# Patient Record
Sex: MALE | Race: WHITE | URBAN - METROPOLITAN AREA
[De-identification: names, ages, dates, MRNs, and addresses within clinical notes are randomized per-mention and may not be internally consistent; named-entity substitution may affect disease eponyms.]

---

## 2017-05-16 ENCOUNTER — EMERGENCY (EMERGENCY)
Facility: HOSPITAL | Age: 52
LOS: 0 days | Discharge: HOME | End: 2017-05-16

## 2017-06-28 DIAGNOSIS — Z79.84 LONG TERM (CURRENT) USE OF ORAL HYPOGLYCEMIC DRUGS: ICD-10-CM

## 2017-06-28 DIAGNOSIS — K85.90 ACUTE PANCREATITIS WITHOUT NECROSIS OR INFECTION, UNSPECIFIED: ICD-10-CM

## 2017-06-28 DIAGNOSIS — R10.13 EPIGASTRIC PAIN: ICD-10-CM

## 2017-06-28 DIAGNOSIS — Z79.899 OTHER LONG TERM (CURRENT) DRUG THERAPY: ICD-10-CM

## 2017-06-28 DIAGNOSIS — I10 ESSENTIAL (PRIMARY) HYPERTENSION: ICD-10-CM

## 2017-06-28 DIAGNOSIS — E11.9 TYPE 2 DIABETES MELLITUS WITHOUT COMPLICATIONS: ICD-10-CM

## 2019-04-03 ENCOUNTER — EMERGENCY (EMERGENCY)
Facility: HOSPITAL | Age: 54
LOS: 0 days | Discharge: HOME | End: 2019-04-03
Attending: EMERGENCY MEDICINE | Admitting: EMERGENCY MEDICINE
Payer: COMMERCIAL

## 2019-04-03 VITALS
DIASTOLIC BLOOD PRESSURE: 86 MMHG | HEART RATE: 85 BPM | OXYGEN SATURATION: 98 % | SYSTOLIC BLOOD PRESSURE: 176 MMHG | TEMPERATURE: 98 F | RESPIRATION RATE: 18 BRPM

## 2019-04-03 VITALS — HEIGHT: 70 IN | WEIGHT: 223.99 LBS

## 2019-04-03 DIAGNOSIS — Z23 ENCOUNTER FOR IMMUNIZATION: ICD-10-CM

## 2019-04-03 DIAGNOSIS — I10 ESSENTIAL (PRIMARY) HYPERTENSION: ICD-10-CM

## 2019-04-03 DIAGNOSIS — S61.311A LACERATION WITHOUT FOREIGN BODY OF LEFT INDEX FINGER WITH DAMAGE TO NAIL, INITIAL ENCOUNTER: ICD-10-CM
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
## 2019-04-03 DIAGNOSIS — E11.9 TYPE 2 DIABETES MELLITUS WITHOUT COMPLICATIONS: ICD-10-CM

## 2019-04-03 DIAGNOSIS — Z79.899 OTHER LONG TERM (CURRENT) DRUG THERAPY: ICD-10-CM
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
## 2019-04-03 DIAGNOSIS — Z87.19 PERSONAL HISTORY OF OTHER DISEASES OF THE DIGESTIVE SYSTEM: ICD-10-CM

## 2019-04-03 DIAGNOSIS — Z79.84 LONG TERM (CURRENT) USE OF ORAL HYPOGLYCEMIC DRUGS: ICD-10-CM

## 2019-04-03 PROCEDURE — 11760 REPAIR OF NAIL BED: CPT

## 2019-04-03 PROCEDURE — 99283 EMERGENCY DEPT VISIT LOW MDM: CPT | Mod: 25

## 2019-04-03 RX ORDER — TETANUS TOXOID, REDUCED DIPHTHERIA TOXOID AND ACELLULAR PERTUSSIS VACCINE, ADSORBED 5; 2.5; 8; 8; 2.5 [IU]/.5ML; [IU]/.5ML; UG/.5ML; UG/.5ML; UG/.5ML
0.5 SUSPENSION INTRAMUSCULAR ONCE
Qty: 0 | Refills: 0 | Status: COMPLETED | OUTPATIENT
Start: 2019-04-03 | End: 2019-04-03

## 2019-04-03 RX ORDER — CEPHALEXIN 500 MG
1 CAPSULE ORAL
Qty: 28 | Refills: 0
Start: 2019-04-03 | End: 2019-04-09

## 2019-04-03 RX ORDER — IBUPROFEN 200 MG
600 TABLET ORAL ONCE
Qty: 0 | Refills: 0 | Status: COMPLETED | OUTPATIENT
Start: 2019-04-03 | End: 2019-04-03

## 2019-04-03 RX ADMIN — Medication 600 MILLIGRAM(S): at 18:34

## 2019-04-03 RX ADMIN — TETANUS TOXOID, REDUCED DIPHTHERIA TOXOID AND ACELLULAR PERTUSSIS VACCINE, ADSORBED 0.5 MILLILITER(S): 5; 2.5; 8; 8; 2.5 SUSPENSION INTRAMUSCULAR at 18:34

## 2019-04-03 NOTE — ED PROVIDER NOTE - NS ED ROS FT
Review of Systems:  	•	CONSTITUTIONAL - no fever, no diaphoresis, no chills  	•	SKIN - left index laceration   	•	RESPIRATORY - no shortness of breath, no cough  	•	CARDIAC - no chest pain, no palpitations    	•	MUSCULOSKELETAL - no joint paint, no swelling, no redness  	•	NEUROLOGIC - no weakness, no headache, no paresthesias, no LOC  	•	PSYCH - no anxiety, non suicidal, non homicidal, no hallucination, no depression

## 2019-04-03 NOTE — ED PROVIDER NOTE - CLINICAL SUMMARY MEDICAL DECISION MAKING FREE TEXT BOX
53 male diabetic here for finger laceration had complex wound repair, update of Td and started on PO ABX for existing diabetes. Will discharge with outpatient management.

## 2019-04-03 NOTE — ED PROVIDER NOTE - PHYSICAL EXAMINATION
--EXAM--  VITAL SIGNS: I have reviewed vs documented at present.  CONSTITUTIONAL: Well-developed; well-nourished; in no acute distress.   SKIN: left index finger laceration  skin is torn and avusions. there is also a laceration under nail bed  HEAD: Normocephalic; atraumatic.  EYES: PERRL, EOM intact; conjunctiva and sclera clear. No nystagmus.  ENT: No nasal discharge; airway clear.  NECK: Supple; non tender.  CARD: S1, S2, Regular rate and rhythm.   RESP: No wheezes, rales or rhonchi.  ABD: Normal bowel sounds; soft; non-distended; non-tender.  EXT: Normal ROM.   NEURO: Alert, oriented, grossly unremarkable. Strength 5/5 in all extremities. Sensation intact throughout.  PSYCH: Cooperative, appropriate.

## 2019-04-03 NOTE — ED PROVIDER NOTE - OBJECTIVE STATEMENT
this is 52 yo male presents to ed for evaluation of laceration to index finger on left.  patient states he cut himself on saw. patient is diabetic but it is not on blood thinners.  patient states wound keeps bleeding

## 2019-04-03 NOTE — ED PROCEDURE NOTE - CPROC ED LACER REPAIR DETAIL1
The wound was explored to base in bloodless field./nail partially removed/Nail was excised./Undermining was performed.

## 2019-04-12 ENCOUNTER — EMERGENCY (EMERGENCY)
Facility: HOSPITAL | Age: 54
LOS: 0 days | Discharge: HOME | End: 2019-04-12
Admitting: EMERGENCY MEDICINE

## 2019-04-12 VITALS
SYSTOLIC BLOOD PRESSURE: 190 MMHG | RESPIRATION RATE: 18 BRPM | WEIGHT: 225.09 LBS | OXYGEN SATURATION: 98 % | HEART RATE: 79 BPM | DIASTOLIC BLOOD PRESSURE: 104 MMHG | HEIGHT: 70 IN | TEMPERATURE: 96 F

## 2019-04-12 DIAGNOSIS — Z79.899 OTHER LONG TERM (CURRENT) DRUG THERAPY: ICD-10-CM
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
## 2019-04-12 DIAGNOSIS — E11.9 TYPE 2 DIABETES MELLITUS WITHOUT COMPLICATIONS: ICD-10-CM

## 2019-04-12 DIAGNOSIS — S61.211D LACERATION WITHOUT FOREIGN BODY OF LEFT INDEX FINGER WITHOUT DAMAGE TO NAIL, SUBSEQUENT ENCOUNTER: ICD-10-CM

## 2019-04-12 DIAGNOSIS — Z79.84 LONG TERM (CURRENT) USE OF ORAL HYPOGLYCEMIC DRUGS: ICD-10-CM

## 2019-04-12 DIAGNOSIS — Z87.19 PERSONAL HISTORY OF OTHER DISEASES OF THE DIGESTIVE SYSTEM: ICD-10-CM

## 2019-04-12 DIAGNOSIS — Z79.2 LONG TERM (CURRENT) USE OF ANTIBIOTICS: ICD-10-CM

## 2019-04-12 DIAGNOSIS — I10 ESSENTIAL (PRIMARY) HYPERTENSION: ICD-10-CM

## 2019-04-12 NOTE — ED PROVIDER NOTE - NSFOLLOWUPINSTRUCTIONS_ED_ALL_ED_FT
clean with soap and water daily, Cover with bacitracin ointment, Return to ED if any problems arise.

## 2019-04-12 NOTE — ED PROVIDER NOTE - CLINICAL SUMMARY MEDICAL DECISION MAKING FREE TEXT BOX
Sutures removed, Wound healing well, Covered with bacitracin dressing, Patient informed to have BP rechecked by PMD

## 2020-05-14 ENCOUNTER — INPATIENT (INPATIENT)
Facility: HOSPITAL | Age: 55
LOS: 1 days | Discharge: HOME | End: 2020-05-16
Attending: INTERNAL MEDICINE | Admitting: INTERNAL MEDICINE
Payer: COMMERCIAL

## 2020-05-14 VITALS
DIASTOLIC BLOOD PRESSURE: 126 MMHG | SYSTOLIC BLOOD PRESSURE: 240 MMHG | RESPIRATION RATE: 20 BRPM | TEMPERATURE: 98 F | WEIGHT: 225.09 LBS | OXYGEN SATURATION: 96 % | HEIGHT: 70 IN | HEART RATE: 107 BPM

## 2020-05-14 LAB
ALBUMIN SERPL ELPH-MCNC: 4.2 G/DL — SIGNIFICANT CHANGE UP (ref 3.5–5.2)
ALP SERPL-CCNC: 83 U/L — SIGNIFICANT CHANGE UP (ref 30–115)
ALT FLD-CCNC: 16 U/L — SIGNIFICANT CHANGE UP (ref 0–41)
ANION GAP SERPL CALC-SCNC: 16 MMOL/L — HIGH (ref 7–14)
APTT BLD: 38 SEC — SIGNIFICANT CHANGE UP (ref 27–39.2)
AST SERPL-CCNC: 15 U/L — SIGNIFICANT CHANGE UP (ref 0–41)
B-OH-BUTYR SERPL-SCNC: 1.2 MMOL/L — HIGH
BASE EXCESS BLDV CALC-SCNC: 1.4 MMOL/L — SIGNIFICANT CHANGE UP (ref -2–2)
BASOPHILS # BLD AUTO: 0.09 K/UL — SIGNIFICANT CHANGE UP (ref 0–0.2)
BASOPHILS NFR BLD AUTO: 1 % — SIGNIFICANT CHANGE UP (ref 0–1)
BILIRUB SERPL-MCNC: 0.4 MG/DL — SIGNIFICANT CHANGE UP (ref 0.2–1.2)
BUN SERPL-MCNC: 19 MG/DL — SIGNIFICANT CHANGE UP (ref 10–20)
CA-I SERPL-SCNC: 1.18 MMOL/L — SIGNIFICANT CHANGE UP (ref 1.12–1.3)
CALCIUM SERPL-MCNC: 9 MG/DL — SIGNIFICANT CHANGE UP (ref 8.5–10.1)
CHLORIDE SERPL-SCNC: 100 MMOL/L — SIGNIFICANT CHANGE UP (ref 98–110)
CO2 SERPL-SCNC: 22 MMOL/L — SIGNIFICANT CHANGE UP (ref 17–32)
CREAT SERPL-MCNC: 0.9 MG/DL — SIGNIFICANT CHANGE UP (ref 0.7–1.5)
EOSINOPHIL # BLD AUTO: 0.24 K/UL — SIGNIFICANT CHANGE UP (ref 0–0.7)
EOSINOPHIL NFR BLD AUTO: 2.5 % — SIGNIFICANT CHANGE UP (ref 0–8)
GAS PNL BLDV: 140 MMOL/L — SIGNIFICANT CHANGE UP (ref 136–145)
GAS PNL BLDV: SIGNIFICANT CHANGE UP
GLUCOSE BLDC GLUCOMTR-MCNC: 269 MG/DL — HIGH (ref 70–99)
GLUCOSE SERPL-MCNC: 390 MG/DL — HIGH (ref 70–99)
HCO3 BLDV-SCNC: 27 MMOL/L — SIGNIFICANT CHANGE UP (ref 22–29)
HCT VFR BLD CALC: 51.6 % — SIGNIFICANT CHANGE UP (ref 42–52)
HCT VFR BLDA CALC: 51.5 % — HIGH (ref 34–44)
HGB BLD CALC-MCNC: 16.8 G/DL — SIGNIFICANT CHANGE UP (ref 14–18)
HGB BLD-MCNC: 17.6 G/DL — SIGNIFICANT CHANGE UP (ref 14–18)
HOROWITZ INDEX BLDV+IHG-RTO: 21 — SIGNIFICANT CHANGE UP
IMM GRANULOCYTES NFR BLD AUTO: 0.7 % — HIGH (ref 0.1–0.3)
INR BLD: 0.93 RATIO — SIGNIFICANT CHANGE UP (ref 0.65–1.3)
LACTATE BLDV-MCNC: 1.3 MMOL/L — SIGNIFICANT CHANGE UP (ref 0.5–1.6)
LYMPHOCYTES # BLD AUTO: 2.13 K/UL — SIGNIFICANT CHANGE UP (ref 1.2–3.4)
LYMPHOCYTES # BLD AUTO: 22.6 % — SIGNIFICANT CHANGE UP (ref 20.5–51.1)
MCHC RBC-ENTMCNC: 29.3 PG — SIGNIFICANT CHANGE UP (ref 27–31)
MCHC RBC-ENTMCNC: 34.1 G/DL — SIGNIFICANT CHANGE UP (ref 32–37)
MCV RBC AUTO: 86 FL — SIGNIFICANT CHANGE UP (ref 80–94)
MONOCYTES # BLD AUTO: 0.64 K/UL — HIGH (ref 0.1–0.6)
MONOCYTES NFR BLD AUTO: 6.8 % — SIGNIFICANT CHANGE UP (ref 1.7–9.3)
NEUTROPHILS # BLD AUTO: 6.26 K/UL — SIGNIFICANT CHANGE UP (ref 1.4–6.5)
NEUTROPHILS NFR BLD AUTO: 66.4 % — SIGNIFICANT CHANGE UP (ref 42.2–75.2)
NRBC # BLD: 0 /100 WBCS — SIGNIFICANT CHANGE UP (ref 0–0)
PCO2 BLDV: 44 MMHG — SIGNIFICANT CHANGE UP (ref 41–51)
PH BLDV: 7.4 — SIGNIFICANT CHANGE UP (ref 7.26–7.43)
PLATELET # BLD AUTO: 339 K/UL — SIGNIFICANT CHANGE UP (ref 130–400)
PO2 BLDV: 38 MMHG — SIGNIFICANT CHANGE UP (ref 20–40)
POTASSIUM BLDV-SCNC: 4 MMOL/L — SIGNIFICANT CHANGE UP (ref 3.3–5.6)
POTASSIUM SERPL-MCNC: 4.1 MMOL/L — SIGNIFICANT CHANGE UP (ref 3.5–5)
POTASSIUM SERPL-SCNC: 4.1 MMOL/L — SIGNIFICANT CHANGE UP (ref 3.5–5)
PROT SERPL-MCNC: 6.9 G/DL — SIGNIFICANT CHANGE UP (ref 6–8)
PROTHROM AB SERPL-ACNC: 10.7 SEC — SIGNIFICANT CHANGE UP (ref 9.95–12.87)
RBC # BLD: 6 M/UL — SIGNIFICANT CHANGE UP (ref 4.7–6.1)
RBC # FLD: 12.9 % — SIGNIFICANT CHANGE UP (ref 11.5–14.5)
SAO2 % BLDV: 75 % — SIGNIFICANT CHANGE UP
SARS-COV-2 RNA SPEC QL NAA+PROBE: SIGNIFICANT CHANGE UP
SODIUM SERPL-SCNC: 138 MMOL/L — SIGNIFICANT CHANGE UP (ref 135–146)
TROPONIN T SERPL-MCNC: <0.01 NG/ML — SIGNIFICANT CHANGE UP
WBC # BLD: 9.43 K/UL — SIGNIFICANT CHANGE UP (ref 4.8–10.8)
WBC # FLD AUTO: 9.43 K/UL — SIGNIFICANT CHANGE UP (ref 4.8–10.8)

## 2020-05-14 PROCEDURE — 70496 CT ANGIOGRAPHY HEAD: CPT | Mod: 26

## 2020-05-14 PROCEDURE — 70450 CT HEAD/BRAIN W/O DYE: CPT | Mod: 26,59

## 2020-05-14 PROCEDURE — ZZZZZ: CPT

## 2020-05-14 PROCEDURE — 99285 EMERGENCY DEPT VISIT HI MDM: CPT

## 2020-05-14 PROCEDURE — 70498 CT ANGIOGRAPHY NECK: CPT | Mod: 26

## 2020-05-14 PROCEDURE — 99222 1ST HOSP IP/OBS MODERATE 55: CPT

## 2020-05-14 RX ORDER — PANTOPRAZOLE SODIUM 20 MG/1
40 TABLET, DELAYED RELEASE ORAL
Refills: 0 | Status: DISCONTINUED | OUTPATIENT
Start: 2020-05-14 | End: 2020-05-16

## 2020-05-14 RX ORDER — DIPHENHYDRAMINE HCL 50 MG
50 CAPSULE ORAL ONCE
Refills: 0 | Status: COMPLETED | OUTPATIENT
Start: 2020-05-14 | End: 2020-05-14

## 2020-05-14 RX ORDER — HYDROCHLOROTHIAZIDE 25 MG
25 TABLET ORAL DAILY
Refills: 0 | Status: DISCONTINUED | OUTPATIENT
Start: 2020-05-14 | End: 2020-05-16

## 2020-05-14 RX ORDER — ASPIRIN/CALCIUM CARB/MAGNESIUM 324 MG
324 TABLET ORAL DAILY
Refills: 0 | Status: DISCONTINUED | OUTPATIENT
Start: 2020-05-14 | End: 2020-05-16

## 2020-05-14 RX ORDER — LABETALOL HCL 100 MG
10 TABLET ORAL ONCE
Refills: 0 | Status: COMPLETED | OUTPATIENT
Start: 2020-05-14 | End: 2020-05-14

## 2020-05-14 RX ORDER — LISINOPRIL 2.5 MG/1
20 TABLET ORAL DAILY
Refills: 0 | Status: DISCONTINUED | OUTPATIENT
Start: 2020-05-14 | End: 2020-05-15

## 2020-05-14 RX ORDER — METOPROLOL TARTRATE 50 MG
25 TABLET ORAL DAILY
Refills: 0 | Status: DISCONTINUED | OUTPATIENT
Start: 2020-05-14 | End: 2020-05-16

## 2020-05-14 RX ORDER — ATORVASTATIN CALCIUM 80 MG/1
80 TABLET, FILM COATED ORAL ONCE
Refills: 0 | Status: COMPLETED | OUTPATIENT
Start: 2020-05-14 | End: 2020-05-14

## 2020-05-14 RX ADMIN — Medication 10 MILLIGRAM(S): at 20:08

## 2020-05-14 RX ADMIN — Medication 324 MILLIGRAM(S): at 19:55

## 2020-05-14 NOTE — ED ADULT NURSE NOTE - NSIMPLEMENTINTERV_GEN_ALL_ED
Implemented All Universal Safety Interventions:  [place] to call system. Call bell, personal items and telephone within reach. Instruct patient to call for assistance. Room bathroom lighting operational. Non-slip footwear when patient is off stretcher. Physically safe environment: no spills, clutter or unnecessary equipment. Stretcher in lowest position, wheels locked, appropriate side rails in place.

## 2020-05-14 NOTE — ED ADULT TRIAGE NOTE - MODE OF ARRIVAL
Urology Consult History and Physical    Name: Jose Juan Araujo    MRN: [id number]   YOB: 1958       We were asked to see Jose Juan Araujo at the request of Dr. Valdez for evaluation and treatment of ureteral calculus.        Chief Complaint:   Ureteral calculus     History is obtained from the patient          History of Present Illness:   Jose Juan Araujo is a 60 year old male who is being seen for evaluation of left ureteral calculus. Patient has long history of stones with multiple episodes of renal colic over the past 30 years. More recently required ureteroscopy with laser lithotripsy and stent for a larger stone. Patient was here on business and noted abdominal pain. Has history of diverticulitis and was initially unsure of the cause of the pain. The pain worsened and he realized this was likely renal colic.   He presented to [facility] ED and was found to have a mid ureteral 5mm calculus on the left. He initially had some difficulty with pain but currently is comfortable with PO medication.     We discussed ureteroscopy vs MET. Discussed pain management while traveling. Discussed close follow up when patient returns home.            Past Medical History:   No past medical history on file.         Past Surgical History:   No past surgical history on file.         Social History:     Social History     Tobacco Use     Smoking status: Not on file   Substance Use Topics     Alcohol use: Not on file            Family History:   No family history on file.           Allergies:   No Known Allergies         Medications:     Current Facility-Administered Medications   Medication     acetaminophen (TYLENOL) Suppository 650 mg     acetaminophen (TYLENOL) tablet 650 mg     lidocaine (LMX4) cream     lidocaine 1 % 0.1-1 mL     magnesium hydroxide (MILK OF MAGNESIA) suspension 30 mL     melatonin tablet 1 mg     morphine (PF) injection 2-4 mg     naloxone (NARCAN) injection 0.1-0.4 mg     ondansetron  "(ZOFRAN-ODT) ODT tab 4 mg    Or     ondansetron (ZOFRAN) injection 4 mg     oxyCODONE-acetaminophen (PERCOCET) 5-325 MG per tablet 1-2 tablet     senna-docusate (SENOKOT-S/PERICOLACE) 8.6-50 MG per tablet 1 tablet    Or     senna-docusate (SENOKOT-S/PERICOLACE) 8.6-50 MG per tablet 2 tablet     sodium chloride (PF) 0.9% PF flush 3 mL     sodium chloride (PF) 0.9% PF flush 3 mL     sodium chloride 0.9% infusion     tamsulosin (FLOMAX) capsule 0.4 mg             Review of Systems:    ROS: 10 point ROS neg other than the symptoms noted above in the HPI and chart.          Physical Exam:     Patient Vitals for the past 24 hrs:   BP Temp Temp src Pulse Heart Rate Resp SpO2 Height Weight   06/13/19 0739 128/83 97.8  F (36.6  C) Oral 53 53 16 97 % -- --   06/13/19 0641 -- -- -- -- -- 16 -- -- --   06/13/19 0603 -- -- -- -- -- 16 -- -- --   06/13/19 0548 -- -- -- -- -- 16 -- -- --   06/13/19 0448 132/86 98.1  F (36.7  C) Oral -- 56 16 97 % -- --   06/13/19 0302 131/85 -- -- -- 58 16 96 % -- --   06/13/19 0200 -- -- -- -- -- -- 96 % -- --   06/13/19 0110 124/79 -- -- 57 -- -- 95 % -- --   06/13/19 0023 134/83 98.1  F (36.7  C) Temporal 74 74 20 98 % 1.778 m (5' 10\") 83.9 kg (185 lb)     General: age-appropriate appearing male in NAD.  body habitus.  HEENT: Head AT/NC, EOMI, CN Grossly intact  Resp: no respiratory distress,   CV: negative   Back:  no CVAT bilaterally.  Abdomen: soft, non-distended, non-tender  Neuro:  moving all 4 extremities equally.  Skin: clear of rashes or ecchymoses.          Data:   All laboratory data reviewed:    Recent Labs   Lab 06/13/19  0030   WBC 8.6   HGB 14.3        Recent Labs   Lab 06/13/19  0030      POTASSIUM 4.0   CHLORIDE 106   CO2 27   BUN 28   CR 1.47*   *   IDANIA 8.8     Recent Labs   Lab 06/13/19  0103   COLOR Yellow   APPEARANCE Clear   URINEGLC Negative   URINEBILI Negative   URINEKETONE Negative   SG 1.026   URINEPH 5.0   PROTEIN Negative   NITRITE Negative "   LEUKEST Negative   RBCU 53*   WBCU 1       All pertinent imaging reviewed:    CT scan of the abdomen:   Left ureteral calculus        CT scan of the pelvis:   See above             Impression and Plan:   Impression:   59 y/o M with long history of nephrolithiasis currently with left mid ureteral calculus       Plan:   Toradol 15 mg now   Continue percocet   Patient may be discharged and has scheduled follow up in [place]         Anuradha German MD  June 13, 2019        [employer]

## 2020-05-14 NOTE — H&P ADULT - HISTORY OF PRESENT ILLNESS
Pt's a 55 yo male w/PMH as noted below. Pt was told to go to hospital for  left facial droop , noticed by co workers and neighbor , @ around 5 pm.  pt was also told he had slurred speech w/ weakness of lower extremities.  pt denied- LOC, headache, loss of: vision or sensation.  presently in ER pt w/ still some left facial droop despite full beard, no other focal signs noted. neurologist was called by ER MD, who request neuro checks q 1 hr. pt . transfer to ICu

## 2020-05-14 NOTE — ED PROVIDER NOTE - CLINICAL SUMMARY MEDICAL DECISION MAKING FREE TEXT BOX
Stroke code called.  not a candidate for tpa.  Neuro rec aspirin, Lipitor, q 1 hr neuro checks.  will admit to crit care setting for neuro checks

## 2020-05-14 NOTE — ED PROVIDER NOTE - PROGRESS NOTE DETAILS
Stroke code called Authored by PAULA England: spoke with Neurology (Dr. Ross) pt is out of the window for TPA, wants CTA of head and neck and admission to stroke unit. Authored by PAULA England: spoke with Dr. Ross, will await official CTA reading, but states no acute intervention at this time. start ASA and lipitor 80mg and would like Q1hr neuro checks.  Pt also noted to have itchiness to neck and wrist after receiving contrast. Re-evaluated pt states it has improved no swelling, no difficulty breathing. offered benadryl and solu-medrol, pt refusing at this time. pt approved for ICU by Dr. Douglass.

## 2020-05-14 NOTE — ED PROVIDER NOTE - OBJECTIVE STATEMENT
The pt is a 54y M with PMH HTN and DM is presenting to ED with concern for stroke unknown well. Pt states his neighbor noticed a facial droop and slurred speech, pt is unsure if it started today. Pt states he has felt dizzy and off balance for a week now. no aggravating or relieving factors. Pt states he feels like it started soon after starting Lipitor. Pt denies any aphasia, noticeable weakness or slurred speech, trauma, fall, f/c/n/v/d, abd pain, cp, sob, palpitations, HA, visual changes.

## 2020-05-14 NOTE — ED ADULT TRIAGE NOTE - CHIEF COMPLAINT QUOTE
c/o light headed and weak in his legs. States his speech is slurred.  Pt has a slight left sided facial droop.   Stroke Code called.  .

## 2020-05-14 NOTE — ED PROVIDER NOTE - NS ED ROS FT
GEN: (-) fever, (-) chills, (-) malaise  HEENT: (-) vision changes, (-) HA, (-) sore throat, (-) ear pain, (-) tinnitus   CV: (-) chest pain, (-) palpitations, (-) edema  PULM: (-) cough, (-) wheezing, (-) dyspnea  GI: (-) abdominal pain,(-) Nausea, (-) Vomiting, (-) Diarrhea  NEURO: (+) weakness, (-) paresthesias, (-) syncope, (+) Dizziness  : (-) dysuria, (-) frequency, (-) urgency, (-) incontinence   MS: (-) back pain, (-) joint pain, (-)myalgias, (-) swelling  SKIN: (-) rashes, (-) new lesions  HEME: (-) bleeding, (-) ecchymosis

## 2020-05-14 NOTE — ED PROVIDER NOTE - CARE PLAN
Principal Discharge DX:	Dizziness  Secondary Diagnosis:	Facial droop  Secondary Diagnosis:	Weakness  Secondary Diagnosis:	Hyperglycemia  Secondary Diagnosis:	Hypertension

## 2020-05-14 NOTE — H&P ADULT - NSHPPHYSICALEXAM_GEN_ALL_CORE
GENERAL:  53y/o WN /WN  white Male NAD, resting comfortably.  HEAD:  Atraumatic, Normocephalic, some left facial droop  EYES: EOMI, PERRLA, conjunctiva and sclera clear  NECK: Supple, No JVD, no cervical lymphadenopathy, non-tender  CHEST/LUNG: Clear to auscultation bilaterally; No wheeze, rhonchi, or rales  HEART: Regular rate and rhythm; S1&S2  ABDOMEN: Soft, Nontender, Nondistended x 4 quadrants; Bowel sounds present  EXTREMITIES:   Peripheral Pulses Present, No clubbing, no cyanosis, or no edema, no calf tenderness  PSYCH: AAOx3, cooperative, appropriate  NEUROLOGY: cr n 1-12 grossly intact , no motor deficits, strength 5/5 x 4 extremities  SKIN: WNL

## 2020-05-14 NOTE — H&P ADULT - ATTENDING COMMENTS
Pt seen and examined at bedside with MICU team on 5/14/2020    I agree with PA physical exam and plan.    - pt has left face droop  - c/w asa, lipitor   - neuro fu   - carotid doppler, 2 d echo,   - neruocheck q 1 hr

## 2020-05-14 NOTE — H&P ADULT - NSHPLABSRESULTS_GEN_ALL_CORE
17.6   9.43  )-----------( 339      ( 14 May 2020 18:52 )             51.6       05-14    138  |  100  |  19  ----------------------------<  390<H>  4.1   |  22  |  0.9    Ca    9.0      14 May 2020 18:52    TPro  6.9  /  Alb  4.2  /  TBili  0.4  /  DBili  x   /  AST  15  /  ALT  16  /  AlkPhos  83  05-14                  PT/INR - ( 14 May 2020 18:52 )   PT: 10.70 sec;   INR: 0.93 ratio         PTT - ( 14 May 2020 18:52 )  PTT:38.0 sec    Lactate Trend      CARDIAC MARKERS ( 14 May 2020 18:52 )  x     / <0.01 ng/mL / x     / x     / x            CAPILLARY BLOOD GLUCOSE  404 (14 May 2020 20:27)      POCT Blood Glucose.: 269 mg/dL (14 May 2020 22:28)

## 2020-05-14 NOTE — CHART NOTE - NSCHARTNOTEFT_GEN_A_CORE
Paged about stroke code at 6.46 PM    62 year old man with Hx of DM and HTN who presents with slurred speech, left facial and arm weakness. Last known well is known. Per report patient had imbalance for the past week and this afternoon was visiting his neighbor when he noted that patient has left facial weakness. Per NP patient has slurred speech, left facial weakness and left arm weakness. CT head showed no acute pathology. Patient is not a candidate for IV Tpa since his last known well is unknown. CT angiogram of head and neck was done which showed no large vessel occlusion, official reading is pending.     - Admit to ICU with q1h neurocheck  - ASA 81 and Lipitor 80  - TSH, A1c and Lipid profile   - Cardiac monitoring   - TTE  - Repeat CT head in 24 hours.

## 2020-05-14 NOTE — ED PROVIDER NOTE - PHYSICAL EXAMINATION
GEN: Alert & Oriented x 3, No acute distress. Calm, appropriate.  Head and Neck: Normocephalic, atraumatic.   ENT: Oral mucosa pink, moist without lesions.   Eyes: PERRL. EOMI. no nystagmus. No conjunctival injection. No scleral icterus.  RESP: Lungs clear to auscult bilat. no wheezes, rhonchi or rales. No retractions. Equal air entry.  CARDIO: regular rate and rhythm, no murmurs, rubs or gallops. Normal S1, S2. Radial pulses 2+ bilaterally. No lower extremity edema.  ABD: Soft, Nondistended. No rebound tenderness/guarding. No pulsatile mass. No tenderness with palpation x 4 quadrants.  MS: grossly moving all extremities.   SKIN: no rashes/lesions, no petechiae, no ecchymosis.  Neuro: A&O x3, CN II- XII intact, strength 4/5 left upper ext. 5/5 strength right upper ext, 5/5 lower ext strength, sensation intact. Speech & mentation intact. Left sided facial droop. Without dysarthria or aphasia. Finger to nose intact. Romberg Neg. Neg. nuchal rigidity.

## 2020-05-14 NOTE — H&P ADULT - ASSESSMENT
1.  r/o right CVA  2. hx- HTN, DM 1.  r/o right CVA  2. hx- HTN, DM    Discussed w/ ICU MD , adm to ICU  pt not deemed as a candidate for tpa and presently has only  slight left facial droop.  pt able to speak clearly no dysarrthria ,pt able to swallow, as evaluated initially by RN  will start diet  neuro checks q 1 hr (as requested by neurologist)  aspirin 325 qd  plavix 75 qd  lipitor 40  ck lipid panel  carotid doppler  echocardiogram  PT/OT evaluation  ecg in am  f/u repeat CT brain in about 12 hrs  may need MRI head neck pending ICU team and neurologist discretion.

## 2020-05-15 LAB
CHOLEST SERPL-MCNC: 202 MG/DL — HIGH (ref 100–200)
D DIMER BLD IA.RAPID-MCNC: 222 NG/ML DDU — SIGNIFICANT CHANGE UP (ref 0–230)
GLUCOSE BLDC GLUCOMTR-MCNC: 233 MG/DL — HIGH (ref 70–99)
GLUCOSE BLDC GLUCOMTR-MCNC: 276 MG/DL — HIGH (ref 70–99)
GLUCOSE BLDC GLUCOMTR-MCNC: 297 MG/DL — HIGH (ref 70–99)
GLUCOSE BLDC GLUCOMTR-MCNC: 304 MG/DL — HIGH (ref 70–99)
GLUCOSE BLDC GLUCOMTR-MCNC: 332 MG/DL — HIGH (ref 70–99)
GLUCOSE BLDC GLUCOMTR-MCNC: 578 MG/DL — CRITICAL HIGH (ref 70–99)
HDLC SERPL-MCNC: 34 MG/DL — LOW
LIPID PNL WITH DIRECT LDL SERPL: 138 MG/DL — HIGH (ref 4–129)
TOTAL CHOLESTEROL/HDL RATIO MEASUREMENT: 5.9 RATIO — HIGH (ref 4–5.5)
TRIGL SERPL-MCNC: 196 MG/DL — HIGH (ref 10–149)

## 2020-05-15 PROCEDURE — 99233 SBSQ HOSP IP/OBS HIGH 50: CPT

## 2020-05-15 PROCEDURE — 70450 CT HEAD/BRAIN W/O DYE: CPT | Mod: 26

## 2020-05-15 PROCEDURE — 99222 1ST HOSP IP/OBS MODERATE 55: CPT

## 2020-05-15 PROCEDURE — 70551 MRI BRAIN STEM W/O DYE: CPT | Mod: 26

## 2020-05-15 RX ORDER — DEXTROSE 50 % IN WATER 50 %
12.5 SYRINGE (ML) INTRAVENOUS ONCE
Refills: 0 | Status: DISCONTINUED | OUTPATIENT
Start: 2020-05-15 | End: 2020-05-16

## 2020-05-15 RX ORDER — DEXTROSE 50 % IN WATER 50 %
25 SYRINGE (ML) INTRAVENOUS ONCE
Refills: 0 | Status: DISCONTINUED | OUTPATIENT
Start: 2020-05-15 | End: 2020-05-16

## 2020-05-15 RX ORDER — SODIUM CHLORIDE 9 MG/ML
1000 INJECTION, SOLUTION INTRAVENOUS
Refills: 0 | Status: DISCONTINUED | OUTPATIENT
Start: 2020-05-15 | End: 2020-05-16

## 2020-05-15 RX ORDER — LISINOPRIL 2.5 MG/1
40 TABLET ORAL DAILY
Refills: 0 | Status: DISCONTINUED | OUTPATIENT
Start: 2020-05-16 | End: 2020-05-16

## 2020-05-15 RX ORDER — ENOXAPARIN SODIUM 100 MG/ML
40 INJECTION SUBCUTANEOUS DAILY
Refills: 0 | Status: DISCONTINUED | OUTPATIENT
Start: 2020-05-15 | End: 2020-05-16

## 2020-05-15 RX ORDER — INSULIN LISPRO 100/ML
5 VIAL (ML) SUBCUTANEOUS
Refills: 0 | Status: DISCONTINUED | OUTPATIENT
Start: 2020-05-15 | End: 2020-05-15

## 2020-05-15 RX ORDER — LISINOPRIL 2.5 MG/1
20 TABLET ORAL ONCE
Refills: 0 | Status: COMPLETED | OUTPATIENT
Start: 2020-05-15 | End: 2020-05-15

## 2020-05-15 RX ORDER — DEXTROSE 50 % IN WATER 50 %
15 SYRINGE (ML) INTRAVENOUS ONCE
Refills: 0 | Status: DISCONTINUED | OUTPATIENT
Start: 2020-05-15 | End: 2020-05-15

## 2020-05-15 RX ORDER — INSULIN GLARGINE 100 [IU]/ML
21 INJECTION, SOLUTION SUBCUTANEOUS AT BEDTIME
Refills: 0 | Status: DISCONTINUED | OUTPATIENT
Start: 2020-05-15 | End: 2020-05-16

## 2020-05-15 RX ORDER — ATORVASTATIN CALCIUM 80 MG/1
40 TABLET, FILM COATED ORAL AT BEDTIME
Refills: 0 | Status: DISCONTINUED | OUTPATIENT
Start: 2020-05-15 | End: 2020-05-16

## 2020-05-15 RX ORDER — DEXTROSE 50 % IN WATER 50 %
12.5 SYRINGE (ML) INTRAVENOUS ONCE
Refills: 0 | Status: DISCONTINUED | OUTPATIENT
Start: 2020-05-15 | End: 2020-05-15

## 2020-05-15 RX ORDER — INSULIN GLARGINE 100 [IU]/ML
12 INJECTION, SOLUTION SUBCUTANEOUS ONCE
Refills: 0 | Status: COMPLETED | OUTPATIENT
Start: 2020-05-15 | End: 2020-05-15

## 2020-05-15 RX ORDER — GLUCAGON INJECTION, SOLUTION 0.5 MG/.1ML
1 INJECTION, SOLUTION SUBCUTANEOUS ONCE
Refills: 0 | Status: DISCONTINUED | OUTPATIENT
Start: 2020-05-15 | End: 2020-05-16

## 2020-05-15 RX ORDER — INSULIN GLARGINE 100 [IU]/ML
12 INJECTION, SOLUTION SUBCUTANEOUS AT BEDTIME
Refills: 0 | Status: DISCONTINUED | OUTPATIENT
Start: 2020-05-15 | End: 2020-05-15

## 2020-05-15 RX ORDER — INSULIN LISPRO 100/ML
VIAL (ML) SUBCUTANEOUS
Refills: 0 | Status: DISCONTINUED | OUTPATIENT
Start: 2020-05-15 | End: 2020-05-16

## 2020-05-15 RX ORDER — INSULIN LISPRO 100/ML
7 VIAL (ML) SUBCUTANEOUS
Refills: 0 | Status: DISCONTINUED | OUTPATIENT
Start: 2020-05-15 | End: 2020-05-16

## 2020-05-15 RX ORDER — DEXTROSE 50 % IN WATER 50 %
25 SYRINGE (ML) INTRAVENOUS ONCE
Refills: 0 | Status: DISCONTINUED | OUTPATIENT
Start: 2020-05-15 | End: 2020-05-15

## 2020-05-15 RX ORDER — DEXTROSE 50 % IN WATER 50 %
15 SYRINGE (ML) INTRAVENOUS ONCE
Refills: 0 | Status: DISCONTINUED | OUTPATIENT
Start: 2020-05-15 | End: 2020-05-16

## 2020-05-15 RX ORDER — CLOPIDOGREL BISULFATE 75 MG/1
75 TABLET, FILM COATED ORAL DAILY
Refills: 0 | Status: DISCONTINUED | OUTPATIENT
Start: 2020-05-15 | End: 2020-05-16

## 2020-05-15 RX ORDER — ZOLPIDEM TARTRATE 10 MG/1
5 TABLET ORAL AT BEDTIME
Refills: 0 | Status: DISCONTINUED | OUTPATIENT
Start: 2020-05-15 | End: 2020-05-15

## 2020-05-15 RX ORDER — INSULIN LISPRO 100/ML
VIAL (ML) SUBCUTANEOUS
Refills: 0 | Status: DISCONTINUED | OUTPATIENT
Start: 2020-05-15 | End: 2020-05-15

## 2020-05-15 RX ADMIN — Medication 5 UNIT(S): at 07:52

## 2020-05-15 RX ADMIN — Medication 7 UNIT(S): at 11:42

## 2020-05-15 RX ADMIN — INSULIN GLARGINE 21 UNIT(S): 100 INJECTION, SOLUTION SUBCUTANEOUS at 22:35

## 2020-05-15 RX ADMIN — ZOLPIDEM TARTRATE 5 MILLIGRAM(S): 10 TABLET ORAL at 23:17

## 2020-05-15 RX ADMIN — CLOPIDOGREL BISULFATE 75 MILLIGRAM(S): 75 TABLET, FILM COATED ORAL at 11:06

## 2020-05-15 RX ADMIN — ENOXAPARIN SODIUM 40 MILLIGRAM(S): 100 INJECTION SUBCUTANEOUS at 11:04

## 2020-05-15 RX ADMIN — Medication 7 UNIT(S): at 19:59

## 2020-05-15 RX ADMIN — Medication 3: at 11:42

## 2020-05-15 RX ADMIN — Medication 25 MILLIGRAM(S): at 05:08

## 2020-05-15 RX ADMIN — LISINOPRIL 20 MILLIGRAM(S): 2.5 TABLET ORAL at 15:36

## 2020-05-15 RX ADMIN — Medication 3: at 19:58

## 2020-05-15 RX ADMIN — Medication 4: at 07:52

## 2020-05-15 RX ADMIN — LISINOPRIL 20 MILLIGRAM(S): 2.5 TABLET ORAL at 05:08

## 2020-05-15 RX ADMIN — INSULIN GLARGINE 12 UNIT(S): 100 INJECTION, SOLUTION SUBCUTANEOUS at 01:09

## 2020-05-15 RX ADMIN — ATORVASTATIN CALCIUM 40 MILLIGRAM(S): 80 TABLET, FILM COATED ORAL at 22:36

## 2020-05-15 RX ADMIN — Medication 324 MILLIGRAM(S): at 11:06

## 2020-05-15 RX ADMIN — PANTOPRAZOLE SODIUM 40 MILLIGRAM(S): 20 TABLET, DELAYED RELEASE ORAL at 06:24

## 2020-05-15 NOTE — PROGRESS NOTE ADULT - ASSESSMENT
IMPRESSION:    Acute CVA  HO HTN, DM    PLAN:    CNS: MRI today, neuro check q4h    HEENT: Oral care    PULMONARY:  HOB @ 45 degrees    CARDIOVASCULAR: BP control    GI: GI prophylaxis.  Feeding     RENAL:  Follow up lytes.  Correct as needed    INFECTIOUS DISEASE: Follow up cultures. No Abx    HEMATOLOGICAL:  DVT prophylaxis.    ENDOCRINE:  Follow up FS.  Insulin protocol if needed.    MUSCULOSKELETAL: OOb to chair, PT rehab    Downgrade to tele

## 2020-05-15 NOTE — PHYSICAL THERAPY INITIAL EVALUATION ADULT - GENERAL OBSERVATIONS, REHAB EVAL
10:27 - 10:50. Chart reviewed. Patient available to be seen for physical therapy, confirmed with nurse. Patient encountered semi-reclined in bed, +tele. Denies pain at rest, but says his "head feels foggy."  Agreeable for PT evaluation.

## 2020-05-15 NOTE — PROGRESS NOTE ADULT - ASSESSMENT
54 M  w/PMH HTN, DM who p/w  left facial droop , also slurred speech w/ weakness of lower extremities       # Acute CVA  repeat CT head shows small R corpus callosum infarct  Neurology has seen, indicates q4 neurochecks, chk MRI brain, chk echo & hgbA1c  cont ASA, plavix, lipitor  PT consult      #HTN, DM    cont lisinopril , HCTZ, metoprolol   on Insulin basal bolus regimen, monitor FS 54 M  w/PMH HTN, DM who p/w  left facial droop , also slurred speech w/ weakness of lower extremities       # Acute CVA  repeat CT head shows small R corpus callosum infarct  Neurology has seen, indicates q4 neurochecks, chk MRI brain, chk echo & hgbA1c, D-dimer  cont ASA, plavix, lipitor  PT consult, OT, speech      #HTN, DM    cont lisinopril , HCTZ, metoprolol   on Insulin basal bolus regimen, monitor FS

## 2020-05-15 NOTE — OCCUPATIONAL THERAPY INITIAL EVALUATION ADULT - GENERAL OBSERVATIONS, REHAB EVAL
13:00-13:23 chart reviewed, ok to treat by Occupational Therapist as confirmed by RN, Patient received supine in bed in No Apparent Distress. +Tele

## 2020-05-15 NOTE — CONSULT NOTE ADULT - SUBJECTIVE AND OBJECTIVE BOX
Neurology Consultation note    Name  ALEA MORRIS    HPI:  Pt's a 53 yo male w/PMH as noted below. Pt was told to go to hospital for  left facial droop , noticed by co workers and neighbor , @ around 5 pm.  pt was also told he had slurred speech w/ weakness of lower extremities.  pt denied- LOC, headache, loss of: vision or sensation.  presently in ER pt w/ still some left facial droop despite full beard, no other focal signs noted. neurologist was called by ER MD, who request neuro checks q 1 hr. pt . transfer to ICu (14 May 2020 23:49)      NEURO  53 yo man w/ hx of dm and htn adm w/ dyarthria and l facial droop  no hx of cva  out of tpa window  nihss3  mrs 0  patient feels unchnged this am  cth and cta h/ n on adm normal      Vital Signs Last 24 Hrs  T(C): 36.1 (15 May 2020 07:01), Max: 36.7 (14 May 2020 18:43)  T(F): 97 (15 May 2020 07:01), Max: 98.1 (14 May 2020 18:43)  HR: 74 (15 May 2020 07:03) (74 - 107)  BP: 168/81 (15 May 2020 07:03) (134/62 - 240/126)  BP(mean): 116 (15 May 2020 07:03) (89 - 122)  RR: 15 (15 May 2020 07:03) (15 - 50)  SpO2: 94% (15 May 2020 07:03) (92% - 96%)    Neurological Exam:   Mental status: Awake, alert and oriented x3.  Recent and remote memory intact.  Naming, repetition and comprehension intact.  Attention/concentration intact.  , no aphasia.  Fund of knowledge appropriate.    Cranial nerves: Pupils equally round and reactive to light, visual fields full, no nystagmus, extraocular muscles intact, V1 through V3 intact bilaterally and symmetric, l central facial, hearing intact to finger rub, palate elevation symmetric, tongue was midline. mild dysarthria  Motor:  MRC grading 5/5 b/l UE/LE except 4+/5 prox lue   strength 5/5.  Normal tone and bulk.  No abnormal movements.    Sensation: Intact to light touch, proprioception, and pinprick.   Coordination: No dysmetria on finger-to-nose and heel-to-shin.  No dysdiadokinesia.  Reflexes: 2+ in bilateral UE/LE, downgoing toes bilaterally. (-) Cortes.      Medications  aspirin  chewable 324 milliGRAM(s) Oral daily  atorvastatin 40 milliGRAM(s) Oral at bedtime  clopidogrel Tablet 75 milliGRAM(s) Oral daily  dextrose 40% Gel 15 Gram(s) Oral once PRN  dextrose 5%. 1000 milliLiter(s) IV Continuous <Continuous>  dextrose 50% Injectable 12.5 Gram(s) IV Push once  dextrose 50% Injectable 25 Gram(s) IV Push once  dextrose 50% Injectable 25 Gram(s) IV Push once  enoxaparin Injectable 40 milliGRAM(s) SubCutaneous daily  glucagon  Injectable 1 milliGRAM(s) IntraMuscular once PRN  hydrochlorothiazide 25 milliGRAM(s) Oral daily  insulin glargine Injectable (LANTUS) 21 Unit(s) SubCutaneous at bedtime  insulin lispro (HumaLOG) corrective regimen sliding scale   SubCutaneous three times a day before meals  insulin lispro Injectable (HumaLOG) 7 Unit(s) SubCutaneous before breakfast  insulin lispro Injectable (HumaLOG) 7 Unit(s) SubCutaneous before lunch  insulin lispro Injectable (HumaLOG) 7 Unit(s) SubCutaneous before dinner  lisinopril 20 milliGRAM(s) Oral daily  metoprolol succinate ER 25 milliGRAM(s) Oral daily  pantoprazole    Tablet 40 milliGRAM(s) Oral before breakfast      Lab  05-14    138  |  100  |  19  ----------------------------<  390<H>  4.1   |  22  |  0.9    Ca    9.0      14 May 2020 18:52    TPro  6.9  /  Alb  4.2  /  TBili  0.4  /  DBili  x   /  AST  15  /  ALT  16  /  AlkPhos  83  05-14                          17.6   9.43  )-----------( 339      ( 14 May 2020 18:52 )             51.6     LIVER FUNCTIONS - ( 14 May 2020 18:52 )  Alb: 4.2 g/dL / Pro: 6.9 g/dL / ALK PHOS: 83 U/L / ALT: 16 U/L / AST: 15 U/L / GGT: x                   Radiology      Assessment:  53 yo man adm with sudden onset dysarthria and l facial. on exam this am, left arm is weak as well  adm to ccu for cva w/u  Plan:  q 4 neuro checks  mri brain nc  2d echo  cont asa and plavix . patient was on asa at home  lipitor 80 mg qd  check lipids and hba1c  PT for lue weakness

## 2020-05-15 NOTE — OCCUPATIONAL THERAPY INITIAL EVALUATION ADULT - PLANNED THERAPY INTERVENTIONS, OT EVAL
motor coordination training/cognitive, visual perceptual/transfer training/balance training/ADL retraining

## 2020-05-15 NOTE — PROGRESS NOTE ADULT - SUBJECTIVE AND OBJECTIVE BOX
SUBJECTIVE:    Stable, no complaints. Still w/ facial droop + slurred speech.    PAST MEDICAL & SURGICAL HISTORY  Pancreatitis  Hypertension  Diabetes  No significant past surgical history    SOCIAL HISTORY:  Negative for smoking/alcohol/drug use.     ALLERGIES:  No Known Allergies    MEDICATIONS:  STANDING MEDICATIONS  aspirin  chewable 324 milliGRAM(s) Oral daily  atorvastatin 40 milliGRAM(s) Oral at bedtime  clopidogrel Tablet 75 milliGRAM(s) Oral daily  dextrose 5%. 1000 milliLiter(s) IV Continuous <Continuous>  dextrose 50% Injectable 12.5 Gram(s) IV Push once  dextrose 50% Injectable 25 Gram(s) IV Push once  dextrose 50% Injectable 25 Gram(s) IV Push once  enoxaparin Injectable 40 milliGRAM(s) SubCutaneous daily  hydrochlorothiazide 25 milliGRAM(s) Oral daily  insulin glargine Injectable (LANTUS) 21 Unit(s) SubCutaneous at bedtime  insulin lispro (HumaLOG) corrective regimen sliding scale   SubCutaneous three times a day before meals  insulin lispro Injectable (HumaLOG) 7 Unit(s) SubCutaneous before breakfast  insulin lispro Injectable (HumaLOG) 7 Unit(s) SubCutaneous before lunch  insulin lispro Injectable (HumaLOG) 7 Unit(s) SubCutaneous before dinner  lisinopril 20 milliGRAM(s) Oral daily  metoprolol succinate ER 25 milliGRAM(s) Oral daily  pantoprazole    Tablet 40 milliGRAM(s) Oral before breakfast    PRN MEDICATIONS  dextrose 40% Gel 15 Gram(s) Oral once PRN  glucagon  Injectable 1 milliGRAM(s) IntraMuscular once PRN    VITALS:   T(F): 97  HR: 74  BP: 168/81  RR: 15  SpO2: 94%    LABS:                        17.6   9.43  )-----------( 339      ( 14 May 2020 18:52 )             51.6     05-14    138  |  100  |  19  ----------------------------<  390<H>  4.1   |  22  |  0.9    Ca    9.0      14 May 2020 18:52    TPro  6.9  /  Alb  4.2  /  TBili  0.4  /  DBili  x   /  AST  15  /  ALT  16  /  AlkPhos  83  05-14    PT/INR - ( 14 May 2020 18:52 )   PT: 10.70 sec;   INR: 0.93 ratio      PTT - ( 14 May 2020 18:52 )  PTT:38.0 sec    Troponin T, Serum: <0.01 ng/mL (05-14-20 @ 18:52)    CARDIAC MARKERS ( 14 May 2020 18:52 )  x     / <0.01 ng/mL / x     / x     / x        05-14-20 @ 07:01  -  05-15-20 @ 07:00  --------------------------------------------------------  IN: 0 mL / OUT: 550 mL / NET: -550 mL    05-15-20 @ 07:01  -  05-15-20 @ 08:58  --------------------------------------------------------  IN: 0 mL / OUT: 800 mL / NET: -800 mL    PHYSICAL EXAM:  GEN: NAD, comfortable  LUNGS: CTAB, no w/r/r  HEART: RRR, no m/r/g  ABD: soft, NT/ND, +BS  EXT: no edema, PP b/l  NEURO: AAOx3, L-sided facial droop noted, also w/ slurred speech

## 2020-05-15 NOTE — SWALLOW BEDSIDE ASSESSMENT ADULT - ORAL PREPARATORY PHASE
Bolus falls into right lateral sulci/Bolus falls into left lateral sulci/Decreased mastication ability

## 2020-05-15 NOTE — PHYSICAL THERAPY INITIAL EVALUATION ADULT - DISCHARGE DISPOSITION, PT EVAL
Patient demonstrated independent bed mobility, transfers, gait / overall functional mobiltiy. Does not require acute PT interventions in this setting. Please re-consult PT if needed / if change in status./no skilled PT needs

## 2020-05-15 NOTE — PROGRESS NOTE ADULT - SUBJECTIVE AND OBJECTIVE BOX
Patient is a 54y old  Male who presents with a chief complaint of stroke code called (15 May 2020 10:46)        Over Night Events: no events overnight        ROS:  See HPI    PHYSICAL EXAM    ICU Vital Signs Last 24 Hrs  T(C): 36.2 (15 May 2020 11:11), Max: 36.7 (14 May 2020 18:43)  T(F): 97.2 (15 May 2020 11:11), Max: 98.1 (14 May 2020 18:43)  HR: 78 (15 May 2020 10:45) (74 - 107)  BP: 190/91 (15 May 2020 10:45) (134/62 - 240/126)  BP(mean): 130 (15 May 2020 10:45) (89 - 130)  RR: 24 (15 May 2020 11:11) (15 - 50)  SpO2: 95% (15 May 2020 10:45) (92% - 96%)      General: NAD  HEENT: VAL, left facial droop             Lymph Nodes: No cervical LN   Lungs: Bilateral BS  Cardiovascular: Regular   Abdomen: Soft, Positive BS  Extremities: No clubbing   Skin: Warm  Neurological: Left sided weakness upper and lower extremity      05-14-20 @ 07:01  -  05-15-20 @ 07:00  --------------------------------------------------------  IN:  Total IN: 0 mL    OUT:    Voided: 550 mL  Total OUT: 550 mL    Total NET: -550 mL      05-15-20 @ 07:01  -  05-15-20 @ 11:55  --------------------------------------------------------  IN:    Oral Fluid: 120 mL  Total IN: 120 mL    OUT:    Voided: 1525 mL  Total OUT: 1525 mL    Total NET: -1405 mL          LABS:                            17.6   9.43  )-----------( 339      ( 14 May 2020 18:52 )             51.6                                               05-14    138  |  100  |  19  ----------------------------<  390<H>  4.1   |  22  |  0.9    Ca    9.0      14 May 2020 18:52    TPro  6.9  /  Alb  4.2  /  TBili  0.4  /  DBili  x   /  AST  15  /  ALT  16  /  AlkPhos  83  05-14      PT/INR - ( 14 May 2020 18:52 )   PT: 10.70 sec;   INR: 0.93 ratio         PTT - ( 14 May 2020 18:52 )  PTT:38.0 sec                                           CARDIAC MARKERS ( 14 May 2020 18:52 )  x     / <0.01 ng/mL / x     / x     / x        LIVER FUNCTIONS - ( 14 May 2020 18:52 )  Alb: 4.2 g/dL / Pro: 6.9 g/dL / ALK PHOS: 83 U/L / ALT: 16 U/L / AST: 15 U/L / GGT: x                                                                                                                                       MEDICATIONS  (STANDING):  aspirin  chewable 324 milliGRAM(s) Oral daily  atorvastatin 40 milliGRAM(s) Oral at bedtime  clopidogrel Tablet 75 milliGRAM(s) Oral daily  dextrose 5%. 1000 milliLiter(s) (50 mL/Hr) IV Continuous <Continuous>  dextrose 50% Injectable 12.5 Gram(s) IV Push once  dextrose 50% Injectable 25 Gram(s) IV Push once  dextrose 50% Injectable 25 Gram(s) IV Push once  enoxaparin Injectable 40 milliGRAM(s) SubCutaneous daily  hydrochlorothiazide 25 milliGRAM(s) Oral daily  insulin glargine Injectable (LANTUS) 21 Unit(s) SubCutaneous at bedtime  insulin lispro (HumaLOG) corrective regimen sliding scale   SubCutaneous three times a day before meals  insulin lispro Injectable (HumaLOG) 7 Unit(s) SubCutaneous before breakfast  insulin lispro Injectable (HumaLOG) 7 Unit(s) SubCutaneous before lunch  insulin lispro Injectable (HumaLOG) 7 Unit(s) SubCutaneous before dinner  lisinopril 20 milliGRAM(s) Oral daily  metoprolol succinate ER 25 milliGRAM(s) Oral daily  pantoprazole    Tablet 40 milliGRAM(s) Oral before breakfast    MEDICATIONS  (PRN):  dextrose 40% Gel 15 Gram(s) Oral once PRN Blood Glucose LESS THAN 70 milliGRAM(s)/deciliter  glucagon  Injectable 1 milliGRAM(s) IntraMuscular once PRN Glucose LESS THAN 70 milligrams/deciliter      Xrays:                                                                                     ECHO

## 2020-05-15 NOTE — PROGRESS NOTE ADULT - ASSESSMENT
#) Facial droop + slurred speech 2/2 suspected CVA  - CTH + CTA head/neck = negative  - MRI - ordered, pending  - Echo - ordered, pending  - Neuro following  - c/w ASA (vs DAPT?) - will wait for final Neuro Rx's  - patient previously unable to tolerate Lipitor because of "brain fog". Will start 40 qd for now, if tolerated increase to 80  - PT/OT/Speech ordered    #) HTN - c/w Lisinopril + HCTZ + Metoprolol    #) DM2 - holding Metformin, monitor fs, c/w insulin basal+bolus, increasing dose to 21 + 7/7/7, adjust PRN    DVT ppx: Lovenox subQ  Diet: DASH/CC  Activity: as tolerated  Code status: FULL  Dispo: pending

## 2020-05-15 NOTE — SWALLOW BEDSIDE ASSESSMENT ADULT - ASR SWALLOW ASPIRATION MONITOR
change of breathing pattern/cough/gurgly voice/pneumonia/oral hygiene/position upright (90Y)/fever/throat clearing/upper respiratory infection

## 2020-05-15 NOTE — PROGRESS NOTE ADULT - SUBJECTIVE AND OBJECTIVE BOX
SUBJECTIVE:   No new complaints this AM.  Did not have any weakness or paresthesias of the upper or lower extremities.  Denied any difficulty swallowing or speaking.      *********************** VITALS ******************************************  Vital Signs Last 24 Hrs  T(C): 36.1 (15 May 2020 07:01), Max: 36.7 (14 May 2020 18:43)  T(F): 97 (15 May 2020 07:01), Max: 98.1 (14 May 2020 18:43)  HR: 83 (15 May 2020 09:42) (74 - 107)  BP: 182/86 (15 May 2020 09:42) (134/62 - 240/126)  BP(mean): 123 (15 May 2020 09:42) (89 - 123)  RR: 30 (15 May 2020 09:42) (15 - 50)  SpO2: 95% (15 May 2020 09:42) (92% - 96%)    ******************************** PHYSICAL EXAM:**************************************************  GENERAL:  NAD     PSYCH: no agitation     HEENT:   EOMI     NERVOUS SYSTEM:  Alert & Oriented X3 , slight L sided facial droop; L arm weakness    PULMONARY:  patient is breathing comfortably    CARDIOVASCULAR:  RRR, S1 S2 audible      ABDOMEN: Soft, NT, ND     EXTREMITIES:  warm          LABS:                        17.6   9.43  )-----------( 339      ( 14 May 2020 18:52 )             51.6       05-14    138  |  100  |  19  ----------------------------<  390<H>  4.1   |  22  |  0.9    Ca    9.0      14 May 2020 18:52    TPro  6.9  /  Alb  4.2  /  TBili  0.4  /  DBili  x   /  AST  15  /  ALT  16  /  AlkPhos  83  05-14      LIVER FUNCTIONS - ( 14 May 2020 18:52 )  Alb: 4.2 g/dL / Pro: 6.9 g/dL / ALK PHOS: 83 U/L / ALT: 16 U/L / AST: 15 U/L / GGT: x               < from: CT Brain Stroke Protocol (05.14.20 @ 18:55) >    IMPRESSION:     1.  No evidence of acute intracranial hemorrhage or acute territorial infarct; if clinical suspicion for acute infarct persists, MRI may be obtained for further evaluation.    < end of copied text >  < from: CT Angio Head w/ IV Cont (05.14.20 @ 19:28) >  sphenoid sinus.    IMPRESSION:     Negative CTA of the head and neck  No evidence of major vascular stenosis, occlusion, or aneurysm.      < end of copied text >      REPEAT CT HEAD    < from: CT Head No Cont (05.15.20 @ 09:09) >  IMPRESSION:    In comparison with the prior noncontrast CT scan of the brain dated May 14, 2020:    Hypodensity in the right side of the splenium of the corpus callosum has mildly increased in size consistent with an acute infarct.    < end of copied text >

## 2020-05-16 ENCOUNTER — TRANSCRIPTION ENCOUNTER (OUTPATIENT)
Age: 55
End: 2020-05-16

## 2020-05-16 VITALS — SYSTOLIC BLOOD PRESSURE: 158 MMHG | DIASTOLIC BLOOD PRESSURE: 76 MMHG | HEART RATE: 86 BPM | OXYGEN SATURATION: 97 %

## 2020-05-16 LAB
A1C WITH ESTIMATED AVERAGE GLUCOSE RESULT: 14.4 % — HIGH (ref 4–5.6)
A1C WITH ESTIMATED AVERAGE GLUCOSE RESULT: 14.6 % — HIGH (ref 4–5.6)
ANION GAP SERPL CALC-SCNC: 11 MMOL/L — SIGNIFICANT CHANGE UP (ref 7–14)
BASOPHILS # BLD AUTO: 0.08 K/UL — SIGNIFICANT CHANGE UP (ref 0–0.2)
BASOPHILS NFR BLD AUTO: 0.9 % — SIGNIFICANT CHANGE UP (ref 0–1)
BUN SERPL-MCNC: 14 MG/DL — SIGNIFICANT CHANGE UP (ref 10–20)
CALCIUM SERPL-MCNC: 9.2 MG/DL — SIGNIFICANT CHANGE UP (ref 8.5–10.1)
CHLORIDE SERPL-SCNC: 101 MMOL/L — SIGNIFICANT CHANGE UP (ref 98–110)
CO2 SERPL-SCNC: 29 MMOL/L — SIGNIFICANT CHANGE UP (ref 17–32)
CREAT SERPL-MCNC: 0.8 MG/DL — SIGNIFICANT CHANGE UP (ref 0.7–1.5)
EOSINOPHIL # BLD AUTO: 0.48 K/UL — SIGNIFICANT CHANGE UP (ref 0–0.7)
EOSINOPHIL NFR BLD AUTO: 5.6 % — SIGNIFICANT CHANGE UP (ref 0–8)
ESTIMATED AVERAGE GLUCOSE: 367 MG/DL — HIGH (ref 68–114)
ESTIMATED AVERAGE GLUCOSE: 372 MG/DL — HIGH (ref 68–114)
GLUCOSE BLDC GLUCOMTR-MCNC: 159 MG/DL — HIGH (ref 70–99)
GLUCOSE BLDC GLUCOMTR-MCNC: 235 MG/DL — HIGH (ref 70–99)
GLUCOSE SERPL-MCNC: 166 MG/DL — HIGH (ref 70–99)
HCT VFR BLD CALC: 47.9 % — SIGNIFICANT CHANGE UP (ref 42–52)
HGB BLD-MCNC: 16.2 G/DL — SIGNIFICANT CHANGE UP (ref 14–18)
IMM GRANULOCYTES NFR BLD AUTO: 0.5 % — HIGH (ref 0.1–0.3)
LYMPHOCYTES # BLD AUTO: 2.28 K/UL — SIGNIFICANT CHANGE UP (ref 1.2–3.4)
LYMPHOCYTES # BLD AUTO: 26.8 % — SIGNIFICANT CHANGE UP (ref 20.5–51.1)
MAGNESIUM SERPL-MCNC: 1.9 MG/DL — SIGNIFICANT CHANGE UP (ref 1.8–2.4)
MCHC RBC-ENTMCNC: 29.5 PG — SIGNIFICANT CHANGE UP (ref 27–31)
MCHC RBC-ENTMCNC: 33.8 G/DL — SIGNIFICANT CHANGE UP (ref 32–37)
MCV RBC AUTO: 87.2 FL — SIGNIFICANT CHANGE UP (ref 80–94)
MONOCYTES # BLD AUTO: 0.65 K/UL — HIGH (ref 0.1–0.6)
MONOCYTES NFR BLD AUTO: 7.6 % — SIGNIFICANT CHANGE UP (ref 1.7–9.3)
NEUTROPHILS # BLD AUTO: 4.98 K/UL — SIGNIFICANT CHANGE UP (ref 1.4–6.5)
NEUTROPHILS NFR BLD AUTO: 58.6 % — SIGNIFICANT CHANGE UP (ref 42.2–75.2)
NRBC # BLD: 0 /100 WBCS — SIGNIFICANT CHANGE UP (ref 0–0)
PHOSPHATE SERPL-MCNC: 3.4 MG/DL — SIGNIFICANT CHANGE UP (ref 2.1–4.9)
PLATELET # BLD AUTO: 306 K/UL — SIGNIFICANT CHANGE UP (ref 130–400)
POTASSIUM SERPL-MCNC: 4.3 MMOL/L — SIGNIFICANT CHANGE UP (ref 3.5–5)
POTASSIUM SERPL-SCNC: 4.3 MMOL/L — SIGNIFICANT CHANGE UP (ref 3.5–5)
RBC # BLD: 5.49 M/UL — SIGNIFICANT CHANGE UP (ref 4.7–6.1)
RBC # FLD: 12.7 % — SIGNIFICANT CHANGE UP (ref 11.5–14.5)
SODIUM SERPL-SCNC: 141 MMOL/L — SIGNIFICANT CHANGE UP (ref 135–146)
WBC # BLD: 8.51 K/UL — SIGNIFICANT CHANGE UP (ref 4.8–10.8)
WBC # FLD AUTO: 8.51 K/UL — SIGNIFICANT CHANGE UP (ref 4.8–10.8)

## 2020-05-16 PROCEDURE — 99233 SBSQ HOSP IP/OBS HIGH 50: CPT

## 2020-05-16 PROCEDURE — 99232 SBSQ HOSP IP/OBS MODERATE 35: CPT

## 2020-05-16 RX ORDER — BENAZEPRIL HYDROCHLORIDE 40 MG/1
1 TABLET ORAL
Qty: 0 | Refills: 0 | DISCHARGE

## 2020-05-16 RX ORDER — AMLODIPINE BESYLATE 2.5 MG/1
5 TABLET ORAL ONCE
Refills: 0 | Status: DISCONTINUED | OUTPATIENT
Start: 2020-05-16 | End: 2020-05-16

## 2020-05-16 RX ORDER — AMLODIPINE BESYLATE 2.5 MG/1
5 TABLET ORAL DAILY
Refills: 0 | Status: DISCONTINUED | OUTPATIENT
Start: 2020-05-16 | End: 2020-05-16

## 2020-05-16 RX ORDER — ATORVASTATIN CALCIUM 80 MG/1
1 TABLET, FILM COATED ORAL
Qty: 30 | Refills: 1
Start: 2020-05-16 | End: 2020-07-14

## 2020-05-16 RX ORDER — ATORVASTATIN CALCIUM 80 MG/1
40 TABLET, FILM COATED ORAL ONCE
Refills: 0 | Status: COMPLETED | OUTPATIENT
Start: 2020-05-16 | End: 2020-05-16

## 2020-05-16 RX ORDER — CLOPIDOGREL BISULFATE 75 MG/1
1 TABLET, FILM COATED ORAL
Qty: 30 | Refills: 1
Start: 2020-05-16 | End: 2020-07-14

## 2020-05-16 RX ORDER — REPAGLINIDE 1 MG/1
1 TABLET ORAL
Qty: 90 | Refills: 0
Start: 2020-05-16 | End: 2020-06-14

## 2020-05-16 RX ORDER — REPAGLINIDE 1 MG/1
0.5 TABLET ORAL THREE TIMES A DAY
Refills: 0 | Status: DISCONTINUED | OUTPATIENT
Start: 2020-05-16 | End: 2020-05-16

## 2020-05-16 RX ORDER — AMLODIPINE BESYLATE 2.5 MG/1
1 TABLET ORAL
Qty: 30 | Refills: 1
Start: 2020-05-16 | End: 2020-07-14

## 2020-05-16 RX ORDER — BENAZEPRIL HYDROCHLORIDE 40 MG/1
2 TABLET ORAL
Qty: 60 | Refills: 0
Start: 2020-05-16 | End: 2020-06-14

## 2020-05-16 RX ORDER — ASPIRIN/CALCIUM CARB/MAGNESIUM 324 MG
1 TABLET ORAL
Qty: 30 | Refills: 1
Start: 2020-05-16 | End: 2020-07-14

## 2020-05-16 RX ADMIN — ENOXAPARIN SODIUM 40 MILLIGRAM(S): 100 INJECTION SUBCUTANEOUS at 12:38

## 2020-05-16 RX ADMIN — Medication 2: at 11:57

## 2020-05-16 RX ADMIN — LISINOPRIL 40 MILLIGRAM(S): 2.5 TABLET ORAL at 05:27

## 2020-05-16 RX ADMIN — CLOPIDOGREL BISULFATE 75 MILLIGRAM(S): 75 TABLET, FILM COATED ORAL at 12:37

## 2020-05-16 RX ADMIN — ATORVASTATIN CALCIUM 40 MILLIGRAM(S): 80 TABLET, FILM COATED ORAL at 09:22

## 2020-05-16 RX ADMIN — PANTOPRAZOLE SODIUM 40 MILLIGRAM(S): 20 TABLET, DELAYED RELEASE ORAL at 06:08

## 2020-05-16 RX ADMIN — Medication 7 UNIT(S): at 11:58

## 2020-05-16 RX ADMIN — Medication 324 MILLIGRAM(S): at 12:38

## 2020-05-16 RX ADMIN — REPAGLINIDE 0.5 MILLIGRAM(S): 1 TABLET ORAL at 09:22

## 2020-05-16 RX ADMIN — Medication 25 MILLIGRAM(S): at 05:27

## 2020-05-16 RX ADMIN — REPAGLINIDE 0.5 MILLIGRAM(S): 1 TABLET ORAL at 13:25

## 2020-05-16 RX ADMIN — Medication 7 UNIT(S): at 08:52

## 2020-05-16 RX ADMIN — AMLODIPINE BESYLATE 5 MILLIGRAM(S): 2.5 TABLET ORAL at 09:07

## 2020-05-16 NOTE — DISCHARGE NOTE PROVIDER - PROVIDER TOKENS
PROVIDER:[TOKEN:[[id number]],FOLLOWUP:[1 week]],PROVIDER:[TOKEN:[[id number]],FOLLOWUP:[1 week]]

## 2020-05-16 NOTE — PROGRESS NOTE ADULT - SUBJECTIVE AND OBJECTIVE BOX
Neurology Follow up note    Name  ALEA MORRIS    HPI:  Pt's a 53 yo male w/PMH as noted below. Pt was told to go to hospital for  left facial droop , noticed by co workers and neighbor , @ around 5 pm.  pt was also told he had slurred speech w/ weakness of lower extremities.  pt denied- LOC, headache, loss of: vision or sensation.  presently in ER pt w/ still some left facial droop despite full beard, no other focal signs noted. neurologist was called by ER MD, who request neuro checks q 1 hr. pt . transfer to ICu (14 May 2020 23:49)      Interval History:        Vital Signs Last 24 Hrs  T(C): 36.3 (16 May 2020 05:24), Max: 36.4 (15 May 2020 15:10)  T(F): 97.3 (16 May 2020 05:24), Max: 97.5 (15 May 2020 15:10)  HR: 67 (16 May 2020 05:24) (67 - 89)  BP: 173/88 (16 May 2020 05:24) (144/74 - 203/97)  BP(mean): 123 (16 May 2020 05:24) (100 - 139)  RR: 20 (16 May 2020 05:24) (15 - 30)  SpO2: 96% (16 May 2020 05:24) (94% - 98%)    Neurological Exam:   Mental status: Awake, alert and oriented x3.  Recent and remote memory intact.  Naming, repetition and comprehension intact.  Attention/concentration intact.  No dysarthria, no aphasia.  Fund of knowledge appropriate.    Cranial nerves: Pupils equally round and reactive to light, visual fields full, no nystagmus, extraocular muscles intact, V1 through V3 intact bilaterally and symmetric, face symmetric, hearing intact to finger rub, palate elevation symmetric, tongue was midline.  Motor:  MRC grading 5/5 b/l UE/LE.   strength 5/5.  Normal tone and bulk.  No abnormal movements.    Sensation: Intact to light touch, proprioception, and pinprick.   Coordination: No dysmetria on finger-to-nose and heel-to-shin.  No dysdiadokinesia.  Reflexes: 2+ in bilateral UE/LE, downgoing toes bilaterally. (-) Cortes.  Gait: Narrow and steady. No ataxia.  Romberg negative    Medications  aspirin  chewable 324 milliGRAM(s) Oral daily  atorvastatin 40 milliGRAM(s) Oral at bedtime  clopidogrel Tablet 75 milliGRAM(s) Oral daily  dextrose 40% Gel 15 Gram(s) Oral once PRN  dextrose 5%. 1000 milliLiter(s) IV Continuous <Continuous>  dextrose 50% Injectable 12.5 Gram(s) IV Push once  dextrose 50% Injectable 25 Gram(s) IV Push once  dextrose 50% Injectable 25 Gram(s) IV Push once  enoxaparin Injectable 40 milliGRAM(s) SubCutaneous daily  glucagon  Injectable 1 milliGRAM(s) IntraMuscular once PRN  hydrochlorothiazide 25 milliGRAM(s) Oral daily  insulin glargine Injectable (LANTUS) 21 Unit(s) SubCutaneous at bedtime  insulin lispro (HumaLOG) corrective regimen sliding scale   SubCutaneous three times a day before meals  insulin lispro Injectable (HumaLOG) 7 Unit(s) SubCutaneous before breakfast  insulin lispro Injectable (HumaLOG) 7 Unit(s) SubCutaneous before lunch  insulin lispro Injectable (HumaLOG) 7 Unit(s) SubCutaneous before dinner  lisinopril 40 milliGRAM(s) Oral daily  metoprolol succinate ER 25 milliGRAM(s) Oral daily  pantoprazole    Tablet 40 milliGRAM(s) Oral before breakfast      Lab  05-14    138  |  100  |  19  ----------------------------<  390<H>  4.1   |  22  |  0.9    Ca    9.0      14 May 2020 18:52    TPro  6.9  /  Alb  4.2  /  TBili  0.4  /  DBili  x   /  AST  15  /  ALT  16  /  AlkPhos  83  05-14                          17.6   9.43  )-----------( 339      ( 14 May 2020 18:52 )             51.6     LIVER FUNCTIONS - ( 14 May 2020 18:52 )  Alb: 4.2 g/dL / Pro: 6.9 g/dL / ALK PHOS: 83 U/L / ALT: 16 U/L / AST: 15 U/L / GGT: x                   Radiology  CT Head No Cont:   EXAM:  CT BRAIN            PROCEDURE DATE:  05/15/2020            INTERPRETATION:  Clinical History / Reason for exam: Left facial droop and slurred speech.    CT SCAN OF THE BRAIN WITHOUT CONTRAST    TECHNIQUE:    Multiple transaxial noncontrast CT images of the brain were obtained from base to vertex. Sagittal and coronal reformatted images were obtained.    COMPARISON:    Noncontrast CT scan of the brain dated May 14, 2020.    FINDINGS:    In the right side of the splenium of the corpus callosum there is a 1.7 cm hypodensity consistent with an acute infarct.    The third and lateral ventricles are mildly enlarged as are the cortical sulci consistent with a mild degree of cortical atrophy.  The fourth ventricle is normal in size and position.    There is no shift of the midline structures, evidence of acute intracranial hemorrhage, or depressed skull fracture.    In the anterior frontal subcutaneous soft tissues there is a 1.2 cm ossific/calcific lesion likely representing an osteoma.    Vascular calcifications are noted.    Mucosal thickening in the right sphenoid, right frontal, and bilateral ethmoid sinuses. There is a 0.6 cm ossific/calcific lesion arising in the left frontal sinus consistent with an osteoma.    The frontal sinuses are hypoplastic.    IMPRESSION:    In comparison with the prior noncontrast CT scan of the brain dated May 14, 2020:    Hypodensity in the right side of the splenium of the corpus callosum has mildly increased in size consistent with an acute infarct.    Spoke with MARTIN MCMAHAN MD on 5/15/2020 9:55 AM with readback.                  CANDACE GOODMAN M.D., ATTENDING RADIOLOGIST  This document has been electronically signed. May 15 2020 10:10AM             (05-15-20 @ 09:09)      Assessment:    Plan: Neurology Follow up note    Name  ALEA MORRIS    HPI:  Pt's a 55 yo male w/PMH as noted below. Pt was told to go to hospital for  left facial droop , noticed by co workers and neighbor , @ around 5 pm.  pt was also told he had slurred speech w/ weakness of lower extremities.  pt denied- LOC, headache, loss of: vision or sensation.  presently in ER pt w/ still some left facial droop despite full beard, no other focal signs noted. neurologist was called by ER MD, who request neuro checks q 1 hr. pt . transfer to ICu (14 May 2020 23:49)      Interval History:      pt unchanged  L facial, lue weakness and dysarthria  mri brain with multiple small acute r sided cva, prelim    Vital Signs Last 24 Hrs  T(C): 36.3 (16 May 2020 05:24), Max: 36.4 (15 May 2020 15:10)  T(F): 97.3 (16 May 2020 05:24), Max: 97.5 (15 May 2020 15:10)  HR: 67 (16 May 2020 05:24) (67 - 89)  BP: 173/88 (16 May 2020 05:24) (144/74 - 203/97)  BP(mean): 123 (16 May 2020 05:24) (100 - 139)  RR: 20 (16 May 2020 05:24) (15 - 30)  SpO2: 96% (16 May 2020 05:24) (94% - 98%)    Neurological Exam:   Mental status: Awake, alert and oriented x3.  Recent and remote memory intact.  Naming, repetition and comprehension intact.  Attention/concentration intact.  No dysarthria, no aphasia.  Fund of knowledge appropriate.    Cranial nerves: Pupils equally round and reactive to light, visual fields full, no nystagmus, extraocular muscles intact, V1 through V3 intact bilaterally and symmetric, l central facial, hearing intact to finger rub, palate elevation symmetric, tongue was midline. mild dysartthria  Motor:  MRC grading 5/5 b/l UE/LE except 4+ prox lue   strength 5/5.  Normal tone and bulk.  No abnormal movements.    Sensation: Intact to light touch, proprioception, and pinprick.   Coordination: No dysmetria on finger-to-nose and heel-to-shin.  No dysdiadokinesia.  Reflexes: 2+ in bilateral UE/LE, downgoing toes bilaterally. (-) Cortes.  Gait: Narrow and steady. No ataxia.  Romberg negative    Medications  aspirin  chewable 324 milliGRAM(s) Oral daily  atorvastatin 40 milliGRAM(s) Oral at bedtime  clopidogrel Tablet 75 milliGRAM(s) Oral daily  dextrose 40% Gel 15 Gram(s) Oral once PRN  dextrose 5%. 1000 milliLiter(s) IV Continuous <Continuous>  dextrose 50% Injectable 12.5 Gram(s) IV Push once  dextrose 50% Injectable 25 Gram(s) IV Push once  dextrose 50% Injectable 25 Gram(s) IV Push once  enoxaparin Injectable 40 milliGRAM(s) SubCutaneous daily  glucagon  Injectable 1 milliGRAM(s) IntraMuscular once PRN  hydrochlorothiazide 25 milliGRAM(s) Oral daily  insulin glargine Injectable (LANTUS) 21 Unit(s) SubCutaneous at bedtime  insulin lispro (HumaLOG) corrective regimen sliding scale   SubCutaneous three times a day before meals  insulin lispro Injectable (HumaLOG) 7 Unit(s) SubCutaneous before breakfast  insulin lispro Injectable (HumaLOG) 7 Unit(s) SubCutaneous before lunch  insulin lispro Injectable (HumaLOG) 7 Unit(s) SubCutaneous before dinner  lisinopril 40 milliGRAM(s) Oral daily  metoprolol succinate ER 25 milliGRAM(s) Oral daily  pantoprazole    Tablet 40 milliGRAM(s) Oral before breakfast      Lab  05-14    138  |  100  |  19  ----------------------------<  390<H>  4.1   |  22  |  0.9    Ca    9.0      14 May 2020 18:52    TPro  6.9  /  Alb  4.2  /  TBili  0.4  /  DBili  x   /  AST  15  /  ALT  16  /  AlkPhos  83  05-14                          17.6   9.43  )-----------( 339      ( 14 May 2020 18:52 )             51.6     LIVER FUNCTIONS - ( 14 May 2020 18:52 )  Alb: 4.2 g/dL / Pro: 6.9 g/dL / ALK PHOS: 83 U/L / ALT: 16 U/L / AST: 15 U/L / GGT: x                   Radiology  CT Head No Cont:   EXAM:  CT BRAIN            PROCEDURE DATE:  05/15/2020            INTERPRETATION:  Clinical History / Reason for exam: Left facial droop and slurred speech.    CT SCAN OF THE BRAIN WITHOUT CONTRAST    TECHNIQUE:    Multiple transaxial noncontrast CT images of the brain were obtained from base to vertex. Sagittal and coronal reformatted images were obtained.    COMPARISON:    Noncontrast CT scan of the brain dated May 14, 2020.    FINDINGS:    In the right side of the splenium of the corpus callosum there is a 1.7 cm hypodensity consistent with an acute infarct.    The third and lateral ventricles are mildly enlarged as are the cortical sulci consistent with a mild degree of cortical atrophy.  The fourth ventricle is normal in size and position.    There is no shift of the midline structures, evidence of acute intracranial hemorrhage, or depressed skull fracture.    In the anterior frontal subcutaneous soft tissues there is a 1.2 cm ossific/calcific lesion likely representing an osteoma.    Vascular calcifications are noted.    Mucosal thickening in the right sphenoid, right frontal, and bilateral ethmoid sinuses. There is a 0.6 cm ossific/calcific lesion arising in the left frontal sinus consistent with an osteoma.    The frontal sinuses are hypoplastic.    IMPRESSION:    In comparison with the prior noncontrast CT scan of the brain dated May 14, 2020:    Hypodensity in the right side of the splenium of the corpus callosum has mildly increased in size consistent with an acute infarct.    Spoke with MARTIN MCMAHAN MD on 5/15/2020 9:55 AM with readback.                  CANDACE GOODMAN M.D., ATTENDING RADIOLOGIST  This document has been electronically signed. May 15 2020 10:10AM             (05-15-20 @ 09:09)      Assessment:  55 yo man with acute l hp and dysarthria with shower of small r sided cva  exam stable  echo normal  Plan:  can /c home once cleared by pt  cont asa and plavix  cont lipitor 80  f/u official mri read  hypercoag w/u as oupt  f/u in 1-2 weeks w/neuro

## 2020-05-16 NOTE — DISCHARGE NOTE PROVIDER - NSDCCPCAREPLAN_GEN_ALL_CORE_FT
PRINCIPAL DISCHARGE DIAGNOSIS  Diagnosis: Stroke  Assessment and Plan of Treatment: Acute stroke.  Patient now needs to take aspirin , plavix, and high dose atorvastatin daily.  He also will be given amlodpine for blood pressure management and repaglinide for blood sugar management.  He needs to follow up closely with neurologist and endocrinologist in coming weeks. Symptoms have resolved.      SECONDARY DISCHARGE DIAGNOSES  Diagnosis: Hypertension  Assessment and Plan of Treatment:     Diagnosis: Hyperglycemia  Assessment and Plan of Treatment:     Diagnosis: Weakness  Assessment and Plan of Treatment:     Diagnosis: Facial droop  Assessment and Plan of Treatment:

## 2020-05-16 NOTE — CHART NOTE - NSCHARTNOTEFT_GEN_A_CORE
Patient works as a . I spoke to Dr. Perez over the phone about his return to the work. She recommends that at this point the driving is prohibited till he sees neurologist. Patient understands very well. He will make the appointment next week. All questions were answered.

## 2020-05-16 NOTE — PROGRESS NOTE ADULT - SUBJECTIVE AND OBJECTIVE BOX
SUBJECTIVE:   No new complaints this AM.  Patient feeling better and ready to go home.  He has no residual weakness in extremities.  Has been able to speak and swallow well    *********************** VITALS ******************************************  Vital Signs Last 24 Hrs  T(C): 37 (16 May 2020 07:01), Max: 37 (16 May 2020 07:01)  T(F): 98.6 (16 May 2020 07:01), Max: 98.6 (16 May 2020 07:01)  HR: 80 (16 May 2020 10:45) (67 - 89)  BP: 150/78 (16 May 2020 10:45) (144/74 - 203/97)  BP(mean): 107 (16 May 2020 10:45) (100 - 140)  RR: 12 (16 May 2020 07:01) (12 - 23)  SpO2: 97% (16 May 2020 10:45) (94% - 99%)      ******************************** PHYSICAL EXAM:**************************************************  GENERAL:  NAD     PSYCH: no agitation     HEENT:   EOMI     NERVOUS SYSTEM:  Alert & Oriented X3      PULMONARY:  patient is breathing comfortably    CARDIOVASCULAR:  RRR, S1 S2 audible      ABDOMEN: Soft, NT, ND     EXTREMITIES:  warm          LABS:                        17.6   9.43  )-----------( 339      ( 14 May 2020 18:52 )             51.6       05-14    138  |  100  |  19  ----------------------------<  390<H>  4.1   |  22  |  0.9    Ca    9.0      14 May 2020 18:52    TPro  6.9  /  Alb  4.2  /  TBili  0.4  /  DBili  x   /  AST  15  /  ALT  16  /  AlkPhos  83  05-14      LIVER FUNCTIONS - ( 14 May 2020 18:52 )  Alb: 4.2 g/dL / Pro: 6.9 g/dL / ALK PHOS: 83 U/L / ALT: 16 U/L / AST: 15 U/L / GGT: x               < from: CT Brain Stroke Protocol (05.14.20 @ 18:55) >    IMPRESSION:     1.  No evidence of acute intracranial hemorrhage or acute territorial infarct; if clinical suspicion for acute infarct persists, MRI may be obtained for further evaluation.    < end of copied text >  < from: CT Angio Head w/ IV Cont (05.14.20 @ 19:28) >  sphenoid sinus.    IMPRESSION:     Negative CTA of the head and neck  No evidence of major vascular stenosis, occlusion, or aneurysm.      < end of copied text >      REPEAT CT HEAD    < from: CT Head No Cont (05.15.20 @ 09:09) >  IMPRESSION:    In comparison with the prior noncontrast CT scan of the brain dated May 14, 2020:    Hypodensity in the right side of the splenium of the corpus callosum has mildly increased in size consistent with an acute infarct.    < end of copied text >        MRI HEAD: results pending

## 2020-05-16 NOTE — DISCHARGE NOTE PROVIDER - CARE PROVIDERS DIRECT ADDRESSES
[email or web address]

## 2020-05-16 NOTE — DISCHARGE NOTE PROVIDER - NSDCMRMEDTOKEN_GEN_ALL_CORE_FT
amLODIPine 5 mg oral tablet: 1 tab(s) orally once a day   Aspirin Enteric Coated 325 mg oral delayed release tablet: 1 tab(s) orally once a day   atorvastatin 80 mg oral tablet: 1 tab(s) orally once a day (at bedtime)   benazepril 20 mg oral tablet: 2 tab(s) orally once a day   clopidogrel 75 mg oral tablet: 1 tab(s) orally once a day  hydroCHLOROthiazide 25 mg oral tablet: 1 tab(s) orally once a day  metFORMIN 1000 mg oral tablet: 1 tab(s) orally 2 times a day  metoprolol succinate 25 mg oral tablet, extended release: 1 tab(s) orally once a day  repaglinide 0.5 mg oral tablet: 1 tab(s) orally 3 times a day (with meals)

## 2020-05-16 NOTE — DISCHARGE NOTE NURSING/CASE MANAGEMENT/SOCIAL WORK - PATIENT PORTAL LINK FT
You can access the FollowMyHealth Patient Portal offered by [facility] by registering at the following website: [email or web address]. By joining [employer]’s FollowMyHealth portal, you will also be able to view your health information using other applications (apps) compatible with our system.

## 2020-05-16 NOTE — PROGRESS NOTE ADULT - ASSESSMENT
54 M  w/PMH HTN, DM who p/w  left facial droop , also slurred speech w/ weakness of lower extremities       # Acute CVA  repeat CT head shows small R corpus callosum infarct  Neurology has seen; Echo nl.  cont ASA, plavix, lipitor as per neurology and patient needs close outpatient neurology follow up  PT consult, OT, speech have seen and pt has no needs from this standpoint      #HTN, DM    cont  benazepril  , HCTZ, metoprolol, and will add amlodipine 5mg PO daily for better blood pressure control    cont metformin and will add repaglinide 0.5mg PO TID w meals for better blood sugar control and patient should seen an endocrinologist outpatient.      DC home today. no services needed

## 2020-05-16 NOTE — DISCHARGE NOTE PROVIDER - CARE PROVIDER_API CALL
Monica Leyva  NEUROLOGY  [address] [place] [id number]  Phone: [phone]  Fax: [phone]  Follow Up Time: 1 week    Babar Jenkins  Endocrinology, Diabetes and Metabolism  [address] [place] [address]  Phone: [phone]  Fax: [phone]  Follow Up Time: 1 week

## 2020-05-16 NOTE — DISCHARGE NOTE PROVIDER - NSDCFUADDINST_GEN_ALL_CORE_FT
Patient can return to work/school duties in 2 weeks time frame, and preferably after he follow up with his Neurologist

## 2020-05-16 NOTE — DISCHARGE NOTE NURSING/CASE MANAGEMENT/SOCIAL WORK - NSDCPEPTSTRK_GEN_ALL_CORE
Risk factors for stroke/Stroke warning signs and symptoms/Signs and symptoms of stroke/Call 911 for stroke/Need for follow up after discharge/Prescribed medications/Stroke education booklet/Stroke support groups for patients, families, and friends

## 2020-05-18 DIAGNOSIS — E11.65 TYPE 2 DIABETES MELLITUS WITH HYPERGLYCEMIA: ICD-10-CM

## 2020-05-18 DIAGNOSIS — R47.1 DYSARTHRIA AND ANARTHRIA: ICD-10-CM

## 2020-05-18 DIAGNOSIS — I10 ESSENTIAL (PRIMARY) HYPERTENSION: ICD-10-CM

## 2020-05-18 DIAGNOSIS — Z79.84 LONG TERM (CURRENT) USE OF ORAL HYPOGLYCEMIC DRUGS: ICD-10-CM

## 2020-05-18 DIAGNOSIS — R29.810 FACIAL WEAKNESS: ICD-10-CM

## 2020-05-18 DIAGNOSIS — R29.898 OTHER SYMPTOMS AND SIGNS INVOLVING THE MUSCULOSKELETAL SYSTEM: ICD-10-CM

## 2020-05-18 DIAGNOSIS — R29.703 NIHSS SCORE 3: ICD-10-CM

## 2020-05-18 DIAGNOSIS — R47.81 SLURRED SPEECH: ICD-10-CM

## 2020-05-18 DIAGNOSIS — I63.9 CEREBRAL INFARCTION, UNSPECIFIED: ICD-10-CM

## 2020-05-18 PROBLEM — Z00.00 ENCOUNTER FOR PREVENTIVE HEALTH EXAMINATION: Status: ACTIVE | Noted: 2020-05-18

## 2020-05-20 ENCOUNTER — APPOINTMENT (OUTPATIENT)
Dept: NEUROLOGY | Facility: CLINIC | Age: 55
End: 2020-05-20
Payer: COMMERCIAL

## 2020-05-20 VITALS — WEIGHT: 225 LBS | BODY MASS INDEX: 32.21 KG/M2 | HEIGHT: 70 IN

## 2020-05-20 PROCEDURE — 99213 OFFICE O/P EST LOW 20 MIN: CPT | Mod: 95

## 2020-05-20 NOTE — HISTORY OF PRESENT ILLNESS
[FreeTextEntry1] : Patient consents to telehealth visit.  Start of visit is 1:36 p.m.  End of visit 1:53 p.m. 17 min.  Had stroke symptoms on left sided symptoms.  Left leg feels weak, not reliable .  Had a fall when walking down basement.  Has handrail, now but not at time.  stroke symptoms started last thursday.  Neighbor is a doctor and told him to go to ER when he saw his face droop.  He has diabetes and A1C 14.4.  LDL was 138.  Taking meds for diabetes and HTN.  Is a  so doesn't take insulin, only on metformin.  Spoke to his endocrinologist today and prescribed other meds.  Can't take glyburide since it gives him pancreatitis.\par \par He is taking atorvastatin 80 mg/day since stroke.\par \par BP meds were changed.   Latest readings at home, 170/79, 156/70, 143/65..\par \par Discussed BP management and healthy diet. \par \par Is trying to exercise, walking around back yard several time.\par \par Does not smoke.  \par \par Taking aspirin 325 mg/day and plavix 75 mg/day.\par \par \par \par \par \par \par

## 2020-05-20 NOTE — PHYSICAL EXAM
[FreeTextEntry1] : Speech, mild slurring.\par Left lower facial droop, mild.\par \par good movement of upper and lower extremities.\par No limp when he walks.\par \par

## 2020-06-26 ENCOUNTER — APPOINTMENT (OUTPATIENT)
Dept: NEUROLOGY | Facility: CLINIC | Age: 55
End: 2020-06-26
Payer: SELF-PAY

## 2020-06-26 PROCEDURE — 99214 OFFICE O/P EST MOD 30 MIN: CPT | Mod: 95

## 2020-06-26 RX ORDER — CLOPIDOGREL BISULFATE 75 MG/1
75 TABLET, FILM COATED ORAL DAILY
Qty: 90 | Refills: 3 | Status: ACTIVE | COMMUNITY
Start: 2020-06-26 | End: 1900-01-01

## 2020-06-26 NOTE — ASSESSMENT
[FreeTextEntry1] : Patient is s/p ischemic stroke causing left visual field defect, imbalance and mild decrease in strength and dexterity on left.   He should continue risk factor modification for stroke prevention.  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  He needs f/u visual field testing.\par \par He also has high risk for obstructive sleep apnea and needs sleep medicine referral.\par \par Paperwork completed for work and patient to return in 3 months.\par \par 25 min face-to-face visit with > 50% spent in counselling and coordination  of care.

## 2020-06-26 NOTE — HISTORY OF PRESENT ILLNESS
[FreeTextEntry1] : Patient consents to an audiovisual telehealth visit.  At home in [place].  Start of visit 1: 49 p.m. End of visit  2:14 p.m.  Duration 25 min.\par \par Patient states original stroke May 16th.  Stroke involved several areas of brain including brainstem.\par States he has problem sleeping.  Has to take melatonin 1-3 tablets of 5 mg melatonin.  \par States his balance is good.  Every once in a while he will bump into a wall.  Needs to have someone with him when shopping because will bump into other customers or shelving racks. \par \par Still every once in a while things are in a fog.  About once a day for about 15 min to 2 hours.  No headache associated with that.  \par \par Medications include aspirin 81 mg/day plus plavix 75 mg/day.

## 2020-06-26 NOTE — PHYSICAL EXAM
[FreeTextEntry1] : mild left facial droop, speech is clear.\par decreased fine motor control.\par left hemianopsia.\par mild unsteadiness to tandem.\par \par eye doctor appointment :  told not doing well.  has pending visual field testing.

## 2020-08-25 ENCOUNTER — APPOINTMENT (OUTPATIENT)
Dept: NEUROLOGY | Facility: CLINIC | Age: 55
End: 2020-08-25
Payer: SELF-PAY

## 2020-08-25 VITALS
DIASTOLIC BLOOD PRESSURE: 93 MMHG | OXYGEN SATURATION: 97 % | TEMPERATURE: 97.2 F | WEIGHT: 225 LBS | BODY MASS INDEX: 32.21 KG/M2 | SYSTOLIC BLOOD PRESSURE: 166 MMHG | HEART RATE: 76 BPM | HEIGHT: 70 IN

## 2020-08-25 VITALS — DIASTOLIC BLOOD PRESSURE: 85 MMHG | SYSTOLIC BLOOD PRESSURE: 145 MMHG

## 2020-08-25 PROCEDURE — 99214 OFFICE O/P EST MOD 30 MIN: CPT

## 2020-08-25 NOTE — PHYSICAL EXAM
[FreeTextEntry1] : Bp = 166/93.\par left homonymous hemianopsia.\par face symmetric, speech fluent.\par strength 5/5 upper and lower\par FTN, HTS intact.\par Gait normal tandem, heel, toe.\par normal LT face, arms, legs.\par

## 2020-08-25 NOTE — HISTORY OF PRESENT ILLNESS
[FreeTextEntry1] : Pt is 54 yom s/p stroke in May '20 causing left homonymous hemianopsia. Had ophthalmologic visit in July and field testing documenting this.  Recalls ophthalmologist stating he was not likely to get the vision back.  Also they documented diabetic retinopathy and mild glaucoma.  Given eye drops for glaucoma (combigan).  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  Given script for transition lenses.  \par \par States strength and dexterity in left hand improving.  Left leg when walking not wanting to carry the weight so reliies on right leg and that gets tired.  States hasn't yet seen sleep medicine doctor for ALENA eval.  \par \par States continues to take clopidogrel, aspirin 81, BP meds, cholesterol meds, diabetes meds.  Sees endocrinologist.  Dose of ezitembe is 10 mg, put on it 2 weeks after the stroke.  Finished the atorvatatin 80 mg/day last week.\par \par

## 2020-08-25 NOTE — ASSESSMENT
[FreeTextEntry1] : Ischemic stroke ( recent corpus callosum, occipital, and midbrain (right cerebral peduncle) ) with primary residual deficit being left homonymous hemianopsia.  Likely to have permanent visual field loss on the left.  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 \par \par Plan:\par 1.  Continue treating risk factors of DM, HTN, hyperlipidemia.  BP was 140/80-85.  DIscussed worki quinn PCP to get SBP <130.  \par 2.  F/u hyperlipidemia to optimally treat.\par 3.  Get sleep medicine ALENA eval and treat if positive.\par 4.  Heart and brain healthy diet and exercise.\par 5.  Return in 6 months.

## 2021-03-10 ENCOUNTER — APPOINTMENT (OUTPATIENT)
Dept: NEUROLOGY | Facility: CLINIC | Age: 56
End: 2021-03-10
Payer: COMMERCIAL

## 2021-03-10 VITALS
HEART RATE: 89 BPM | OXYGEN SATURATION: 96 % | TEMPERATURE: 97.8 F | SYSTOLIC BLOOD PRESSURE: 134 MMHG | BODY MASS INDEX: 35.55 KG/M2 | WEIGHT: 240 LBS | HEIGHT: 69 IN | DIASTOLIC BLOOD PRESSURE: 85 MMHG

## 2021-03-10 DIAGNOSIS — R06.83 SNORING: ICD-10-CM

## 2021-03-10 DIAGNOSIS — G47.19 OTHER HYPERSOMNIA: ICD-10-CM

## 2021-03-10 PROCEDURE — 99072 ADDL SUPL MATRL&STAF TM PHE: CPT

## 2021-03-10 PROCEDURE — 99213 OFFICE O/P EST LOW 20 MIN: CPT

## 2021-03-10 NOTE — PHYSICAL EXAM
[FreeTextEntry1] : left homonymous hemianopsia.\par RUE's 5/5 .  LUE 5-/5 - deltoid, bicep, 4+ tricep,  5-/5 .  decreased fine motor movement LUE compared to right.\par RLE 5/5,  LLE 4+/5 hip flexion and knee flexion\par Normal LT, PP, vibration in upper and lower extremities.\par ambulates well, only very subtle difference in leg swing on left compared to right, he does bump into corner due to left field defect.

## 2021-03-10 NOTE — ASSESSMENT
[FreeTextEntry1] : History of stroke last year affecting midbrain, corpus callosum and occipital lobe.  No new stroke symptoms reported.  He has residual mild left hemimotor syndrome and left homonymous hemianopsia which make it difficult for him to function in a competitive work environment.\par \par \par Plan:\par 1.  Continue Plavix, aspirin and cholesterol medicine treatment.\par 2.  Get sleep medicine eval to r/o ALENA (I offered to give him a new referral but states he doesn't need one).\par 3.  Optimize diabetic control.\par 4.  Treat hypertension.\par 5.  Heart and brain healthy diet and exercise.\par 6.  Activate EMS for any new stroke symptoms.\par 7.  Return in 6 months.\par \par

## 2021-03-10 NOTE — HISTORY OF PRESENT ILLNESS
[FreeTextEntry1] : Pt presents today in f/u for stroke occurring last year (corpus callosum, occipital, and right cerebral peduncle of midbrain) causing left visual field deficit and mild left sided weakness.  He reports being told he can't improve the vision.  Just saw ophthalmologist for diabetic retinopathy as well.  No new stroke symptoms.  He ambulates with quad cane but can walk without it.  He bumps into walls and people due to his visual field defect.\par \par Takes aspirin 81 plus clopidogrel 75 mg and ezitimbe.   Diabetes - unsure of how A1C is currently.\fransico \fransico States his company let him go and so he was without insurance for a few months so off diabetic meds and A1C shot up to 10.x.  Back on treatment now but doesn't know what latest result is (just done yesterday).\par \par He denies diabetic neuropathy, denies numbness tingling in his feet.\par \par Stroke was May 2020, still feels weak on left side from stroke.\fransico \par Wife states he snores.  I had encouraged him to get a sleep study to r/o ALENA since that is modifiable stroke risk factor but he hasn't done it yet.  He plans to he states.

## 2021-03-18 NOTE — ED ADULT NURSE NOTE - NS ED NURSE TRANSPORT WITH
I personally performed the service described in the documentation recorded by the scribe in my presence, and it accurately and completely records my words and actions. Cardiac Monitor/Defib/ACLS/Rescue Kit/O2/BVM

## 2021-04-26 NOTE — ED PROVIDER NOTE - ATTENDING CONTRIBUTION TO CARE
1.48 55 yo M PMHx HTN, DM presents with c/o facial droop.  Pt states that his neighbor noticed droop and told talia to come to ED for a possible stroke, Pt admits that he has been feeling dizzy/off balance for about a week now.  States that his co-workers thought his speech was off yesterday, but he did not notice.  no headache, no change in vision.  Pt is complaint with meds.  On exam pt in NAD AAO x 3, speech is clear and fluent, + left sided facial droop, good tone, equal strength, good finger to nose. no edema

## 2021-06-27 ENCOUNTER — INPATIENT (INPATIENT)
Facility: HOSPITAL | Age: 56
LOS: 16 days | Discharge: NURSING, REHAB OR LONG-TERM CARE | End: 2021-07-14
Attending: INTERNAL MEDICINE | Admitting: INTERNAL MEDICINE
Payer: COMMERCIAL

## 2021-06-27 VITALS
RESPIRATION RATE: 18 BRPM | TEMPERATURE: 98 F | DIASTOLIC BLOOD PRESSURE: 72 MMHG | OXYGEN SATURATION: 95 % | SYSTOLIC BLOOD PRESSURE: 154 MMHG | HEART RATE: 81 BPM | HEIGHT: 70 IN

## 2021-06-27 DIAGNOSIS — I10 ESSENTIAL (PRIMARY) HYPERTENSION: ICD-10-CM

## 2021-06-27 DIAGNOSIS — I63.9 CEREBRAL INFARCTION, UNSPECIFIED: ICD-10-CM

## 2021-06-27 DIAGNOSIS — E11.9 TYPE 2 DIABETES MELLITUS WITHOUT COMPLICATIONS: ICD-10-CM

## 2021-06-27 LAB
ALBUMIN SERPL ELPH-MCNC: 3.8 G/DL — SIGNIFICANT CHANGE UP (ref 3.5–5.2)
ALP SERPL-CCNC: 88 U/L — SIGNIFICANT CHANGE UP (ref 30–115)
ALT FLD-CCNC: 14 U/L — SIGNIFICANT CHANGE UP (ref 0–41)
ANION GAP SERPL CALC-SCNC: 15 MMOL/L — HIGH (ref 7–14)
APAP SERPL-MCNC: <5 UG/ML — LOW (ref 10–30)
AST SERPL-CCNC: 16 U/L — SIGNIFICANT CHANGE UP (ref 0–41)
BASOPHILS # BLD AUTO: 0.07 K/UL — SIGNIFICANT CHANGE UP (ref 0–0.2)
BASOPHILS NFR BLD AUTO: 0.7 % — SIGNIFICANT CHANGE UP (ref 0–1)
BILIRUB SERPL-MCNC: 0.4 MG/DL — SIGNIFICANT CHANGE UP (ref 0.2–1.2)
BUN SERPL-MCNC: 27 MG/DL — HIGH (ref 10–20)
CALCIUM SERPL-MCNC: 9.4 MG/DL — SIGNIFICANT CHANGE UP (ref 8.5–10.1)
CHLORIDE SERPL-SCNC: 106 MMOL/L — SIGNIFICANT CHANGE UP (ref 98–110)
CO2 SERPL-SCNC: 24 MMOL/L — SIGNIFICANT CHANGE UP (ref 17–32)
CREAT SERPL-MCNC: 0.9 MG/DL — SIGNIFICANT CHANGE UP (ref 0.7–1.5)
EOSINOPHIL # BLD AUTO: 0.14 K/UL — SIGNIFICANT CHANGE UP (ref 0–0.7)
EOSINOPHIL NFR BLD AUTO: 1.4 % — SIGNIFICANT CHANGE UP (ref 0–8)
ETHANOL SERPL-MCNC: <10 MG/DL — SIGNIFICANT CHANGE UP
GLUCOSE BLDC GLUCOMTR-MCNC: 97 MG/DL — SIGNIFICANT CHANGE UP (ref 70–99)
GLUCOSE SERPL-MCNC: 120 MG/DL — HIGH (ref 70–99)
HCT VFR BLD CALC: 49.2 % — SIGNIFICANT CHANGE UP (ref 42–52)
HGB BLD-MCNC: 15.8 G/DL — SIGNIFICANT CHANGE UP (ref 14–18)
IMM GRANULOCYTES NFR BLD AUTO: 0.5 % — HIGH (ref 0.1–0.3)
LYMPHOCYTES # BLD AUTO: 1.9 K/UL — SIGNIFICANT CHANGE UP (ref 1.2–3.4)
LYMPHOCYTES # BLD AUTO: 18.4 % — LOW (ref 20.5–51.1)
MAGNESIUM SERPL-MCNC: 1.9 MG/DL — SIGNIFICANT CHANGE UP (ref 1.8–2.4)
MCHC RBC-ENTMCNC: 28.1 PG — SIGNIFICANT CHANGE UP (ref 27–31)
MCHC RBC-ENTMCNC: 32.1 G/DL — SIGNIFICANT CHANGE UP (ref 32–37)
MCV RBC AUTO: 87.4 FL — SIGNIFICANT CHANGE UP (ref 80–94)
MONOCYTES # BLD AUTO: 0.81 K/UL — HIGH (ref 0.1–0.6)
MONOCYTES NFR BLD AUTO: 7.8 % — SIGNIFICANT CHANGE UP (ref 1.7–9.3)
NEUTROPHILS # BLD AUTO: 7.38 K/UL — HIGH (ref 1.4–6.5)
NEUTROPHILS NFR BLD AUTO: 71.2 % — SIGNIFICANT CHANGE UP (ref 42.2–75.2)
NRBC # BLD: 0 /100 WBCS — SIGNIFICANT CHANGE UP (ref 0–0)
PLATELET # BLD AUTO: 356 K/UL — SIGNIFICANT CHANGE UP (ref 130–400)
POTASSIUM SERPL-MCNC: 4 MMOL/L — SIGNIFICANT CHANGE UP (ref 3.5–5)
POTASSIUM SERPL-SCNC: 4 MMOL/L — SIGNIFICANT CHANGE UP (ref 3.5–5)
PROT SERPL-MCNC: 7.2 G/DL — SIGNIFICANT CHANGE UP (ref 6–8)
RAPID RVP RESULT: SIGNIFICANT CHANGE UP
RBC # BLD: 5.63 M/UL — SIGNIFICANT CHANGE UP (ref 4.7–6.1)
RBC # FLD: 13.5 % — SIGNIFICANT CHANGE UP (ref 11.5–14.5)
SALICYLATES SERPL-MCNC: <0.3 MG/DL — LOW (ref 4–30)
SARS-COV-2 RNA SPEC QL NAA+PROBE: SIGNIFICANT CHANGE UP
SODIUM SERPL-SCNC: 145 MMOL/L — SIGNIFICANT CHANGE UP (ref 135–146)
TROPONIN T SERPL-MCNC: <0.01 NG/ML — SIGNIFICANT CHANGE UP
WBC # BLD: 10.35 K/UL — SIGNIFICANT CHANGE UP (ref 4.8–10.8)
WBC # FLD AUTO: 10.35 K/UL — SIGNIFICANT CHANGE UP (ref 4.8–10.8)

## 2021-06-27 PROCEDURE — ZZZZZ: CPT

## 2021-06-27 PROCEDURE — 99285 EMERGENCY DEPT VISIT HI MDM: CPT

## 2021-06-27 PROCEDURE — 70450 CT HEAD/BRAIN W/O DYE: CPT | Mod: 26,MA,59

## 2021-06-27 PROCEDURE — 70496 CT ANGIOGRAPHY HEAD: CPT | Mod: 26,MA

## 2021-06-27 PROCEDURE — 70498 CT ANGIOGRAPHY NECK: CPT | Mod: 26,MA

## 2021-06-27 PROCEDURE — 99222 1ST HOSP IP/OBS MODERATE 55: CPT

## 2021-06-27 PROCEDURE — 71045 X-RAY EXAM CHEST 1 VIEW: CPT | Mod: 26

## 2021-06-27 PROCEDURE — 93010 ELECTROCARDIOGRAM REPORT: CPT | Mod: NC

## 2021-06-27 RX ORDER — CHLORHEXIDINE GLUCONATE 213 G/1000ML
1 SOLUTION TOPICAL EVERY 12 HOURS
Refills: 0 | Status: DISCONTINUED | OUTPATIENT
Start: 2021-06-27 | End: 2021-07-14

## 2021-06-27 RX ORDER — METOPROLOL TARTRATE 50 MG
50 TABLET ORAL DAILY
Refills: 0 | Status: DISCONTINUED | OUTPATIENT
Start: 2021-06-27 | End: 2021-07-14

## 2021-06-27 RX ORDER — ATORVASTATIN CALCIUM 80 MG/1
80 TABLET, FILM COATED ORAL AT BEDTIME
Refills: 0 | Status: DISCONTINUED | OUTPATIENT
Start: 2021-06-27 | End: 2021-07-14

## 2021-06-27 RX ORDER — AMLODIPINE BESYLATE 2.5 MG/1
5 TABLET ORAL DAILY
Refills: 0 | Status: DISCONTINUED | OUTPATIENT
Start: 2021-06-27 | End: 2021-06-29

## 2021-06-27 RX ORDER — PANTOPRAZOLE SODIUM 20 MG/1
40 TABLET, DELAYED RELEASE ORAL DAILY
Refills: 0 | Status: DISCONTINUED | OUTPATIENT
Start: 2021-06-27 | End: 2021-06-29

## 2021-06-27 RX ORDER — METFORMIN HYDROCHLORIDE 850 MG/1
1 TABLET ORAL
Qty: 0 | Refills: 0 | DISCHARGE

## 2021-06-27 RX ORDER — ASPIRIN/CALCIUM CARB/MAGNESIUM 324 MG
325 TABLET ORAL DAILY
Refills: 0 | Status: DISCONTINUED | OUTPATIENT
Start: 2021-06-27 | End: 2021-07-12

## 2021-06-27 RX ORDER — CLOPIDOGREL BISULFATE 75 MG/1
75 TABLET, FILM COATED ORAL DAILY
Refills: 0 | Status: DISCONTINUED | OUTPATIENT
Start: 2021-06-27 | End: 2021-07-14

## 2021-06-27 RX ORDER — LABETALOL HCL 100 MG
10 TABLET ORAL ONCE
Refills: 0 | Status: COMPLETED | OUTPATIENT
Start: 2021-06-27 | End: 2021-06-27

## 2021-06-27 RX ORDER — SODIUM CHLORIDE 9 MG/ML
1000 INJECTION, SOLUTION INTRAVENOUS ONCE
Refills: 0 | Status: COMPLETED | OUTPATIENT
Start: 2021-06-27 | End: 2021-06-27

## 2021-06-27 RX ORDER — LISINOPRIL 2.5 MG/1
20 TABLET ORAL DAILY
Refills: 0 | Status: DISCONTINUED | OUTPATIENT
Start: 2021-06-27 | End: 2021-06-29

## 2021-06-27 RX ADMIN — Medication 10 MILLIGRAM(S): at 22:50

## 2021-06-27 RX ADMIN — SODIUM CHLORIDE 1000 MILLILITER(S): 9 INJECTION, SOLUTION INTRAVENOUS at 17:38

## 2021-06-27 NOTE — ED ADULT NURSE NOTE - CHIEF COMPLAINT QUOTE
pt was found by ems walking around home not speaking. EMS was called bywife who was away for the weekend and was unable to reach  by phone and found him non verbal

## 2021-06-27 NOTE — ED PROVIDER NOTE - PROGRESS NOTE DETAILS
ANDREA: ct shows new left thalamic infarct. Neuro Dr. Rc Parr aware, requesting CTA head/neck, admission for Q1h neuro checks. ANDREA: Rc Parr aware of CTA new right posterior cerebral artery occlusion. states patient can remain south for Q1h neuro checks. Dr. Ross and dr. Calderon aware.

## 2021-06-27 NOTE — ED PROVIDER NOTE - PHYSICAL EXAMINATION
VITALS:  I have reviewed the initial vital signs.  GENERAL: Well-developed, well-nourished, in no acute distress. Nontoxic.  HEENT: NC/AT. EOMI, PERRLA. No nystagmus. No gaze deviation. MMM.   NECK: supple w FROM. No nuchal rigidity or meningismus.   CARDIO: RRR, nl S1 and S2. No murmurs, rubs, or gallops.   PULM: Normal effort. CTA b/l without wheezes, rales, or rhonchi.  MSK: FROM to extremities x 4.  GI: Abdomen soft and non-distended. Nontender.  : No CVA tenderness b/l.  SKIN: Warm, dry. No pallor. No rash.   NEURO: Alert, able to answer yes/no questions but unable to speak otherwise. CN II-XII intact. 5/5 strength to upper and lower extremities b/l. Sensation intact and equal throughout. No pronator drift. Finger to nose intact.

## 2021-06-27 NOTE — H&P ADULT - HISTORY OF PRESENT ILLNESS
Patient is a 55y old  Male who presents with a chief complaint of AMS - 2 days     T(F): 98.7 (06-27-21 @ 19:49), Max: 98.7 (06-27-21 @ 19:49)  HR: 79 (06-27-21 @ 21:09)  BP: 197/89 (06-27-21 @ 21:09)  RR: 20 (06-27-21 @ 21:09)  SpO2: 98% (06-27-21 @ 21:09) (95% - 98%)    PHYSICAL EXAM:  GENERAL: NAD, well-groomed, well-developed  HEAD:  Atraumatic, Normocephalic  EYES: EOMI, PERRLA, conjunctiva and sclera clear  ENMT: No tonsillar erythema, exudates, or enlargement; Moist mucous membranes, Good dentition, No lesions  NECK: Supple, No JVD, Normal thyroid  NERVOUS SYSTEM:  Alert & Oriented X3, Good concentration; Motor Strength 5/5 B/L upper and lower extremities; DTRs 2+ intact and symmetric  CHEST/LUNG: Clear to percussion bilaterally; No rales, rhonchi, wheezing, or rubs  HEART: Regular rate and rhythm; No murmurs, rubs, or gallops  ABDOMEN: Soft, Nontender, Nondistended; Bowel sounds present  EXTREMITIES:  2+ Peripheral Pulses, No clubbing, cyanosis, or edema  LYMPH: No lymphadenopathy noted  SKIN: No rashes or lesions    labs  06-27    145  |  106  |  27<H>  ----------------------------<  120<H>  4.0   |  24  |  0.9    Ca    9.4      27 Jun 2021 17:06  Mg     1.9     06-27    TPro  7.2  /  Alb  3.8  /  TBili  0.4  /  DBili  x   /  AST  16  /  ALT  14  /  AlkPhos  88  06-27                          15.8   10.35 )-----------( 356      ( 27 Jun 2021 17:06 )             49.2               radiology

## 2021-06-27 NOTE — ED PROVIDER NOTE - OBJECTIVE STATEMENT
55 year old male w hx of DM, HTN, previous stroke with residual left sided facial droop and left arm weakness presents to the ED with altered mental status. Per EMS, 55 year old male w hx of DM, HTN, previous stroke with residual left sided facial droop and left arm weakness presents to the ED with altered mental status. Per EMS, last known well 4d ago when wife went away, found him at home confused and the house in disarray not answering questions

## 2021-06-27 NOTE — ED ADULT TRIAGE NOTE - CHIEF COMPLAINT QUOTE
pt was found by ems walking around home not speaking. EMS was called bywife who was away for the weekend and was unable to reach  by phone and found him non verbal pt was found by ems walking around home not speaking. EMS was called bywife who was away for the weekend and was unable to reach  by phone and found him non verbal  [id number] in ED

## 2021-06-27 NOTE — ED ADULT NURSE NOTE - NSIMPLEMENTINTERV_GEN_ALL_ED
Implemented All Fall with Harm Risk Interventions:  Nice to call system. Call bell, personal items and telephone within reach. Instruct patient to call for assistance. Room bathroom lighting operational. Non-slip footwear when patient is off stretcher. Physically safe environment: no spills, clutter or unnecessary equipment. Stretcher in lowest position, wheels locked, appropriate side rails in place. Provide visual cue, wrist band, yellow gown, etc. Monitor gait and stability. Monitor for mental status changes and reorient to person, place, and time. Review medications for side effects contributing to fall risk. Reinforce activity limits and safety measures with patient and family. Provide visual clues: red socks.

## 2021-06-27 NOTE — ED ADULT NURSE NOTE - PAIN: PRESENCE, MLM
denies pain/discomfort Acute gastric ulcer without hemorrhage or perforation  age 42  Benign prostatic hyperplasia, presence of lower urinary tract symptoms unspecified, unspecified morphology    Cardiac murmur    Cervical disc disease    Essential hypertension  in the past  Falls frequently    Hemochromatosis, unspecified hemochromatosis type  Pt states he has been getting Procrit injections for about 20 years, Last dose 4/27/17, Pt sees hematologist Dr Sawyer.  Hypothyroid    Kidney stones    Migraine    Neuropathy  b/l feet  OA (osteoarthritis)    Sciatica of left side

## 2021-06-28 LAB
A1C WITH ESTIMATED AVERAGE GLUCOSE RESULT: 9.8 % — HIGH (ref 4–5.6)
ALBUMIN SERPL ELPH-MCNC: 3.4 G/DL — LOW (ref 3.5–5.2)
ALP SERPL-CCNC: 88 U/L — SIGNIFICANT CHANGE UP (ref 30–115)
ALT FLD-CCNC: 13 U/L — SIGNIFICANT CHANGE UP (ref 0–41)
ANION GAP SERPL CALC-SCNC: 16 MMOL/L — HIGH (ref 7–14)
APPEARANCE UR: CLEAR — SIGNIFICANT CHANGE UP
AST SERPL-CCNC: 17 U/L — SIGNIFICANT CHANGE UP (ref 0–41)
BASOPHILS # BLD AUTO: 0.06 K/UL — SIGNIFICANT CHANGE UP (ref 0–0.2)
BASOPHILS NFR BLD AUTO: 0.5 % — SIGNIFICANT CHANGE UP (ref 0–1)
BILIRUB SERPL-MCNC: 0.4 MG/DL — SIGNIFICANT CHANGE UP (ref 0.2–1.2)
BILIRUB UR-MCNC: ABNORMAL
BUN SERPL-MCNC: 18 MG/DL — SIGNIFICANT CHANGE UP (ref 10–20)
CALCIUM SERPL-MCNC: 9.1 MG/DL — SIGNIFICANT CHANGE UP (ref 8.5–10.1)
CHLORIDE SERPL-SCNC: 105 MMOL/L — SIGNIFICANT CHANGE UP (ref 98–110)
CHOLEST SERPL-MCNC: 130 MG/DL — SIGNIFICANT CHANGE UP
CO2 SERPL-SCNC: 23 MMOL/L — SIGNIFICANT CHANGE UP (ref 17–32)
COLOR SPEC: YELLOW — SIGNIFICANT CHANGE UP
COVID-19 SPIKE DOMAIN AB INTERP: POSITIVE
COVID-19 SPIKE DOMAIN ANTIBODY RESULT: >250 U/ML — HIGH
CREAT SERPL-MCNC: 0.7 MG/DL — SIGNIFICANT CHANGE UP (ref 0.7–1.5)
DIFF PNL FLD: NEGATIVE — SIGNIFICANT CHANGE UP
EOSINOPHIL # BLD AUTO: 0.29 K/UL — SIGNIFICANT CHANGE UP (ref 0–0.7)
EOSINOPHIL NFR BLD AUTO: 2.5 % — SIGNIFICANT CHANGE UP (ref 0–8)
ESTIMATED AVERAGE GLUCOSE: 235 MG/DL — HIGH (ref 68–114)
GLUCOSE BLDC GLUCOMTR-MCNC: 189 MG/DL — HIGH (ref 70–99)
GLUCOSE BLDC GLUCOMTR-MCNC: 205 MG/DL — HIGH (ref 70–99)
GLUCOSE SERPL-MCNC: 91 MG/DL — SIGNIFICANT CHANGE UP (ref 70–99)
GLUCOSE UR QL: 500 MG/DL
HCT VFR BLD CALC: 46.5 % — SIGNIFICANT CHANGE UP (ref 42–52)
HCV AB S/CO SERPL IA: 0.03 COI — SIGNIFICANT CHANGE UP
HCV AB SERPL-IMP: SIGNIFICANT CHANGE UP
HDLC SERPL-MCNC: 34 MG/DL — LOW
HGB BLD-MCNC: 15.1 G/DL — SIGNIFICANT CHANGE UP (ref 14–18)
IMM GRANULOCYTES NFR BLD AUTO: 0.4 % — HIGH (ref 0.1–0.3)
KETONES UR-MCNC: 160
LEUKOCYTE ESTERASE UR-ACNC: NEGATIVE — SIGNIFICANT CHANGE UP
LIPID PNL WITH DIRECT LDL SERPL: 80 MG/DL — SIGNIFICANT CHANGE UP
LYMPHOCYTES # BLD AUTO: 2.73 K/UL — SIGNIFICANT CHANGE UP (ref 1.2–3.4)
LYMPHOCYTES # BLD AUTO: 23.6 % — SIGNIFICANT CHANGE UP (ref 20.5–51.1)
MCHC RBC-ENTMCNC: 28.3 PG — SIGNIFICANT CHANGE UP (ref 27–31)
MCHC RBC-ENTMCNC: 32.5 G/DL — SIGNIFICANT CHANGE UP (ref 32–37)
MCV RBC AUTO: 87.2 FL — SIGNIFICANT CHANGE UP (ref 80–94)
MONOCYTES # BLD AUTO: 0.84 K/UL — HIGH (ref 0.1–0.6)
MONOCYTES NFR BLD AUTO: 7.3 % — SIGNIFICANT CHANGE UP (ref 1.7–9.3)
NEUTROPHILS # BLD AUTO: 7.58 K/UL — HIGH (ref 1.4–6.5)
NEUTROPHILS NFR BLD AUTO: 65.7 % — SIGNIFICANT CHANGE UP (ref 42.2–75.2)
NITRITE UR-MCNC: NEGATIVE — SIGNIFICANT CHANGE UP
NON HDL CHOLESTEROL: 96 MG/DL — SIGNIFICANT CHANGE UP
NRBC # BLD: 0 /100 WBCS — SIGNIFICANT CHANGE UP (ref 0–0)
PH UR: 5.5 — SIGNIFICANT CHANGE UP (ref 5–8)
PLATELET # BLD AUTO: 334 K/UL — SIGNIFICANT CHANGE UP (ref 130–400)
POTASSIUM SERPL-MCNC: 3.9 MMOL/L — SIGNIFICANT CHANGE UP (ref 3.5–5)
POTASSIUM SERPL-SCNC: 3.9 MMOL/L — SIGNIFICANT CHANGE UP (ref 3.5–5)
PROT SERPL-MCNC: 6.9 G/DL — SIGNIFICANT CHANGE UP (ref 6–8)
PROT UR-MCNC: 100 MG/DL
RBC # BLD: 5.33 M/UL — SIGNIFICANT CHANGE UP (ref 4.7–6.1)
RBC # FLD: 13.3 % — SIGNIFICANT CHANGE UP (ref 11.5–14.5)
SARS-COV-2 IGG+IGM SERPL QL IA: >250 U/ML — HIGH
SARS-COV-2 IGG+IGM SERPL QL IA: POSITIVE
SODIUM SERPL-SCNC: 144 MMOL/L — SIGNIFICANT CHANGE UP (ref 135–146)
SP GR SPEC: >=1.03 (ref 1.01–1.03)
TRIGL SERPL-MCNC: 112 MG/DL — SIGNIFICANT CHANGE UP
UROBILINOGEN FLD QL: 0.2 MG/DL — SIGNIFICANT CHANGE UP (ref 0.2–0.2)
WBC # BLD: 11.55 K/UL — HIGH (ref 4.8–10.8)
WBC # FLD AUTO: 11.55 K/UL — HIGH (ref 4.8–10.8)

## 2021-06-28 PROCEDURE — 99222 1ST HOSP IP/OBS MODERATE 55: CPT

## 2021-06-28 PROCEDURE — 99233 SBSQ HOSP IP/OBS HIGH 50: CPT

## 2021-06-28 RX ORDER — ENOXAPARIN SODIUM 100 MG/ML
40 INJECTION SUBCUTANEOUS DAILY
Refills: 0 | Status: DISCONTINUED | OUTPATIENT
Start: 2021-06-28 | End: 2021-07-14

## 2021-06-28 RX ORDER — SODIUM CHLORIDE 9 MG/ML
1000 INJECTION INTRAMUSCULAR; INTRAVENOUS; SUBCUTANEOUS
Refills: 0 | Status: DISCONTINUED | OUTPATIENT
Start: 2021-06-28 | End: 2021-06-29

## 2021-06-28 RX ORDER — HALOPERIDOL DECANOATE 100 MG/ML
5 INJECTION INTRAMUSCULAR ONCE
Refills: 0 | Status: COMPLETED | OUTPATIENT
Start: 2021-06-28 | End: 2021-06-28

## 2021-06-28 RX ADMIN — CHLORHEXIDINE GLUCONATE 1 APPLICATION(S): 213 SOLUTION TOPICAL at 05:52

## 2021-06-28 RX ADMIN — SODIUM CHLORIDE 75 MILLILITER(S): 9 INJECTION INTRAMUSCULAR; INTRAVENOUS; SUBCUTANEOUS at 20:05

## 2021-06-28 RX ADMIN — CLOPIDOGREL BISULFATE 75 MILLIGRAM(S): 75 TABLET, FILM COATED ORAL at 11:53

## 2021-06-28 RX ADMIN — Medication 2 MILLIGRAM(S): at 21:03

## 2021-06-28 RX ADMIN — ATORVASTATIN CALCIUM 80 MILLIGRAM(S): 80 TABLET, FILM COATED ORAL at 21:16

## 2021-06-28 RX ADMIN — Medication 50 MILLIGRAM(S): at 05:52

## 2021-06-28 RX ADMIN — ENOXAPARIN SODIUM 40 MILLIGRAM(S): 100 INJECTION SUBCUTANEOUS at 11:54

## 2021-06-28 RX ADMIN — CHLORHEXIDINE GLUCONATE 1 APPLICATION(S): 213 SOLUTION TOPICAL at 19:08

## 2021-06-28 RX ADMIN — Medication 325 MILLIGRAM(S): at 11:53

## 2021-06-28 RX ADMIN — HALOPERIDOL DECANOATE 5 MILLIGRAM(S): 100 INJECTION INTRAMUSCULAR at 20:03

## 2021-06-28 RX ADMIN — LISINOPRIL 20 MILLIGRAM(S): 2.5 TABLET ORAL at 05:52

## 2021-06-28 RX ADMIN — PANTOPRAZOLE SODIUM 40 MILLIGRAM(S): 20 TABLET, DELAYED RELEASE ORAL at 11:54

## 2021-06-28 RX ADMIN — AMLODIPINE BESYLATE 5 MILLIGRAM(S): 2.5 TABLET ORAL at 05:52

## 2021-06-28 RX ADMIN — SODIUM CHLORIDE 75 MILLILITER(S): 9 INJECTION INTRAMUSCULAR; INTRAVENOUS; SUBCUTANEOUS at 16:07

## 2021-06-28 NOTE — PHYSICAL THERAPY INITIAL EVALUATION ADULT - GAIT DEVIATIONS NOTED, PT EVAL
stooped posture, dec heel strike/pushoff/decreased jose roberto/decreased step length/decreased weight-shifting ability

## 2021-06-28 NOTE — PHYSICAL THERAPY INITIAL EVALUATION ADULT - IMPAIRMENTS CONTRIBUTING TO GAIT DEVIATIONS, PT EVAL
decreased endurance/impaired balance/cognition/narrow base of support/impaired postural control/decreased strength

## 2021-06-28 NOTE — PROGRESS NOTE ADULT - SUBJECTIVE AND OBJECTIVE BOX
SUBJECTIVE:    Patient is a 55y old Male who presents with a chief complaint of CVA (28 Jun 2021 10:11)  Currently admitted to medicine with the primary diagnosis of Thalamic stroke  Today is hospital day 1d. This morning he is resting comfortably in bed.   Overnight patient was agitated and climbing out of bed so he was placed with a 1:1 sit.    PAST MEDICAL & SURGICAL HISTORY  Diabetes  Hypertension    Pancreatitis    CVA (cerebral vascular accident)    No significant past surgical history      SOCIAL HISTORY:  Negative for smoking/alcohol/drug use.     ALLERGIES:  No Known Allergies    MEDICATIONS:  STANDING MEDICATIONS  amLODIPine   Tablet 5 milliGRAM(s) Oral daily  aspirin enteric coated 325 milliGRAM(s) Oral daily  atorvastatin 80 milliGRAM(s) Oral at bedtime  chlorhexidine 4% Liquid 1 Application(s) Topical every 12 hours  clopidogrel Tablet 75 milliGRAM(s) Oral daily  lisinopril 20 milliGRAM(s) Oral daily  metoprolol succinate ER 50 milliGRAM(s) Oral daily  pantoprazole  Injectable 40 milliGRAM(s) IV Push daily    PRN MEDICATIONS    VITALS:   T(F): 97.5  HR: 68  BP: 151/70  RR: 17  SpO2: 93%    LABS:                        15.1   11.55 )-----------( 334      ( 28 Jun 2021 05:59 )             46.5     06-28    144  |  105  |  18  ----------------------------<  91  3.9   |  23  |  0.7    Ca    9.1      28 Jun 2021 05:59  Mg     1.9     06-27    TPro  6.9  /  Alb  3.4<L>  /  TBili  0.4  /  DBili  x   /  AST  17  /  ALT  13  /  AlkPhos  88  06-28    Troponin T, Serum: <0.01 ng/mL (06-27-21 @ 17:06)  CARDIAC MARKERS ( 27 Jun 2021 17:06 )  x     / <0.01 ng/mL / x     / x     / x          RADIOLOGY:  < from: CT Angio Head w/ IV Cont (06.27.21 @ 18:54) >  CTA HEAD:  Short segment stenosis involving the P1/P2 segmentsof the right posterior cerebral artery.    Focal moderate right/mild left atherosclerotic stenosis in the bilateral intradural vertebral artery.    CTA NECK:  No evidence of carotid or vertebral artery stenosis.    < from: CT Head No Cont (06.27.21 @ 17:14) >  INTERPRETATION:  Clinical History / Reason for exam: Altered mental status  The study was performed without IV contrast.  Comparison 5/15/2020  The cisterns and ventricles are normalwith no shift in midline structures.  There is an old infarct involving the right occipital lobe and right splenorenal the corpus callosum.  There is a new 1 cm low density lesion in the left thalamus presumably representing an acute/subacute ischemic infarct.  No hemorrhage or mass formation is seen.  The skull is intact.  Impression: Acute/subacute infarct left thalamus.    < from: Xray Chest 1 View-PORTABLE IMMEDIATE (06.27.21 @ 17:31) >  Impression:    Cardiomegaly magnification/cardiomegaly. Bilateral opacities.            PHYSICAL EXAM:  GEN: No acute distress  LUNGS: Clear to auscultation bilaterally   HEART: S1/S2 present. RRR.   ABD: Soft, non-tender, non-distended. Bowel sounds present  EXT: NC/NC/NE/2+PP/REYES/Skin Intact.   NEURO: AAOX2 (not oriented to time)    Intravenous access:   NG tube:   Cook cathter:        SUBJECTIVE:    Patient is a 55y old Male who presents with a chief complaint of CVA (28 Jun 2021 10:11)  Currently admitted to medicine with the primary diagnosis of Thalamic stroke  Today is hospital day 1d. This morning he is resting comfortably in bed.   Overnight patient was agitated and climbing out of bed so he was placed with a 1:1 sit.    PAST MEDICAL & SURGICAL HISTORY  Diabetes  Hypertension    Pancreatitis    CVA (cerebral vascular accident)    No significant past surgical history      SOCIAL HISTORY:  Negative for smoking/alcohol/drug use.     ALLERGIES:  No Known Allergies    MEDICATIONS:  STANDING MEDICATIONS  amLODIPine   Tablet 5 milliGRAM(s) Oral daily  aspirin enteric coated 325 milliGRAM(s) Oral daily  atorvastatin 80 milliGRAM(s) Oral at bedtime  chlorhexidine 4% Liquid 1 Application(s) Topical every 12 hours  clopidogrel Tablet 75 milliGRAM(s) Oral daily  lisinopril 20 milliGRAM(s) Oral daily  metoprolol succinate ER 50 milliGRAM(s) Oral daily  pantoprazole  Injectable 40 milliGRAM(s) IV Push daily    PRN MEDICATIONS    VITALS:   T(F): 97.5  HR: 68  BP: 151/70  RR: 17  SpO2: 93%    LABS:                        15.1   11.55 )-----------( 334      ( 28 Jun 2021 05:59 )             46.5     06-28    144  |  105  |  18  ----------------------------<  91  3.9   |  23  |  0.7    Ca    9.1      28 Jun 2021 05:59  Mg     1.9     06-27    TPro  6.9  /  Alb  3.4<L>  /  TBili  0.4  /  DBili  x   /  AST  17  /  ALT  13  /  AlkPhos  88  06-28    Troponin T, Serum: <0.01 ng/mL (06-27-21 @ 17:06)  CARDIAC MARKERS ( 27 Jun 2021 17:06 )  x     / <0.01 ng/mL / x     / x     / x          RADIOLOGY:  < from: CT Angio Head w/ IV Cont (06.27.21 @ 18:54) >  CTA HEAD:  Short segment stenosis involving the P1/P2 segmentsof the right posterior cerebral artery.    Focal moderate right/mild left atherosclerotic stenosis in the bilateral intradural vertebral artery.    CTA NECK:  No evidence of carotid or vertebral artery stenosis.    < from: CT Head No Cont (06.27.21 @ 17:14) >  INTERPRETATION:  Clinical History / Reason for exam: Altered mental status  The study was performed without IV contrast.  Comparison 5/15/2020  The cisterns and ventricles are normalwith no shift in midline structures.  There is an old infarct involving the right occipital lobe and right splenorenal the corpus callosum.  There is a new 1 cm low density lesion in the left thalamus presumably representing an acute/subacute ischemic infarct.  No hemorrhage or mass formation is seen.  The skull is intact.  Impression: Acute/subacute infarct left thalamus.    < from: Xray Chest 1 View-PORTABLE IMMEDIATE (06.27.21 @ 17:31) >  Impression:    Cardiomegaly magnification/cardiomegaly. Bilateral opacities.            PHYSICAL EXAM:  GEN: No acute distress  LUNGS: Clear to auscultation bilaterally   HEART: S1/S2 present. RRR.   ABD: Soft, non-tender, non-distended. Bowel sounds present  EXT: NC/NC/NE/2+PP/REYES/Skin Intact.   NEURO: AAOX2 (not oriented to time), mild weakness on L side     Intravenous access:   NG tube:   Cook cathter:

## 2021-06-28 NOTE — CONSULT NOTE ADULT - ATTENDING COMMENTS
Patient examined this afternoon.  He was not oriented to date and had residual sensory deficits.  Likely encephalopathy secondary to thalamic disconnection to higher cortical centers.  Somewhat large lacune so may benefit from ERICKSON with bubble study and loop recorder especially given  his prior large vessel stroke.  Continue DAPT and high intensity statin.  Okay to q4h neuro checks.

## 2021-06-28 NOTE — PHYSICAL THERAPY INITIAL EVALUATION ADULT - PERTINENT HX OF CURRENT PROBLEM, REHAB EVAL
56 y/o male admitted with diagnoses of Thalamic Stroke, Aphasia, brought in from [facility] with altered mental status, found to have acute/sub acute (L) thalamic stroke on CT

## 2021-06-28 NOTE — PROGRESS NOTE ADULT - SUBJECTIVE AND OBJECTIVE BOX
SUBJECTIVE:    Patient is a 55y old Male who presents with a chief complaint of CVA (28 Jun 2021 08:53)    Currently admitted to medicine with the primary diagnosis of Thalamic stroke       Today is hospital day 1d. This morning he is resting comfortably in bed and reports no new issues or overnight events.     PAST MEDICAL & SURGICAL HISTORY  Diabetes    Hypertension    Pancreatitis    CVA (cerebral vascular accident)    No significant past surgical history      SOCIAL HISTORY:  Negative for smoking/alcohol/drug use.     ALLERGIES:  No Known Allergies    MEDICATIONS:  STANDING MEDICATIONS  amLODIPine   Tablet 5 milliGRAM(s) Oral daily  aspirin enteric coated 325 milliGRAM(s) Oral daily  atorvastatin 80 milliGRAM(s) Oral at bedtime  chlorhexidine 4% Liquid 1 Application(s) Topical every 12 hours  clopidogrel Tablet 75 milliGRAM(s) Oral daily  lisinopril 20 milliGRAM(s) Oral daily  metoprolol succinate ER 50 milliGRAM(s) Oral daily  pantoprazole  Injectable 40 milliGRAM(s) IV Push daily    PRN MEDICATIONS    VITALS:   T(F): 97.5  HR: 68  BP: 151/70  RR: 17  SpO2: 93%    LABS:                        15.1   11.55 )-----------( 334      ( 28 Jun 2021 05:59 )             46.5     06-28    144  |  105  |  18  ----------------------------<  91  3.9   |  23  |  0.7    Ca    9.1      28 Jun 2021 05:59  Mg     1.9     06-27    TPro  6.9  /  Alb  3.4<L>  /  TBili  0.4  /  DBili  x   /  AST  17  /  ALT  13  /  AlkPhos  88  06-28          Troponin T, Serum: <0.01 ng/mL (06-27-21 @ 17:06)      CARDIAC MARKERS ( 27 Jun 2021 17:06 )  x     / <0.01 ng/mL / x     / x     / x          RADIOLOGY:    PHYSICAL EXAM:  GEN: No acute distress  LUNGS: Clear to auscultation bilaterally   HEART: S1/S2 present. RRR.   ABD: Soft, non-tender, non-distended. Bowel sounds present  EXT: NC/NC/NE/2+PP/REYES/Skin Intact.   NEURO: AAOX3    Intravenous access:   NG tube:   Cook cathter:

## 2021-06-28 NOTE — PROGRESS NOTE ADULT - SUBJECTIVE AND OBJECTIVE BOX
Patient is alert and confused stating it is the 1980's and he is in a educational facility      T(F): 97.5 (06-28-21 @ 07:00), Max: 98.7 (06-27-21 @ 19:49)  HR: 68 (06-28-21 @ 13:00)  BP: 158/74 (06-28-21 @ 13:00)  RR: 18  SpO2: 93% (06-28-21 @ 10:05) (92% - 98%)    PHYSICAL EXAM:  GENERAL: NAD  HEAD:  Atraumatic, Normocephalic  NERVOUS SYSTEM:  Alert & confused, no focal motor deficits   CHEST/LUNG: Clear to percussion bilaterally; No rales, rhonchi, wheezing, or rubs  HEART: Regular rate and rhythm; No murmurs, rubs, or gallops  ABDOMEN: Soft, Nontender, Nondistended; Bowel sounds present  EXTREMITIES:  2+ Peripheral Pulses, No clubbing, cyanosis, or edema      LABS  06-28    144  |  105  |  18  ----------------------------<  91  3.9   |  23  |  0.7    Ca    9.1      28 Jun 2021 05:59  Mg     1.9     06-27    TPro  6.9  /  Alb  3.4<L>  /  TBili  0.4  /  DBili  x   /  AST  17  /  ALT  13  /  AlkPhos  88  06-28                          15.1   11.55 )-----------( 334      ( 28 Jun 2021 05:59 )             46.5         CARDIAC ENZYMES      Troponin T, Serum: <0.01 ng/mL (06-27-21 @ 17:06)    < from: 12 Lead ECG (06.27.21 @ 16:52) >  Diagnosis Line Normal sinus rhythm    < end of copied text >      RADIOLOGY  < from: CT Head No Cont (06.27.21 @ 17:14) >    INTERPRETATION:  Clinical History / Reason for exam: Altered mental status  The study was performed without IV contrast.  Comparison 5/15/2020  The cisterns and ventricles are normalwith no shift in midline structures.  There is an old infarct involving the right occipital lobe and right splenorenal the corpus callosum.  There is a new 1 cm low density lesion in the left thalamus presumably representing an acute/subacute ischemic infarct.  No hemorrhage or mass formation is seen.  The skull is intact.  Impression: Acute/subacute infarct left thalamus.    < end of copied text >    MEDICATIONS  (STANDING):  amLODIPine   Tablet 5 milliGRAM(s) Oral daily  aspirin enteric coated 325 milliGRAM(s) Oral daily  atorvastatin 80 milliGRAM(s) Oral at bedtime  chlorhexidine 4% Liquid 1 Application(s) Topical every 12 hours  clopidogrel Tablet 75 milliGRAM(s) Oral daily  enoxaparin Injectable 40 milliGRAM(s) SubCutaneous daily  lisinopril 20 milliGRAM(s) Oral daily  metoprolol succinate ER 50 milliGRAM(s) Oral daily  pantoprazole  Injectable 40 milliGRAM(s) IV Push daily    MEDICATIONS  (PRN):

## 2021-06-28 NOTE — SWALLOW BEDSIDE ASSESSMENT ADULT - SWALLOW EVAL: DIAGNOSIS
moderate oral dysphagia, +generalized weakness + toleration of puree consistency and nectar thick liquids w/o s/s of aspiration

## 2021-06-28 NOTE — PROGRESS NOTE ADULT - ASSESSMENT
55 M  w/PMH HTN, DM, s/p R PCA infarct s/p L sided residual deficit presenting from home with AMS found to have likely acute to subacute L thalamic CVA.     # Acute to subacute L thalamic infarct  - was on ASA and plavix, continue   - PT consult, OT, speech have seen and pt has no needs from this standpoint      # HTN, DM

## 2021-06-28 NOTE — PHYSICAL THERAPY INITIAL EVALUATION ADULT - ACTIVE RANGE OF MOTION EXAMINATION, REHAB EVAL
Please refer to OT eval for BUE/Left LE Active ROM was WFL (within functional limits)/Right LE Active ROM was WFL (within functional limits)

## 2021-06-28 NOTE — PHYSICAL THERAPY INITIAL EVALUATION ADULT - MANUAL MUSCLE TESTING RESULTS, REHAB EVAL
Both upper extremites/Both lower extremities muscle strength grossly graded 3+ to 4-/5; Please refer to OT yael for BUE

## 2021-06-28 NOTE — PROGRESS NOTE ADULT - ASSESSMENT
· 55 year old male w hx of DM, HTN, CVA,  presents to the ED with altered mental status.  Last known well 5d ago when wife went away on a trip she returned  found him at home confused and the house in disarray not answering questions. CT head in ED revealed acute/subacute infarct as above    metabolic encephalopathy possibly secondary to Acute CVA      - DAPT and Lipitor   - tele   - check 2DECHO   - check TSH, B12 and urine tox screen   - neuro consult   - DVT prophylaxis

## 2021-06-28 NOTE — PROGRESS NOTE ADULT - ASSESSMENT
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 p/w AMS x4 days, found to have acute-subacute L thalamic CVA    # h/o R PCA infarct p/w acute-subacute L thalamic infarct   - CTA shows R P1 occlusion/high grade stenosis  - f/u neuro recs, poss neurointerventional c/s   - s/p loading dose ASA, c/w ASA and plavix   - Start statin  - Check hba1c, lipid profile, TTE   - PT consult, OT, speech  - c/w BP meds, Keep SBP >140   - Last TTE from 5/20 mild LVH, otherwise normal. Unclear whether agitated saline was done  - No h/o AF, continue with tele monitoring   - neuro checks q1, f/u with neuro to see if they would like to change to q4  - Check EEG     # HTN  - cont  benazepril , HCTZ, metoprolol, and amlodipine 5mg PO daily for better blood pressure control    # h/o uncontrolled DM  - Hold Metformin and repaglinide 0.5mg PO  - Start insulin regimen if FS>180  - f/u hba1c   - last hba1c May 2020 14.4    DVT ppx lovenox   GI ppx ptx  Diet: s&s to see  full code  dispo: acute  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 p/w AMS x4 days, found to have acute-subacute L thalamic CVA    # h/o R PCA infarct p/w acute-subacute L thalamic infarct   - CTA shows R P1 occlusion/high grade stenosis  - f/u neuro recs, poss neurointerventional c/s   - s/p loading dose ASA, c/w ASA and plavix   - Start statin  - Check hba1c, lipid profile, TTE   - PT consult, OT, speech  - c/w BP meds, Keep SBP >140   - Last TTE from 5/20 mild LVH, otherwise normal. Unclear whether agitated saline was done  - No h/o AF, continue with tele monitoring   - neuro checks q1, f/u with neuro to see if they would like to change to q4  - Check EEG     # HTN  - cont  benazepril , HCTZ, metoprolol, and amlodipine 5mg PO daily for better blood pressure control    # h/o uncontrolled DM  - Start insulin regimen if FS>180  - f/u hba1c   - last hba1c May 2020 14.4    DVT ppx lovenox   GI ppx ptx  Diet: s&s to see  full code  dispo: acute

## 2021-06-28 NOTE — PHYSICAL THERAPY INITIAL EVALUATION ADULT - GENERAL OBSERVATIONS, REHAB EVAL
10;46-11:06 Chart reviewed. Pt encountered supine in bed, may be seen by Physical Therapist as confirmed with Nurse. Patient denied pain at rest; +tele; +PCA at bedside

## 2021-06-29 LAB
ANION GAP SERPL CALC-SCNC: 14 MMOL/L — SIGNIFICANT CHANGE UP (ref 7–14)
BUN SERPL-MCNC: 9 MG/DL — LOW (ref 10–20)
CALCIUM SERPL-MCNC: 8.9 MG/DL — SIGNIFICANT CHANGE UP (ref 8.5–10.1)
CHLORIDE SERPL-SCNC: 104 MMOL/L — SIGNIFICANT CHANGE UP (ref 98–110)
CO2 SERPL-SCNC: 22 MMOL/L — SIGNIFICANT CHANGE UP (ref 17–32)
CREAT SERPL-MCNC: 0.6 MG/DL — LOW (ref 0.7–1.5)
GLUCOSE BLDC GLUCOMTR-MCNC: 179 MG/DL — HIGH (ref 70–99)
GLUCOSE BLDC GLUCOMTR-MCNC: 210 MG/DL — HIGH (ref 70–99)
GLUCOSE BLDC GLUCOMTR-MCNC: 234 MG/DL — HIGH (ref 70–99)
GLUCOSE BLDC GLUCOMTR-MCNC: 346 MG/DL — HIGH (ref 70–99)
GLUCOSE SERPL-MCNC: 165 MG/DL — HIGH (ref 70–99)
HCT VFR BLD CALC: 46.9 % — SIGNIFICANT CHANGE UP (ref 42–52)
HGB BLD-MCNC: 15.7 G/DL — SIGNIFICANT CHANGE UP (ref 14–18)
MAGNESIUM SERPL-MCNC: 1.6 MG/DL — LOW (ref 1.8–2.4)
MCHC RBC-ENTMCNC: 28.7 PG — SIGNIFICANT CHANGE UP (ref 27–31)
MCHC RBC-ENTMCNC: 33.5 G/DL — SIGNIFICANT CHANGE UP (ref 32–37)
MCV RBC AUTO: 85.7 FL — SIGNIFICANT CHANGE UP (ref 80–94)
NRBC # BLD: 0 /100 WBCS — SIGNIFICANT CHANGE UP (ref 0–0)
PHOSPHATE SERPL-MCNC: 2 MG/DL — LOW (ref 2.1–4.9)
PLATELET # BLD AUTO: 320 K/UL — SIGNIFICANT CHANGE UP (ref 130–400)
POTASSIUM SERPL-MCNC: 3.8 MMOL/L — SIGNIFICANT CHANGE UP (ref 3.5–5)
POTASSIUM SERPL-SCNC: 3.8 MMOL/L — SIGNIFICANT CHANGE UP (ref 3.5–5)
RBC # BLD: 5.47 M/UL — SIGNIFICANT CHANGE UP (ref 4.7–6.1)
RBC # FLD: 13.1 % — SIGNIFICANT CHANGE UP (ref 11.5–14.5)
SODIUM SERPL-SCNC: 140 MMOL/L — SIGNIFICANT CHANGE UP (ref 135–146)
TSH SERPL-MCNC: 1.45 UIU/ML — SIGNIFICANT CHANGE UP (ref 0.27–4.2)
VIT B12 SERPL-MCNC: 1079 PG/ML — SIGNIFICANT CHANGE UP (ref 232–1245)
WBC # BLD: 11.43 K/UL — HIGH (ref 4.8–10.8)
WBC # FLD AUTO: 11.43 K/UL — HIGH (ref 4.8–10.8)

## 2021-06-29 PROCEDURE — 93010 ELECTROCARDIOGRAM REPORT: CPT | Mod: NC

## 2021-06-29 PROCEDURE — 99233 SBSQ HOSP IP/OBS HIGH 50: CPT

## 2021-06-29 PROCEDURE — 95816 EEG AWAKE AND DROWSY: CPT | Mod: 26

## 2021-06-29 PROCEDURE — 99232 SBSQ HOSP IP/OBS MODERATE 35: CPT

## 2021-06-29 PROCEDURE — 70450 CT HEAD/BRAIN W/O DYE: CPT | Mod: 26

## 2021-06-29 RX ORDER — DEXTROSE 50 % IN WATER 50 %
25 SYRINGE (ML) INTRAVENOUS ONCE
Refills: 0 | Status: DISCONTINUED | OUTPATIENT
Start: 2021-06-29 | End: 2021-07-06

## 2021-06-29 RX ORDER — INSULIN LISPRO 100/ML
9 VIAL (ML) SUBCUTANEOUS
Refills: 0 | Status: DISCONTINUED | OUTPATIENT
Start: 2021-06-29 | End: 2021-07-07

## 2021-06-29 RX ORDER — PANTOPRAZOLE SODIUM 20 MG/1
40 TABLET, DELAYED RELEASE ORAL
Refills: 0 | Status: DISCONTINUED | OUTPATIENT
Start: 2021-06-29 | End: 2021-07-14

## 2021-06-29 RX ORDER — SODIUM,POTASSIUM PHOSPHATES 278-250MG
1 POWDER IN PACKET (EA) ORAL
Refills: 0 | Status: DISCONTINUED | OUTPATIENT
Start: 2021-06-29 | End: 2021-07-01

## 2021-06-29 RX ORDER — LISINOPRIL 2.5 MG/1
20 TABLET ORAL ONCE
Refills: 0 | Status: COMPLETED | OUTPATIENT
Start: 2021-06-29 | End: 2021-06-29

## 2021-06-29 RX ORDER — AMLODIPINE BESYLATE 2.5 MG/1
10 TABLET ORAL DAILY
Refills: 0 | Status: DISCONTINUED | OUTPATIENT
Start: 2021-06-29 | End: 2021-07-07

## 2021-06-29 RX ORDER — MAGNESIUM SULFATE 500 MG/ML
2 VIAL (ML) INJECTION ONCE
Refills: 0 | Status: COMPLETED | OUTPATIENT
Start: 2021-06-29 | End: 2021-06-29

## 2021-06-29 RX ORDER — GLUCAGON INJECTION, SOLUTION 0.5 MG/.1ML
1 INJECTION, SOLUTION SUBCUTANEOUS ONCE
Refills: 0 | Status: DISCONTINUED | OUTPATIENT
Start: 2021-06-29 | End: 2021-07-06

## 2021-06-29 RX ORDER — DEXTROSE 50 % IN WATER 50 %
12.5 SYRINGE (ML) INTRAVENOUS ONCE
Refills: 0 | Status: DISCONTINUED | OUTPATIENT
Start: 2021-06-29 | End: 2021-07-06

## 2021-06-29 RX ORDER — LISINOPRIL 2.5 MG/1
40 TABLET ORAL DAILY
Refills: 0 | Status: DISCONTINUED | OUTPATIENT
Start: 2021-06-29 | End: 2021-07-14

## 2021-06-29 RX ORDER — DEXTROSE 50 % IN WATER 50 %
15 SYRINGE (ML) INTRAVENOUS ONCE
Refills: 0 | Status: DISCONTINUED | OUTPATIENT
Start: 2021-06-29 | End: 2021-07-06

## 2021-06-29 RX ORDER — INSULIN LISPRO 100/ML
VIAL (ML) SUBCUTANEOUS
Refills: 0 | Status: DISCONTINUED | OUTPATIENT
Start: 2021-06-29 | End: 2021-07-07

## 2021-06-29 RX ORDER — SODIUM CHLORIDE 9 MG/ML
1000 INJECTION, SOLUTION INTRAVENOUS
Refills: 0 | Status: DISCONTINUED | OUTPATIENT
Start: 2021-06-29 | End: 2021-07-06

## 2021-06-29 RX ORDER — INSULIN GLARGINE 100 [IU]/ML
27 INJECTION, SOLUTION SUBCUTANEOUS AT BEDTIME
Refills: 0 | Status: DISCONTINUED | OUTPATIENT
Start: 2021-06-29 | End: 2021-07-07

## 2021-06-29 RX ORDER — INSULIN LISPRO 100/ML
9 VIAL (ML) SUBCUTANEOUS ONCE
Refills: 0 | Status: COMPLETED | OUTPATIENT
Start: 2021-06-29 | End: 2021-06-29

## 2021-06-29 RX ORDER — AMLODIPINE BESYLATE 2.5 MG/1
5 TABLET ORAL ONCE
Refills: 0 | Status: COMPLETED | OUTPATIENT
Start: 2021-06-29 | End: 2021-06-29

## 2021-06-29 RX ADMIN — ENOXAPARIN SODIUM 40 MILLIGRAM(S): 100 INJECTION SUBCUTANEOUS at 11:40

## 2021-06-29 RX ADMIN — LISINOPRIL 20 MILLIGRAM(S): 2.5 TABLET ORAL at 05:44

## 2021-06-29 RX ADMIN — Medication 9 UNIT(S): at 17:03

## 2021-06-29 RX ADMIN — CLOPIDOGREL BISULFATE 75 MILLIGRAM(S): 75 TABLET, FILM COATED ORAL at 10:03

## 2021-06-29 RX ADMIN — Medication 50 MILLIGRAM(S): at 05:44

## 2021-06-29 RX ADMIN — Medication 25 GRAM(S): at 11:35

## 2021-06-29 RX ADMIN — AMLODIPINE BESYLATE 5 MILLIGRAM(S): 2.5 TABLET ORAL at 05:44

## 2021-06-29 RX ADMIN — CHLORHEXIDINE GLUCONATE 1 APPLICATION(S): 213 SOLUTION TOPICAL at 17:03

## 2021-06-29 RX ADMIN — Medication 9 UNIT(S): at 12:25

## 2021-06-29 RX ADMIN — ATORVASTATIN CALCIUM 80 MILLIGRAM(S): 80 TABLET, FILM COATED ORAL at 21:34

## 2021-06-29 RX ADMIN — Medication 1 PACKET(S): at 11:35

## 2021-06-29 RX ADMIN — PANTOPRAZOLE SODIUM 40 MILLIGRAM(S): 20 TABLET, DELAYED RELEASE ORAL at 11:41

## 2021-06-29 RX ADMIN — LISINOPRIL 20 MILLIGRAM(S): 2.5 TABLET ORAL at 10:03

## 2021-06-29 RX ADMIN — AMLODIPINE BESYLATE 5 MILLIGRAM(S): 2.5 TABLET ORAL at 10:03

## 2021-06-29 RX ADMIN — Medication 1 PACKET(S): at 17:03

## 2021-06-29 RX ADMIN — Medication 325 MILLIGRAM(S): at 10:03

## 2021-06-29 RX ADMIN — INSULIN GLARGINE 27 UNIT(S): 100 INJECTION, SOLUTION SUBCUTANEOUS at 21:34

## 2021-06-29 RX ADMIN — SODIUM CHLORIDE 75 MILLILITER(S): 9 INJECTION INTRAMUSCULAR; INTRAVENOUS; SUBCUTANEOUS at 07:03

## 2021-06-29 RX ADMIN — Medication 2: at 17:03

## 2021-06-29 NOTE — PROGRESS NOTE ADULT - ASSESSMENT
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 p/w AMS x4 days, found to have acute-subacute L thalamic CVA. Patient now disoriented to time and person. CTH demonstrating acute/subacute infarct of left thalamus. CTA head revealed P1/P2 stenosis of R PCA.    Recommend:  - repeat CTH today with change in mental status- pending formal read  - Routine EEG today- reviewed, no seizure activity, formal reading pending  - q4h neuro checks  - continue DAPT, high intensity statin  - BP control, glycemic control  - consider EP consult for possible loop recorder to r/o cardiogenic cause  - consider TTE with bubble study, or ERICKSON    seen and examined with Dr. Garcia 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 p/w AMS x4 days, found to have acute-subacute L thalamic CVA. Patient now disoriented to time and person. CTH demonstrating acute/subacute infarct of left thalamus. CTA head revealed P1/P2 stenosis of R PCA.    Recommend:  - repeat CTH today with change in mental status- pending formal read  - Routine EEG today- reviewed, no seizure activity, formal reading pending  - q4h neuro checks  - continue DAPT, high intensity statin  - BP control, glycemic control  - consider EP consult for possible loop recorder to r/o cardiogenic cause  - ERICKSON with bubble study due to multiple strokes  - MRI brain w/o contrast.    seen and examined with Dr. Garcia

## 2021-06-29 NOTE — PROGRESS NOTE ADULT - SUBJECTIVE AND OBJECTIVE BOX
Neurology Follow up note    Name  ALEA MORRIS    55 year old male w hx of DM, HTN, CVA,  presents to the ED with altered mental status.  Last known well 5d ago when wife went away on a trip she returned  found him at home confused and the house in disarray not answering questions. CT head in ED revealed acute/subacute infarct L thalamus    Neuro: Patient is now disoriented to self and time, oriented to place. Very difficult to awaken. Able to squeeze hand after sternal rub. Observed to shake left leg several times during exam    ROS:  Constitutional, Neurological, Psychiatric, Eyes, ENT, Cardiovascular, Respiratory, Gastrointestinal, Genitourinary, Musculoskeletal, Integumentary, Endocrine and Heme/Lymph are otherwise negative.     Social History: No smoking, No drinking, No drug use      Vital Signs Last 24 Hrs  T(C): 36.4 (29 Jun 2021 16:00), Max: 36.8 (28 Jun 2021 23:00)  T(F): 97.5 (29 Jun 2021 16:00), Max: 98.2 (28 Jun 2021 23:00)  HR: 78 (29 Jun 2021 16:00) (71 - 87)  BP: 147/69 (29 Jun 2021 16:00) (128/61 - 200/84)  BP(mean): 99 (29 Jun 2021 16:00) (88 - 120)  RR: 17 (29 Jun 2021 16:00) (17 - 28)  SpO2: 96% (29 Jun 2021 15:57) (94% - 96%)  ICU Vital Signs Last 24 Hrs  T(C): 36.4 (29 Jun 2021 16:00), Max: 36.8 (28 Jun 2021 23:00)  T(F): 97.5 (29 Jun 2021 16:00), Max: 98.2 (28 Jun 2021 23:00)  HR: 78 (29 Jun 2021 16:00) (71 - 87)  BP: 147/69 (29 Jun 2021 16:00) (128/61 - 200/84)  BP(mean): 99 (29 Jun 2021 16:00) (88 - 120)  ABP: --  ABP(mean): --  RR: 17 (29 Jun 2021 16:00) (17 - 28)  SpO2: 96% (29 Jun 2021 15:57) (94% - 96%)    Physical Exam:  Constitutional: asleep,  in no acute distress.  Eyes: the sclera and conjunctiva were normal, pupils were equal in size, round, reactive to light, with normal accommodation and extraocular movements were intact.     Neuro Exam:  Orientation: oriented to place, disoriented to person and time.   Attention: impaired concentrating ability and visual attention was impaired  Fund of knowledge: impaired knowledge of personal past history.   Cranial Nerves:  pupils equal round and reactive to light, extraocular motion intact, facial sensation intact symmetrically, face symmetrical, hearing was intact bilaterally  Motor: spontaneously moving all 4 extremities, intermittent shaking of LLE  Sensory exam: responsive to noxious stimuli  Coordination:. unable to assess        Medications  amLODIPine   Tablet 10 milliGRAM(s) Oral daily  aspirin enteric coated 325 milliGRAM(s) Oral daily  atorvastatin 80 milliGRAM(s) Oral at bedtime  chlorhexidine 4% Liquid 1 Application(s) Topical every 12 hours  clopidogrel Tablet 75 milliGRAM(s) Oral daily  dextrose 40% Gel 15 Gram(s) Oral once  dextrose 5%. 1000 milliLiter(s) IV Continuous <Continuous>  dextrose 5%. 1000 milliLiter(s) IV Continuous <Continuous>  dextrose 50% Injectable 25 Gram(s) IV Push once  dextrose 50% Injectable 12.5 Gram(s) IV Push once  dextrose 50% Injectable 25 Gram(s) IV Push once  enoxaparin Injectable 40 milliGRAM(s) SubCutaneous daily  glucagon  Injectable 1 milliGRAM(s) IntraMuscular once  insulin glargine Injectable (LANTUS) 27 Unit(s) SubCutaneous at bedtime  insulin lispro (ADMELOG) corrective regimen sliding scale   SubCutaneous three times a day before meals  insulin lispro Injectable (ADMELOG) 9 Unit(s) SubCutaneous three times a day before meals  lisinopril 40 milliGRAM(s) Oral daily  LORazepam   Injectable 1 milliGRAM(s) IV Push once PRN  metoprolol succinate ER 50 milliGRAM(s) Oral daily  pantoprazole    Tablet 40 milliGRAM(s) Oral before breakfast  potassium phosphate / sodium phosphate Powder (PHOS-NaK) 1 Packet(s) Oral three times a day with meals      Lab                        15.7   11.43 )-----------( 320      ( 29 Jun 2021 05:43 )             46.9     06-29    140  |  104  |  9<L>  ----------------------------<  165<H>  3.8   |  22  |  0.6<L>    Ca    8.9      29 Jun 2021 05:43  Phos  2.0     06-29  Mg     1.6     06-29    TPro  6.9  /  Alb  3.4<L>  /  TBili  0.4  /  DBili  x   /  AST  17  /  ALT  13  /  AlkPhos  88  06-28    CAPILLARY BLOOD GLUCOSE      POCT Blood Glucose.: 234 mg/dL (29 Jun 2021 16:18)  POCT Blood Glucose.: 346 mg/dL (29 Jun 2021 11:34)  POCT Blood Glucose.: 179 mg/dL (29 Jun 2021 07:59)  POCT Blood Glucose.: 189 mg/dL (28 Jun 2021 21:08)  POCT Blood Glucose.: 205 mg/dL (28 Jun 2021 19:12)    LIVER FUNCTIONS - ( 28 Jun 2021 05:59 )  Alb: 3.4 g/dL / Pro: 6.9 g/dL / ALK PHOS: 88 U/L / ALT: 13 U/L / AST: 17 U/L / GGT: x

## 2021-06-29 NOTE — OCCUPATIONAL THERAPY INITIAL EVALUATION ADULT - GENERAL OBSERVATIONS, REHAB EVAL
chart reviewed, pt ok to be seen for OT IE as per RN 12:00-12:30, pt was lethargic, non coherent, sitting in chair, with 1:1 supervision, +IV insert

## 2021-06-29 NOTE — OCCUPATIONAL THERAPY INITIAL EVALUATION ADULT - ADDITIONAL COMMENTS
pt lives with wife in private home 8 steps to enter, once inside one flight to bedrooms, tub/shower combo with shower chair, regular toilet seat, pt was independent PTA and ambulated with SC occasionally outside, wife reports he had a stroke in may 2020 and that affected his balance

## 2021-06-29 NOTE — PROGRESS NOTE ADULT - SUBJECTIVE AND OBJECTIVE BOX
Patient is a 55y old  Male who presents with a chief complaint of CVA (28 Jun 2021 17:45)      Over Night Events:  Patient seen and examined.   awake confused get agitated on and off     ROS:  See HPI    PHYSICAL EXAM    ICU Vital Signs Last 24 Hrs  T(C): 36.8 (28 Jun 2021 23:00), Max: 36.8 (28 Jun 2021 23:00)  T(F): 98.2 (28 Jun 2021 23:00), Max: 98.2 (28 Jun 2021 23:00)  HR: 83 (29 Jun 2021 05:47) (66 - 83)  BP: 176/81 (29 Jun 2021 05:47) (138/63 - 182/81)  BP(mean): 116 (29 Jun 2021 05:47) (100 - 117)  ABP: --  ABP(mean): --  RR: 18 (29 Jun 2021 05:47) (17 - 21)  SpO2: 96% (28 Jun 2021 20:00) (93% - 96%)      General: awake confused   HEENT:         kae       Lymph Nodes: NO cervical LN   Lungs: Bilateral BS  Cardiovascular: Regular   Abdomen: Soft, Positive BS  Extremities: No clubbing   Skin: warm   Neurological: mild  weakness left   Musculoskeletal: move all ext     I&O's Detail    28 Jun 2021 07:01  -  29 Jun 2021 07:00  --------------------------------------------------------  IN:    Oral Fluid: 150 mL    sodium chloride 0.9%: 825 mL  Total IN: 975 mL    OUT:    Intermittent Catheterization - Urethral (mL): 450 mL    Voided (mL): 550 mL  Total OUT: 1000 mL    Total NET: -25 mL          LABS:                          15.7   11.43 )-----------( 320      ( 29 Jun 2021 05:43 )             46.9         28 Jun 2021 05:59    144    |  105    |  18     ----------------------------<  91     3.9     |  23     |  0.7      Ca    9.1        28 Jun 2021 05:59  Mg     1.9       27 Jun 2021 17:06                                                                                      Urinalysis Basic - ( 27 Jun 2021 16:46 )    Color: Yellow / Appearance: Clear / SG: >=1.030 / pH: x  Gluc: x / Ketone: 160  / Bili: Small / Urobili: 0.2 mg/dL   Blood: x / Protein: 100 mg/dL / Nitrite: Negative   Leuk Esterase: Negative / RBC: x / WBC x   Sq Epi: x / Non Sq Epi: x / Bacteria: x          CARDIAC MARKERS ( 27 Jun 2021 17:06 )  x     / <0.01 ng/mL / x     / x     / x                                                                                                                                                 MEDICATIONS  (STANDING):  aspirin enteric coated 325 milliGRAM(s) Oral daily  atorvastatin 80 milliGRAM(s) Oral at bedtime  chlorhexidine 4% Liquid 1 Application(s) Topical every 12 hours  clopidogrel Tablet 75 milliGRAM(s) Oral daily  enoxaparin Injectable 40 milliGRAM(s) SubCutaneous daily  lisinopril 20 milliGRAM(s) Oral daily  metoprolol succinate ER 50 milliGRAM(s) Oral daily  pantoprazole  Injectable 40 milliGRAM(s) IV Push daily    MEDICATIONS  (PRN):          Xrays:  TLC:  OG:  ET tube:                                                                                       ECHO:  [facility] ICU:

## 2021-06-29 NOTE — PROGRESS NOTE ADULT - SUBJECTIVE AND OBJECTIVE BOX
ARTUROALEA  55y, Male  Allergy: No Known Allergies      OVERNIGHT EVENTS:    remains confused     PHYSICAL EXAM      PHYSICAL EXAM:  Vital Signs Last 24 Hrs  T(C): 36.4 (29 Jun 2021 16:00), Max: 36.8 (28 Jun 2021 23:00)  T(F): 97.5 (29 Jun 2021 16:00), Max: 98.2 (28 Jun 2021 23:00)  HR: 78 (29 Jun 2021 16:00) (69 - 87)  BP: 147/69 (29 Jun 2021 16:00) (147/69 - 200/84)  BP(mean): 99 (29 Jun 2021 16:00) (99 - 120)  RR: 17 (29 Jun 2021 16:00) (17 - 28)  SpO2: 94% (29 Jun 2021 15:00) (94% - 96%)    GENERAL: NAD  HEAD:  Atraumatic, Normocephalic  NERVOUS SYSTEM:  no focal deficits   CHEST/LUNG: Clear to percussion bilaterally; No rales, rhonchi, wheezing, or rubs  HEART: Regular rate and rhythm; No murmurs, rubs, or gallops  ABDOMEN: Soft, Nontender, Nondistended; Bowel sounds present  EXTREMITIES:  2+ Peripheral Pulses, No clubbing, cyanosis, or edema  LYMPH: No lymphadenopathy noted  SKIN: No rashes or lesions              TESTS & MEASUREMENTS:  Height (cm): 180.3 (06-27-21 @ 22:02)  Weight (kg): 108.8 (06-27-21 @ 22:02)  BMI (kg/m2): 33.5 (06-27-21 @ 22:02)    06-28-21 @ 07:01  -  06-29-21 @ 07:00  --------------------------------------------------------  IN: 975 mL / OUT: 1000 mL / NET: -25 mL    06-29-21 @ 07:01  -  06-29-21 @ 16:16  --------------------------------------------------------  IN: 50 mL / OUT: 0 mL / NET: 50 mL                            15.7   11.43 )-----------( 320      ( 29 Jun 2021 05:43 )             46.9         06-29    140  |  104  |  9<L>  ----------------------------<  165<H>  3.8   |  22  |  0.6<L>    Ca    8.9      29 Jun 2021 05:43  Phos  2.0     06-29  Mg     1.6     06-29    TPro  6.9  /  Alb  3.4<L>  /  TBili  0.4  /  DBili  x   /  AST  17  /  ALT  13  /  AlkPhos  88  06-28    LIVER FUNCTIONS - ( 28 Jun 2021 05:59 )  Alb: 3.4 g/dL / Pro: 6.9 g/dL / ALK PHOS: 88 U/L / ALT: 13 U/L / AST: 17 U/L / GGT: x           CARDIAC MARKERS ( 27 Jun 2021 17:06 )  x     / <0.01 ng/mL / x     / x     / x            Urinalysis Basic - ( 27 Jun 2021 16:46 )    Color: Yellow / Appearance: Clear / SG: >=1.030 / pH: x  Gluc: x / Ketone: 160  / Bili: Small / Urobili: 0.2 mg/dL   Blood: x / Protein: 100 mg/dL / Nitrite: Negative   Leuk Esterase: Negative / RBC: x / WBC x   Sq Epi: x / Non Sq Epi: x / Bacteria: x        RADIOLOGY & ADDITIONAL TESTS:  < from: CT Head No Cont (06.27.21 @ 17:14) >  Impression: Acute/subacute infarct left thalamus.    < end of copied text >    MEDICATIONS:  MEDICATIONS  (STANDING):  amLODIPine   Tablet 10 milliGRAM(s) Oral daily  aspirin enteric coated 325 milliGRAM(s) Oral daily  atorvastatin 80 milliGRAM(s) Oral at bedtime  chlorhexidine 4% Liquid 1 Application(s) Topical every 12 hours  clopidogrel Tablet 75 milliGRAM(s) Oral daily  enoxaparin Injectable 40 milliGRAM(s) SubCutaneous daily  glucagon  Injectable 1 milliGRAM(s) IntraMuscular once  insulin glargine Injectable (LANTUS) 27 Unit(s) SubCutaneous at bedtime  insulin lispro (ADMELOG) corrective regimen sliding scale   SubCutaneous three times a day before meals  insulin lispro Injectable (ADMELOG) 9 Unit(s) SubCutaneous three times a day before meals  lisinopril 40 milliGRAM(s) Oral daily  metoprolol succinate ER 50 milliGRAM(s) Oral daily  pantoprazole    Tablet 40 milliGRAM(s) Oral before breakfast  potassium phosphate / sodium phosphate Powder (PHOS-NaK) 1 Packet(s) Oral three times a day with meals    MEDICATIONS  (PRN):  LORazepam   Injectable 1 milliGRAM(s) IV Push once PRN Agitation      HEALTH ISSUES - PROBLEM Dx:  Thalamic stroke  Thalamic stroke    Hypertension  Hypertension    Diabetes  Diabetes            Case discussed in details with:

## 2021-06-29 NOTE — PROGRESS NOTE ADULT - ASSESSMENT
IMPRESSION:  Stroke   DM  htn    PLAN:    CNS: follow neurology recommendation   neuro q 4 hrs       HEENT: oralc are    PULMONARY: keep pox > 92%    CARDIOVASCULAR: echo asa, plavix statin   cardiology consult for ERICKSON VS TTE with bubble study   loop recorder     GI: GI prophylaxis.  Feeding speech and swallow eval     RENAL: follow lytes is an os     INFECTIOUS DISEASE: nbo abx   bld cx     HEMATOLOGICAL:  DVT prophylaxis. follow INR, cbc     ENDOCRINE:  Follow up FS.  Insulin protocol if needed.    MUSCULOSKELETAL: rehab / PT     care as neurology   recall prn from pulmonary     CRITICAL CARE TIME SPENT: ***

## 2021-06-29 NOTE — PROGRESS NOTE ADULT - ASSESSMENT
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 p/w AMS x4 days, found to have acute-subacute L thalamic CVA    # h/o R PCA infarct p/w acute-subacute L thalamic infarct   - CTA shows R P1 occlusion/high grade stenosis  - f/u MRI brain  - Coag studies  - EP for loop recorder   - f/u neuro recs, poss neurointerventional c/s   - s/p loading dose ASA, c/w ASA and plavix   - c/w statin  - Check hba1c, lipid profile, TTE   - PT, OT  - speech cleared for dys I puree  - c/w BP meds, Keep SBP >140   - Last TTE from 5/20 mild LVH, otherwise normal. Unclear whether agitated saline was done  - No h/o AF, continue with tele monitoring   - neuro checks q 4h     # HTN  - cont  lisinopril, HCTZ, metoprolol, and amlodipine 10mg PO daily for better blood pressure control    # h/o uncontrolled DM  - Start insulin regimen if FS>180  - hba1c 9.8  - last hba1c May 2020 14.4    DVT ppx lovenox   GI ppx ptx  Diet: dys I puree nectar thick  full code  dispo: acute    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 p/w AMS x4 days, found to have acute-subacute L thalamic CVA    # h/o R PCA infarct p/w acute-subacute L thalamic infarct   - CTA shows R P1 occlusion/high grade stenosis  - f/u MRI brain  - Coag studies  - EP for loop recorder   - f/u neuro recs, poss neurointerventional c/s   - s/p loading dose ASA, c/w ASA and plavix   - c/w statin  - Check TTE   - PT, OT  - speech cleared for dys I puree nectar  - c/w BP meds, Keep SBP >140   - Last TTE from 5/20 mild LVH, otherwise normal. Unclear whether agitated saline was done  - No h/o AF, continue with tele monitoring   - neuro checks q 4h     # magnesium deficiency  - replete Mg    # HTN  - cont  lisinopril, HCTZ, metoprolol, and amlodipine 10mg PO daily for better blood pressure control    # h/o uncontrolled DM  - Start insulin regimen if FS>180  - hba1c 9.8  - last hba1c May 2020 14.4    DVT ppx lovenox   GI ppx ptx  Diet: dys I puree nectar thick  full code  dispo: acute

## 2021-06-29 NOTE — PROGRESS NOTE ADULT - ASSESSMENT
· 55 year old male w hx of DM, HTN, CVA,  presents to the ED with altered mental status.  Last known well 5d ago when wife went away on a trip she returned  found him at home confused and the house in disarray not answering questions. CT head in ED revealed acute/subacute infarct as above    metabolic encephalopathy possibly secondary to Acute CVA      - DAPT and Lipitor   - tele   - check 2DECHO   - check TSH, B12 and urine tox screen   - neuro follow up   - DVT prophylaxis

## 2021-06-29 NOTE — PROGRESS NOTE ADULT - ATTENDING COMMENTS
Patient noted to be somnolent which was change from yesterday.  Head CT and routine EEG recommended.    MRI brain and ERICKSON pending.

## 2021-06-30 LAB
ANION GAP SERPL CALC-SCNC: 11 MMOL/L — SIGNIFICANT CHANGE UP (ref 7–14)
AT III ACT/NOR PPP CHRO: 110 % — SIGNIFICANT CHANGE UP (ref 85–135)
B2 GLYCOPROT1 AB SER QL: NEGATIVE — SIGNIFICANT CHANGE UP
BUN SERPL-MCNC: 13 MG/DL — SIGNIFICANT CHANGE UP (ref 10–20)
CALCIUM SERPL-MCNC: 9.4 MG/DL — SIGNIFICANT CHANGE UP (ref 8.5–10.1)
CARDIOLIPIN AB SER-ACNC: POSITIVE
CHLORIDE SERPL-SCNC: 107 MMOL/L — SIGNIFICANT CHANGE UP (ref 98–110)
CO2 SERPL-SCNC: 23 MMOL/L — SIGNIFICANT CHANGE UP (ref 17–32)
CREAT SERPL-MCNC: 0.7 MG/DL — SIGNIFICANT CHANGE UP (ref 0.7–1.5)
DRVVT SCREEN TO CONFIRM RATIO: SIGNIFICANT CHANGE UP
GLUCOSE BLDC GLUCOMTR-MCNC: 129 MG/DL — HIGH (ref 70–99)
GLUCOSE BLDC GLUCOMTR-MCNC: 157 MG/DL — HIGH (ref 70–99)
GLUCOSE BLDC GLUCOMTR-MCNC: 240 MG/DL — HIGH (ref 70–99)
GLUCOSE BLDC GLUCOMTR-MCNC: 363 MG/DL — HIGH (ref 70–99)
GLUCOSE BLDC GLUCOMTR-MCNC: 446 MG/DL — HIGH (ref 70–99)
GLUCOSE SERPL-MCNC: 264 MG/DL — HIGH (ref 70–99)
HCT VFR BLD CALC: 45.6 % — SIGNIFICANT CHANGE UP (ref 42–52)
HCYS SERPL-MCNC: 11.4 UMOL/L — SIGNIFICANT CHANGE UP
HGB BLD-MCNC: 15.1 G/DL — SIGNIFICANT CHANGE UP (ref 14–18)
LA NT DPL PPP QL: SIGNIFICANT CHANGE UP
MAGNESIUM SERPL-MCNC: 1.9 MG/DL — SIGNIFICANT CHANGE UP (ref 1.8–2.4)
MCHC RBC-ENTMCNC: 28.5 PG — SIGNIFICANT CHANGE UP (ref 27–31)
MCHC RBC-ENTMCNC: 33.1 G/DL — SIGNIFICANT CHANGE UP (ref 32–37)
MCV RBC AUTO: 86 FL — SIGNIFICANT CHANGE UP (ref 80–94)
NORMALIZED SCT PPP-RTO: 1.08 RATIO — SIGNIFICANT CHANGE UP (ref 0–1.16)
NORMALIZED SCT PPP-RTO: SIGNIFICANT CHANGE UP
NRBC # BLD: 0 /100 WBCS — SIGNIFICANT CHANGE UP (ref 0–0)
PHOSPHATE SERPL-MCNC: 3.2 MG/DL — SIGNIFICANT CHANGE UP (ref 2.1–4.9)
PLATELET # BLD AUTO: 328 K/UL — SIGNIFICANT CHANGE UP (ref 130–400)
POTASSIUM SERPL-MCNC: 3.8 MMOL/L — SIGNIFICANT CHANGE UP (ref 3.5–5)
POTASSIUM SERPL-SCNC: 3.8 MMOL/L — SIGNIFICANT CHANGE UP (ref 3.5–5)
PROT C ACT/NOR PPP: 107 % — SIGNIFICANT CHANGE UP (ref 65–129)
RBC # BLD: 5.3 M/UL — SIGNIFICANT CHANGE UP (ref 4.7–6.1)
RBC # FLD: 13.3 % — SIGNIFICANT CHANGE UP (ref 11.5–14.5)
SODIUM SERPL-SCNC: 141 MMOL/L — SIGNIFICANT CHANGE UP (ref 135–146)
WBC # BLD: 9.85 K/UL — SIGNIFICANT CHANGE UP (ref 4.8–10.8)
WBC # FLD AUTO: 9.85 K/UL — SIGNIFICANT CHANGE UP (ref 4.8–10.8)

## 2021-06-30 PROCEDURE — 70551 MRI BRAIN STEM W/O DYE: CPT | Mod: 26

## 2021-06-30 PROCEDURE — 93306 TTE W/DOPPLER COMPLETE: CPT | Mod: 26

## 2021-06-30 PROCEDURE — 99233 SBSQ HOSP IP/OBS HIGH 50: CPT

## 2021-06-30 PROCEDURE — 99232 SBSQ HOSP IP/OBS MODERATE 35: CPT

## 2021-06-30 RX ADMIN — Medication 1 PACKET(S): at 17:12

## 2021-06-30 RX ADMIN — Medication 2: at 11:38

## 2021-06-30 RX ADMIN — CHLORHEXIDINE GLUCONATE 1 APPLICATION(S): 213 SOLUTION TOPICAL at 05:50

## 2021-06-30 RX ADMIN — Medication 1 PACKET(S): at 15:18

## 2021-06-30 RX ADMIN — Medication 50 MILLIGRAM(S): at 05:50

## 2021-06-30 RX ADMIN — Medication 325 MILLIGRAM(S): at 11:39

## 2021-06-30 RX ADMIN — AMLODIPINE BESYLATE 10 MILLIGRAM(S): 2.5 TABLET ORAL at 05:50

## 2021-06-30 RX ADMIN — Medication 9 UNIT(S): at 16:47

## 2021-06-30 RX ADMIN — LISINOPRIL 40 MILLIGRAM(S): 2.5 TABLET ORAL at 05:50

## 2021-06-30 RX ADMIN — CLOPIDOGREL BISULFATE 75 MILLIGRAM(S): 75 TABLET, FILM COATED ORAL at 11:42

## 2021-06-30 RX ADMIN — INSULIN GLARGINE 27 UNIT(S): 100 INJECTION, SOLUTION SUBCUTANEOUS at 21:55

## 2021-06-30 RX ADMIN — CHLORHEXIDINE GLUCONATE 1 APPLICATION(S): 213 SOLUTION TOPICAL at 17:12

## 2021-06-30 RX ADMIN — Medication 6: at 16:46

## 2021-06-30 RX ADMIN — PANTOPRAZOLE SODIUM 40 MILLIGRAM(S): 20 TABLET, DELAYED RELEASE ORAL at 06:08

## 2021-06-30 RX ADMIN — ENOXAPARIN SODIUM 40 MILLIGRAM(S): 100 INJECTION SUBCUTANEOUS at 11:43

## 2021-06-30 RX ADMIN — ATORVASTATIN CALCIUM 80 MILLIGRAM(S): 80 TABLET, FILM COATED ORAL at 21:55

## 2021-06-30 RX ADMIN — Medication 9 UNIT(S): at 11:38

## 2021-06-30 NOTE — PROGRESS NOTE ADULT - ASSESSMENT
· 55 year old male w hx of DM, HTN, CVA,  presents to the ED with altered mental status.  Last known well 5d ago when wife went away on a trip she returned  found him at home confused and the house in disarray not answering questions. CT head in ED revealed acute/subacute infarct as above    metabolic encephalopathy possibly secondary to Acute CVA      - DAPT and Lipitor   - tele   - check 2DECHO   - check  urine tox screen   - MRI brain today   - DVT prophylaxis

## 2021-06-30 NOTE — PROGRESS NOTE ADULT - SUBJECTIVE AND OBJECTIVE BOX
SUBJECTIVE:    Patient is a 55y old Male who presents with a chief complaint of CVA (30 Jun 2021 08:03)    Currently admitted to medicine with the primary diagnosis of Thalamic stroke       Today is hospital day 3d. This morning he is resting comfortably in bed. Overnight, pt was agitated once again.     PAST MEDICAL & SURGICAL HISTORY  Diabetes    Hypertension    Pancreatitis    CVA (cerebral vascular accident)    No significant past surgical history      SOCIAL HISTORY:  Negative for smoking/alcohol/drug use.     ALLERGIES:  No Known Allergies    MEDICATIONS:  STANDING MEDICATIONS  amLODIPine   Tablet 10 milliGRAM(s) Oral daily  aspirin enteric coated 325 milliGRAM(s) Oral daily  atorvastatin 80 milliGRAM(s) Oral at bedtime  chlorhexidine 4% Liquid 1 Application(s) Topical every 12 hours  clopidogrel Tablet 75 milliGRAM(s) Oral daily  dextrose 40% Gel 15 Gram(s) Oral once  dextrose 5%. 1000 milliLiter(s) IV Continuous <Continuous>  dextrose 5%. 1000 milliLiter(s) IV Continuous <Continuous>  dextrose 50% Injectable 25 Gram(s) IV Push once  dextrose 50% Injectable 12.5 Gram(s) IV Push once  dextrose 50% Injectable 25 Gram(s) IV Push once  enoxaparin Injectable 40 milliGRAM(s) SubCutaneous daily  glucagon  Injectable 1 milliGRAM(s) IntraMuscular once  insulin glargine Injectable (LANTUS) 27 Unit(s) SubCutaneous at bedtime  insulin lispro (ADMELOG) corrective regimen sliding scale   SubCutaneous three times a day before meals  insulin lispro Injectable (ADMELOG) 9 Unit(s) SubCutaneous three times a day before meals  lisinopril 40 milliGRAM(s) Oral daily  metoprolol succinate ER 50 milliGRAM(s) Oral daily  pantoprazole    Tablet 40 milliGRAM(s) Oral before breakfast  potassium phosphate / sodium phosphate Powder (PHOS-NaK) 1 Packet(s) Oral three times a day with meals    PRN MEDICATIONS  LORazepam   Injectable 1 milliGRAM(s) IV Push once PRN    VITALS:   T(F): 96.8  HR: 67  BP: 129/67  RR: 17  SpO2: 93%    LABS:                        15.7   11.43 )-----------( 320      ( 29 Jun 2021 05:43 )             46.9     06-29    140  |  104  |  9<L>  ----------------------------<  165<H>  3.8   |  22  |  0.6<L>    Ca    8.9      29 Jun 2021 05:43  Phos  3.2     06-30  Mg     1.6     06-29    RADIOLOGY:    PHYSICAL EXAM:  GEN: No acute distress  LUNGS: Clear to auscultation bilaterally   HEART: S1/S2 present. RRR.   ABD: Soft, non-tender, non-distended. Bowel sounds present  EXT: NC/NC/NE/2+PP/REYES/Skin Intact.   NEURO: AAOX2, lethargic     Intravenous access:   NG tube:   Cook cathter: Indwelling Urethral Catheter:     Connect To:  Straight Drainage/[facility]    Indication:  Urinary Retention / Obstruction (06-28-21 @ 17:46) (not performed) [Active]

## 2021-06-30 NOTE — PROGRESS NOTE ADULT - SUBJECTIVE AND OBJECTIVE BOX
Patient seems less confused today      T(F): 96.8 (06-30-21 @ 08:00), Max: 97.5 (06-29-21 @ 16:00)  HR: 67 (06-30-21 @ 08:00)  BP: 129/67 (06-30-21 @ 08:00)  RR: 17 (06-30-21 @ 08:00)  SpO2: 93% (06-30-21 @ 07:45) (93% - 96%)    PHYSICAL EXAM:  GENERAL: NAD  HEAD:  Atraumatic, Normocephalic  EYES: EOMI, PERRLA, conjunctiva and sclera clear  NERVOUS SYSTEM:  Alert & Oriented X3, no focal deficits   CHEST/LUNG: Clear to percussion bilaterally; No rales, rhonchi, wheezing, or rubs  HEART: Regular rate and rhythm; No murmurs, rubs, or gallops  ABDOMEN: Soft, Nontender, Nondistended; Bowel sounds present  EXTREMITIES:  2+ Peripheral Pulses, No clubbing, cyanosis, or edema    LABS  06-29    140  |  104  |  9<L>  ----------------------------<  165<H>  3.8   |  22  |  0.6<L>    Ca    8.9      29 Jun 2021 05:43  Phos  3.2     06-30  Mg     1.6     06-29                            15.7   11.43 )-----------( 320      ( 29 Jun 2021 05:43 )             46.9     Thyroid Stimulating Hormone, Serum in AM (06.29.21 @ 05:43)   Thyroid Stimulating Hormone, Serum: 1.45 uIU/mL   Vitamin B12, Serum in AM (06.29.21 @ 05:43)   Vitamin B12, Serum: 1079 pg/mL   CARDIAC ENZYMES      Troponin T, Serum: <0.01 ng/mL (06-27-21 @ 17:06)        RADIOLOGY  < from: CT Head No Cont (06.29.21 @ 15:55) >  2.  Stable acute lacunar infarct within the left thalamus.    3.  Stable moderate infarct involving the right occipital and temporal lobes, potentially subacute (significantly increased in size since the brain MRI 5/15/20).    4.  Stable chronic infarct within the right aspect of the corpus callosum splenium) and tiny chronic lacunar infarct within the right thalamus.    < end of copied text >    MEDICATIONS  (STANDING):  amLODIPine   Tablet 10 milliGRAM(s) Oral daily  aspirin enteric coated 325 milliGRAM(s) Oral daily  atorvastatin 80 milliGRAM(s) Oral at bedtime  chlorhexidine 4% Liquid 1 Application(s) Topical every 12 hours  clopidogrel Tablet 75 milliGRAM(s) Oral daily  enoxaparin Injectable 40 milliGRAM(s) SubCutaneous daily  glucagon  Injectable 1 milliGRAM(s) IntraMuscular once  insulin glargine Injectable (LANTUS) 27 Unit(s) SubCutaneous at bedtime  insulin lispro (ADMELOG) corrective regimen sliding scale   SubCutaneous three times a day before meals  insulin lispro Injectable (ADMELOG) 9 Unit(s) SubCutaneous three times a day before meals  lisinopril 40 milliGRAM(s) Oral daily  metoprolol succinate ER 50 milliGRAM(s) Oral daily  pantoprazole    Tablet 40 milliGRAM(s) Oral before breakfast  potassium phosphate / sodium phosphate Powder (PHOS-NaK) 1 Packet(s) Oral three times a day with meals    MEDICATIONS  (PRN):  LORazepam   Injectable 1 milliGRAM(s) IV Push once PRN Agitation

## 2021-06-30 NOTE — PROGRESS NOTE ADULT - ASSESSMENT
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 p/w AMS x4 days, found to have acute-subacute L thalamic CVA    # h/o R PCA infarct p/w acute-subacute L thalamic infarct   - CTA shows R P1 occlusion/high grade stenosis  - f/u MRI brain  - Coag studies  - EP for loop recorder   - f/u neuro recs, poss neurointerventional c/s   - s/p loading dose ASA, c/w ASA and plavix   - c/w statin  - Check TTE with bubble  - PT, OT  - speech cleared for dys I puree nectar  - c/w BP meds, Keep SBP >140   - Last TTE from 5/20 mild LVH, otherwise normal. Unclear whether agitated saline was done  - No h/o AF, continue with tele monitoring   - neuro checks q 4h     # magnesium deficiency  - replete Mg    # HTN  - cont  lisinopril, HCTZ, metoprolol, and amlodipine 10mg PO daily for better blood pressure control    # h/o uncontrolled DM  - Start insulin regimen if FS>180  - hba1c 9.8  - last hba1c May 2020 14.4    DVT ppx lovenox   GI ppx ptx  Diet: dys I puree nectar thick  full code  dispo: acute

## 2021-06-30 NOTE — SWALLOW BEDSIDE ASSESSMENT ADULT - SWALLOW EVAL: DIAGNOSIS
+moderate oral dysphagia, +overt s/s of aspiration/penetration w/ thin liquids +toleration of puree consistency +moderate oral dysphagia for puree, moderate-severe oral dysphagia for soft, +overt s/s of aspiration/penetration w/ thin liquids +toleration of puree consistency

## 2021-06-30 NOTE — PROGRESS NOTE ADULT - ASSESSMENT
IMPRESSION:  Stroke   DM  htn    PLAN:    CNS: follow neurology recommendation   repeat ct scan   neuro q 4 hrs       HEENT: oral care    PULMONARY: keep pox > 92%    CARDIOVASCULAR: echo asa, plavix statin   cardiology consult for ERICKSON VS TTE with bubble study   loop recorder     GI: GI prophylaxis.  Feeding speech and swallow eval     RENAL: follow lytes is an os     INFECTIOUS DISEASE: nbo abx   bld cx     HEMATOLOGICAL:  DVT prophylaxis. follow INR, cbc     ENDOCRINE:  Follow up FS.  Insulin protocol if needed.    MUSCULOSKELETAL: rehab / PT     care as neurology   recall prn from pulmonary     CRITICAL CARE TIME SPENT: ***

## 2021-06-30 NOTE — PROGRESS NOTE ADULT - SUBJECTIVE AND OBJECTIVE BOX
Patient is a 55y old  Male who presents with a chief complaint of CVA (29 Jun 2021 17:57)      Over Night Events:  Patient seen and examined.   awake more oriented today follow command     ROS:  See HPI    PHYSICAL EXAM    ICU Vital Signs Last 24 Hrs  T(C): 36 (30 Jun 2021 08:00), Max: 36.4 (29 Jun 2021 16:00)  T(F): 96.8 (30 Jun 2021 08:00), Max: 97.5 (29 Jun 2021 16:00)  HR: 67 (30 Jun 2021 08:00) (67 - 87)  BP: 129/67 (30 Jun 2021 08:00) (128/61 - 200/84)  BP(mean): 98 (30 Jun 2021 05:44) (88 - 120)  ABP: --  ABP(mean): --  RR: 17 (30 Jun 2021 08:00) (17 - 26)  SpO2: 96% (30 Jun 2021 05:44) (94% - 96%)      General: Awake   HEENT:                Lymph Nodes: NO cervical LN   Lungs: Bilateral BS  Cardiovascular: Regular   Abdomen: Soft, Positive BS  Extremities: No clubbing   Skin: warm   Neurological:  follow command   Musculoskeletal: move all ext     I&O's Detail    29 Jun 2021 07:01  -  30 Jun 2021 07:00  --------------------------------------------------------  IN:    IV PiggyBack: 50 mL  Total IN: 50 mL    OUT:  Total OUT: 0 mL    Total NET: 50 mL          LABS:                          15.7   11.43 )-----------( 320      ( 29 Jun 2021 05:43 )             46.9         29 Jun 2021 05:43    140    |  104    |  9      ----------------------------<  165    3.8     |  22     |  0.6      Ca    8.9        29 Jun 2021 05:43  Phos  2.0       29 Jun 2021 05:43  Mg     1.6       29 Jun 2021 05:43                                                                                                                                                                                                                                       MEDICATIONS  (STANDING):  amLODIPine   Tablet 10 milliGRAM(s) Oral daily  aspirin enteric coated 325 milliGRAM(s) Oral daily  atorvastatin 80 milliGRAM(s) Oral at bedtime  chlorhexidine 4% Liquid 1 Application(s) Topical every 12 hours  clopidogrel Tablet 75 milliGRAM(s) Oral daily  dextrose 40% Gel 15 Gram(s) Oral once  dextrose 5%. 1000 milliLiter(s) (50 mL/Hr) IV Continuous <Continuous>  dextrose 5%. 1000 milliLiter(s) (100 mL/Hr) IV Continuous <Continuous>  dextrose 50% Injectable 25 Gram(s) IV Push once  dextrose 50% Injectable 12.5 Gram(s) IV Push once  dextrose 50% Injectable 25 Gram(s) IV Push once  enoxaparin Injectable 40 milliGRAM(s) SubCutaneous daily  glucagon  Injectable 1 milliGRAM(s) IntraMuscular once  insulin glargine Injectable (LANTUS) 27 Unit(s) SubCutaneous at bedtime  insulin lispro (ADMELOG) corrective regimen sliding scale   SubCutaneous three times a day before meals  insulin lispro Injectable (ADMELOG) 9 Unit(s) SubCutaneous three times a day before meals  lisinopril 40 milliGRAM(s) Oral daily  metoprolol succinate ER 50 milliGRAM(s) Oral daily  pantoprazole    Tablet 40 milliGRAM(s) Oral before breakfast  potassium phosphate / sodium phosphate Powder (PHOS-NaK) 1 Packet(s) Oral three times a day with meals    MEDICATIONS  (PRN):  LORazepam   Injectable 1 milliGRAM(s) IV Push once PRN Agitation          Xrays:  TLC:  OG:  ET tube:                                                                                       ECHO:  [facility] ICU:

## 2021-07-01 LAB
ALBUMIN SERPL ELPH-MCNC: 3.3 G/DL — LOW (ref 3.5–5.2)
ALP SERPL-CCNC: 82 U/L — SIGNIFICANT CHANGE UP (ref 30–115)
ALT FLD-CCNC: 17 U/L — SIGNIFICANT CHANGE UP (ref 0–41)
ANION GAP SERPL CALC-SCNC: 11 MMOL/L — SIGNIFICANT CHANGE UP (ref 7–14)
AST SERPL-CCNC: 16 U/L — SIGNIFICANT CHANGE UP (ref 0–41)
BASOPHILS # BLD AUTO: 0.06 K/UL — SIGNIFICANT CHANGE UP (ref 0–0.2)
BASOPHILS NFR BLD AUTO: 0.6 % — SIGNIFICANT CHANGE UP (ref 0–1)
BILIRUB SERPL-MCNC: 0.4 MG/DL — SIGNIFICANT CHANGE UP (ref 0.2–1.2)
BUN SERPL-MCNC: 11 MG/DL — SIGNIFICANT CHANGE UP (ref 10–20)
CALCIUM SERPL-MCNC: 9 MG/DL — SIGNIFICANT CHANGE UP (ref 8.5–10.1)
CARDIOLIPIN IGM SER-MCNC: 5.8 GPL — SIGNIFICANT CHANGE UP (ref 0–12.5)
CHLORIDE SERPL-SCNC: 108 MMOL/L — SIGNIFICANT CHANGE UP (ref 98–110)
CO2 SERPL-SCNC: 25 MMOL/L — SIGNIFICANT CHANGE UP (ref 17–32)
CREAT SERPL-MCNC: 0.6 MG/DL — LOW (ref 0.7–1.5)
DEPRECATED CARDIOLIPIN IGA SER: 5.3 APL — SIGNIFICANT CHANGE UP (ref 0–12.5)
EOSINOPHIL # BLD AUTO: 0.37 K/UL — SIGNIFICANT CHANGE UP (ref 0–0.7)
EOSINOPHIL NFR BLD AUTO: 3.7 % — SIGNIFICANT CHANGE UP (ref 0–8)
GLUCOSE BLDC GLUCOMTR-MCNC: 137 MG/DL — HIGH (ref 70–99)
GLUCOSE BLDC GLUCOMTR-MCNC: 163 MG/DL — HIGH (ref 70–99)
GLUCOSE BLDC GLUCOMTR-MCNC: 182 MG/DL — HIGH (ref 70–99)
GLUCOSE BLDC GLUCOMTR-MCNC: 242 MG/DL — HIGH (ref 70–99)
GLUCOSE SERPL-MCNC: 142 MG/DL — HIGH (ref 70–99)
HCT VFR BLD CALC: 46.8 % — SIGNIFICANT CHANGE UP (ref 42–52)
HGB BLD-MCNC: 15.4 G/DL — SIGNIFICANT CHANGE UP (ref 14–18)
IMM GRANULOCYTES NFR BLD AUTO: 0.5 % — HIGH (ref 0.1–0.3)
LYMPHOCYTES # BLD AUTO: 2.34 K/UL — SIGNIFICANT CHANGE UP (ref 1.2–3.4)
LYMPHOCYTES # BLD AUTO: 23.5 % — SIGNIFICANT CHANGE UP (ref 20.5–51.1)
MAGNESIUM SERPL-MCNC: 1.6 MG/DL — LOW (ref 1.8–2.4)
MCHC RBC-ENTMCNC: 28.2 PG — SIGNIFICANT CHANGE UP (ref 27–31)
MCHC RBC-ENTMCNC: 32.9 G/DL — SIGNIFICANT CHANGE UP (ref 32–37)
MCV RBC AUTO: 85.6 FL — SIGNIFICANT CHANGE UP (ref 80–94)
MONOCYTES # BLD AUTO: 0.7 K/UL — HIGH (ref 0.1–0.6)
MONOCYTES NFR BLD AUTO: 7 % — SIGNIFICANT CHANGE UP (ref 1.7–9.3)
NEUTROPHILS # BLD AUTO: 6.43 K/UL — SIGNIFICANT CHANGE UP (ref 1.4–6.5)
NEUTROPHILS NFR BLD AUTO: 64.7 % — SIGNIFICANT CHANGE UP (ref 42.2–75.2)
NRBC # BLD: 0 /100 WBCS — SIGNIFICANT CHANGE UP (ref 0–0)
PHOSPHATE SERPL-MCNC: 3 MG/DL — SIGNIFICANT CHANGE UP (ref 2.1–4.9)
PLATELET # BLD AUTO: 302 K/UL — SIGNIFICANT CHANGE UP (ref 130–400)
POTASSIUM SERPL-MCNC: 3.2 MMOL/L — LOW (ref 3.5–5)
POTASSIUM SERPL-SCNC: 3.2 MMOL/L — LOW (ref 3.5–5)
PROT SERPL-MCNC: 6 G/DL — SIGNIFICANT CHANGE UP (ref 6–8)
RBC # BLD: 5.47 M/UL — SIGNIFICANT CHANGE UP (ref 4.7–6.1)
RBC # FLD: 13.2 % — SIGNIFICANT CHANGE UP (ref 11.5–14.5)
SARS-COV-2 RNA SPEC QL NAA+PROBE: SIGNIFICANT CHANGE UP
SODIUM SERPL-SCNC: 144 MMOL/L — SIGNIFICANT CHANGE UP (ref 135–146)
WBC # BLD: 9.95 K/UL — SIGNIFICANT CHANGE UP (ref 4.8–10.8)
WBC # FLD AUTO: 9.95 K/UL — SIGNIFICANT CHANGE UP (ref 4.8–10.8)

## 2021-07-01 PROCEDURE — 99233 SBSQ HOSP IP/OBS HIGH 50: CPT

## 2021-07-01 PROCEDURE — 93320 DOPPLER ECHO COMPLETE: CPT | Mod: 26

## 2021-07-01 PROCEDURE — 99232 SBSQ HOSP IP/OBS MODERATE 35: CPT

## 2021-07-01 PROCEDURE — 93312 ECHO TRANSESOPHAGEAL: CPT | Mod: 26

## 2021-07-01 PROCEDURE — 93325 DOPPLER ECHO COLOR FLOW MAPG: CPT | Mod: 26

## 2021-07-01 RX ORDER — POTASSIUM CHLORIDE 20 MEQ
20 PACKET (EA) ORAL ONCE
Refills: 0 | Status: COMPLETED | OUTPATIENT
Start: 2021-07-01 | End: 2021-07-01

## 2021-07-01 RX ORDER — METOPROLOL TARTRATE 50 MG
5 TABLET ORAL ONCE
Refills: 0 | Status: COMPLETED | OUTPATIENT
Start: 2021-07-01 | End: 2021-07-01

## 2021-07-01 RX ORDER — POTASSIUM CHLORIDE 20 MEQ
40 PACKET (EA) ORAL ONCE
Refills: 0 | Status: DISCONTINUED | OUTPATIENT
Start: 2021-07-01 | End: 2021-07-01

## 2021-07-01 RX ORDER — MAGNESIUM SULFATE 500 MG/ML
2 VIAL (ML) INJECTION ONCE
Refills: 0 | Status: COMPLETED | OUTPATIENT
Start: 2021-07-01 | End: 2021-07-01

## 2021-07-01 RX ORDER — POTASSIUM CHLORIDE 20 MEQ
40 PACKET (EA) ORAL EVERY 4 HOURS
Refills: 0 | Status: COMPLETED | OUTPATIENT
Start: 2021-07-01 | End: 2021-07-02

## 2021-07-01 RX ADMIN — AMLODIPINE BESYLATE 10 MILLIGRAM(S): 2.5 TABLET ORAL at 06:55

## 2021-07-01 RX ADMIN — Medication 50 MILLIGRAM(S): at 06:54

## 2021-07-01 RX ADMIN — Medication 1: at 17:24

## 2021-07-01 RX ADMIN — CHLORHEXIDINE GLUCONATE 1 APPLICATION(S): 213 SOLUTION TOPICAL at 06:55

## 2021-07-01 RX ADMIN — Medication 325 MILLIGRAM(S): at 17:13

## 2021-07-01 RX ADMIN — PANTOPRAZOLE SODIUM 40 MILLIGRAM(S): 20 TABLET, DELAYED RELEASE ORAL at 06:55

## 2021-07-01 RX ADMIN — Medication 2 MILLIGRAM(S): at 02:20

## 2021-07-01 RX ADMIN — Medication 25 GRAM(S): at 09:27

## 2021-07-01 RX ADMIN — CLOPIDOGREL BISULFATE 75 MILLIGRAM(S): 75 TABLET, FILM COATED ORAL at 17:13

## 2021-07-01 RX ADMIN — INSULIN GLARGINE 27 UNIT(S): 100 INJECTION, SOLUTION SUBCUTANEOUS at 22:29

## 2021-07-01 RX ADMIN — Medication 9 UNIT(S): at 17:24

## 2021-07-01 RX ADMIN — LISINOPRIL 40 MILLIGRAM(S): 2.5 TABLET ORAL at 06:55

## 2021-07-01 RX ADMIN — Medication 40 MILLIEQUIVALENT(S): at 22:28

## 2021-07-01 RX ADMIN — ENOXAPARIN SODIUM 40 MILLIGRAM(S): 100 INJECTION SUBCUTANEOUS at 17:13

## 2021-07-01 RX ADMIN — ATORVASTATIN CALCIUM 80 MILLIGRAM(S): 80 TABLET, FILM COATED ORAL at 22:28

## 2021-07-01 RX ADMIN — Medication 1 MILLIGRAM(S): at 22:16

## 2021-07-01 RX ADMIN — Medication 50 MILLIEQUIVALENT(S): at 10:37

## 2021-07-01 RX ADMIN — CHLORHEXIDINE GLUCONATE 1 APPLICATION(S): 213 SOLUTION TOPICAL at 17:13

## 2021-07-01 RX ADMIN — Medication 5 MILLIGRAM(S): at 01:47

## 2021-07-01 NOTE — CHART NOTE - NSCHARTNOTEFT_GEN_A_CORE
POST OPERATIVE PROCEDURAL DOCUMENTATION  PRE-OP DIAGNOSIS: TIA CVA    POST-OP DIAGNOSIS:  CVA    PROCEDURE: Transesophageal echocardiogram    Primary Physician:  Dr. Lion  Assistant: Dr. Geo Zamora    ANESTHESIA TYPE  [  ] General Anesthesia  [ x ] Conscious Sedation  [  ] Local/Regional    CONDITION  [  ] Critical  [  ] Serious  [  ] Fair  [ x ] Good    SPECIMENS REMOVED (IF APPLICABLE): N/A    IMPLANTS (IF APPLICABLE): None    ESTIMATED BLOOD LOSS: None    COMPLICATIONS: None    FINDINGS:    After risks and benefits of procedures were explained, informed consent was obtained and placed in chart. Refer to Anesthesia note for sedation details.  The ERICKSON probe was passed into the esophagus without difficulty.  Transesophageal and transgastric images were obtained.  The ERICKSON probe was removed without difficulty and examined.  There was no evidence for bleeding.  The patient tolerated the procedure well without any immediate ERICKSON-related complications.      Preliminary Findings:  LA:   nl  CARLOS: Left atrial appendage was clear of clot and smoke.  LV: LVEF nl  MV: Trace MR  AV: No evidence of AI, no evidence of AS.   RA: nl  TV: mild TR.   PV: no PI.   IAS: no PFO. No R-> L shunt.   Aorta:  simple atheroma of aortic arch    DIAGNOSIS/IMPRESSION:    PLAN OF CARE: return to unit  f/u with primary team.

## 2021-07-01 NOTE — PHARMACOTHERAPY INTERVENTION NOTE - INTERVENTION TYPE RECOOMEND
Therapy Recommended - Drug indicated but not ordered
IV to PO
Therapy Recommended - Drug indicated but not ordered

## 2021-07-01 NOTE — PROGRESS NOTE ADULT - ASSESSMENT
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 p/w AMS x4 days, found to have acute-subacute L thalamic CVA.  CTH demonstrating acute/subacute infarct of left thalamus. CTA head revealed P1/P2 stenosis of R PCA. MRI head showed  Acute lacunar infarct within the left thalamus measuring about 1.5 cm.  Moderate-sized infarct within the right occipital and medial temporal lobes, combination of late subacute and chronic.  Chronic infarct involving the splenium of the corpus callosum. Chronic lacunar infarct within the right thalamus. Pt alert to person, place, has flat expression , able to follow commands no weakness in upper or lower extremity. TTE and ERICKSON no acute abnormalities.     Plan    -pt stable can be downgraded to low risk tele   -neuro check q 8 hours   -continue aspirin , plavix and statin   -continue PT/OT   -neurology to follow up

## 2021-07-01 NOTE — PROGRESS NOTE ADULT - SUBJECTIVE AND OBJECTIVE BOX
Patient seen and evaluated this am, pt is sleepy but as per nursing was up last night until 5am      T(F): 97.2 (07-01-21 @ 17:21), Max: 97.5 (06-30-21 @ 19:09)  HR: 73 (07-01-21 @ 17:07)  BP: 158/78 (07-01-21 @ 14:45)  RR: 17 (07-01-21 @ 17:21)  SpO2: 94% (07-01-21 @ 17:07) (94% - 96%)    PHYSICAL EXAM:  GENERAL: NAD  HEAD:  Atraumatic, Normocephalic  EYES: EOMI, PERRLA, conjunctiva and sclera clear  NERVOUS SYSTEM:  no focal deficits   CHEST/LUNG: Clear to percussion bilaterally; No rales, rhonchi, wheezing, or rubs  HEART: Regular rate and rhythm; No murmurs, rubs, or gallops  ABDOMEN: Soft, Nontender, Nondistended; Bowel sounds present  EXTREMITIES:  2+ Peripheral Pulses, No clubbing, cyanosis, or edema    LABS  07-01    144  |  108  |  11  ----------------------------<  142<H>  3.2<L>   |  25  |  0.6<L>    Ca    9.0      01 Jul 2021 06:04  Phos  3.0     07-01  Mg     1.6     07-01    TPro  6.0  /  Alb  3.3<L>  /  TBili  0.4  /  DBili  x   /  AST  16  /  ALT  17  /  AlkPhos  82  07-01                          15.4   9.95  )-----------( 302      ( 01 Jul 2021 06:04 )             46.8     < from: 12 Lead ECG (06.29.21 @ 07:50) >  Diagnosis Line Normal sinus rhythm    < end of copied text >    RADIOLOGY  < from: MR Head No Cont (06.30.21 @ 15:24) >  IMPRESSION:    1.  Acute lacunar infarct within the left thalamus measuring about 1.5 cm.    2. Moderate-sized infarct within the right occipital and medial temporal lobes, combination of late subacute and chronic.    3.  Chronic infarct involving the splenium of the corpus callosum. Chronic lacunar infarct within the right thalamus.    4.  Signal abnormality within the right ventral midbrain extending into the mehreen and medulla.  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      < end of copied text >  < from: Transesophageal Echocardiogram (07.01.21 @ 14:55) >  Summary:   1. Left ventricular ejection fraction, by visual estimation, is 60 to 65%.   2. Normal global left ventricular systolic function.   3. Trace mitral valve regurgitation.   4. Color flow doppler and intravenous injection of agitated saline demonstrates the presence of an intact intra atrial septum.   5. No left atrial appendage thrombus and normal left atrial appendage velocities.    < end of copied text >    MEDICATIONS  (STANDING):  amLODIPine   Tablet 10 milliGRAM(s) Oral daily  aspirin enteric coated 325 milliGRAM(s) Oral daily  atorvastatin 80 milliGRAM(s) Oral at bedtime  chlorhexidine 4% Liquid 1 Application(s) Topical every 12 hours  clopidogrel Tablet 75 milliGRAM(s) Oral daily  enoxaparin Injectable 40 milliGRAM(s) SubCutaneous daily  glucagon  Injectable 1 milliGRAM(s) IntraMuscular once  insulin glargine Injectable (LANTUS) 27 Unit(s) SubCutaneous at bedtime  insulin lispro (ADMELOG) corrective regimen sliding scale   SubCutaneous three times a day before meals  insulin lispro Injectable (ADMELOG) 9 Unit(s) SubCutaneous three times a day before meals  lisinopril 40 milliGRAM(s) Oral daily  metoprolol succinate ER 50 milliGRAM(s) Oral daily  pantoprazole    Tablet 40 milliGRAM(s) Oral before breakfast    MEDICATIONS  (PRN):  LORazepam   Injectable 1 milliGRAM(s) IV Push once PRN Agitation

## 2021-07-01 NOTE — PROGRESS NOTE ADULT - SUBJECTIVE AND OBJECTIVE BOX
Neurology Follow up note    Name  ALEA MORRIS    55 year old male w hx of DM, HTN, CVA,  presents to the ED with altered mental status.  Last known well 5d ago when wife went away on a trip she returned  found him at home confused and the house in disarray not answering questions. CT head in ED revealed acute/subacute infarct L thalamus    Neuro: Pt AOx 2, able to follow commands more, continues to have flat expression and poor eye interaction.  MRI  showed acute infarct at left thalamus 1.5 cm, moderate R occipital and medial temporal lobe. TTE and ERICKSON normal , no thrombus.     Vital Signs Last 24 Hrs  T(C): 36.2 (01 Jul 2021 17:21), Max: 36.4 (30 Jun 2021 19:09)  T(F): 97.2 (01 Jul 2021 17:21), Max: 97.5 (30 Jun 2021 19:09)  HR: 78 (01 Jul 2021 14:45) (68 - 88)  BP: 158/78 (01 Jul 2021 14:45) (109/56 - 189/85)  BP(mean): 111 (01 Jul 2021 14:45) (79 - 122)  RR: 17 (01 Jul 2021 17:21) (17 - 22)  SpO2: 95% (01 Jul 2021 14:45) (94% - 96%)      Neurological Exam:   Mental status: Awake, alert and oriented x2. For age keeps repeating number 5, able to follow up commands answerers mostly shortly no or yes.    Attention/concentration intact.  No dysarthria.    Cranial nerves:  pupils equally round and reactive to light, visual fields full, no nystagmus, extraocular muscles intact,  face symmetric, hearing intact to finger rub,tongue was midline  Motor:  Normal bulk and tone, strength 5/5 in bilateral upper and lower extremities.   strength 5/5.  Rapid alternating movements intact and symmetric.   Sensation: Intact to light touch  Coordination: No dysmetria on finger-to-nose and heel-to-shin.  No clumsiness.  Reflexes: 2+ in upper and lower extremities, downgoing toes bilaterally      Medications  amLODIPine   Tablet 10 milliGRAM(s) Oral daily  aspirin enteric coated 325 milliGRAM(s) Oral daily  atorvastatin 80 milliGRAM(s) Oral at bedtime  chlorhexidine 4% Liquid 1 Application(s) Topical every 12 hours  clopidogrel Tablet 75 milliGRAM(s) Oral daily  dextrose 40% Gel 15 Gram(s) Oral once  dextrose 5%. 1000 milliLiter(s) IV Continuous <Continuous>  dextrose 5%. 1000 milliLiter(s) IV Continuous <Continuous>  dextrose 50% Injectable 25 Gram(s) IV Push once  dextrose 50% Injectable 12.5 Gram(s) IV Push once  dextrose 50% Injectable 25 Gram(s) IV Push once  enoxaparin Injectable 40 milliGRAM(s) SubCutaneous daily  glucagon  Injectable 1 milliGRAM(s) IntraMuscular once  insulin glargine Injectable (LANTUS) 27 Unit(s) SubCutaneous at bedtime  insulin lispro (ADMELOG) corrective regimen sliding scale   SubCutaneous three times a day before meals  insulin lispro Injectable (ADMELOG) 9 Unit(s) SubCutaneous three times a day before meals  lisinopril 40 milliGRAM(s) Oral daily  LORazepam   Injectable 1 milliGRAM(s) IV Push once PRN  metoprolol succinate ER 50 milliGRAM(s) Oral daily  pantoprazole    Tablet 40 milliGRAM(s) Oral before breakfast      Lab                        15.4   9.95  )-----------( 302      ( 01 Jul 2021 06:04 )             46.8     07-01    144  |  108  |  11  ----------------------------<  142<H>  3.2<L>   |  25  |  0.6<L>    Ca    9.0      01 Jul 2021 06:04  Phos  3.0     07-01  Mg     1.6     07-01    TPro  6.0  /  Alb  3.3<L>  /  TBili  0.4  /  DBili  x   /  AST  16  /  ALT  17  /  AlkPhos  82  07-01    CAPILLARY BLOOD GLUCOSE      POCT Blood Glucose.: 182 mg/dL (01 Jul 2021 17:13)  POCT Blood Glucose.: 163 mg/dL (01 Jul 2021 11:32)  POCT Blood Glucose.: 137 mg/dL (01 Jul 2021 08:09)  POCT Blood Glucose.: 129 mg/dL (30 Jun 2021 21:53)  POCT Blood Glucose.: 363 mg/dL (30 Jun 2021 18:30)    LIVER FUNCTIONS - ( 01 Jul 2021 06:04 )  Alb: 3.3 g/dL / Pro: 6.0 g/dL / ALK PHOS: 82 U/L / ALT: 17 U/L / AST: 16 U/L / GGT: x           Radiology    < from: MR Head No Cont (06.30.21 @ 15:24) >  IMPRESSION:    1.  Acute lacunar infarct within the left thalamus measuring about 1.5 cm.    2. Moderate-sized infarct within the right occipital and medial temporal lobes, combination of late subacute and chronic.    3.  Chronic infarct involving the splenium of the corpus callosum. Chronic lacunar infarct within the right thalamus.    4.  Signal abnormality within the right ventral midbrain extending into the mehreen and medulla.  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      AVIS ANDERSON MD; Attending Radiologist  This document has been electronically signed. Jun 30 2021  4:25PM    < end of copied text >      < from: CT Head No Cont (06.29.21 @ 15:55) >  IMPRESSION:    1.  No significant change since the CT head dated 6/27/2021:    2.  Stable acute lacunar infarct within the left thalamus.    3.  Stable moderate infarct involving the right occipital and temporal lobes, potentially subacute (significantly increased in size since the brain MRI 5/15/20).    4.  Stable chronic infarct within the right aspect of the corpus callosum splenium) and tiny chronic lacunar infarct within the right thalamus.          AVIS ANDERSON MD; Attending Radiologist  This document has been electronically signed. Jun 29 2021  4:44PM    < end of copied text >

## 2021-07-01 NOTE — PROGRESS NOTE ADULT - ASSESSMENT
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 p/w AMS x4 days, found to have acute-subacute L thalamic CVA    # h/o R PCA infarct p/w acute-subacute L thalamic infarct   - CTA shows R P1 occlusion/high grade stenosis  - MRI brain shows acute lacunar L thalamic infarct measuring 1.5 cm, mod size R occipital and medial temporal lobe infarcts combo of late subacute and chronic ,chronic R thalmic lacunar infarct and splenium of corpus callosum and poss wallerian degeneration  - Coag studies  - EP for loop recorder   - ERICKSON per neuro  - s/p loading dose ASA, c/w ASA and plavix   - c/w statin  - PT, OT  - speech cleared for dys I puree nectar  - c/w BP meds, Keep SBP >140   - Last TTE from 5/20 mild LVH, otherwise normal. 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 p/w AMS x4 days, found to have acute-subacute L thalamic CVA    # h/o R PCA infarct p/w acute-subacute L thalamic infarct   - CTA shows R P1 occlusion/high grade stenosis  - MRI brain shows acute lacunar L thalamic infarct measuring 1.5 cm, mod size R occipital and medial temporal lobe infarcts combo of late subacute and chronic ,chronic R thalmic lacunar infarct and splenium of corpus callosum and poss wallerian degeneration  - Coag studies  - EP for loop recorder   - ERICKSON today  - s/p loading dose ASA, c/w ASA and plavix   - c/w statin  - PT, OT  - speech cleared for dys I puree nectar  - c/w BP meds, Keep SBP >140   - Last TTE from 5/20 mild LVH, otherwise normal. Unclear whether agitated saline was done  - No h/o AF, continue with tele monitoring   - neuro checks q 4h     # magnesium deficiency  - replete Mg    # HTN  - cont  lisinopril, HCTZ, metoprolol, and amlodipine 10mg PO daily for better blood pressure control    # h/o uncontrolled DM  - Start insulin regimen if FS>180  - hba1c 9.8    DVT ppx lovenox   GI ppx ptx  Diet: dys I puree nectar thick  full code  dispo: acute

## 2021-07-01 NOTE — PROGRESS NOTE ADULT - ASSESSMENT
· 55 year old male w hx of DM, HTN, CVA,  presents to the ED with altered mental status.  Last known well 5d ago when wife went away on a trip she returned  found him at home confused and the house in disarray not answering questions. CT head in ED revealed acute/subacute infarct as above    metabolic encephalopathy possibly secondary to Acute CVA / hypokalemia / hypomagnesemia     - DAPT and Lipitor   - tele, may change neuro checks to q8h   - ERICKSON negative as above   - continue PT   - sq hospital insulin protocol   - continue home meds   - may transfer to 3S tele   - will need outpt cardiac event monitor    - DVT prophylaxis

## 2021-07-01 NOTE — PROGRESS NOTE ADULT - ASSESSMENT
IMPRESSION:  Stroke   DM  htn    PLAN:    CNS: follow neurology recommendation   neuro q 4 hrs       HEENT: oral care    PULMONARY: keep pox > 92%    CARDIOVASCULAR: echo asa, plavix statin   cardiology  for ERICKSON VS TTE with bubble study   loop recorder     GI: GI prophylaxis.  Feeding speech and swallow eval     RENAL: follow lytes is an os     INFECTIOUS DISEASE: nbo abx   bld cx     HEMATOLOGICAL:  DVT prophylaxis. follow INR, cbc     ENDOCRINE:  Follow up FS.  Insulin protocol if needed.    MUSCULOSKELETAL: rehab / PT     care as neurology possible downgrade to floor or rehab   recall prn from pulmonary

## 2021-07-01 NOTE — PROGRESS NOTE ADULT - SUBJECTIVE AND OBJECTIVE BOX
Patient is a 55y old  Male who presents with a chief complaint of CVA (30 Jun 2021 11:18)      Over Night Events:  Patient seen and examined.   stable awake no acute changes   s/p MRI   ROS:  See HPI    PHYSICAL EXAM    ICU Vital Signs Last 24 Hrs  T(C): 36.4 (30 Jun 2021 19:09), Max: 36.5 (30 Jun 2021 16:33)  T(F): 97.5 (30 Jun 2021 19:09), Max: 97.7 (30 Jun 2021 16:33)  HR: 74 (01 Jul 2021 02:16) (72 - 88)  BP: 173/76 (01 Jul 2021 02:16) (109/56 - 189/85)  BP(mean): 109 (01 Jul 2021 02:16) (79 - 122)  ABP: --  ABP(mean): --  RR: 22 (01 Jul 2021 01:47) (18 - 23)  SpO2: 95% (01 Jul 2021 01:47) (94% - 96%)      General: awake   HEENT: kae               Lymph Nodes: NO cervical LN   Lungs: Bilateral BS  Cardiovascular: Regular   Abdomen: Soft, Positive BS  Extremities: No clubbing   Skin: warm   Neurological: follow command   Musculoskeletal: move all ext     I&O's Detail    30 Jun 2021 07:01  -  01 Jul 2021 07:00  --------------------------------------------------------  IN:    Oral Fluid: 180 mL  Total IN: 180 mL    OUT:    Voided (mL): 875 mL  Total OUT: 875 mL    Total NET: -695 mL          LABS:                          15.4   9.95  )-----------( 302      ( 01 Jul 2021 06:04 )             46.8         01 Jul 2021 06:04    144    |  108    |  11     ----------------------------<  142    3.2     |  25     |  0.6      Ca    9.0        01 Jul 2021 06:04  Phos  3.0       01 Jul 2021 06:04  Mg     1.6       01 Jul 2021 06:04    TPro  6.0    /  Alb  3.3    /  TBili  0.4    /  DBili  x      /  AST  16     /  ALT  17     /  AlkPhos  82     01 Jul 2021 06:04  Amylase x     lipase x                                                                                                                                                                                                                                         MEDICATIONS  (STANDING):  amLODIPine   Tablet 10 milliGRAM(s) Oral daily  aspirin enteric coated 325 milliGRAM(s) Oral daily  atorvastatin 80 milliGRAM(s) Oral at bedtime  chlorhexidine 4% Liquid 1 Application(s) Topical every 12 hours  clopidogrel Tablet 75 milliGRAM(s) Oral daily  dextrose 40% Gel 15 Gram(s) Oral once  dextrose 5%. 1000 milliLiter(s) (50 mL/Hr) IV Continuous <Continuous>  dextrose 5%. 1000 milliLiter(s) (100 mL/Hr) IV Continuous <Continuous>  dextrose 50% Injectable 25 Gram(s) IV Push once  dextrose 50% Injectable 12.5 Gram(s) IV Push once  dextrose 50% Injectable 25 Gram(s) IV Push once  enoxaparin Injectable 40 milliGRAM(s) SubCutaneous daily  glucagon  Injectable 1 milliGRAM(s) IntraMuscular once  insulin glargine Injectable (LANTUS) 27 Unit(s) SubCutaneous at bedtime  insulin lispro (ADMELOG) corrective regimen sliding scale   SubCutaneous three times a day before meals  insulin lispro Injectable (ADMELOG) 9 Unit(s) SubCutaneous three times a day before meals  lisinopril 40 milliGRAM(s) Oral daily  metoprolol succinate ER 50 milliGRAM(s) Oral daily  pantoprazole    Tablet 40 milliGRAM(s) Oral before breakfast  potassium phosphate / sodium phosphate Powder (PHOS-NaK) 1 Packet(s) Oral three times a day with meals    MEDICATIONS  (PRN):  LORazepam   Injectable 1 milliGRAM(s) IV Push once PRN Agitation        m< from: MR Head No Cont (06.30.21 @ 15:24) >  1.  Acute lacunar infarct within the left thalamus measuring about 1.5 cm.    2. Moderate-sized infarct within the right occipital and medial temporal lobes, combination of late subacute and chronic.    3.  Chronic infarct involving the splenium of the corpus callosum. Chronic lacunar infarct within the right thalamus.    4.  Signal abnormality within the right ventral midbrain extending into the mehreen and medulla.  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    < end of copied text >    Xrays:  TLC:  OG:  ET tube:                                                                                       ECHO:  CAM ICU:

## 2021-07-01 NOTE — PROGRESS NOTE ADULT - SUBJECTIVE AND OBJECTIVE BOX
SUBJECTIVE:    Patient is a 55y old Male who presents with a chief complaint of CVA (01 Jul 2021 08:12)    Currently admitted to medicine with the primary diagnosis of Thalamic stroke    Today is hospital day 4d. This morning he is resting comfortably in bed. Agitated during the night.     PAST MEDICAL & SURGICAL HISTORY  Diabetes    Hypertension    Pancreatitis    CVA (cerebral vascular accident)    No significant past surgical history      SOCIAL HISTORY:  Negative for smoking/alcohol/drug use.     ALLERGIES:  No Known Allergies    MEDICATIONS:  STANDING MEDICATIONS  amLODIPine   Tablet 10 milliGRAM(s) Oral daily  aspirin enteric coated 325 milliGRAM(s) Oral daily  atorvastatin 80 milliGRAM(s) Oral at bedtime  chlorhexidine 4% Liquid 1 Application(s) Topical every 12 hours  clopidogrel Tablet 75 milliGRAM(s) Oral daily  dextrose 40% Gel 15 Gram(s) Oral once  dextrose 5%. 1000 milliLiter(s) IV Continuous <Continuous>  dextrose 5%. 1000 milliLiter(s) IV Continuous <Continuous>  dextrose 50% Injectable 25 Gram(s) IV Push once  dextrose 50% Injectable 12.5 Gram(s) IV Push once  dextrose 50% Injectable 25 Gram(s) IV Push once  enoxaparin Injectable 40 milliGRAM(s) SubCutaneous daily  glucagon  Injectable 1 milliGRAM(s) IntraMuscular once  insulin glargine Injectable (LANTUS) 27 Unit(s) SubCutaneous at bedtime  insulin lispro (ADMELOG) corrective regimen sliding scale   SubCutaneous three times a day before meals  insulin lispro Injectable (ADMELOG) 9 Unit(s) SubCutaneous three times a day before meals  lisinopril 40 milliGRAM(s) Oral daily  metoprolol succinate ER 50 milliGRAM(s) Oral daily  pantoprazole    Tablet 40 milliGRAM(s) Oral before breakfast  potassium chloride  20 mEq/100 mL IVPB 20 milliEquivalent(s) IV Intermittent once    PRN MEDICATIONS  LORazepam   Injectable 1 milliGRAM(s) IV Push once PRN    VITALS:   T(F): 97.5  HR: 74  BP: 173/76  RR: 22  SpO2: 95%    LABS:                        15.4   9.95  )-----------( 302      ( 01 Jul 2021 06:04 )             46.8     07-01    144  |  108  |  11  ----------------------------<  142<H>  3.2<L>   |  25  |  0.6<L>    Ca    9.0      01 Jul 2021 06:04  Phos  3.0     07-01  Mg     1.6     07-01    TPro  6.0  /  Alb  3.3<L>  /  TBili  0.4  /  DBili  x   /  AST  16  /  ALT  17  /  AlkPhos  82  07-01    RADIOLOGY:  < from: MR Head No Cont (06.30.21 @ 15:24) >  1.  Acute lacunar infarct within the left thalamus measuring about 1.5 cm.    2. Moderate-sized infarct within the right occipital and medial temporal lobes, combination of late subacute and chronic.    3.  Chronic infarct involving the splenium of the corpus callosum. Chronic lacunar infarct within the right thalamus.    4.  Signal abnormality within the right ventral midbrain extending into the mehreen and medulla.  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        PHYSICAL EXAM:  GEN: No acute distress  LUNGS: Clear to auscultation bilaterally   HEART: S1/S2 present. RRR.   ABD: Soft, non-tender, non-distended. Bowel sounds present  EXT: NC/NC/NE/2+PP/REYES/Skin Intact.   NEURO: AAOX2, lethargic       Intravenous access:   NG tube:   Cook cathter: Indwelling Urethral Catheter:     Connect To:  Straight Drainage/[place]    Indication:  Urinary Retention / Obstruction (06-28-21 @ 17:46) (not performed) [Active]         SUBJECTIVE:    Patient is a 55y old Male who presents with a chief complaint of CVA (01 Jul 2021 08:12)    Currently admitted to medicine with the primary diagnosis of Thalamic stroke    Today is hospital day 4d. This morning he is resting comfortably in bed. Agitated during the night.     PAST MEDICAL & SURGICAL HISTORY  Diabetes    Hypertension    Pancreatitis    CVA (cerebral vascular accident)    No significant past surgical history      SOCIAL HISTORY:  Negative for smoking/alcohol/drug use.     ALLERGIES:  No Known Allergies    MEDICATIONS:  STANDING MEDICATIONS  amLODIPine   Tablet 10 milliGRAM(s) Oral daily  aspirin enteric coated 325 milliGRAM(s) Oral daily  atorvastatin 80 milliGRAM(s) Oral at bedtime  chlorhexidine 4% Liquid 1 Application(s) Topical every 12 hours  clopidogrel Tablet 75 milliGRAM(s) Oral daily  dextrose 40% Gel 15 Gram(s) Oral once  dextrose 5%. 1000 milliLiter(s) IV Continuous <Continuous>  dextrose 5%. 1000 milliLiter(s) IV Continuous <Continuous>  dextrose 50% Injectable 25 Gram(s) IV Push once  dextrose 50% Injectable 12.5 Gram(s) IV Push once  dextrose 50% Injectable 25 Gram(s) IV Push once  enoxaparin Injectable 40 milliGRAM(s) SubCutaneous daily  glucagon  Injectable 1 milliGRAM(s) IntraMuscular once  insulin glargine Injectable (LANTUS) 27 Unit(s) SubCutaneous at bedtime  insulin lispro (ADMELOG) corrective regimen sliding scale   SubCutaneous three times a day before meals  insulin lispro Injectable (ADMELOG) 9 Unit(s) SubCutaneous three times a day before meals  lisinopril 40 milliGRAM(s) Oral daily  metoprolol succinate ER 50 milliGRAM(s) Oral daily  pantoprazole    Tablet 40 milliGRAM(s) Oral before breakfast  potassium chloride  20 mEq/100 mL IVPB 20 milliEquivalent(s) IV Intermittent once    PRN MEDICATIONS  LORazepam   Injectable 1 milliGRAM(s) IV Push once PRN    VITALS:   T(F): 97.5  HR: 74  BP: 173/76  RR: 22  SpO2: 95%    LABS:                        15.4   9.95  )-----------( 302      ( 01 Jul 2021 06:04 )             46.8     07-01    144  |  108  |  11  ----------------------------<  142<H>  3.2<L>   |  25  |  0.6<L>    Ca    9.0      01 Jul 2021 06:04  Phos  3.0     07-01  Mg     1.6     07-01    TPro  6.0  /  Alb  3.3<L>  /  TBili  0.4  /  DBili  x   /  AST  16  /  ALT  17  /  AlkPhos  82  07-01    RADIOLOGY:  < from: MR Head No Cont (06.30.21 @ 15:24) >  1.  Acute lacunar infarct within the left thalamus measuring about 1.5 cm.    2. Moderate-sized infarct within the right occipital and medial temporal lobes, combination of late subacute and chronic.    3.  Chronic infarct involving the splenium of the corpus callosum. Chronic lacunar infarct within the right thalamus.    4.  Signal abnormality within the right ventral midbrain extending into the mehreen and medulla.  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        PHYSICAL EXAM:  GEN: No acute distress  LUNGS: Clear to auscultation bilaterally   HEART: S1/S2 present. RRR.   ABD: Soft, non-tender, non-distended. Bowel sounds present  EXT: NC/NC/NE/2+PP/REYES/Skin Intact.   NEURO: AAOX2, lethargic       Intravenous access:   NG tube:   Cook cathter: Indwelling Urethral Catheter:     Connect To:  Straight Drainage/[place]    Indication:  Urinary Retention / Obstruction (06-28-21 @ 17:46) (not performed) [Active]

## 2021-07-01 NOTE — PROGRESS NOTE ADULT - ATTENDING COMMENTS
Patient examined 7/1/21 just after returning from [place].  Patient was drowsy but able to follow commands and neurologic exam was stable.  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  MRI brain showed new acute - subacute infarction within the right occipital region in addition to the left thalamic infarct.  Patient's ERICKSON did not show atrial thrombus or vegetation or shunt.  Due to multifocal infarction cardioembolism is still a consideration so recommend loop recorder.  Continue dual antiplatelet therapy and high intensity statin.  Okay for downgrade to tele.  PT/OT, speech and rehab assessment.

## 2021-07-01 NOTE — PRE-ANESTHESIA EVALUATION ADULT - NSANTHOSAYNRD_GEN_A_CORE
No. ALENA screening performed.  STOP BANG Legend: 0-2 = LOW Risk; 3-4 = INTERMEDIATE Risk; 5-8 = HIGH Risk

## 2021-07-02 LAB
ALBUMIN SERPL ELPH-MCNC: 3.4 G/DL — LOW (ref 3.5–5.2)
ALP SERPL-CCNC: 91 U/L — SIGNIFICANT CHANGE UP (ref 30–115)
ALT FLD-CCNC: 24 U/L — SIGNIFICANT CHANGE UP (ref 0–41)
ANION GAP SERPL CALC-SCNC: 10 MMOL/L — SIGNIFICANT CHANGE UP (ref 7–14)
APCR PPP: 3.2 RATIO — SIGNIFICANT CHANGE UP
AST SERPL-CCNC: 20 U/L — SIGNIFICANT CHANGE UP (ref 0–41)
BASOPHILS # BLD AUTO: 0.07 K/UL — SIGNIFICANT CHANGE UP (ref 0–0.2)
BASOPHILS NFR BLD AUTO: 0.7 % — SIGNIFICANT CHANGE UP (ref 0–1)
BILIRUB SERPL-MCNC: 0.4 MG/DL — SIGNIFICANT CHANGE UP (ref 0.2–1.2)
BUN SERPL-MCNC: 11 MG/DL — SIGNIFICANT CHANGE UP (ref 10–20)
CALCIUM SERPL-MCNC: 8.8 MG/DL — SIGNIFICANT CHANGE UP (ref 8.5–10.1)
CARDIOLIPIN IGM SER-MCNC: <5 MPL — SIGNIFICANT CHANGE UP (ref 0–12.5)
CHLORIDE SERPL-SCNC: 111 MMOL/L — HIGH (ref 98–110)
CO2 SERPL-SCNC: 25 MMOL/L — SIGNIFICANT CHANGE UP (ref 17–32)
CREAT SERPL-MCNC: 0.6 MG/DL — LOW (ref 0.7–1.5)
DNA PLOIDY SPEC FC-IMP: SIGNIFICANT CHANGE UP
EOSINOPHIL # BLD AUTO: 0.51 K/UL — SIGNIFICANT CHANGE UP (ref 0–0.7)
EOSINOPHIL NFR BLD AUTO: 5 % — SIGNIFICANT CHANGE UP (ref 0–8)
GLUCOSE BLDC GLUCOMTR-MCNC: 117 MG/DL — HIGH (ref 70–99)
GLUCOSE BLDC GLUCOMTR-MCNC: 132 MG/DL — HIGH (ref 70–99)
GLUCOSE BLDC GLUCOMTR-MCNC: 135 MG/DL — HIGH (ref 70–99)
GLUCOSE BLDC GLUCOMTR-MCNC: 142 MG/DL — HIGH (ref 70–99)
GLUCOSE SERPL-MCNC: 133 MG/DL — HIGH (ref 70–99)
HCT VFR BLD CALC: 46.8 % — SIGNIFICANT CHANGE UP (ref 42–52)
HGB BLD-MCNC: 15.6 G/DL — SIGNIFICANT CHANGE UP (ref 14–18)
IMM GRANULOCYTES NFR BLD AUTO: 0.4 % — HIGH (ref 0.1–0.3)
LYMPHOCYTES # BLD AUTO: 2.18 K/UL — SIGNIFICANT CHANGE UP (ref 1.2–3.4)
LYMPHOCYTES # BLD AUTO: 21.3 % — SIGNIFICANT CHANGE UP (ref 20.5–51.1)
MAGNESIUM SERPL-MCNC: 1.8 MG/DL — SIGNIFICANT CHANGE UP (ref 1.8–2.4)
MCHC RBC-ENTMCNC: 28.2 PG — SIGNIFICANT CHANGE UP (ref 27–31)
MCHC RBC-ENTMCNC: 33.3 G/DL — SIGNIFICANT CHANGE UP (ref 32–37)
MCV RBC AUTO: 84.6 FL — SIGNIFICANT CHANGE UP (ref 80–94)
MONOCYTES # BLD AUTO: 0.7 K/UL — HIGH (ref 0.1–0.6)
MONOCYTES NFR BLD AUTO: 6.8 % — SIGNIFICANT CHANGE UP (ref 1.7–9.3)
NEUTROPHILS # BLD AUTO: 6.73 K/UL — HIGH (ref 1.4–6.5)
NEUTROPHILS NFR BLD AUTO: 65.8 % — SIGNIFICANT CHANGE UP (ref 42.2–75.2)
NRBC # BLD: 0 /100 WBCS — SIGNIFICANT CHANGE UP (ref 0–0)
PHOSPHATE SERPL-MCNC: 2.3 MG/DL — SIGNIFICANT CHANGE UP (ref 2.1–4.9)
PLATELET # BLD AUTO: 331 K/UL — SIGNIFICANT CHANGE UP (ref 130–400)
POTASSIUM SERPL-MCNC: 4.1 MMOL/L — SIGNIFICANT CHANGE UP (ref 3.5–5)
POTASSIUM SERPL-SCNC: 4.1 MMOL/L — SIGNIFICANT CHANGE UP (ref 3.5–5)
PROT S FREE PPP-ACNC: 85 % — SIGNIFICANT CHANGE UP (ref 63–140)
PROT SERPL-MCNC: 6.2 G/DL — SIGNIFICANT CHANGE UP (ref 6–8)
PTR INTERPRETATION: SIGNIFICANT CHANGE UP
RBC # BLD: 5.53 M/UL — SIGNIFICANT CHANGE UP (ref 4.7–6.1)
RBC # FLD: 13.2 % — SIGNIFICANT CHANGE UP (ref 11.5–14.5)
SODIUM SERPL-SCNC: 146 MMOL/L — SIGNIFICANT CHANGE UP (ref 135–146)
WBC # BLD: 10.23 K/UL — SIGNIFICANT CHANGE UP (ref 4.8–10.8)
WBC # FLD AUTO: 10.23 K/UL — SIGNIFICANT CHANGE UP (ref 4.8–10.8)

## 2021-07-02 PROCEDURE — 99232 SBSQ HOSP IP/OBS MODERATE 35: CPT

## 2021-07-02 RX ORDER — HALOPERIDOL DECANOATE 100 MG/ML
2 INJECTION INTRAMUSCULAR ONCE
Refills: 0 | Status: COMPLETED | OUTPATIENT
Start: 2021-07-02 | End: 2021-07-02

## 2021-07-02 RX ORDER — LANOLIN ALCOHOL/MO/W.PET/CERES
5 CREAM (GRAM) TOPICAL AT BEDTIME
Refills: 0 | Status: DISCONTINUED | OUTPATIENT
Start: 2021-07-02 | End: 2021-07-14

## 2021-07-02 RX ADMIN — Medication 50 MILLIGRAM(S): at 06:40

## 2021-07-02 RX ADMIN — Medication 40 MILLIEQUIVALENT(S): at 02:46

## 2021-07-02 RX ADMIN — HALOPERIDOL DECANOATE 2 MILLIGRAM(S): 100 INJECTION INTRAMUSCULAR at 04:34

## 2021-07-02 RX ADMIN — Medication 9 UNIT(S): at 17:05

## 2021-07-02 RX ADMIN — INSULIN GLARGINE 27 UNIT(S): 100 INJECTION, SOLUTION SUBCUTANEOUS at 21:44

## 2021-07-02 RX ADMIN — ENOXAPARIN SODIUM 40 MILLIGRAM(S): 100 INJECTION SUBCUTANEOUS at 12:07

## 2021-07-02 RX ADMIN — AMLODIPINE BESYLATE 10 MILLIGRAM(S): 2.5 TABLET ORAL at 06:40

## 2021-07-02 RX ADMIN — CLOPIDOGREL BISULFATE 75 MILLIGRAM(S): 75 TABLET, FILM COATED ORAL at 12:07

## 2021-07-02 RX ADMIN — Medication 5 MILLIGRAM(S): at 21:45

## 2021-07-02 RX ADMIN — LISINOPRIL 40 MILLIGRAM(S): 2.5 TABLET ORAL at 06:40

## 2021-07-02 RX ADMIN — Medication 9 UNIT(S): at 12:07

## 2021-07-02 RX ADMIN — Medication 9 UNIT(S): at 08:12

## 2021-07-02 RX ADMIN — Medication 325 MILLIGRAM(S): at 12:07

## 2021-07-02 RX ADMIN — PANTOPRAZOLE SODIUM 40 MILLIGRAM(S): 20 TABLET, DELAYED RELEASE ORAL at 06:41

## 2021-07-02 RX ADMIN — HALOPERIDOL DECANOATE 2 MILLIGRAM(S): 100 INJECTION INTRAMUSCULAR at 22:32

## 2021-07-02 RX ADMIN — ATORVASTATIN CALCIUM 80 MILLIGRAM(S): 80 TABLET, FILM COATED ORAL at 21:45

## 2021-07-02 RX ADMIN — CHLORHEXIDINE GLUCONATE 1 APPLICATION(S): 213 SOLUTION TOPICAL at 17:06

## 2021-07-02 NOTE — PROGRESS NOTE ADULT - ASSESSMENT
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 Kowalski to follow   - ERICKSON no vegetation noted   - s/p loading dose ASA, c/w ASA and plavix   - c/w statin  - PT, OT  - speech cleared for dys I puree nectar  - c/w BP meds, Keep SBP >140   - Last TTE from 5/20 mild LVH, otherwise normal. Unclear whether agitated saline was done  - No h/o AF, continue with tele monitoring   - neuro checks q 8  - wife reports that his brother have protein S deficiency and father have early onset dementia - she was concerned about some underlying genetic factor for current situation      # magnesium deficiency  - replete Mg    # HTN  - cont  lisinopril, HCTZ, metoprolol, and amlodipine 10mg PO daily for better blood pressure control    # h/o uncontrolled DM  - Start insulin regimen if FS>180  - hba1c 9.8    DVT ppx lovenox   GI ppx ptx  Diet: dys I puree nectar thick  full code  dispo: acute

## 2021-07-02 NOTE — CHART NOTE - NSCHARTNOTEFT_GEN_A_CORE
-pt has brother with protein s deficiency and takes Coumadin  -coagulopathy work up thus far negative, pending genetic testing  -pt needs to follow up with Hematologist as outpt for further testing   -pt needs loop recorder preferably or 30 days event monitoring before discharge  -pt to continue ASA and Plavix  -pt to follow up with stroke clinic in 2 x weeks  -plan discussed with the hospitalist

## 2021-07-02 NOTE — PROGRESS NOTE ADULT - SUBJECTIVE AND OBJECTIVE BOX
Alayna York MD  Hospitalist   Spectra: [phone]    Patient is a 55y old  Male who presents with a chief complaint of CVA (01 Jul 2021 18:26)      INTERVAL HPI/OVERNIGHT EVENTS: patient was agitated and combative overnight   calm this morning   comfortably sitting in chair   able to participate with PT and OT       REVIEW OF SYSTEMS: negative  Vital Signs Last 24 Hrs  T(C): 36.3 (02 Jul 2021 13:12), Max: 36.3 (02 Jul 2021 13:12)  T(F): 97.4 (02 Jul 2021 13:12), Max: 97.4 (02 Jul 2021 13:12)  HR: 77 (02 Jul 2021 13:12) (70 - 77)  BP: 122/66 (02 Jul 2021 13:12) (122/66 - 157/76)  BP(mean): 108 (01 Jul 2021 21:08) (108 - 108)  RR: 18 (02 Jul 2021 13:12) (17 - 18)  SpO2: 96% (02 Jul 2021 06:11) (96% - 96%)    PHYSICAL EXAM:   NAD; Normocephalic;   LUNGS - no wheezing  HEART: S1 S2+   ABDOMEN: Soft, Nontender, non distended  EXTREMITIES: no cyanosis; no edema  NERVOUS SYSTEM:  Awake and alert; no focal neuro deficits appreciated  cognitive difficulties     LABS:                        15.6   10.23 )-----------( 331      ( 02 Jul 2021 07:06 )             46.8     07-02    146  |  111<H>  |  11  ----------------------------<  133<H>  4.1   |  25  |  0.6<L>    Ca    8.8      02 Jul 2021 07:06  Phos  2.3     07-02  Mg     1.8     07-02    TPro  6.2  /  Alb  3.4<L>  /  TBili  0.4  /  DBili  x   /  AST  20  /  ALT  24  /  AlkPhos  91  07-02        CAPILLARY BLOOD GLUCOSE      POCT Blood Glucose.: 142 mg/dL (02 Jul 2021 16:29)  POCT Blood Glucose.: 135 mg/dL (02 Jul 2021 11:21)  POCT Blood Glucose.: 132 mg/dL (02 Jul 2021 07:49)  POCT Blood Glucose.: 242 mg/dL (01 Jul 2021 22:26)      Medications:  MEDICATIONS  (STANDING):  amLODIPine   Tablet 10 milliGRAM(s) Oral daily  aspirin enteric coated 325 milliGRAM(s) Oral daily  atorvastatin 80 milliGRAM(s) Oral at bedtime  chlorhexidine 4% Liquid 1 Application(s) Topical every 12 hours  clopidogrel Tablet 75 milliGRAM(s) Oral daily  dextrose 40% Gel 15 Gram(s) Oral once  dextrose 5%. 1000 milliLiter(s) (50 mL/Hr) IV Continuous <Continuous>  dextrose 5%. 1000 milliLiter(s) (100 mL/Hr) IV Continuous <Continuous>  dextrose 50% Injectable 25 Gram(s) IV Push once  dextrose 50% Injectable 12.5 Gram(s) IV Push once  dextrose 50% Injectable 25 Gram(s) IV Push once  enoxaparin Injectable 40 milliGRAM(s) SubCutaneous daily  glucagon  Injectable 1 milliGRAM(s) IntraMuscular once  insulin glargine Injectable (LANTUS) 27 Unit(s) SubCutaneous at bedtime  insulin lispro (ADMELOG) corrective regimen sliding scale   SubCutaneous three times a day before meals  insulin lispro Injectable (ADMELOG) 9 Unit(s) SubCutaneous three times a day before meals  lisinopril 40 milliGRAM(s) Oral daily  melatonin 5 milliGRAM(s) Oral at bedtime  metoprolol succinate ER 50 milliGRAM(s) Oral daily  pantoprazole    Tablet 40 milliGRAM(s) Oral before breakfast

## 2021-07-03 LAB
ANION GAP SERPL CALC-SCNC: 11 MMOL/L — SIGNIFICANT CHANGE UP (ref 7–14)
BASOPHILS # BLD AUTO: 0.06 K/UL — SIGNIFICANT CHANGE UP (ref 0–0.2)
BASOPHILS NFR BLD AUTO: 0.5 % — SIGNIFICANT CHANGE UP (ref 0–1)
BUN SERPL-MCNC: 9 MG/DL — LOW (ref 10–20)
CALCIUM SERPL-MCNC: 8.9 MG/DL — SIGNIFICANT CHANGE UP (ref 8.5–10.1)
CHLORIDE SERPL-SCNC: 109 MMOL/L — SIGNIFICANT CHANGE UP (ref 98–110)
CO2 SERPL-SCNC: 25 MMOL/L — SIGNIFICANT CHANGE UP (ref 17–32)
CREAT SERPL-MCNC: 0.6 MG/DL — LOW (ref 0.7–1.5)
EOSINOPHIL # BLD AUTO: 0.37 K/UL — SIGNIFICANT CHANGE UP (ref 0–0.7)
EOSINOPHIL NFR BLD AUTO: 3.3 % — SIGNIFICANT CHANGE UP (ref 0–8)
GLUCOSE BLDC GLUCOMTR-MCNC: 109 MG/DL — HIGH (ref 70–99)
GLUCOSE BLDC GLUCOMTR-MCNC: 171 MG/DL — HIGH (ref 70–99)
GLUCOSE BLDC GLUCOMTR-MCNC: 207 MG/DL — HIGH (ref 70–99)
GLUCOSE BLDC GLUCOMTR-MCNC: 243 MG/DL — HIGH (ref 70–99)
GLUCOSE SERPL-MCNC: 124 MG/DL — HIGH (ref 70–99)
HCT VFR BLD CALC: 47.6 % — SIGNIFICANT CHANGE UP (ref 42–52)
HGB BLD-MCNC: 16 G/DL — SIGNIFICANT CHANGE UP (ref 14–18)
IMM GRANULOCYTES NFR BLD AUTO: 0.3 % — SIGNIFICANT CHANGE UP (ref 0.1–0.3)
LYMPHOCYTES # BLD AUTO: 19.4 % — LOW (ref 20.5–51.1)
LYMPHOCYTES # BLD AUTO: 2.19 K/UL — SIGNIFICANT CHANGE UP (ref 1.2–3.4)
MCHC RBC-ENTMCNC: 29 PG — SIGNIFICANT CHANGE UP (ref 27–31)
MCHC RBC-ENTMCNC: 33.6 G/DL — SIGNIFICANT CHANGE UP (ref 32–37)
MCV RBC AUTO: 86.4 FL — SIGNIFICANT CHANGE UP (ref 80–94)
MONOCYTES # BLD AUTO: 0.77 K/UL — HIGH (ref 0.1–0.6)
MONOCYTES NFR BLD AUTO: 6.8 % — SIGNIFICANT CHANGE UP (ref 1.7–9.3)
NEUTROPHILS # BLD AUTO: 7.86 K/UL — HIGH (ref 1.4–6.5)
NEUTROPHILS NFR BLD AUTO: 69.7 % — SIGNIFICANT CHANGE UP (ref 42.2–75.2)
NRBC # BLD: 0 /100 WBCS — SIGNIFICANT CHANGE UP (ref 0–0)
PLATELET # BLD AUTO: 314 K/UL — SIGNIFICANT CHANGE UP (ref 130–400)
POTASSIUM SERPL-MCNC: 3.6 MMOL/L — SIGNIFICANT CHANGE UP (ref 3.5–5)
POTASSIUM SERPL-SCNC: 3.6 MMOL/L — SIGNIFICANT CHANGE UP (ref 3.5–5)
RBC # BLD: 5.51 M/UL — SIGNIFICANT CHANGE UP (ref 4.7–6.1)
RBC # FLD: 13.5 % — SIGNIFICANT CHANGE UP (ref 11.5–14.5)
SODIUM SERPL-SCNC: 145 MMOL/L — SIGNIFICANT CHANGE UP (ref 135–146)
WBC # BLD: 11.28 K/UL — HIGH (ref 4.8–10.8)
WBC # FLD AUTO: 11.28 K/UL — HIGH (ref 4.8–10.8)

## 2021-07-03 PROCEDURE — 99232 SBSQ HOSP IP/OBS MODERATE 35: CPT

## 2021-07-03 RX ORDER — CHLORPROMAZINE HCL 10 MG
100 TABLET ORAL ONCE
Refills: 0 | Status: COMPLETED | OUTPATIENT
Start: 2021-07-03 | End: 2021-07-03

## 2021-07-03 RX ORDER — HALOPERIDOL DECANOATE 100 MG/ML
3 INJECTION INTRAMUSCULAR ONCE
Refills: 0 | Status: COMPLETED | OUTPATIENT
Start: 2021-07-03 | End: 2021-07-03

## 2021-07-03 RX ORDER — HALOPERIDOL DECANOATE 100 MG/ML
2 INJECTION INTRAMUSCULAR ONCE
Refills: 0 | Status: COMPLETED | OUTPATIENT
Start: 2021-07-03 | End: 2021-07-03

## 2021-07-03 RX ORDER — HALOPERIDOL DECANOATE 100 MG/ML
5 INJECTION INTRAMUSCULAR ONCE
Refills: 0 | Status: DISCONTINUED | OUTPATIENT
Start: 2021-07-03 | End: 2021-07-03

## 2021-07-03 RX ADMIN — Medication 50 MILLIGRAM(S): at 05:20

## 2021-07-03 RX ADMIN — Medication 2: at 16:38

## 2021-07-03 RX ADMIN — Medication 100 MILLIGRAM(S): at 20:16

## 2021-07-03 RX ADMIN — Medication 9 UNIT(S): at 16:38

## 2021-07-03 RX ADMIN — Medication 5 MILLIGRAM(S): at 21:14

## 2021-07-03 RX ADMIN — HALOPERIDOL DECANOATE 2 MILLIGRAM(S): 100 INJECTION INTRAMUSCULAR at 18:59

## 2021-07-03 RX ADMIN — CLOPIDOGREL BISULFATE 75 MILLIGRAM(S): 75 TABLET, FILM COATED ORAL at 11:11

## 2021-07-03 RX ADMIN — ENOXAPARIN SODIUM 40 MILLIGRAM(S): 100 INJECTION SUBCUTANEOUS at 11:11

## 2021-07-03 RX ADMIN — Medication 325 MILLIGRAM(S): at 11:11

## 2021-07-03 RX ADMIN — Medication 2: at 11:45

## 2021-07-03 RX ADMIN — HALOPERIDOL DECANOATE 2 MILLIGRAM(S): 100 INJECTION INTRAMUSCULAR at 18:35

## 2021-07-03 RX ADMIN — HALOPERIDOL DECANOATE 3 MILLIGRAM(S): 100 INJECTION INTRAMUSCULAR at 02:42

## 2021-07-03 RX ADMIN — INSULIN GLARGINE 27 UNIT(S): 100 INJECTION, SOLUTION SUBCUTANEOUS at 21:13

## 2021-07-03 RX ADMIN — LISINOPRIL 40 MILLIGRAM(S): 2.5 TABLET ORAL at 05:20

## 2021-07-03 RX ADMIN — AMLODIPINE BESYLATE 10 MILLIGRAM(S): 2.5 TABLET ORAL at 05:20

## 2021-07-03 RX ADMIN — ATORVASTATIN CALCIUM 80 MILLIGRAM(S): 80 TABLET, FILM COATED ORAL at 21:14

## 2021-07-03 RX ADMIN — PANTOPRAZOLE SODIUM 40 MILLIGRAM(S): 20 TABLET, DELAYED RELEASE ORAL at 05:20

## 2021-07-03 RX ADMIN — CHLORHEXIDINE GLUCONATE 1 APPLICATION(S): 213 SOLUTION TOPICAL at 05:20

## 2021-07-03 NOTE — PROGRESS NOTE ADULT - ASSESSMENT
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 p/w AMS x4 days, found to have acute-subacute L thalamic CVA    # h/o R PCA infarct p/w acute-subacute L thalamic infarct   - CTA shows R P1 occlusion/high grade stenosis  - MRI brain shows acute lacunar L thalamic infarct measuring 1.5 cm, mod size R occipital and medial temporal lobe infarcts combo of late subacute and chronic ,chronic R thalmic lacunar infarct and splenium of corpus callosum and poss wallerian degeneration  - Coag studies - patients brother have protien S def on long term anticoagulation   - initial hyper coag work up negative - will need outpatient hem/onc follow up   - Dr. Kowalski reconsulted for Loop recorder placement   - ERICK[place] no vegetation noted   - s/p loading dose ASA, c/w ASA and plavix   - c/w statin  - PT, OT  - speech cleared for dys I puree nectar  - c/w BP meds, Keep SBP >140   - Last TTE from 5/20 mild LVH, otherwise normal. Unclear whether agitated saline was done  - No h/o AF, continue with tele monitoring   - neuro checks q 8     # magnesium deficiency  - replete Mg    # HTN  - cont  lisinopril, HCTZ, metoprolol, and amlodipine 10mg PO daily for better blood pressure control    # h/o uncontrolled DM  - Start insulin regimen if FS>180  - hba1c 9.8    DVT ppx lovenox   GI ppx ptx  Diet: dys I puree nectar thick  full code    Provider handoff:   pending: loop recorder placement by cardio, clinical improvement   Placement: subacute rehab

## 2021-07-03 NOTE — PROGRESS NOTE ADULT - SUBJECTIVE AND OBJECTIVE BOX
Alayna York MD  Hospitalist   Spectra: [phone]    Patient is a 55y old  Male who presents with a chief complaint of CVA (02 Jul 2021 17:16)      INTERVAL HPI/OVERNIGHT EVENTS: patient was up all night - agitated on occasions   sleeping through day     REVIEW OF SYSTEMS: negative  Vital Signs Last 24 Hrs  T(C): 36 (03 Jul 2021 14:34), Max: 36.2 (02 Jul 2021 21:02)  T(F): 96.8 (03 Jul 2021 14:34), Max: 97.2 (02 Jul 2021 21:02)  HR: 80 (03 Jul 2021 14:34) (74 - 86)  BP: 136/70 (03 Jul 2021 14:34) (136/70 - 168/84)  BP(mean): --  RR: 18 (03 Jul 2021 14:34) (18 - 18)  SpO2: --    PHYSICAL EXAM:   NAD; Normocephalic;   LUNGS - no wheezing  HEART: S1 S2+   ABDOMEN: Soft, Nontender, non distended  EXTREMITIES: no cyanosis; no edema  NERVOUS SYSTEM:  Awake and alert; no focal neuro deficits appreciated    LABS:                        16.0   11.28 )-----------( 314      ( 03 Jul 2021 07:12 )             47.6     07-03    145  |  109  |  9<L>  ----------------------------<  124<H>  3.6   |  25  |  0.6<L>    Ca    8.9      03 Jul 2021 07:12  Phos  2.3     07-02  Mg     1.8     07-02    TPro  6.2  /  Alb  3.4<L>  /  TBili  0.4  /  DBili  x   /  AST  20  /  ALT  24  /  AlkPhos  91  07-02        CAPILLARY BLOOD GLUCOSE      POCT Blood Glucose.: 243 mg/dL (03 Jul 2021 16:17)  POCT Blood Glucose.: 207 mg/dL (03 Jul 2021 11:10)  POCT Blood Glucose.: 109 mg/dL (03 Jul 2021 07:47)  POCT Blood Glucose.: 117 mg/dL (02 Jul 2021 20:59)      Medications:  MEDICATIONS  (STANDING):  amLODIPine   Tablet 10 milliGRAM(s) Oral daily  aspirin enteric coated 325 milliGRAM(s) Oral daily  atorvastatin 80 milliGRAM(s) Oral at bedtime  chlorhexidine 4% Liquid 1 Application(s) Topical every 12 hours  clopidogrel Tablet 75 milliGRAM(s) Oral daily  dextrose 40% Gel 15 Gram(s) Oral once  dextrose 5%. 1000 milliLiter(s) (50 mL/Hr) IV Continuous <Continuous>  dextrose 5%. 1000 milliLiter(s) (100 mL/Hr) IV Continuous <Continuous>  dextrose 50% Injectable 25 Gram(s) IV Push once  dextrose 50% Injectable 12.5 Gram(s) IV Push once  dextrose 50% Injectable 25 Gram(s) IV Push once  enoxaparin Injectable 40 milliGRAM(s) SubCutaneous daily  glucagon  Injectable 1 milliGRAM(s) IntraMuscular once  insulin glargine Injectable (LANTUS) 27 Unit(s) SubCutaneous at bedtime  insulin lispro (ADMELOG) corrective regimen sliding scale   SubCutaneous three times a day before meals  insulin lispro Injectable (ADMELOG) 9 Unit(s) SubCutaneous three times a day before meals  lisinopril 40 milliGRAM(s) Oral daily  melatonin 5 milliGRAM(s) Oral at bedtime  metoprolol succinate ER 50 milliGRAM(s) Oral daily  pantoprazole    Tablet 40 milliGRAM(s) Oral before breakfast    MEDICATIONS  (PRN):

## 2021-07-04 LAB
ANION GAP SERPL CALC-SCNC: 11 MMOL/L — SIGNIFICANT CHANGE UP (ref 7–14)
BASOPHILS # BLD AUTO: 0.07 K/UL — SIGNIFICANT CHANGE UP (ref 0–0.2)
BASOPHILS NFR BLD AUTO: 0.6 % — SIGNIFICANT CHANGE UP (ref 0–1)
BUN SERPL-MCNC: 9 MG/DL — LOW (ref 10–20)
CALCIUM SERPL-MCNC: 8.9 MG/DL — SIGNIFICANT CHANGE UP (ref 8.5–10.1)
CHLORIDE SERPL-SCNC: 107 MMOL/L — SIGNIFICANT CHANGE UP (ref 98–110)
CO2 SERPL-SCNC: 25 MMOL/L — SIGNIFICANT CHANGE UP (ref 17–32)
CREAT SERPL-MCNC: 0.7 MG/DL — SIGNIFICANT CHANGE UP (ref 0.7–1.5)
EOSINOPHIL # BLD AUTO: 0.31 K/UL — SIGNIFICANT CHANGE UP (ref 0–0.7)
EOSINOPHIL NFR BLD AUTO: 2.8 % — SIGNIFICANT CHANGE UP (ref 0–8)
GLUCOSE BLDC GLUCOMTR-MCNC: 110 MG/DL — HIGH (ref 70–99)
GLUCOSE BLDC GLUCOMTR-MCNC: 134 MG/DL — HIGH (ref 70–99)
GLUCOSE BLDC GLUCOMTR-MCNC: 160 MG/DL — HIGH (ref 70–99)
GLUCOSE BLDC GLUCOMTR-MCNC: 272 MG/DL — HIGH (ref 70–99)
GLUCOSE SERPL-MCNC: 119 MG/DL — HIGH (ref 70–99)
HCT VFR BLD CALC: 47.9 % — SIGNIFICANT CHANGE UP (ref 42–52)
HGB BLD-MCNC: 15.7 G/DL — SIGNIFICANT CHANGE UP (ref 14–18)
IMM GRANULOCYTES NFR BLD AUTO: 0.4 % — HIGH (ref 0.1–0.3)
LYMPHOCYTES # BLD AUTO: 2.4 K/UL — SIGNIFICANT CHANGE UP (ref 1.2–3.4)
LYMPHOCYTES # BLD AUTO: 21.4 % — SIGNIFICANT CHANGE UP (ref 20.5–51.1)
MCHC RBC-ENTMCNC: 28.4 PG — SIGNIFICANT CHANGE UP (ref 27–31)
MCHC RBC-ENTMCNC: 32.8 G/DL — SIGNIFICANT CHANGE UP (ref 32–37)
MCV RBC AUTO: 86.6 FL — SIGNIFICANT CHANGE UP (ref 80–94)
MONOCYTES # BLD AUTO: 0.84 K/UL — HIGH (ref 0.1–0.6)
MONOCYTES NFR BLD AUTO: 7.5 % — SIGNIFICANT CHANGE UP (ref 1.7–9.3)
NEUTROPHILS # BLD AUTO: 7.54 K/UL — HIGH (ref 1.4–6.5)
NEUTROPHILS NFR BLD AUTO: 67.3 % — SIGNIFICANT CHANGE UP (ref 42.2–75.2)
NRBC # BLD: 0 /100 WBCS — SIGNIFICANT CHANGE UP (ref 0–0)
PLATELET # BLD AUTO: 302 K/UL — SIGNIFICANT CHANGE UP (ref 130–400)
POTASSIUM SERPL-MCNC: 3.4 MMOL/L — LOW (ref 3.5–5)
POTASSIUM SERPL-SCNC: 3.4 MMOL/L — LOW (ref 3.5–5)
RBC # BLD: 5.53 M/UL — SIGNIFICANT CHANGE UP (ref 4.7–6.1)
RBC # FLD: 13.3 % — SIGNIFICANT CHANGE UP (ref 11.5–14.5)
SODIUM SERPL-SCNC: 143 MMOL/L — SIGNIFICANT CHANGE UP (ref 135–146)
WBC # BLD: 11.2 K/UL — HIGH (ref 4.8–10.8)
WBC # FLD AUTO: 11.2 K/UL — HIGH (ref 4.8–10.8)

## 2021-07-04 PROCEDURE — 99221 1ST HOSP IP/OBS SF/LOW 40: CPT

## 2021-07-04 PROCEDURE — 99232 SBSQ HOSP IP/OBS MODERATE 35: CPT

## 2021-07-04 RX ORDER — CHLORPROMAZINE HCL 10 MG
100 TABLET ORAL ONCE
Refills: 0 | Status: COMPLETED | OUTPATIENT
Start: 2021-07-04 | End: 2021-07-05

## 2021-07-04 RX ORDER — ZOLPIDEM TARTRATE 10 MG/1
5 TABLET ORAL AT BEDTIME
Refills: 0 | Status: DISCONTINUED | OUTPATIENT
Start: 2021-07-04 | End: 2021-07-06

## 2021-07-04 RX ORDER — METOPROLOL TARTRATE 50 MG
25 TABLET ORAL ONCE
Refills: 0 | Status: COMPLETED | OUTPATIENT
Start: 2021-07-04 | End: 2021-07-04

## 2021-07-04 RX ADMIN — AMLODIPINE BESYLATE 10 MILLIGRAM(S): 2.5 TABLET ORAL at 05:03

## 2021-07-04 RX ADMIN — CHLORHEXIDINE GLUCONATE 1 APPLICATION(S): 213 SOLUTION TOPICAL at 05:03

## 2021-07-04 RX ADMIN — Medication 9 UNIT(S): at 16:40

## 2021-07-04 RX ADMIN — INSULIN GLARGINE 27 UNIT(S): 100 INJECTION, SOLUTION SUBCUTANEOUS at 22:51

## 2021-07-04 RX ADMIN — Medication 5 MILLIGRAM(S): at 22:52

## 2021-07-04 RX ADMIN — ENOXAPARIN SODIUM 40 MILLIGRAM(S): 100 INJECTION SUBCUTANEOUS at 11:08

## 2021-07-04 RX ADMIN — Medication 50 MILLIGRAM(S): at 05:03

## 2021-07-04 RX ADMIN — Medication 3: at 16:40

## 2021-07-04 RX ADMIN — Medication 25 MILLIGRAM(S): at 17:38

## 2021-07-04 RX ADMIN — Medication 325 MILLIGRAM(S): at 11:08

## 2021-07-04 RX ADMIN — LISINOPRIL 40 MILLIGRAM(S): 2.5 TABLET ORAL at 05:03

## 2021-07-04 RX ADMIN — ATORVASTATIN CALCIUM 80 MILLIGRAM(S): 80 TABLET, FILM COATED ORAL at 22:51

## 2021-07-04 RX ADMIN — PANTOPRAZOLE SODIUM 40 MILLIGRAM(S): 20 TABLET, DELAYED RELEASE ORAL at 05:03

## 2021-07-04 RX ADMIN — CLOPIDOGREL BISULFATE 75 MILLIGRAM(S): 75 TABLET, FILM COATED ORAL at 11:08

## 2021-07-04 NOTE — PROGRESS NOTE ADULT - SUBJECTIVE AND OBJECTIVE BOX
Alayna York MD  Hospitalist   Spectra: [phone]    Patient is a 55y old  Male who presents with a chief complaint of CVA (04 Jul 2021 09:29)      INTERVAL HPI/OVERNIGHT EVENTS: patient is being aggressive and agitated overnight   during day patient is sleeping most of day     REVIEW OF SYSTEMS: negative  Vital Signs Last 24 Hrs  T(C): 36.3 (04 Jul 2021 14:17), Max: 36.3 (04 Jul 2021 14:17)  T(F): 97.4 (04 Jul 2021 14:17), Max: 97.4 (04 Jul 2021 14:17)  HR: 84 (04 Jul 2021 14:17) (82 - 84)  BP: 134/64 (04 Jul 2021 14:17) (134/64 - 154/72)  BP(mean): --  RR: 16 (04 Jul 2021 14:17) (16 - 18)  SpO2: 100% (04 Jul 2021 06:06) (100% - 100%)    PHYSICAL EXAM:   NAD; Normocephalic;   LUNGS - no wheezing  HEART: S1 S2+   ABDOMEN: Soft, Nontender, non distended  EXTREMITIES: no cyanosis; no edema  NERVOUS SYSTEM:  Awake and alert; no focal neuro deficits appreciated    LABS:                        15.7   11.20 )-----------( 302      ( 04 Jul 2021 07:06 )             47.9     07-04    143  |  107  |  9<L>  ----------------------------<  119<H>  3.4<L>   |  25  |  0.7    Ca    8.9      04 Jul 2021 07:06          CAPILLARY BLOOD GLUCOSE      POCT Blood Glucose.: 134 mg/dL (04 Jul 2021 11:48)  POCT Blood Glucose.: 110 mg/dL (04 Jul 2021 07:57)  POCT Blood Glucose.: 171 mg/dL (03 Jul 2021 21:19)  POCT Blood Glucose.: 243 mg/dL (03 Jul 2021 16:17)      Medications:  MEDICATIONS  (STANDING):  amLODIPine   Tablet 10 milliGRAM(s) Oral daily  aspirin enteric coated 325 milliGRAM(s) Oral daily  atorvastatin 80 milliGRAM(s) Oral at bedtime  chlorhexidine 4% Liquid 1 Application(s) Topical every 12 hours  clopidogrel Tablet 75 milliGRAM(s) Oral daily  dextrose 40% Gel 15 Gram(s) Oral once  dextrose 5%. 1000 milliLiter(s) (50 mL/Hr) IV Continuous <Continuous>  dextrose 5%. 1000 milliLiter(s) (100 mL/Hr) IV Continuous <Continuous>  dextrose 50% Injectable 25 Gram(s) IV Push once  dextrose 50% Injectable 12.5 Gram(s) IV Push once  dextrose 50% Injectable 25 Gram(s) IV Push once  enoxaparin Injectable 40 milliGRAM(s) SubCutaneous daily  glucagon  Injectable 1 milliGRAM(s) IntraMuscular once  insulin glargine Injectable (LANTUS) 27 Unit(s) SubCutaneous at bedtime  insulin lispro (ADMELOG) corrective regimen sliding scale   SubCutaneous three times a day before meals  insulin lispro Injectable (ADMELOG) 9 Unit(s) SubCutaneous three times a day before meals  lisinopril 40 milliGRAM(s) Oral daily  melatonin 5 milliGRAM(s) Oral at bedtime  metoprolol succinate ER 50 milliGRAM(s) Oral daily  pantoprazole    Tablet 40 milliGRAM(s) Oral before breakfast

## 2021-07-04 NOTE — PROGRESS NOTE ADULT - ASSESSMENT
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 p/w AMS x4 days, found to have acute-subacute L thalamic CVA    # h/o R PCA infarct p/w acute-subacute L thalamic infarct   - CTA shows R P1 occlusion/high grade stenosis  - MRI brain shows acute lacunar L thalamic infarct measuring 1.5 cm, mod size R occipital and medial temporal lobe infarcts combo of late subacute and chronic ,chronic R thalmic lacunar infarct and splenium of corpus callosum and poss wallerian degeneration  - Coag studies - patients brother have protien S def on long term anticoagulation   - initial hyper coag work up negative - will need outpatient hem/onc follow up   - Dr. Kowalski reconsulted for Loop recorder placement   - ERICKSON no vegetation noted   - s/p loading dose ASA, c/w ASA and plavix   - c/w statin  - PT, OT  - speech cleared for dys I puree nectar  - c/w BP meds, Keep SBP >140   - Last TTE from 5/20 mild LVH, otherwise normal. Unclear whether agitated saline was done  - No h/o AF, continue with tele monitoring   - neuro checks q 8  - will try zolpidem tonight and see if that helps patient sleep through night      # magnesium deficiency  - replete Mg    # HTN  - cont  lisinopril, HCTZ, metoprolol, and amlodipine 10mg PO daily for better blood pressure control    # h/o uncontrolled DM  - Start insulin regimen if FS>180  - hba1c 9.8    DVT ppx lovenox   GI ppx ptx  Diet: dys I puree nectar thick  full code    Provider handoff:   pending: loop recorder placement by cardio, clinical improvement   Placement: subacute rehab

## 2021-07-05 LAB
ANION GAP SERPL CALC-SCNC: 10 MMOL/L — SIGNIFICANT CHANGE UP (ref 7–14)
BUN SERPL-MCNC: 14 MG/DL — SIGNIFICANT CHANGE UP (ref 10–20)
CALCIUM SERPL-MCNC: 9.2 MG/DL — SIGNIFICANT CHANGE UP (ref 8.5–10.1)
CHLORIDE SERPL-SCNC: 106 MMOL/L — SIGNIFICANT CHANGE UP (ref 98–110)
CO2 SERPL-SCNC: 25 MMOL/L — SIGNIFICANT CHANGE UP (ref 17–32)
CREAT SERPL-MCNC: 0.6 MG/DL — LOW (ref 0.7–1.5)
GLUCOSE BLDC GLUCOMTR-MCNC: 135 MG/DL — HIGH (ref 70–99)
GLUCOSE BLDC GLUCOMTR-MCNC: 145 MG/DL — HIGH (ref 70–99)
GLUCOSE BLDC GLUCOMTR-MCNC: 273 MG/DL — HIGH (ref 70–99)
GLUCOSE SERPL-MCNC: 156 MG/DL — HIGH (ref 70–99)
POTASSIUM SERPL-MCNC: 3.5 MMOL/L — SIGNIFICANT CHANGE UP (ref 3.5–5)
POTASSIUM SERPL-SCNC: 3.5 MMOL/L — SIGNIFICANT CHANGE UP (ref 3.5–5)
SODIUM SERPL-SCNC: 141 MMOL/L — SIGNIFICANT CHANGE UP (ref 135–146)

## 2021-07-05 PROCEDURE — 99232 SBSQ HOSP IP/OBS MODERATE 35: CPT

## 2021-07-05 RX ADMIN — Medication 5 MILLIGRAM(S): at 22:04

## 2021-07-05 RX ADMIN — CHLORHEXIDINE GLUCONATE 1 APPLICATION(S): 213 SOLUTION TOPICAL at 05:39

## 2021-07-05 RX ADMIN — PANTOPRAZOLE SODIUM 40 MILLIGRAM(S): 20 TABLET, DELAYED RELEASE ORAL at 05:39

## 2021-07-05 RX ADMIN — ATORVASTATIN CALCIUM 80 MILLIGRAM(S): 80 TABLET, FILM COATED ORAL at 22:04

## 2021-07-05 RX ADMIN — Medication 50 MILLIGRAM(S): at 05:39

## 2021-07-05 RX ADMIN — ENOXAPARIN SODIUM 40 MILLIGRAM(S): 100 INJECTION SUBCUTANEOUS at 11:32

## 2021-07-05 RX ADMIN — LISINOPRIL 40 MILLIGRAM(S): 2.5 TABLET ORAL at 05:39

## 2021-07-05 RX ADMIN — CLOPIDOGREL BISULFATE 75 MILLIGRAM(S): 75 TABLET, FILM COATED ORAL at 11:32

## 2021-07-05 RX ADMIN — AMLODIPINE BESYLATE 10 MILLIGRAM(S): 2.5 TABLET ORAL at 05:39

## 2021-07-05 RX ADMIN — CHLORHEXIDINE GLUCONATE 1 APPLICATION(S): 213 SOLUTION TOPICAL at 17:23

## 2021-07-05 RX ADMIN — Medication 325 MILLIGRAM(S): at 11:32

## 2021-07-05 RX ADMIN — Medication 100 MILLIGRAM(S): at 18:49

## 2021-07-05 RX ADMIN — INSULIN GLARGINE 27 UNIT(S): 100 INJECTION, SOLUTION SUBCUTANEOUS at 22:04

## 2021-07-05 NOTE — PROGRESS NOTE ADULT - SUBJECTIVE AND OBJECTIVE BOX
Alayna York MD  Hospitalist   Spectra: [phone]    Patient is a 55y old  Male who presents with a chief complaint of CVA (04 Jul 2021 15:04)      INTERVAL HPI/OVERNIGHT EVENTS: patient was restless yesterday evening into late night   slept around 2 am as per 1:1 documentation     REVIEW OF SYSTEMS: negative  Vital Signs Last 24 Hrs  T(C): 36.1 (05 Jul 2021 06:00), Max: 36.3 (04 Jul 2021 17:31)  T(F): 97 (05 Jul 2021 06:00), Max: 97.4 (04 Jul 2021 17:31)  HR: 86 (05 Jul 2021 06:00) (86 - 125)  BP: 119/63 (05 Jul 2021 06:00) (109/63 - 134/75)  BP(mean): --  RR: 16 (05 Jul 2021 06:00) (16 - 16)  SpO2: 100% (04 Jul 2021 23:11) (100% - 100%)    PHYSICAL EXAM:   NAD; Normocephalic;   LUNGS - no wheezing  HEART: S1 S2+   ABDOMEN: Soft, Nontender, non distended  EXTREMITIES: no cyanosis; no edema  NERVOUS SYSTEM:  Awake and alert; no focal neuro deficits appreciated    LABS:                        15.7   11.20 )-----------( 302      ( 04 Jul 2021 07:06 )             47.9     07-05    141  |  106  |  14  ----------------------------<  156<H>  3.5   |  25  |  0.6<L>    Ca    9.2      05 Jul 2021 10:36          CAPILLARY BLOOD GLUCOSE      POCT Blood Glucose.: 135 mg/dL (05 Jul 2021 08:13)  POCT Blood Glucose.: 160 mg/dL (04 Jul 2021 21:34)  POCT Blood Glucose.: 272 mg/dL (04 Jul 2021 16:36)      Medications:  MEDICATIONS  (STANDING):  amLODIPine   Tablet 10 milliGRAM(s) Oral daily  aspirin enteric coated 325 milliGRAM(s) Oral daily  atorvastatin 80 milliGRAM(s) Oral at bedtime  chlorhexidine 4% Liquid 1 Application(s) Topical every 12 hours  chlorproMAZINE    Injectable 100 milliGRAM(s) IntraMuscular once  clopidogrel Tablet 75 milliGRAM(s) Oral daily  dextrose 40% Gel 15 Gram(s) Oral once  dextrose 5%. 1000 milliLiter(s) (50 mL/Hr) IV Continuous <Continuous>  dextrose 5%. 1000 milliLiter(s) (100 mL/Hr) IV Continuous <Continuous>  dextrose 50% Injectable 25 Gram(s) IV Push once  dextrose 50% Injectable 12.5 Gram(s) IV Push once  dextrose 50% Injectable 25 Gram(s) IV Push once  enoxaparin Injectable 40 milliGRAM(s) SubCutaneous daily  glucagon  Injectable 1 milliGRAM(s) IntraMuscular once  insulin glargine Injectable (LANTUS) 27 Unit(s) SubCutaneous at bedtime  insulin lispro (ADMELOG) corrective regimen sliding scale   SubCutaneous three times a day before meals  insulin lispro Injectable (ADMELOG) 9 Unit(s) SubCutaneous three times a day before meals  lisinopril 40 milliGRAM(s) Oral daily  melatonin 5 milliGRAM(s) Oral at bedtime  metoprolol succinate ER 50 milliGRAM(s) Oral daily  pantoprazole    Tablet 40 milliGRAM(s) Oral before breakfast    MEDICATIONS  (PRN):  zolpidem 5 milliGRAM(s) Oral at bedtime PRN Insomnia

## 2021-07-05 NOTE — PROGRESS NOTE ADULT - ASSESSMENT
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 p/w AMS x4 days, found to have acute-subacute L thalamic CVA    # h/o R PCA infarct p/w acute-subacute L thalamic infarct   - CTA shows R P1 occlusion/high grade stenosis  - MRI brain shows acute lacunar L thalamic infarct measuring 1.5 cm, mod size R occipital and medial temporal lobe infarcts combo of late subacute and chronic ,chronic R thalmic lacunar infarct and splenium of corpus callosum and poss wallerian degeneration  - Coag studies - patients brother have protien S def on long term anticoagulation   - initial hyper coag work up negative - will need outpatient hem/onc follow up   - Dr. Kowalski reconsulted for Loop recorder placement   - ERICKSON no vegetation noted   - s/p loading dose ASA, c/w ASA and plavix   - c/w statin  - PT, OT  - speech cleared for dys I puree nectar  - c/w BP meds, Keep SBP >140   - Last TTE from 5/20 mild LVH, otherwise normal. Unclear whether agitated saline was done  - No h/o AF, continue with tele monitoring   - neuro checks q 8  - patient slept last night around 2 am - was sleeping this morning during exam - arousable   - need frequent reorientation and needs to sleep through night     # magnesium deficiency  - replete Mg    # HTN  - cont  lisinopril, HCTZ, metoprolol, and amlodipine 10mg PO daily for better blood pressure control    # h/o uncontrolled DM  - Start insulin regimen if FS>180  - hba1c 9.8    DVT ppx lovenox   GI ppx ptx  Diet: dys I puree nectar thick  full code    Provider handoff:   pending: loop recorder placement by cardio, clinical improvement   Placement: subacute rehab

## 2021-07-05 NOTE — CHART NOTE - NSCHARTNOTEFT_GEN_A_CORE
Potassium level is 3.4 but supplement has many interactions with current meds. Will await repeat Potassium level is 3.4 but supplement has many interactions with current meds. Will await repeat, I ordered for 1100

## 2021-07-06 LAB
GLUCOSE BLDC GLUCOMTR-MCNC: 121 MG/DL — HIGH (ref 70–99)
GLUCOSE BLDC GLUCOMTR-MCNC: 149 MG/DL — HIGH (ref 70–99)
GLUCOSE BLDC GLUCOMTR-MCNC: 273 MG/DL — HIGH (ref 70–99)
GLUCOSE BLDC GLUCOMTR-MCNC: 403 MG/DL — HIGH (ref 70–99)

## 2021-07-06 PROCEDURE — 99232 SBSQ HOSP IP/OBS MODERATE 35: CPT

## 2021-07-06 RX ORDER — RISPERIDONE 4 MG/1
0.5 TABLET ORAL ONCE
Refills: 0 | Status: COMPLETED | OUTPATIENT
Start: 2021-07-06 | End: 2021-07-07

## 2021-07-06 RX ADMIN — Medication 9 UNIT(S): at 16:34

## 2021-07-06 RX ADMIN — ENOXAPARIN SODIUM 40 MILLIGRAM(S): 100 INJECTION SUBCUTANEOUS at 13:02

## 2021-07-06 RX ADMIN — INSULIN GLARGINE 27 UNIT(S): 100 INJECTION, SOLUTION SUBCUTANEOUS at 22:33

## 2021-07-06 RX ADMIN — Medication 6: at 16:34

## 2021-07-06 RX ADMIN — ATORVASTATIN CALCIUM 80 MILLIGRAM(S): 80 TABLET, FILM COATED ORAL at 22:33

## 2021-07-06 RX ADMIN — AMLODIPINE BESYLATE 10 MILLIGRAM(S): 2.5 TABLET ORAL at 06:46

## 2021-07-06 RX ADMIN — LISINOPRIL 40 MILLIGRAM(S): 2.5 TABLET ORAL at 06:47

## 2021-07-06 RX ADMIN — Medication 325 MILLIGRAM(S): at 13:01

## 2021-07-06 RX ADMIN — PANTOPRAZOLE SODIUM 40 MILLIGRAM(S): 20 TABLET, DELAYED RELEASE ORAL at 06:46

## 2021-07-06 RX ADMIN — CLOPIDOGREL BISULFATE 75 MILLIGRAM(S): 75 TABLET, FILM COATED ORAL at 13:01

## 2021-07-06 RX ADMIN — Medication 50 MILLIGRAM(S): at 06:47

## 2021-07-06 RX ADMIN — Medication 5 MILLIGRAM(S): at 22:33

## 2021-07-06 NOTE — PROGRESS NOTE ADULT - ASSESSMENT
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 p/w AMS x4 days, found to have acute-subacute L thalamic CVA    # h/o R PCA infarct p/w acute-subacute L thalamic infarct   - CTA shows R P1 occlusion/high grade stenosis  - MRI brain shows acute lacunar L thalamic infarct measuring 1.5 cm, mod size R occipital and medial temporal lobe infarcts combo of late subacute and chronic ,chronic R thalmic lacunar infarct and splenium of corpus callosum and poss wallerian degeneration  - Coag studies - patients brother have protien S def on long term anticoagulation   - initial hyper coag work up negative - will need outpatient hem/onc follow up   - Dr. Kowalski reconsulted for Loop recorder placement   - ERICKSON no vegetation noted   - s/p loading dose ASA, c/w ASA and plavix   - c/w statin  - PT, OT  - speech cleared for dys I puree nectar  - c/w BP meds, Keep SBP >140   - Last TTE from 5/20 mild LVH, otherwise normal. Unclear whether agitated saline was done  - No h/o AF, continue with tele monitoring   - neuro checks q 8  - will try risperidone tonight   - need frequent reorientation and needs to sleep through night     # magnesium deficiency  - replete Mg    # HTN  - cont  lisinopril, HCTZ, metoprolol, and amlodipine 10mg PO daily for better blood pressure control    # h/o uncontrolled DM  - Start insulin regimen if FS>180  - hba1c 9.8    DVT ppx lovenox   GI ppx ptx  Diet: dys I puree nectar thick  full code    Provider handoff:   pending: loop recorder placement by cardio, clinical improvement   Placement: subacute rehab

## 2021-07-06 NOTE — PROGRESS NOTE ADULT - SUBJECTIVE AND OBJECTIVE BOX
Alayna York MD  Hospitalist   Spectra: [phone]    Patient is a 55y old  Male who presents with a chief complaint of CVA (05 Jul 2021 15:32)      INTERVAL HPI/OVERNIGHT EVENTS: no acute overnight events noted   continues to be sleeping during day and agitated and confused overnight     REVIEW OF SYSTEMS: negative  Vital Signs Last 24 Hrs  T(C): 36.1 (05 Jul 2021 21:34), Max: 36.7 (05 Jul 2021 21:32)  T(F): 97 (05 Jul 2021 21:34), Max: 98.1 (05 Jul 2021 21:32)  HR: 75 (05 Jul 2021 21:34) (75 - 95)  BP: 128/62 (05 Jul 2021 21:34) (128/62 - 145/72)  BP(mean): --  RR: 18 (05 Jul 2021 21:34) (18 - 18)  SpO2: --    PHYSICAL EXAM:   NAD; Normocephalic;   LUNGS - no wheezing  HEART: S1 S2+   ABDOMEN: Soft, Nontender, non distended  EXTREMITIES: no cyanosis; no edema  NERVOUS SYSTEM:  arousable, patient non cooperative in detail neuro exam     LABS:    07-05    141  |  106  |  14  ----------------------------<  156<H>  3.5   |  25  |  0.6<L>    Ca    9.2      05 Jul 2021 10:36          CAPILLARY BLOOD GLUCOSE      POCT Blood Glucose.: 403 mg/dL (06 Jul 2021 16:16)  POCT Blood Glucose.: 121 mg/dL (06 Jul 2021 11:22)  POCT Blood Glucose.: 149 mg/dL (06 Jul 2021 07:34)  POCT Blood Glucose.: 273 mg/dL (05 Jul 2021 21:26)      Medications:  MEDICATIONS  (STANDING):  amLODIPine   Tablet 10 milliGRAM(s) Oral daily  aspirin enteric coated 325 milliGRAM(s) Oral daily  atorvastatin 80 milliGRAM(s) Oral at bedtime  chlorhexidine 4% Liquid 1 Application(s) Topical every 12 hours  clopidogrel Tablet 75 milliGRAM(s) Oral daily  enoxaparin Injectable 40 milliGRAM(s) SubCutaneous daily  insulin glargine Injectable (LANTUS) 27 Unit(s) SubCutaneous at bedtime  insulin lispro (ADMELOG) corrective regimen sliding scale   SubCutaneous three times a day before meals  insulin lispro Injectable (ADMELOG) 9 Unit(s) SubCutaneous three times a day before meals  lisinopril 40 milliGRAM(s) Oral daily  melatonin 5 milliGRAM(s) Oral at bedtime  metoprolol succinate ER 50 milliGRAM(s) Oral daily  pantoprazole    Tablet 40 milliGRAM(s) Oral before breakfast    MEDICATIONS  (PRN):  risperiDONE   Tablet 0.5 milliGRAM(s) Oral once PRN insomnia/agitation

## 2021-07-06 NOTE — PROGRESS NOTE ADULT - TIME BILLING
For clinical care, patient education and care coordination.

## 2021-07-06 NOTE — PROVIDER CONTACT NOTE (OTHER) - SITUATION
Pt agitated, aggressive towards staff at times. Pt keeps taking off telemetry leads. Remigio Iglesias notified. 1:1 sitter at bedside for safety. will continue to monitor
Pt noncompliant with tele monitor. Throughout the night, pt consistently taking off monitor.

## 2021-07-07 LAB
ANION GAP SERPL CALC-SCNC: 12 MMOL/L — SIGNIFICANT CHANGE UP (ref 7–14)
BASOPHILS # BLD AUTO: 0.07 K/UL — SIGNIFICANT CHANGE UP (ref 0–0.2)
BASOPHILS NFR BLD AUTO: 0.5 % — SIGNIFICANT CHANGE UP (ref 0–1)
BUN SERPL-MCNC: 23 MG/DL — HIGH (ref 10–20)
CALCIUM SERPL-MCNC: 9.5 MG/DL — SIGNIFICANT CHANGE UP (ref 8.5–10.1)
CHLORIDE SERPL-SCNC: 101 MMOL/L — SIGNIFICANT CHANGE UP (ref 98–110)
CO2 SERPL-SCNC: 24 MMOL/L — SIGNIFICANT CHANGE UP (ref 17–32)
CREAT SERPL-MCNC: 1.1 MG/DL — SIGNIFICANT CHANGE UP (ref 0.7–1.5)
EOSINOPHIL # BLD AUTO: 0.16 K/UL — SIGNIFICANT CHANGE UP (ref 0–0.7)
EOSINOPHIL NFR BLD AUTO: 1.2 % — SIGNIFICANT CHANGE UP (ref 0–8)
GLUCOSE BLDC GLUCOMTR-MCNC: 184 MG/DL — HIGH (ref 70–99)
GLUCOSE BLDC GLUCOMTR-MCNC: 241 MG/DL — HIGH (ref 70–99)
GLUCOSE BLDC GLUCOMTR-MCNC: 250 MG/DL — HIGH (ref 70–99)
GLUCOSE BLDC GLUCOMTR-MCNC: 262 MG/DL — HIGH (ref 70–99)
GLUCOSE SERPL-MCNC: 232 MG/DL — HIGH (ref 70–99)
HCT VFR BLD CALC: 46.3 % — SIGNIFICANT CHANGE UP (ref 42–52)
HGB BLD-MCNC: 15.3 G/DL — SIGNIFICANT CHANGE UP (ref 14–18)
IMM GRANULOCYTES NFR BLD AUTO: 0.5 % — HIGH (ref 0.1–0.3)
LYMPHOCYTES # BLD AUTO: 15.9 % — LOW (ref 20.5–51.1)
LYMPHOCYTES # BLD AUTO: 2.06 K/UL — SIGNIFICANT CHANGE UP (ref 1.2–3.4)
MCHC RBC-ENTMCNC: 28.5 PG — SIGNIFICANT CHANGE UP (ref 27–31)
MCHC RBC-ENTMCNC: 33 G/DL — SIGNIFICANT CHANGE UP (ref 32–37)
MCV RBC AUTO: 86.4 FL — SIGNIFICANT CHANGE UP (ref 80–94)
MONOCYTES # BLD AUTO: 1.41 K/UL — HIGH (ref 0.1–0.6)
MONOCYTES NFR BLD AUTO: 10.9 % — HIGH (ref 1.7–9.3)
NEUTROPHILS # BLD AUTO: 9.17 K/UL — HIGH (ref 1.4–6.5)
NEUTROPHILS NFR BLD AUTO: 71 % — SIGNIFICANT CHANGE UP (ref 42.2–75.2)
NRBC # BLD: 0 /100 WBCS — SIGNIFICANT CHANGE UP (ref 0–0)
PLATELET # BLD AUTO: 321 K/UL — SIGNIFICANT CHANGE UP (ref 130–400)
POTASSIUM SERPL-MCNC: 3.9 MMOL/L — SIGNIFICANT CHANGE UP (ref 3.5–5)
POTASSIUM SERPL-SCNC: 3.9 MMOL/L — SIGNIFICANT CHANGE UP (ref 3.5–5)
RBC # BLD: 5.36 M/UL — SIGNIFICANT CHANGE UP (ref 4.7–6.1)
RBC # FLD: 13.7 % — SIGNIFICANT CHANGE UP (ref 11.5–14.5)
SODIUM SERPL-SCNC: 137 MMOL/L — SIGNIFICANT CHANGE UP (ref 135–146)
WBC # BLD: 12.94 K/UL — HIGH (ref 4.8–10.8)
WBC # FLD AUTO: 12.94 K/UL — HIGH (ref 4.8–10.8)

## 2021-07-07 PROCEDURE — 99233 SBSQ HOSP IP/OBS HIGH 50: CPT

## 2021-07-07 RX ORDER — INSULIN LISPRO 100/ML
10 VIAL (ML) SUBCUTANEOUS
Refills: 0 | Status: DISCONTINUED | OUTPATIENT
Start: 2021-07-07 | End: 2021-07-14

## 2021-07-07 RX ORDER — SODIUM CHLORIDE 9 MG/ML
1000 INJECTION, SOLUTION INTRAVENOUS
Refills: 0 | Status: DISCONTINUED | OUTPATIENT
Start: 2021-07-07 | End: 2021-07-14

## 2021-07-07 RX ORDER — DEXTROSE 50 % IN WATER 50 %
25 SYRINGE (ML) INTRAVENOUS ONCE
Refills: 0 | Status: DISCONTINUED | OUTPATIENT
Start: 2021-07-07 | End: 2021-07-14

## 2021-07-07 RX ORDER — DEXTROSE 50 % IN WATER 50 %
15 SYRINGE (ML) INTRAVENOUS ONCE
Refills: 0 | Status: DISCONTINUED | OUTPATIENT
Start: 2021-07-07 | End: 2021-07-14

## 2021-07-07 RX ORDER — DEXTROSE 50 % IN WATER 50 %
12.5 SYRINGE (ML) INTRAVENOUS ONCE
Refills: 0 | Status: DISCONTINUED | OUTPATIENT
Start: 2021-07-07 | End: 2021-07-14

## 2021-07-07 RX ORDER — QUETIAPINE FUMARATE 200 MG/1
25 TABLET, FILM COATED ORAL ONCE
Refills: 0 | Status: COMPLETED | OUTPATIENT
Start: 2021-07-07 | End: 2021-07-07

## 2021-07-07 RX ORDER — GLUCAGON INJECTION, SOLUTION 0.5 MG/.1ML
1 INJECTION, SOLUTION SUBCUTANEOUS ONCE
Refills: 0 | Status: DISCONTINUED | OUTPATIENT
Start: 2021-07-07 | End: 2021-07-14

## 2021-07-07 RX ORDER — INSULIN GLARGINE 100 [IU]/ML
30 INJECTION, SOLUTION SUBCUTANEOUS AT BEDTIME
Refills: 0 | Status: DISCONTINUED | OUTPATIENT
Start: 2021-07-07 | End: 2021-07-14

## 2021-07-07 RX ORDER — INSULIN LISPRO 100/ML
VIAL (ML) SUBCUTANEOUS
Refills: 0 | Status: DISCONTINUED | OUTPATIENT
Start: 2021-07-07 | End: 2021-07-14

## 2021-07-07 RX ADMIN — Medication 6: at 17:31

## 2021-07-07 RX ADMIN — Medication 5 MILLIGRAM(S): at 21:53

## 2021-07-07 RX ADMIN — ENOXAPARIN SODIUM 40 MILLIGRAM(S): 100 INJECTION SUBCUTANEOUS at 13:07

## 2021-07-07 RX ADMIN — INSULIN GLARGINE 30 UNIT(S): 100 INJECTION, SOLUTION SUBCUTANEOUS at 21:52

## 2021-07-07 RX ADMIN — LISINOPRIL 40 MILLIGRAM(S): 2.5 TABLET ORAL at 07:04

## 2021-07-07 RX ADMIN — Medication 9 UNIT(S): at 12:13

## 2021-07-07 RX ADMIN — RISPERIDONE 0.5 MILLIGRAM(S): 4 TABLET ORAL at 01:34

## 2021-07-07 RX ADMIN — CLOPIDOGREL BISULFATE 75 MILLIGRAM(S): 75 TABLET, FILM COATED ORAL at 13:07

## 2021-07-07 RX ADMIN — Medication 50 MILLIGRAM(S): at 07:04

## 2021-07-07 RX ADMIN — AMLODIPINE BESYLATE 10 MILLIGRAM(S): 2.5 TABLET ORAL at 07:04

## 2021-07-07 RX ADMIN — QUETIAPINE FUMARATE 25 MILLIGRAM(S): 200 TABLET, FILM COATED ORAL at 21:53

## 2021-07-07 RX ADMIN — Medication 10 UNIT(S): at 17:31

## 2021-07-07 RX ADMIN — Medication 2: at 12:12

## 2021-07-07 RX ADMIN — Medication 325 MILLIGRAM(S): at 13:07

## 2021-07-07 RX ADMIN — PANTOPRAZOLE SODIUM 40 MILLIGRAM(S): 20 TABLET, DELAYED RELEASE ORAL at 07:03

## 2021-07-07 RX ADMIN — ATORVASTATIN CALCIUM 80 MILLIGRAM(S): 80 TABLET, FILM COATED ORAL at 21:53

## 2021-07-07 NOTE — PROGRESS NOTE ADULT - SUBJECTIVE AND OBJECTIVE BOX
Patient is a 55y old male who presents with a CVA      INTERVAL HPI/OVERNIGHT EVENTS:   continues to be sleeping during day and agitated and confused overnight   Patient seen and examined this morning and again this afternoon with his wife present   Patient is confused  He is calm and cooperative    REVIEW OF SYSTEMS:   unable to assess due to mental status      Vital Signs Last 24 Hrs  T(C): 36.8 (07 Jul 2021 13:24), Max: 37.8 (07 Jul 2021 05:30)  T(F): 98.2 (07 Jul 2021 13:24), Max: 100.1 (07 Jul 2021 05:30)  HR: 88 (07 Jul 2021 13:24) (88 - 104)  BP: 107/58 (07 Jul 2021 13:24) (107/58 - 127/70)  BP(mean): --  RR: 18 (07 Jul 2021 05:30) (18 - 18)  SpO2: --      PHYSICAL EXAM:  GENERAL: NAD, well-groomed, well-developed  HEAD:  Atraumatic, Normocephalic  EYES: EOMI, PERRLA, conjunctiva and sclera clear  NERVOUS SYSTEM:  awake, confused, moves upper extremities; doesn't follow commands to move his legs today   CHEST/LUNG: Clear to percussion bilaterally; No rales, rhonchi, wheezing, or rubs  HEART: Regular rate and rhythm; No murmurs, rubs, or gallops  ABDOMEN: Soft, Nontender, Nondistended; Bowel sounds present, obese  EXTREMITIES:  2+ Peripheral Pulses, No clubbing, cyanosis, or edema  SKIN: No rashes or lesions      LABS:                          15.3   12.94 )-----------( 321      ( 07 Jul 2021 06:54 )             46.3       07-07    137  |  101  |  23<H>  ----------------------------<  232<H>  3.9   |  24  |  1.1    Ca    9.5      07 Jul 2021 06:54    A1c = 9.8      CAPILLARY BLOOD GLUCOSE  POCT Blood Glucose.: 241 mg/dL (07 Jul 2021 11:21)  POCT Blood Glucose.: 250 mg/dL (07 Jul 2021 07:37)  POCT Blood Glucose.: 273 mg/dL (06 Jul 2021 21:35)  POCT Blood Glucose.: 403 mg/dL (06 Jul 2021 16:16)      MEDICATIONS  (STANDING):  amLODIPine   Tablet 10 milliGRAM(s) Oral daily  aspirin enteric coated 325 milliGRAM(s) Oral daily  atorvastatin 80 milliGRAM(s) Oral at bedtime  chlorhexidine 4% Liquid 1 Application(s) Topical every 12 hours  clopidogrel Tablet 75 milliGRAM(s) Oral daily  enoxaparin Injectable 40 milliGRAM(s) SubCutaneous daily  insulin glargine Injectable (LANTUS) 27 Unit(s) SubCutaneous at bedtime  insulin lispro (ADMELOG) corrective regimen sliding scale   SubCutaneous three times a day before meals  insulin lispro Injectable (ADMELOG) 9 Unit(s) SubCutaneous three times a day before meals  lisinopril 40 milliGRAM(s) Oral daily  melatonin 5 milliGRAM(s) Oral at bedtime  metoprolol succinate ER 50 milliGRAM(s) Oral daily  pantoprazole    Tablet 40 milliGRAM(s) Oral before breakfast    MEDICATIONS  (PRN):

## 2021-07-07 NOTE — SWALLOW BEDSIDE ASSESSMENT ADULT - SWALLOW EVAL: DIAGNOSIS
Suspected pharyngeal dysphagia for thin liquids. Further trials not administered 2' pt pt with inability to sustain arousal despite max cues. Per LINDSEY Pineda, pt received risperidone 2' agitation last night however pt remains lethargic today.

## 2021-07-07 NOTE — PROGRESS NOTE ADULT - ASSESSMENT
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 p/w AMS x4 days, found to have acute-subacute L thalamic CVA    # h/o R PCA infarct p/w acute-subacute L thalamic infarct   - CTA shows R P1 occlusion/high grade stenosis  - MRI brain shows acute lacunar L thalamic infarct measuring 1.5 cm, mod size R occipital and medial temporal lobe infarcts combo of late subacute and chronic ,chronic R thalmic lacunar infarct and splenium of corpus callosum and poss wallerian degeneration  - Coag studies - patients brother has protein S def on long term anticoagulation   - initial hyper coag work up negative - will need outpatient hem/onc follow up   - Dr. Kowalski reconsulted for Loop recorder placement - will be placed prior to discharge to SNF  - ERICKSON no vegetation noted   - c/w ASA, plavix and statin  - PT, OT  - speech cleared for dysphagia 1 puree nectar  - c/w BP meds, Keep SBP >140   - Last TTE from 5/20 mild LVH, otherwise normal. Unclear whether agitated saline was done  - No h/o AF, continue with tele monitoring   - neuro checks q 8  - given risperidone at 1:30am and was very lethargic this morning; will try seroquel   - need frequent reorientation and needs to sleep through night   - continues on 1:1 sit for safety     # HTN  - con't lisinopril and metoprolol  - d/c amlodipine due to low BP    # h/o uncontrolled DM  - increase insulin today  - hba1c = 9.8    DVT ppx lovenox   GI ppx ptx  Diet: dys I puree nectar thick  full code    Progress Note Handoff  Pending Consults: none  Pending Tests: labs  Pending Results: labs  Family Discussion: discussed hypercoaguable workup, snf placement and medication adjustment luiz pt's wife bedside   Disposition: Home_____/SNF__x____/Other_____/Unknown at this time_____    spent over 40 min on medical management

## 2021-07-08 ENCOUNTER — NON-APPOINTMENT (OUTPATIENT)
Age: 56
End: 2021-07-08

## 2021-07-08 LAB
ALBUMIN SERPL ELPH-MCNC: 3.1 G/DL — LOW (ref 3.5–5.2)
ALP SERPL-CCNC: 92 U/L — SIGNIFICANT CHANGE UP (ref 30–115)
ALT FLD-CCNC: 18 U/L — SIGNIFICANT CHANGE UP (ref 0–41)
ANION GAP SERPL CALC-SCNC: 12 MMOL/L — SIGNIFICANT CHANGE UP (ref 7–14)
AST SERPL-CCNC: 16 U/L — SIGNIFICANT CHANGE UP (ref 0–41)
BILIRUB SERPL-MCNC: 0.6 MG/DL — SIGNIFICANT CHANGE UP (ref 0.2–1.2)
BUN SERPL-MCNC: 18 MG/DL — SIGNIFICANT CHANGE UP (ref 10–20)
CALCIUM SERPL-MCNC: 9.1 MG/DL — SIGNIFICANT CHANGE UP (ref 8.5–10.1)
CHLORIDE SERPL-SCNC: 104 MMOL/L — SIGNIFICANT CHANGE UP (ref 98–110)
CO2 SERPL-SCNC: 25 MMOL/L — SIGNIFICANT CHANGE UP (ref 17–32)
CREAT SERPL-MCNC: 0.7 MG/DL — SIGNIFICANT CHANGE UP (ref 0.7–1.5)
GLUCOSE BLDC GLUCOMTR-MCNC: 106 MG/DL — HIGH (ref 70–99)
GLUCOSE BLDC GLUCOMTR-MCNC: 126 MG/DL — HIGH (ref 70–99)
GLUCOSE BLDC GLUCOMTR-MCNC: 299 MG/DL — HIGH (ref 70–99)
GLUCOSE SERPL-MCNC: 111 MG/DL — HIGH (ref 70–99)
HCT VFR BLD CALC: 43.8 % — SIGNIFICANT CHANGE UP (ref 42–52)
HGB BLD-MCNC: 14.4 G/DL — SIGNIFICANT CHANGE UP (ref 14–18)
MAGNESIUM SERPL-MCNC: 1.7 MG/DL — LOW (ref 1.8–2.4)
MCHC RBC-ENTMCNC: 28.4 PG — SIGNIFICANT CHANGE UP (ref 27–31)
MCHC RBC-ENTMCNC: 32.9 G/DL — SIGNIFICANT CHANGE UP (ref 32–37)
MCV RBC AUTO: 86.4 FL — SIGNIFICANT CHANGE UP (ref 80–94)
NRBC # BLD: 0 /100 WBCS — SIGNIFICANT CHANGE UP (ref 0–0)
PLATELET # BLD AUTO: 313 K/UL — SIGNIFICANT CHANGE UP (ref 130–400)
POTASSIUM SERPL-MCNC: 3.6 MMOL/L — SIGNIFICANT CHANGE UP (ref 3.5–5)
POTASSIUM SERPL-SCNC: 3.6 MMOL/L — SIGNIFICANT CHANGE UP (ref 3.5–5)
PROT SERPL-MCNC: 6 G/DL — SIGNIFICANT CHANGE UP (ref 6–8)
RBC # BLD: 5.07 M/UL — SIGNIFICANT CHANGE UP (ref 4.7–6.1)
RBC # FLD: 13.5 % — SIGNIFICANT CHANGE UP (ref 11.5–14.5)
SODIUM SERPL-SCNC: 141 MMOL/L — SIGNIFICANT CHANGE UP (ref 135–146)
WBC # BLD: 10.92 K/UL — HIGH (ref 4.8–10.8)
WBC # FLD AUTO: 10.92 K/UL — HIGH (ref 4.8–10.8)

## 2021-07-08 PROCEDURE — 73030 X-RAY EXAM OF SHOULDER: CPT | Mod: 26,LT

## 2021-07-08 PROCEDURE — 93971 EXTREMITY STUDY: CPT | Mod: 26,LT

## 2021-07-08 PROCEDURE — 99233 SBSQ HOSP IP/OBS HIGH 50: CPT

## 2021-07-08 RX ORDER — MAGNESIUM OXIDE 400 MG ORAL TABLET 241.3 MG
400 TABLET ORAL
Refills: 0 | Status: DISCONTINUED | OUTPATIENT
Start: 2021-07-08 | End: 2021-07-14

## 2021-07-08 RX ORDER — PAMIDRONATE DISODIUM 9 MG/ML
60 INJECTION, SOLUTION INTRAVENOUS ONCE
Refills: 0 | Status: DISCONTINUED | OUTPATIENT
Start: 2021-07-08 | End: 2021-07-08

## 2021-07-08 RX ORDER — QUETIAPINE FUMARATE 200 MG/1
25 TABLET, FILM COATED ORAL
Refills: 0 | Status: DISCONTINUED | OUTPATIENT
Start: 2021-07-08 | End: 2021-07-14

## 2021-07-08 RX ORDER — MAGNESIUM SULFATE 500 MG/ML
2 VIAL (ML) INJECTION ONCE
Refills: 0 | Status: DISCONTINUED | OUTPATIENT
Start: 2021-07-08 | End: 2021-07-08

## 2021-07-08 RX ORDER — QUETIAPINE FUMARATE 200 MG/1
25 TABLET, FILM COATED ORAL DAILY
Refills: 0 | Status: DISCONTINUED | OUTPATIENT
Start: 2021-07-08 | End: 2021-07-08

## 2021-07-08 RX ADMIN — LISINOPRIL 40 MILLIGRAM(S): 2.5 TABLET ORAL at 06:00

## 2021-07-08 RX ADMIN — Medication 6: at 18:04

## 2021-07-08 RX ADMIN — CLOPIDOGREL BISULFATE 75 MILLIGRAM(S): 75 TABLET, FILM COATED ORAL at 18:05

## 2021-07-08 RX ADMIN — Medication 10 UNIT(S): at 18:04

## 2021-07-08 RX ADMIN — INSULIN GLARGINE 30 UNIT(S): 100 INJECTION, SOLUTION SUBCUTANEOUS at 21:25

## 2021-07-08 RX ADMIN — PANTOPRAZOLE SODIUM 40 MILLIGRAM(S): 20 TABLET, DELAYED RELEASE ORAL at 06:00

## 2021-07-08 RX ADMIN — Medication 5 MILLIGRAM(S): at 21:26

## 2021-07-08 RX ADMIN — ATORVASTATIN CALCIUM 80 MILLIGRAM(S): 80 TABLET, FILM COATED ORAL at 21:26

## 2021-07-08 RX ADMIN — MAGNESIUM OXIDE 400 MG ORAL TABLET 400 MILLIGRAM(S): 241.3 TABLET ORAL at 18:07

## 2021-07-08 RX ADMIN — Medication 325 MILLIGRAM(S): at 18:05

## 2021-07-08 RX ADMIN — CHLORHEXIDINE GLUCONATE 1 APPLICATION(S): 213 SOLUTION TOPICAL at 06:00

## 2021-07-08 RX ADMIN — Medication 50 MILLIGRAM(S): at 06:00

## 2021-07-08 RX ADMIN — QUETIAPINE FUMARATE 25 MILLIGRAM(S): 200 TABLET, FILM COATED ORAL at 21:26

## 2021-07-08 RX ADMIN — ENOXAPARIN SODIUM 40 MILLIGRAM(S): 100 INJECTION SUBCUTANEOUS at 18:05

## 2021-07-08 NOTE — PROGRESS NOTE ADULT - ASSESSMENT
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 p/w AMS x4 days, found to have acute-subacute L thalamic CVA    # h/o R PCA infarct p/w acute-subacute L thalamic infarct   - CTA shows R P1 occlusion/high grade stenosis  - MRI brain shows acute lacunar L thalamic infarct measuring 1.5 cm, mod size R occipital and medial temporal lobe infarcts combo of late subacute and chronic ,chronic R thalmic lacunar infarct and splenium of corpus callosum and poss wallerian degeneration  - Coag studies - patients brother has protein S def on long term anticoagulation   - initial hyper coag work up negative - will need outpatient hem/onc follow up   - Dr. Kowalski reconsulted for holter placement - will be placed prior to discharge to SNF - discussed with cardiac center nurse - Florentin  - ERICKSON no vegetation noted   - c/w ASA, plavix and statin  - PT, OT  - speech cleared for dysphagia 1 puree nectar  - c/w BP meds, Keep SBP >140   - Last TTE from 5/20 mild LVH, otherwise normal. Unclear whether agitated saline was done  - No h/o AF, continue with tele monitoring   - neuro checks q 8  - patient did well with seroquel; will continue   - need frequent reorientation and needs to sleep through night     # HTN  - con't lisinopril and metoprolol  - d/c amlodipine due to low BP    # h/o uncontrolled DM - better controlled   - increased insulin on 7/7  - hba1c = 9.8    DVT ppx lovenox   GI ppx ptx  Diet: dys I puree nectar thick  full code    Progress Note Handoff  Pending Consults: none  Pending Tests: labs  Pending Results: labs  Family Discussion: discussed hypercoagulable workup, snf placement and medication adjustment with pt's wife bedside on 7/7  Disposition: Home_____/SNF__x____/Other_____/Unknown at this time_____    spent over 36 min on medical management

## 2021-07-08 NOTE — CHART NOTE - NSCHARTNOTEFT_GEN_A_CORE
Pt was seen by OT this morning and noted to c/o LEFT upper arm pain and decreased LEFT arm motion since prior evaluation (pt was seen by OT last thursday).   Pt was seen and examined at bedside.   When asked if pt has any pain, he pointed to LEFT shoulder.  LEFT shloulder/ upper arm tender to palpation and slightly edematious compared to right; no ethryhema noted.   Case d/ w Dr Herrera: will obtain xr left shoulder and upper extremity duplex for cristinoer assessemnt,.    Pt was also noted to have hypomagnesemia. Will supplement with Mg sulfate 2 mg IVPB x 1 and repeat Mg in AM.

## 2021-07-08 NOTE — PROGRESS NOTE ADULT - SUBJECTIVE AND OBJECTIVE BOX
Patient is a 55y old male who presents with a CVA      INTERVAL HPI/OVERNIGHT EVENTS:   Patient seen and examined this morning  Lying comfortably in bed  Received seroquel yesterday evening - calm and slept well   He is calm and cooperative this morning    REVIEW OF SYSTEMS:   unable to assess due to mental status      Vital Signs Last 24 Hrs  T(C): 36.9 (08 Jul 2021 05:58), Max: 37.3 (07 Jul 2021 21:44)  T(F): 98.5 (08 Jul 2021 05:58), Max: 99.2 (07 Jul 2021 21:44)  HR: 86 (08 Jul 2021 05:58) (83 - 88)  BP: 132/62 (08 Jul 2021 05:58) (107/58 - 148/70)  BP(mean): --  RR: 16 (08 Jul 2021 05:58) (16 - 16)  SpO2: --      PHYSICAL EXAM:  GENERAL: NAD, well-groomed, well-developed  HEAD:  Atraumatic, Normocephalic  EYES: EOMI, PERRLA, conjunctiva and sclera clear  NERVOUS SYSTEM:  awake, alert, follows commands   CHEST/LUNG: Clear to percussion bilaterally; No rales, rhonchi, wheezing, or rubs  HEART: Regular rate and rhythm; No murmurs, rubs, or gallops  ABDOMEN: Soft, Nontender, Nondistended; Bowel sounds present, obese  EXTREMITIES:  2+ Peripheral Pulses, No clubbing, cyanosis, or edema  SKIN: No rashes or lesions      LABS:                          14.4   10.92 )-----------( 313      ( 08 Jul 2021 06:47 )             43.8     07-08    141  |  104  |  18  ----------------------------<  111<H>  3.6   |  25  |  0.7    Ca    9.1      08 Jul 2021 06:47  Mg     1.7     07-08    TPro  6.0  /  Alb  3.1<L>  /  TBili  0.6  /  DBili  x   /  AST  16  /  ALT  18  /  AlkPhos  92  07-08      A1c = 9.8      CAPILLARY BLOOD GLUCOSE  POCT Blood Glucose.: 126 mg/dL (08 Jul 2021 11:51)  POCT Blood Glucose.: 106 mg/dL (08 Jul 2021 07:43)  POCT Blood Glucose.: 184 mg/dL (07 Jul 2021 21:42)  POCT Blood Glucose.: 262 mg/dL (07 Jul 2021 16:20)      MEDICATIONS  (STANDING):  aspirin enteric coated 325 milliGRAM(s) Oral daily  atorvastatin 80 milliGRAM(s) Oral at bedtime  chlorhexidine 4% Liquid 1 Application(s) Topical every 12 hours  clopidogrel Tablet 75 milliGRAM(s) Oral daily  dextrose 40% Gel 15 Gram(s) Oral once  dextrose 5%. 1000 milliLiter(s) (50 mL/Hr) IV Continuous <Continuous>  dextrose 5%. 1000 milliLiter(s) (100 mL/Hr) IV Continuous <Continuous>  dextrose 50% Injectable 25 Gram(s) IV Push once  dextrose 50% Injectable 12.5 Gram(s) IV Push once  dextrose 50% Injectable 25 Gram(s) IV Push once  enoxaparin Injectable 40 milliGRAM(s) SubCutaneous daily  glucagon  Injectable 1 milliGRAM(s) IntraMuscular once  insulin glargine Injectable (LANTUS) 30 Unit(s) SubCutaneous at bedtime  insulin lispro (ADMELOG) corrective regimen sliding scale   SubCutaneous three times a day before meals  insulin lispro Injectable (ADMELOG) 10 Unit(s) SubCutaneous before breakfast  insulin lispro Injectable (ADMELOG) 10 Unit(s) SubCutaneous before lunch  insulin lispro Injectable (ADMELOG) 10 Unit(s) SubCutaneous before dinner  lisinopril 40 milliGRAM(s) Oral daily  magnesium oxide 400 milliGRAM(s) Oral three times a day with meals  melatonin 5 milliGRAM(s) Oral at bedtime  metoprolol succinate ER 50 milliGRAM(s) Oral daily  pantoprazole    Tablet 40 milliGRAM(s) Oral before breakfast  QUEtiapine 25 milliGRAM(s) Oral <User Schedule>

## 2021-07-09 ENCOUNTER — TRANSCRIPTION ENCOUNTER (OUTPATIENT)
Age: 56
End: 2021-07-09

## 2021-07-09 LAB
ALBUMIN SERPL ELPH-MCNC: 3.2 G/DL — LOW (ref 3.5–5.2)
ALP SERPL-CCNC: 92 U/L — SIGNIFICANT CHANGE UP (ref 30–115)
ALT FLD-CCNC: 16 U/L — SIGNIFICANT CHANGE UP (ref 0–41)
ANION GAP SERPL CALC-SCNC: 8 MMOL/L — SIGNIFICANT CHANGE UP (ref 7–14)
AST SERPL-CCNC: 14 U/L — SIGNIFICANT CHANGE UP (ref 0–41)
BILIRUB SERPL-MCNC: 0.6 MG/DL — SIGNIFICANT CHANGE UP (ref 0.2–1.2)
BUN SERPL-MCNC: 14 MG/DL — SIGNIFICANT CHANGE UP (ref 10–20)
CALCIUM SERPL-MCNC: 9.4 MG/DL — SIGNIFICANT CHANGE UP (ref 8.5–10.1)
CHLORIDE SERPL-SCNC: 103 MMOL/L — SIGNIFICANT CHANGE UP (ref 98–110)
CO2 SERPL-SCNC: 28 MMOL/L — SIGNIFICANT CHANGE UP (ref 17–32)
CREAT SERPL-MCNC: 0.6 MG/DL — LOW (ref 0.7–1.5)
GLUCOSE BLDC GLUCOMTR-MCNC: 149 MG/DL — HIGH (ref 70–99)
GLUCOSE BLDC GLUCOMTR-MCNC: 182 MG/DL — HIGH (ref 70–99)
GLUCOSE BLDC GLUCOMTR-MCNC: 254 MG/DL — HIGH (ref 70–99)
GLUCOSE BLDC GLUCOMTR-MCNC: 258 MG/DL — HIGH (ref 70–99)
GLUCOSE BLDC GLUCOMTR-MCNC: 272 MG/DL — HIGH (ref 70–99)
GLUCOSE SERPL-MCNC: 131 MG/DL — HIGH (ref 70–99)
HCT VFR BLD CALC: 46.7 % — SIGNIFICANT CHANGE UP (ref 42–52)
HGB BLD-MCNC: 15.4 G/DL — SIGNIFICANT CHANGE UP (ref 14–18)
MAGNESIUM SERPL-MCNC: 1.7 MG/DL — LOW (ref 1.8–2.4)
MCHC RBC-ENTMCNC: 28.5 PG — SIGNIFICANT CHANGE UP (ref 27–31)
MCHC RBC-ENTMCNC: 33 G/DL — SIGNIFICANT CHANGE UP (ref 32–37)
MCV RBC AUTO: 86.3 FL — SIGNIFICANT CHANGE UP (ref 80–94)
NRBC # BLD: 0 /100 WBCS — SIGNIFICANT CHANGE UP (ref 0–0)
PLATELET # BLD AUTO: 334 K/UL — SIGNIFICANT CHANGE UP (ref 130–400)
POTASSIUM SERPL-MCNC: 3.8 MMOL/L — SIGNIFICANT CHANGE UP (ref 3.5–5)
POTASSIUM SERPL-SCNC: 3.8 MMOL/L — SIGNIFICANT CHANGE UP (ref 3.5–5)
PROT SERPL-MCNC: 6.3 G/DL — SIGNIFICANT CHANGE UP (ref 6–8)
RBC # BLD: 5.41 M/UL — SIGNIFICANT CHANGE UP (ref 4.7–6.1)
RBC # FLD: 13.3 % — SIGNIFICANT CHANGE UP (ref 11.5–14.5)
SODIUM SERPL-SCNC: 139 MMOL/L — SIGNIFICANT CHANGE UP (ref 135–146)
WBC # BLD: 10.31 K/UL — SIGNIFICANT CHANGE UP (ref 4.8–10.8)
WBC # FLD AUTO: 10.31 K/UL — SIGNIFICANT CHANGE UP (ref 4.8–10.8)

## 2021-07-09 PROCEDURE — 74230 X-RAY XM SWLNG FUNCJ C+: CPT | Mod: 26

## 2021-07-09 PROCEDURE — 99233 SBSQ HOSP IP/OBS HIGH 50: CPT

## 2021-07-09 RX ORDER — EZETIMIBE 10 MG/1
1 TABLET ORAL
Qty: 0 | Refills: 0 | DISCHARGE

## 2021-07-09 RX ORDER — CANAGLIFLOZIN AND METFORMIN HYDROCHLORIDE 50; 500 MG/1; MG/1
1 TABLET, FILM COATED, EXTENDED RELEASE ORAL
Qty: 0 | Refills: 0 | DISCHARGE

## 2021-07-09 RX ORDER — METOPROLOL TARTRATE 50 MG
1 TABLET ORAL
Qty: 0 | Refills: 0 | DISCHARGE

## 2021-07-09 RX ORDER — CLOPIDOGREL BISULFATE 75 MG/1
1 TABLET, FILM COATED ORAL
Qty: 0 | Refills: 0 | DISCHARGE
Start: 2021-07-09

## 2021-07-09 RX ORDER — QUETIAPINE FUMARATE 200 MG/1
1 TABLET, FILM COATED ORAL
Qty: 0 | Refills: 0 | DISCHARGE
Start: 2021-07-09

## 2021-07-09 RX ORDER — LISINOPRIL 2.5 MG/1
1 TABLET ORAL
Qty: 0 | Refills: 0 | DISCHARGE
Start: 2021-07-09

## 2021-07-09 RX ORDER — LANOLIN ALCOHOL/MO/W.PET/CERES
1 CREAM (GRAM) TOPICAL
Qty: 0 | Refills: 0 | DISCHARGE
Start: 2021-07-09

## 2021-07-09 RX ORDER — ASPIRIN/CALCIUM CARB/MAGNESIUM 324 MG
1 TABLET ORAL
Qty: 0 | Refills: 0 | DISCHARGE
Start: 2021-07-09

## 2021-07-09 RX ORDER — INSULIN ASPART 100 [IU]/ML
0 INJECTION, SOLUTION SUBCUTANEOUS
Qty: 0 | Refills: 0 | DISCHARGE

## 2021-07-09 RX ORDER — ATORVASTATIN CALCIUM 80 MG/1
1 TABLET, FILM COATED ORAL
Qty: 0 | Refills: 0 | DISCHARGE
Start: 2021-07-09

## 2021-07-09 RX ORDER — METOPROLOL TARTRATE 50 MG
1 TABLET ORAL
Qty: 0 | Refills: 0 | DISCHARGE
Start: 2021-07-09

## 2021-07-09 RX ORDER — INSULIN LISPRO 100/ML
10 VIAL (ML) SUBCUTANEOUS
Qty: 0 | Refills: 0 | DISCHARGE
Start: 2021-07-09

## 2021-07-09 RX ORDER — INSULIN GLARGINE 100 [IU]/ML
30 INJECTION, SOLUTION SUBCUTANEOUS
Qty: 0 | Refills: 0 | DISCHARGE
Start: 2021-07-09

## 2021-07-09 RX ORDER — MAGNESIUM OXIDE 400 MG ORAL TABLET 241.3 MG
1 TABLET ORAL
Qty: 0 | Refills: 0 | DISCHARGE
Start: 2021-07-09

## 2021-07-09 RX ORDER — PIOGLITAZONE HYDROCHLORIDE 15 MG/1
1 TABLET ORAL
Qty: 0 | Refills: 0 | DISCHARGE

## 2021-07-09 RX ORDER — PANTOPRAZOLE SODIUM 20 MG/1
1 TABLET, DELAYED RELEASE ORAL
Qty: 0 | Refills: 0 | DISCHARGE
Start: 2021-07-09

## 2021-07-09 RX ADMIN — Medication 2: at 08:37

## 2021-07-09 RX ADMIN — ATORVASTATIN CALCIUM 80 MILLIGRAM(S): 80 TABLET, FILM COATED ORAL at 21:05

## 2021-07-09 RX ADMIN — CHLORHEXIDINE GLUCONATE 1 APPLICATION(S): 213 SOLUTION TOPICAL at 05:35

## 2021-07-09 RX ADMIN — ENOXAPARIN SODIUM 40 MILLIGRAM(S): 100 INJECTION SUBCUTANEOUS at 16:57

## 2021-07-09 RX ADMIN — Medication 5 MILLIGRAM(S): at 21:05

## 2021-07-09 RX ADMIN — MAGNESIUM OXIDE 400 MG ORAL TABLET 400 MILLIGRAM(S): 241.3 TABLET ORAL at 08:38

## 2021-07-09 RX ADMIN — Medication 50 MILLIGRAM(S): at 05:35

## 2021-07-09 RX ADMIN — PANTOPRAZOLE SODIUM 40 MILLIGRAM(S): 20 TABLET, DELAYED RELEASE ORAL at 05:35

## 2021-07-09 RX ADMIN — Medication 325 MILLIGRAM(S): at 17:12

## 2021-07-09 RX ADMIN — Medication 10 UNIT(S): at 08:36

## 2021-07-09 RX ADMIN — MAGNESIUM OXIDE 400 MG ORAL TABLET 400 MILLIGRAM(S): 241.3 TABLET ORAL at 16:57

## 2021-07-09 RX ADMIN — Medication 10 UNIT(S): at 16:55

## 2021-07-09 RX ADMIN — QUETIAPINE FUMARATE 25 MILLIGRAM(S): 200 TABLET, FILM COATED ORAL at 21:05

## 2021-07-09 RX ADMIN — CLOPIDOGREL BISULFATE 75 MILLIGRAM(S): 75 TABLET, FILM COATED ORAL at 16:56

## 2021-07-09 RX ADMIN — LISINOPRIL 40 MILLIGRAM(S): 2.5 TABLET ORAL at 05:35

## 2021-07-09 RX ADMIN — Medication 6: at 16:56

## 2021-07-09 RX ADMIN — INSULIN GLARGINE 30 UNIT(S): 100 INJECTION, SOLUTION SUBCUTANEOUS at 21:05

## 2021-07-09 NOTE — SWALLOW VFSS/MBS ASSESSMENT ADULT - RECOMMENDED CONSISTENCY
Unable to make diet recommendations based off of VFSS, however MD can consider continuing dysphagia 1 puree and nectar thick liquid diet based off bedside swallow evaluation recommendations.

## 2021-07-09 NOTE — DISCHARGE NOTE PROVIDER - NSDCCPCAREPLAN_GEN_ALL_CORE_FT
PRINCIPAL DISCHARGE DIAGNOSIS  Diagnosis: Thalamic stroke  Assessment and Plan of Treatment: Continue Statain, ASA, Plavix      SECONDARY DISCHARGE DIAGNOSES  Diagnosis: Benign essential HTN  Assessment and Plan of Treatment: cnotinue BP meds    Diagnosis: Dysphagia  Assessment and Plan of Treatment: continue Dysphagia 1, nectar thickened diet     PRINCIPAL DISCHARGE DIAGNOSIS  Diagnosis: Thalamic stroke  Assessment and Plan of Treatment: Continue Statain, ASA, Plavix  -follow up with stroke clinic in 2 weeks      SECONDARY DISCHARGE DIAGNOSES  Diagnosis: Benign essential HTN  Assessment and Plan of Treatment: cnotinue BP meds    Diagnosis: Dysphagia  Assessment and Plan of Treatment: continue Dysphagia 1, nectar thickened diet     PRINCIPAL DISCHARGE DIAGNOSIS  Diagnosis: Thalamic stroke  Assessment and Plan of Treatment: Continue Statin, ASA, Plavix  -follow up with neurology  -fall precautions  -you will have a holter monitor placed prior to discharge  -follow up with cardiology      SECONDARY DISCHARGE DIAGNOSES  Diagnosis: Dysphagia  Assessment and Plan of Treatment: continue Dysphagia 1, nectar thickened diet     PRINCIPAL DISCHARGE DIAGNOSIS  Diagnosis: Thalamic stroke  Assessment and Plan of Treatment: Continue Statin, ASA, Plavix  -follow up with neurology  -fall precautions  -you will have a holter monitor placed prior to discharge  -follow up with cardiology      SECONDARY DISCHARGE DIAGNOSES  Diagnosis: Dysphagia  Assessment and Plan of Treatment: continue Dysphagia 1, thin liquid

## 2021-07-09 NOTE — SWALLOW VFSS/MBS ASSESSMENT ADULT - ORAL PHASE
Delayed oral transit time/Reduced anterior - posterior transport/Uncontrolled bolus / spillover in hypopharynx

## 2021-07-09 NOTE — PROGRESS NOTE ADULT - SUBJECTIVE AND OBJECTIVE BOX
Patient is a 55y old male who presents with a CVA      INTERVAL HPI/OVERNIGHT EVENTS:   Patient seen and examined this morning  Sitting comfortably in the chair  Received seroquel yesterday evening - calm and slept well   He is calm and cooperative this morning    REVIEW OF SYSTEMS:   unable to assess due to mental status      Vital Signs Last 24 Hrs  T(C): 35.9 (09 Jul 2021 05:48), Max: 36.9 (08 Jul 2021 14:04)  T(F): 96.7 (09 Jul 2021 05:48), Max: 98.4 (08 Jul 2021 14:04)  HR: 90 (09 Jul 2021 05:48) (84 - 90)  BP: 183/79 (09 Jul 2021 05:48) (131/63 - 183/79)  BP(mean): --  RR: 16 (09 Jul 2021 05:48) (16 - 16)  SpO2: --      PHYSICAL EXAM:  GENERAL: NAD, well-groomed, well-developed  HEAD:  Atraumatic, Normocephalic  EYES: EOMI, PERRLA, conjunctiva and sclera clear  NERVOUS SYSTEM:  awake, alert, follows commands, confused   CHEST/LUNG: Clear to percussion bilaterally; No rales, rhonchi, wheezing, or rubs  HEART: Regular rate and rhythm; No murmurs, rubs, or gallops  ABDOMEN: Soft, Nontender, Nondistended; Bowel sounds present, obese  EXTREMITIES:  2+ Peripheral Pulses, No clubbing, cyanosis, or edema  SKIN: No rashes or lesions      LABS:                        15.4   10.31 )-----------( 334      ( 09 Jul 2021 06:16 )             46.7     07-09    139  |  103  |  14  ----------------------------<  131<H>  3.8   |  28  |  0.6<L>    Ca    9.4      09 Jul 2021 06:16  Mg     1.7     07-09    TPro  6.3  /  Alb  3.2<L>  /  TBili  0.6  /  DBili  x   /  AST  14  /  ALT  16  /  AlkPhos  92  07-09      A1c = 9.8      CAPILLARY BLOOD GLUCOSE  POCT Blood Glucose.: 182 mg/dL (09 Jul 2021 08:06)  POCT Blood Glucose.: 149 mg/dL (09 Jul 2021 06:01)  POCT Blood Glucose.: 299 mg/dL (08 Jul 2021 16:09)  POCT Blood Glucose.: 126 mg/dL (08 Jul 2021 11:51)          MEDICATIONS  (STANDING):  aspirin enteric coated 325 milliGRAM(s) Oral daily  atorvastatin 80 milliGRAM(s) Oral at bedtime  chlorhexidine 4% Liquid 1 Application(s) Topical every 12 hours  clopidogrel Tablet 75 milliGRAM(s) Oral daily  dextrose 40% Gel 15 Gram(s) Oral once  dextrose 5%. 1000 milliLiter(s) (50 mL/Hr) IV Continuous <Continuous>  dextrose 5%. 1000 milliLiter(s) (100 mL/Hr) IV Continuous <Continuous>  dextrose 50% Injectable 25 Gram(s) IV Push once  dextrose 50% Injectable 12.5 Gram(s) IV Push once  dextrose 50% Injectable 25 Gram(s) IV Push once  enoxaparin Injectable 40 milliGRAM(s) SubCutaneous daily  glucagon  Injectable 1 milliGRAM(s) IntraMuscular once  insulin glargine Injectable (LANTUS) 30 Unit(s) SubCutaneous at bedtime  insulin lispro (ADMELOG) corrective regimen sliding scale   SubCutaneous three times a day before meals  insulin lispro Injectable (ADMELOG) 10 Unit(s) SubCutaneous before breakfast  insulin lispro Injectable (ADMELOG) 10 Unit(s) SubCutaneous before lunch  insulin lispro Injectable (ADMELOG) 10 Unit(s) SubCutaneous before dinner  lisinopril 40 milliGRAM(s) Oral daily  magnesium oxide 400 milliGRAM(s) Oral three times a day with meals  melatonin 5 milliGRAM(s) Oral at bedtime  metoprolol succinate ER 50 milliGRAM(s) Oral daily  pantoprazole    Tablet 40 milliGRAM(s) Oral before breakfast  QUEtiapine 25 milliGRAM(s) Oral <User Schedule>    MEDICATIONS  (PRN):

## 2021-07-09 NOTE — DISCHARGE NOTE PROVIDER - PROVIDER TOKENS
FREE:[LAST:[Rehab MD],PHONE:[(   )    -],FAX:[(   )    -],ADDRESS:[Patint will be followed by MD [facility] Rehab facility]] PROVIDER:[TOKEN:[[id number]],FOLLOWUP:[1 week]],PROVIDER:[TOKEN:[[id number]],FOLLOWUP:[1 week]],FREE:[LAST:[Rehab MD],PHONE:[(   )    -],FAX:[(   )    -],ADDRESS:[Patint will be followed by MD [facility] Rehab facility]],FREE:[LAST:[[facility] Stroke clinic],PHONE:[(   )    -],FAX:[(   )    -],ADDRESS:[[address] [place] [address]  [phone]]] PROVIDER:[TOKEN:[[id number]],FOLLOWUP:[1 week]],PROVIDER:[TOKEN:[[id number]],FOLLOWUP:[1 week]],FREE:[LAST:[Rehab MD],PHONE:[(   )    -],FAX:[(   )    -],ADDRESS:[Patint will be followed by MD [facility] Rehab facility]],FREE:[LAST:[[facility] Stroke clinic],PHONE:[(   )    -],FAX:[(   )    -],ADDRESS:[[address] [place] [address]  [phone]],PROVIDER:[TOKEN:[[id number]],FOLLOWUP:[2 weeks]]

## 2021-07-09 NOTE — DISCHARGE NOTE PROVIDER - NPI NUMBER (FOR SYSADMIN USE ONLY) :
[UNKNOWN] [[id number]],[[id number]],[UNKNOWN],[UNKNOWN] [[id number]],[[id number]],[UNKNOWN],[UNKNOWN],[[id number]]

## 2021-07-09 NOTE — SWALLOW BEDSIDE ASSESSMENT ADULT - SWALLOW EVAL: DIAGNOSIS
No s/s aspiration/penetration for puree and nectar thick liquids. Suspected pharyngeal dysphagia for thin liquids

## 2021-07-09 NOTE — SWALLOW VFSS/MBS ASSESSMENT ADULT - DIAGNOSTIC IMPRESSIONS
Unable to accurately assess 2' pt with increased lethargy throughout evaluation therefore further PO trials not appropriate or recommended at this time.

## 2021-07-09 NOTE — DISCHARGE NOTE PROVIDER - HOSPITAL COURSE
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 p/w AMS x4 days, found to have acute-subacute L thalamic CVA    # h/o R PCA infarct p/w acute-subacute L thalamic infarct   - CTA shows R P1 occlusion/high grade stenosis  - MRI brain shows acute lacunar L thalamic infarct measuring 1.5 cm, mod size R occipital and medial temporal lobe infarcts combo of late subacute and chronic ,chronic R thalmic lacunar infarct and splenium of corpus callosum and poss wallerian degeneration  - Coag studies - patients brother has protein S def on long term anticoagulation   - initial hyper coag work up negative - will need outpatient hem/onc follow up   - Dr. Kowalski reconsulted for holter placement - will be placed prior to discharge to SNF - discussed with cardiac center nurse - Florentin  - ERICKSON no vegetation noted   - c/w ASA, plavix and statin  - PT, OT  - speech cleared for dysphagia 1 puree nectar  - c/w BP meds, Keep SBP >140   - Last TTE from 5/20 mild LVH, otherwise normal. Unclear whether agitated saline was done  - neuro checks q 8 while hospitalized  - patient did well with Seroquel; will continue upon discharge  - need frequent reorientation and needs to sleep through night: on Melatonin    # HTN  - con't lisinopril and metoprolol  - d/c amlodipine due to low BP    # h/o uncontrolled DM - better controlled   - increased insulin on 7/7: continue Glargine + Lispro QAC, Finger sticks QAC and provide coverage with Lispro as needed  - hba1c = 9.8     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 p/w AMS x4 days, found to have acute-subacute L thalamic CVA  # h/o R PCA infarct p/w acute-subacute L thalamic infarct , CTA shows R P1 occlusion/high grade stenosis  - MRI brain shows acute lacunar L thalamic infarct measuring 1.5 cm, mod size R occipital and medial temporal lobe infarcts combo of late subacute and chronic ,chronic R thalmic lacunar infarct and splenium of corpus callosum and poss wallerian degeneration,  Coag studies - patients brother has protein S def on long term anticoagulation   - initial hyper coag work up negative - will need outpatient hem/onc follow up   - Dr. Kowalski reconsulted for holter placement - will be placed prior to discharge to SNF - discussed with cardiac center nurse - Florentin  - ERICKSON no vegetation noted ,  c/w ASA, plavix and statin,  speech cleared for dysphagia 1 puree nectar  Pt seen by Physical therapy, will need short term rehab.   Pt became very aggressive and agitated at night on 7/10; given 7mg of haldol and placed in 4 point restraints,  patient seen by psych - rec prn zyprexa; Seroquel will continue for night time  - need frequent reorientation and needs to sleep through night   Pt needs to follow up with his pcp and neurology as oupt  Pt needs to follow up with cardiology Dr. Kowalski 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 p/w AMS x4 days, found to have acute-subacute L thalamic CVA  # h/o R PCA infarct p/w acute-subacute L thalamic infarct , CTA shows R P1 occlusion/high grade stenosis  - MRI brain shows acute lacunar L thalamic infarct measuring 1.5 cm, mod size R occipital and medial temporal lobe infarcts combo of late subacute and chronic ,chronic R thalmic lacunar infarct and splenium of corpus callosum and poss wallerian degeneration,  Coag studies - patients brother has protein S def on long term anticoagulation   - initial hyper coag work up negative - will need outpatient hem/onc follow up   - Dr. Kowalski reconsulted for holter placement - will be placed prior to discharge to SNF - discussed with cardiac center nurse - Florentin  - ERICKSON no vegetation noted ,  c/w ASA, plavix and statin,  speech cleared for dysphagia 1 puree nectar  Pt seen by Physical therapy, will need short term rehab.   Pt became very aggressive and agitated at night on 7/10; given 7mg of haldol and placed in 4 point restraints,  patient seen by psych - rec prn zyprexa; Seroquel will continue for night time  - need frequent reorientation and needs to sleep through night   Patient remained calm on 7//11 and is calm and cooperative today.    Patient seen and examined  lying comfortably in bed  in nad      Vital Signs Last 24 Hrs  T(C): 36.5 (13 Jul 2021 05:16), Max: 36.7 (12 Jul 2021 21:26)  T(F): 97.7 (13 Jul 2021 05:16), Max: 98.1 (12 Jul 2021 21:26)  HR: 74 (13 Jul 2021 05:16) (74 - 113)  BP: 154/82 (13 Jul 2021 05:16) (118/67 - 167/80)  BP(mean): --  RR: 17 (13 Jul 2021 05:16) (16 - 17)  SpO2: --      PHYSICAL EXAM:  GENERAL: NAD, well-groomed, well-developed  HEAD:  Atraumatic, Normocephalic  EYES: EOMI, PERRLA, conjunctiva and sclera clear  NERVOUS SYSTEM:  sleeping, moves all extremities, follows commands   CHEST/LUNG: Clear to percussion bilaterally; No rales, rhonchi, wheezing, or rubs  HEART: Regular rate and rhythm; No murmurs, rubs, or gallops  ABDOMEN: Soft, Nontender, Nondistended; Bowel sounds present  EXTREMITIES:  2+ Peripheral Pulses, No clubbing, cyanosis, or edema  SKIN: No rashes or lesions      Pt needs to follow up with his pcp and neurology as oupt  Pt needs to follow up with cardiology Dr. Kowalski   d/c to [facility] today  d/c papers done by me  d/c planning took over 45 min 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 p/w AMS x4 days, found to have acute-subacute L thalamic CVA  # h/o R PCA infarct p/w acute-subacute L thalamic infarct , CTA shows R P1 occlusion/high grade stenosis  - MRI brain shows acute lacunar L thalamic infarct measuring 1.5 cm, mod size R occipital and medial temporal lobe infarcts combo of late subacute and chronic ,chronic R thalmic lacunar infarct and splenium of corpus callosum and poss wallerian degeneration,  Coag studies - patients brother has protein S def on long term anticoagulation   - initial hyper coag work up negative - will need outpatient hem/onc follow up   - Dr. Kowalski reconsulted for holter placement - will be placed prior to discharge to SNF - discussed with cardiac center nurse - Florentin  - ERICKSON no vegetation noted ,  c/w ASA, plavix and statin,  speech cleared for dysphagia 1 puree nectar  Pt seen by Physical therapy, will need short term rehab.   Pt became very aggressive and agitated at night on 7/10; given 7mg of haldol and placed in 4 point restraints,  patient seen by psych - rec prn zyprexa; Seroquel will continue for night time  - need frequent reorientation and needs to sleep through night   Patient remained calm on 7//11 and is calm and cooperative today.    Patient seen and examined  lying comfortably in bed  in nad      Vital Signs Last 24 Hrs  T(C): 36.1 (14 Jul 2021 05:00), Max: 37.1 (13 Jul 2021 14:28)  T(F): 97 (14 Jul 2021 05:00), Max: 98.8 (13 Jul 2021 14:28)  HR: 78 (14 Jul 2021 05:00) (78 - 112)  BP: 147/69 (14 Jul 2021 05:00) (111/71 - 162/92)  BP(mean): --  RR: 18 (14 Jul 2021 05:00) (17 - 18)  SpO2: --      PHYSICAL EXAM:  GENERAL: NAD, well-groomed, well-developed  HEAD:  Atraumatic, Normocephalic  EYES: EOMI, PERRLA, conjunctiva and sclera clear  NERVOUS SYSTEM:  alert cooperative pleasant  CHEST/LUNG: Clear to percussion bilaterally; No rales, rhonchi, wheezing, or rubs  HEART: Regular rate and rhythm; No murmurs, rubs, or gallops  ABDOMEN: Soft, Nontender, Nondistended; Bowel sounds present  EXTREMITIES:  2+ Peripheral Pulses, No clubbing, cyanosis, or edema  SKIN: No rashes or lesions      Pt needs to follow up with his pcp and neurology as oupt  Pt needs to follow up with cardiology Dr. Kowalski   d/c to snf today  d/c papers done by me  d/c planning took over 45 min

## 2021-07-09 NOTE — DISCHARGE NOTE PROVIDER - NSDCMRMEDTOKEN_GEN_ALL_CORE_FT
aspirin 325 mg oral delayed release tablet: 1 tab(s) orally once a day  atorvastatin 80 mg oral tablet: 1 tab(s) orally once a day (at bedtime)  clopidogrel 75 mg oral tablet: 1 tab(s) orally once a day  insulin glargine: 30 unit(s) subcutaneous once a day (at bedtime)  insulin lispro: 10 unit(s) subcutaneous 3 times a day before breakfast, lunch, dinner  lisinopril 40 mg oral tablet: 1 tab(s) orally once a day  magnesium oxide 400 mg oral tablet: 1 tab(s) orally 3 times a day (with meals) x 3 days  melatonin 5 mg oral tablet: 1 tab(s) orally once a day (at bedtime)  metoprolol succinate 50 mg oral tablet, extended release: 1 tab(s) orally once a day  pantoprazole 40 mg oral delayed release tablet: 1 tab(s) orally once a day (before a meal)  QUEtiapine 25 mg oral tablet: 1 tab(s) orally    aspirin 325 mg oral delayed release tablet: 1 tab(s) orally once a day  atorvastatin 80 mg oral tablet: 1 tab(s) orally once a day (at bedtime)  clopidogrel 75 mg oral tablet: 1 tab(s) orally once a day  insulin glargine: 30 unit(s) subcutaneous once a day (at bedtime)  insulin lispro: 10 unit(s) subcutaneous 3 times a day before breakfast, lunch, dinner  lisinopril 40 mg oral tablet: 1 tab(s) orally once a day  magnesium oxide 400 mg oral tablet: 1 tab(s) orally 3 times a day (with meals) x 3 days  melatonin 5 mg oral tablet: 1 tab(s) orally once a day (at bedtime)  metoprolol succinate 50 mg oral tablet, extended release: 1 tab(s) orally once a day  OLANZapine 10 mg intramuscular injection: 2.5 milligram(s) intramuscular every 6 hours, As needed, agitation  pantoprazole 40 mg oral delayed release tablet: 1 tab(s) orally once a day (before a meal)  QUEtiapine 25 mg oral tablet: 1 tab(s) orally at 8pm   aspirin 325 mg oral delayed release tablet: 1 tab(s) orally once a day  atorvastatin 80 mg oral tablet: 1 tab(s) orally once a day (at bedtime)  clopidogrel 75 mg oral tablet: 1 tab(s) orally once a day  ezetimibe 10 mg oral tablet: 1 tab(s) orally once a day  insulin glargine: 30 unit(s) subcutaneous once a day (at bedtime)  insulin lispro: 10 unit(s) subcutaneous 3 times a day before breakfast, lunch, dinner  lisinopril 40 mg oral tablet: 1 tab(s) orally once a day  magnesium oxide 400 mg oral tablet: 1 tab(s) orally 3 times a day (with meals) x 3 days  melatonin 5 mg oral tablet: 1 tab(s) orally once a day (at bedtime)  metoprolol succinate 50 mg oral tablet, extended release: 1 tab(s) orally once a day  OLANZapine 10 mg intramuscular injection: 2.5 milligram(s) intramuscular every 6 hours, As needed, agitation  pantoprazole 40 mg oral delayed release tablet: 1 tab(s) orally once a day (before a meal)  QUEtiapine 25 mg oral tablet: 1 tab(s) orally at 8pm

## 2021-07-09 NOTE — SWALLOW VFSS/MBS ASSESSMENT ADULT - SLP PERTINENT HISTORY OF CURRENT PROBLEM
55 year old male presented to ED with AMS.  MRI: Acute lacunar infarct within the left thalamus measuring about 1.5 cm, Moderate-sized infarct within the right occipital and medial temporal lobes, combination of late subacute and chronic, Chronic infarct involving the splenium of the corpus callosum. Chronic lacunar infarct within the right thalamus

## 2021-07-09 NOTE — DISCHARGE NOTE PROVIDER - CARE PROVIDERS DIRECT ADDRESSES
,DirectAddress_Unknown ,[email or web address],DirectAddress_Unknown,[email or web address] ,[email or web address],DirectAddress_Unknown,DirectAddress_Unknown,[email or web address]

## 2021-07-09 NOTE — PROGRESS NOTE ADULT - ASSESSMENT
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 p/w AMS x4 days, found to have acute-subacute L thalamic CVA    # h/o R PCA infarct p/w acute-subacute L thalamic infarct   - CTA shows R P1 occlusion/high grade stenosis  - MRI brain shows acute lacunar L thalamic infarct measuring 1.5 cm, mod size R occipital and medial temporal lobe infarcts combo of late subacute and chronic ,chronic R thalmic lacunar infarct and splenium of corpus callosum and poss wallerian degeneration  - Coag studies - patients brother has protein S def on long term anticoagulation   - initial hyper coag work up negative - will need outpatient hem/onc follow up   - Dr. Kowalski reconsulted for holter placement - will be placed prior to discharge to SNF - discussed with cardiac center nurse - Florentin  - ERICKSON no vegetation noted   - c/w ASA, plavix and statin  - PT, OT  - speech cleared for dysphagia 1 puree nectar - MBS recommended by speech   - c/w BP meds, Keep SBP >140   - Last TTE from 5/20 mild LVH, otherwise normal. 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 All questions answered   Disposition: Home_____/SNF__x____/Other_____/Unknown at this time_____    spent over 36 min on medical management

## 2021-07-09 NOTE — DISCHARGE NOTE PROVIDER - CARE PROVIDER_API CALL
Rehab Miranda ALBA will be followed by MD covering  Rehab facility  Phone: (   )    -  Fax: (   )    -  Follow Up Time:    Norman Kowalski)  Cardiovascular Disease; Internal Medicine  [address] [place] [address]  Phone: [phone]  Fax: [phone]  Follow Up Time: 1 week    Spenser Murphy  INTERNAL MEDICINE  [address] [place] [address]  Phone: [phone]  Fax: [phone]  Follow Up Time: 1 week    Rehab Miranda ALBA will be followed by MD [facility] Rehab facility  Phone: (   )    -  Fax: (   )    -  Follow Up Time:     [facility] Stroke clinic,   [address] [place] [address]  [phone]  Phone: (   )    -  Fax: (   )    -  Follow Up Time:    Norman Kowalski)  Cardiovascular Disease; Internal Medicine  [address] [place] [address]  Phone: [phone]  Fax: [phone]  Follow Up Time: 1 week    Spenser Murphy  INTERNAL MEDICINE  [address] [place] [address]  Phone: [phone]  Fax: [phone]  Follow Up Time: 1 week    Rehab Miranda ALBA will be followed by MD [facility] Rehab facility  Phone: (   )    -  Fax: (   )    -  Follow Up Time:     [facility] Stroke clinic,   [address] [place] [address]  [phone]  Phone: (   )    -  Fax: (   )    -  Follow Up Time:     David Sanchez)  EEGEpilepsy; Neurology  [address] [place] [address], [address]  [place] [address]  Phone: [phone]  Fax: [phone]  Follow Up Time: 2 weeks

## 2021-07-09 NOTE — DISCHARGE NOTE PROVIDER - NSDCFUADDINST_GEN_ALL_CORE_FT
Sliding Scale: with Lispro  Glucose:  151-200 2 u                 201-250 4 u                 251-300  6 u                 301-350  8 u                 351-400  10u -follow up with your PCP  -follow up with cardiology for the Holter monitor  -follow up with stroke clinic in 2x weeks

## 2021-07-09 NOTE — SWALLOW BEDSIDE ASSESSMENT ADULT - ADDITIONAL RECOMMENDATIONS
Pt awake/cooperative during evaluation. Pt has presented with lethargy in previous evaluations. VFSS discussed with pt, SLP will plan for today

## 2021-07-09 NOTE — SWALLOW VFSS/MBS ASSESSMENT ADULT - SLP GENERAL OBSERVATIONS
Pt with increased lethargy throughout exam, +generalized weakness Pt with increased lethargy throughout exam, +generalized weakness. Per RN Shahida pt was given seroquel at 8PM last night.

## 2021-07-09 NOTE — SWALLOW VFSS/MBS ASSESSMENT ADULT - ADDITIONAL RECOMMENDATIONS
ONLY feed pt when awake/alert. If pt lethargic, hold PO tray. If lethargy persists, consider NPO with alternate means of nutrition and hydration

## 2021-07-10 LAB
GLUCOSE BLDC GLUCOMTR-MCNC: 130 MG/DL — HIGH (ref 70–99)
GLUCOSE BLDC GLUCOMTR-MCNC: 154 MG/DL — HIGH (ref 70–99)
GLUCOSE BLDC GLUCOMTR-MCNC: 248 MG/DL — HIGH (ref 70–99)
GLUCOSE BLDC GLUCOMTR-MCNC: 266 MG/DL — HIGH (ref 70–99)

## 2021-07-10 PROCEDURE — 99233 SBSQ HOSP IP/OBS HIGH 50: CPT

## 2021-07-10 RX ADMIN — ENOXAPARIN SODIUM 40 MILLIGRAM(S): 100 INJECTION SUBCUTANEOUS at 13:02

## 2021-07-10 RX ADMIN — PANTOPRAZOLE SODIUM 40 MILLIGRAM(S): 20 TABLET, DELAYED RELEASE ORAL at 05:59

## 2021-07-10 RX ADMIN — MAGNESIUM OXIDE 400 MG ORAL TABLET 400 MILLIGRAM(S): 241.3 TABLET ORAL at 17:03

## 2021-07-10 RX ADMIN — Medication 10 UNIT(S): at 12:59

## 2021-07-10 RX ADMIN — MAGNESIUM OXIDE 400 MG ORAL TABLET 400 MILLIGRAM(S): 241.3 TABLET ORAL at 13:01

## 2021-07-10 RX ADMIN — Medication 325 MILLIGRAM(S): at 13:04

## 2021-07-10 RX ADMIN — Medication 5 MILLIGRAM(S): at 21:49

## 2021-07-10 RX ADMIN — CHLORHEXIDINE GLUCONATE 1 APPLICATION(S): 213 SOLUTION TOPICAL at 05:59

## 2021-07-10 RX ADMIN — ATORVASTATIN CALCIUM 80 MILLIGRAM(S): 80 TABLET, FILM COATED ORAL at 21:49

## 2021-07-10 RX ADMIN — LISINOPRIL 40 MILLIGRAM(S): 2.5 TABLET ORAL at 05:59

## 2021-07-10 RX ADMIN — Medication 4: at 17:02

## 2021-07-10 RX ADMIN — MAGNESIUM OXIDE 400 MG ORAL TABLET 400 MILLIGRAM(S): 241.3 TABLET ORAL at 09:10

## 2021-07-10 RX ADMIN — CLOPIDOGREL BISULFATE 75 MILLIGRAM(S): 75 TABLET, FILM COATED ORAL at 13:01

## 2021-07-10 RX ADMIN — Medication 50 MILLIGRAM(S): at 05:59

## 2021-07-10 RX ADMIN — Medication 6: at 13:00

## 2021-07-10 RX ADMIN — CHLORHEXIDINE GLUCONATE 1 APPLICATION(S): 213 SOLUTION TOPICAL at 17:04

## 2021-07-10 RX ADMIN — Medication 10 UNIT(S): at 17:02

## 2021-07-10 NOTE — PROGRESS NOTE ADULT - ASSESSMENT
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 p/w AMS x4 days, found to have acute-subacute L thalamic CVA    # h/o R PCA infarct p/w acute-subacute L thalamic infarct   - CTA shows R P1 occlusion/high grade stenosis  - MRI brain shows acute lacunar L thalamic infarct measuring 1.5 cm, mod size R occipital and medial temporal lobe infarcts combo of late subacute and chronic ,chronic R thalmic lacunar infarct and splenium of corpus callosum and poss wallerian degeneration  - Coag studies - patients brother has protein S def on long term anticoagulation   - initial hyper coag work up negative - will need outpatient hem/onc follow up   - Dr. Kowalski reconsulted for holter placement - will be placed prior to discharge to SNF - discussed with cardiac center nurse - Florentin  - ERICKSON no vegetation noted   - c/w ASA, plavix and statin  - PT, OT  - speech cleared for dysphagia 1 puree nectar - MBS recommended by speech   - c/w BP meds, Keep SBP >140   - Last TTE from 5/20 mild LVH, otherwise normal. Unclear whether agitated saline was done  - No h/o AF, continue with tele monitoring   - neuro checks q 8  - patient did well with Seroquel will continue   - need frequent reorientation and needs to sleep through night   - s/p MBS on 7/9    # HTN  - con't lisinopril and metoprolol  - off amlodipine due to low BP several days ago - may need to restart if repeat BP still elevated     # h/o uncontrolled DM - better controlled   - increased insulin on 7/7  - hba1c = 9.8    # suspected manganism deficiency  - on supplement    DVT ppx lovenox   GI ppx ptx  Diet: dys I puree nectar thick  full code    Progress Note Handoff  Pending Consults: none  Pending Tests: none  Pending Results: none  Family Discussion: discussed snf placement and medication adjustment with pt's wife in detail. All questions answered   Disposition: Home_____/SNF__x____/Other_____/Unknown at this time_____    spent over 36 min on medical management

## 2021-07-10 NOTE — PROGRESS NOTE ADULT - SUBJECTIVE AND OBJECTIVE BOX
Patient is a 55y old male who presents with a CVA      INTERVAL HPI/OVERNIGHT EVENTS:   Patient seen and examined this morning  Sitting comfortably in the chair  He is awake, calm and cooperative this morning    REVIEW OF SYSTEMS:   unable to assess due to mental status      Vital Signs Last 24 Hrs  T(C): 36.8 (10 Jul 2021 05:00), Max: 36.8 (10 Jul 2021 05:00)  T(F): 98.3 (10 Jul 2021 05:00), Max: 98.3 (10 Jul 2021 05:00)  HR: 80 (10 Jul 2021 06:31) (79 - 80)  BP: 162/75 (10 Jul 2021 06:31) (154/76 - 178/81)  BP(mean): --  RR: 16 (10 Jul 2021 06:31) (16 - 16)  SpO2: --      PHYSICAL EXAM:  GENERAL: NAD, well-groomed, well-developed  HEAD:  Atraumatic, Normocephalic  EYES: EOMI, PERRLA, conjunctiva and sclera clear  NERVOUS SYSTEM:  awake, alert, follows commands, confused   CHEST/LUNG: Clear to percussion bilaterally; No rales, rhonchi, wheezing, or rubs  HEART: Regular rate and rhythm; No murmurs, rubs, or gallops  ABDOMEN: Soft, Nontender, Nondistended; Bowel sounds present, obese  EXTREMITIES:  2+ Peripheral Pulses, No clubbing, cyanosis, or edema  SKIN: No rashes or lesions      LABS: No new labs                        15.4   10.31 )-----------( 334      ( 09 Jul 2021 06:16 )             46.7     07-09    139  |  103  |  14  ----------------------------<  131<H>  3.8   |  28  |  0.6<L>    Ca    9.4      09 Jul 2021 06:16  Mg     1.7     07-09    TPro  6.3  /  Alb  3.2<L>  /  TBili  0.6  /  DBili  x   /  AST  14  /  ALT  16  /  AlkPhos  92  07-09      A1c = 9.8      CAPILLARY BLOOD GLUCOSE  POCT Blood Glucose.: 266 mg/dL (10 Jul 2021 11:44)  POCT Blood Glucose.: 130 mg/dL (10 Jul 2021 07:53)  POCT Blood Glucose.: 272 mg/dL (09 Jul 2021 20:53)  POCT Blood Glucose.: 254 mg/dL (09 Jul 2021 16:18)        MEDICATIONS  (STANDING):  aspirin enteric coated 325 milliGRAM(s) Oral daily  atorvastatin 80 milliGRAM(s) Oral at bedtime  chlorhexidine 4% Liquid 1 Application(s) Topical every 12 hours  clopidogrel Tablet 75 milliGRAM(s) Oral daily  dextrose 40% Gel 15 Gram(s) Oral once  dextrose 5%. 1000 milliLiter(s) (50 mL/Hr) IV Continuous <Continuous>  dextrose 5%. 1000 milliLiter(s) (100 mL/Hr) IV Continuous <Continuous>  dextrose 50% Injectable 25 Gram(s) IV Push once  dextrose 50% Injectable 12.5 Gram(s) IV Push once  dextrose 50% Injectable 25 Gram(s) IV Push once  enoxaparin Injectable 40 milliGRAM(s) SubCutaneous daily  glucagon  Injectable 1 milliGRAM(s) IntraMuscular once  insulin glargine Injectable (LANTUS) 30 Unit(s) SubCutaneous at bedtime  insulin lispro (ADMELOG) corrective regimen sliding scale   SubCutaneous three times a day before meals  insulin lispro Injectable (ADMELOG) 10 Unit(s) SubCutaneous before breakfast  insulin lispro Injectable (ADMELOG) 10 Unit(s) SubCutaneous before lunch  insulin lispro Injectable (ADMELOG) 10 Unit(s) SubCutaneous before dinner  lisinopril 40 milliGRAM(s) Oral daily  magnesium oxide 400 milliGRAM(s) Oral three times a day with meals  melatonin 5 milliGRAM(s) Oral at bedtime  metoprolol succinate ER 50 milliGRAM(s) Oral daily  pantoprazole    Tablet 40 milliGRAM(s) Oral before breakfast  QUEtiapine 25 milliGRAM(s) Oral <User Schedule>    MEDICATIONS  (PRN):

## 2021-07-11 DIAGNOSIS — R45.1 RESTLESSNESS AND AGITATION: ICD-10-CM

## 2021-07-11 LAB
AMMONIA BLD-MCNC: <10 UMOL/L — LOW (ref 11–55)
GLUCOSE BLDC GLUCOMTR-MCNC: 182 MG/DL — HIGH (ref 70–99)
GLUCOSE BLDC GLUCOMTR-MCNC: 257 MG/DL — HIGH (ref 70–99)
GLUCOSE BLDC GLUCOMTR-MCNC: 350 MG/DL — HIGH (ref 70–99)
GLUCOSE BLDC GLUCOMTR-MCNC: 399 MG/DL — HIGH (ref 70–99)

## 2021-07-11 PROCEDURE — 99233 SBSQ HOSP IP/OBS HIGH 50: CPT

## 2021-07-11 PROCEDURE — 99221 1ST HOSP IP/OBS SF/LOW 40: CPT

## 2021-07-11 RX ORDER — HALOPERIDOL DECANOATE 100 MG/ML
2 INJECTION INTRAMUSCULAR ONCE
Refills: 0 | Status: COMPLETED | OUTPATIENT
Start: 2021-07-11 | End: 2021-07-11

## 2021-07-11 RX ORDER — HALOPERIDOL DECANOATE 100 MG/ML
3 INJECTION INTRAMUSCULAR ONCE
Refills: 0 | Status: DISCONTINUED | OUTPATIENT
Start: 2021-07-11 | End: 2021-07-11

## 2021-07-11 RX ORDER — OLANZAPINE 15 MG/1
2.5 TABLET, FILM COATED ORAL EVERY 6 HOURS
Refills: 0 | Status: DISCONTINUED | OUTPATIENT
Start: 2021-07-11 | End: 2021-07-14

## 2021-07-11 RX ORDER — HALOPERIDOL DECANOATE 100 MG/ML
5 INJECTION INTRAMUSCULAR ONCE
Refills: 0 | Status: COMPLETED | OUTPATIENT
Start: 2021-07-11 | End: 2021-07-11

## 2021-07-11 RX ORDER — QUETIAPINE FUMARATE 200 MG/1
25 TABLET, FILM COATED ORAL ONCE
Refills: 0 | Status: COMPLETED | OUTPATIENT
Start: 2021-07-11 | End: 2021-07-11

## 2021-07-11 RX ADMIN — MAGNESIUM OXIDE 400 MG ORAL TABLET 400 MILLIGRAM(S): 241.3 TABLET ORAL at 17:02

## 2021-07-11 RX ADMIN — Medication 10 UNIT(S): at 17:02

## 2021-07-11 RX ADMIN — CLOPIDOGREL BISULFATE 75 MILLIGRAM(S): 75 TABLET, FILM COATED ORAL at 11:25

## 2021-07-11 RX ADMIN — MAGNESIUM OXIDE 400 MG ORAL TABLET 400 MILLIGRAM(S): 241.3 TABLET ORAL at 11:26

## 2021-07-11 RX ADMIN — Medication 10: at 17:02

## 2021-07-11 RX ADMIN — QUETIAPINE FUMARATE 25 MILLIGRAM(S): 200 TABLET, FILM COATED ORAL at 22:35

## 2021-07-11 RX ADMIN — ATORVASTATIN CALCIUM 80 MILLIGRAM(S): 80 TABLET, FILM COATED ORAL at 22:33

## 2021-07-11 RX ADMIN — QUETIAPINE FUMARATE 25 MILLIGRAM(S): 200 TABLET, FILM COATED ORAL at 01:57

## 2021-07-11 RX ADMIN — QUETIAPINE FUMARATE 25 MILLIGRAM(S): 200 TABLET, FILM COATED ORAL at 11:25

## 2021-07-11 RX ADMIN — HALOPERIDOL DECANOATE 2 MILLIGRAM(S): 100 INJECTION INTRAMUSCULAR at 01:56

## 2021-07-11 RX ADMIN — INSULIN GLARGINE 30 UNIT(S): 100 INJECTION, SOLUTION SUBCUTANEOUS at 21:28

## 2021-07-11 RX ADMIN — ENOXAPARIN SODIUM 40 MILLIGRAM(S): 100 INJECTION SUBCUTANEOUS at 11:26

## 2021-07-11 RX ADMIN — Medication 5 MILLIGRAM(S): at 22:33

## 2021-07-11 RX ADMIN — HALOPERIDOL DECANOATE 5 MILLIGRAM(S): 100 INJECTION INTRAMUSCULAR at 03:16

## 2021-07-11 NOTE — CHART NOTE - NSCHARTNOTEFT_GEN_A_CORE
Restless and agitated again tonight despite seroquel. Will try haldol Restless and agitated again tonight despite Seroquel. Will try Zyprexa based on psych note

## 2021-07-11 NOTE — PROGRESS NOTE ADULT - ASSESSMENT
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 p/w AMS x4 days, found to have acute-subacute L thalamic CVA    # h/o R PCA infarct p/w acute-subacute L thalamic infarct   - CTA shows R P1 occlusion/high grade stenosis  - MRI brain shows acute lacunar L thalamic infarct measuring 1.5 cm, mod size R occipital and medial temporal lobe infarcts combo of late subacute and chronic ,chronic R thalmic lacunar infarct and splenium of corpus callosum and poss wallerian degeneration  - Coag studies - patients brother has protein S def on long term anticoagulation   - initial hyper coag work up negative - will need outpatient hem/onc follow up   - Dr. Kowalski reconsulted for holter placement - will be placed prior to discharge to SNF - discussed with cardiac center nurse - Florentin  - ERICKSON no vegetation noted   - c/w ASA, plavix and statin  - PT, OT daily   - speech cleared for dysphagia 1 puree nectar - MBS recommended by speech   - c/w BP meds, Keep SBP >140   - Last TTE from 5/20 mild LVH, otherwise normal. Unclear whether agitated saline was done  - No h/o AF, off tele  - neuro checks q 8  - patient did well with Seroquel will continue for night time  - need frequent reorientation and needs to sleep through night   - s/p MBS on 7/9  - patient became very aggressive and agitated at night on 7/10; given 7mg of haldol and placed in 4 point restraints.  Seroquel ordered for this morning as well.  Will involve psych for medication management     # HTN  - con't lisinopril and metoprolol  - off amlodipine due to low BP several days ago - may need to restart if repeat BP still elevated     # h/o uncontrolled DM - better controlled   - increased insulin on 7/7  - hba1c = 9.8    # suspected manganism deficiency  - on supplement    DVT ppx lovenox   GI ppx ptx  Diet: dys I puree nectar thick  full code    Progress Note Handoff  Pending Consults: psych  Pending Tests: labs  Pending Results: labs  Family Discussion: discussed events of last evening, psych consult, snf placement and medication adjustment with pt's wife in detail. All questions answered   Disposition: Home_____/SNF__x____/Other_____/Unknown at this time_____    spent over 36 min on medical management

## 2021-07-11 NOTE — CONSULT NOTE ADULT - PROBLEM SELECTOR RECOMMENDATION 9
Patient up in bed. She denies any chest pain. Reports feeling good. Gait steady, she was up to bathroom with sba. Plan is of patient to discharge.   Avoid Haldol as per family preference. Use Zyprexa 2.5-5mg IM q6h PRN for acute agitation. Monitor lipids, HgbA1c as Zyprexa can worsen metabolic syndrome and DM.     Can continue Seroquel 25mg Po QHS.

## 2021-07-11 NOTE — CONSULT NOTE ADULT - SUBJECTIVE AND OBJECTIVE BOX
ALEA MORRIS     55y     Male    MRN-[id number]                                                           CC: Patient is a 55y old  Male who presents with a chief complaint of CVA (28 Jun 2021 10:35)      HPI:  55 year old male w hx of DM, HTN, CVA,  presents to the ED with altered mental status.  Last known well 5d ago when wife went away on a trip she returned  found him at home confused and the house in disarray not answering questions. CT head in ED revealed acute/subacute infarct L thalamus    Neuro: Patient is disoriented to time and place. No current complaints.    ROS:  Constitutional, Neurological, Psychiatric, Eyes, ENT, Cardiovascular, Respiratory, Gastrointestinal, Genitourinary, Musculoskeletal, Integumentary, Endocrine and Heme/Lymph are otherwise negative.     Social History: No smoking, No drinking, No drug use    FAMILY HISTORY:      HEALTH ISSUES - PROBLEM Dx:  Thalamic stroke    Hypertension    Diabetes      Vital Signs Last 24 Hrs  T(C): 36.7 (28 Jun 2021 15:00), Max: 37.1 (27 Jun 2021 19:49)  T(F): 98 (28 Jun 2021 15:00), Max: 98.7 (27 Jun 2021 19:49)  HR: 69 (28 Jun 2021 16:31) (66 - 97)  BP: 159/74 (28 Jun 2021 16:31) (138/63 - 197/89)  BP(mean): 107 (28 Jun 2021 16:31) (100 - 120)  RR: 21 (28 Jun 2021 16:31) (11 - 21)  SpO2: 95% (28 Jun 2021 17:45) (92% - 98%)    Physical Exam:  Constitutional: alert and in no acute distress.  Eyes: the sclera and conjunctiva were normal, pupils were equal in size, round, reactive to light, with normal accommodation and extraocular movements were intact.     Neuro Exam:  Orientation: oriented to person, disoriented to place and time.   Attention: impaired concentrating ability and visual attention was impaired  Fund of knowledge: impaired knowledge of personal past history.   Cranial Nerves:  pupils equal round and reactive to light, extraocular motion intact, facial sensation intact symmetrically, face symmetrical, hearing was intact bilaterally  Motor: muscle tone was normal in all four extremities, muscle strength was decreased in LUE, LLE, normal bulk in all four extremities.   Sensory exam: light touch was intact.   Coordination:. . there was no past-pointing. no tremor present.   Deep tendon reflexes:   Biceps right 2+. Biceps left 2+.    Triceps right 2+. Triceps left 2+.    Brachioradialis right 2+. Brachioradialis left 2+.    Patella right 2+. Patella left 2+.    Ankle jerk right 2+. Ankle jerk left 2+.   Plantar responses normal on the right, normal on the left.         Allergies    No Known Allergies       Home Medications:  ezetimibe 10 mg oral tablet: 1 tab(s) orally once a day (27 Jun 2021 20:46)  hydroCHLOROthiazide 25 mg oral tablet: 1 tab(s) orally once a day (27 Jun 2021 17:32)  Insulin Aspart FlexPen 100 units/mL injectable solution:  (27 Jun 2021 20:46)  Invokamet 150 mg-1000 mg oral tablet: 1 tab(s) orally 2 times a day (with meals) (27 Jun 2021 20:46)  metoprolol succinate 25 mg oral tablet, extended release: 50 mg 1 1/2  tabs daily (27 Jun 2021 20:46)  pioglitazone 30 mg oral tablet: 1 tab(s) orally once a day (27 Jun 2021 20:46)      MEDICATIONS  (STANDING):  amLODIPine   Tablet 5 milliGRAM(s) Oral daily  aspirin enteric coated 325 milliGRAM(s) Oral daily  atorvastatin 80 milliGRAM(s) Oral at bedtime  chlorhexidine 4% Liquid 1 Application(s) Topical every 12 hours  clopidogrel Tablet 75 milliGRAM(s) Oral daily  enoxaparin Injectable 40 milliGRAM(s) SubCutaneous daily  lisinopril 20 milliGRAM(s) Oral daily  metoprolol succinate ER 50 milliGRAM(s) Oral daily  pantoprazole  Injectable 40 milliGRAM(s) IV Push daily  sodium chloride 0.9%. 1000 milliLiter(s) (75 mL/Hr) IV Continuous <Continuous>    MEDICATIONS  (PRN):      LABS:                        15.1   11.55 )-----------( 334      ( 28 Jun 2021 05:59 )             46.5     06-28    144  |  105  |  18  ----------------------------<  91  3.9   |  23  |  0.7    Ca    9.1      28 Jun 2021 05:59  Mg     1.9     06-27    TPro  6.9  /  Alb  3.4<L>  /  TBili  0.4  /  DBili  x   /  AST  17  /  ALT  13  /  AlkPhos  88  06-28            Neuro Imaging:  NCHCT: < from: CT Head No Cont (06.27.21 @ 17:14) >  EXAM:  CT BRAIN            PROCEDURE DATE:  06/27/2021            INTERPRETATION:  Clinical History / Reason for exam: Altered mental status  The study was performed without IV contrast.  Comparison 5/15/2020  The cisterns and ventricles are normalwith no shift in midline structures.  There is an old infarct involving the right occipital lobe and right splenorenal the corpus callosum.  There is a new 1 cm low density lesion in the left thalamus presumably representing an acute/subacute ischemic infarct.  No hemorrhage or mass formation is seen.  The skull is intact.  Impression: Acute/subacute infarct left thalamus.              KEL ESPARZA MD; Attending Radiologist  This document has been electronically signed. Jun 27 2021  5:33PM      < from: CT Angio Neck w/ IV Cont (06.27.21 @ 18:54) >  EXAM:  CT ANGIO NECK (W)AW IC        EXAM:  CT ANGIO BRAIN (W)AW IC            PROCEDURE DATE:  06/27/2021            INTERPRETATION:  CLINICAL INDICATION: Altered mental status .    TECHNIQUE: CT angiography of the intracranial (head) and extracranial (neck) circulation was performed after contrast administration. Maximal intensity projection images were additionally included and reviewed.  95 mls of Optiray 320 was administered intravenously without complication and 5 mls were discarded.  Using a separate workstation, 3-D shaded surface reformations were made of vasculature. 3-D reformations were reviewed and included in interpretation of the official report.    CAROTID STENOSIS REFERENCE: (NASCET = 100x1-(N/D)). N=greatest narrowing. D=normal distal diameter - MILD = <50% stenosis. - MODERATE = 50-69% stenosis. - SEVERE = 70-89% stenosis. - HAIRLINE/CRITICAL = 90-99% stenosis. - OCCLUDED = 100% stenosis.    COMPARISON: CT HEAD 6/27/2021 5:04 PM.    FINDINGS:    CTA NECK:    The aortic arch and origins of the great vessels are unremarkable.    The right common carotid artery is patent from its origin to the bifurcation. The right internal carotid artery is unremarkable without significant stenosis.  The origin of the right vertebral artery is normal. The right vertebral artery is normal in course and caliber to the intracranial circulation.    The left common carotid artery is patent from its origin to the bifurcation. The left internal carotid artery is unremarkable without significant stenosis.  The origin of the left vertebral artery is normal. The left vertebral artery is normal in course and caliber to the intracranial circulation.    CTA HEAD:    Mild calcified plaques at the cavernous and supraclinoid segments of the bilateral internal carotid arteries resulting in mild focal stenosis of the right supraclinoid segment. The left carotid siphons are patent.    The middle cerebral arteries and anterior cerebral arteries are unremarkable without significant stenosis, occlusion, or aneurysm.    Mild calcified plaques involving the bilateral intradural vertebral arteries, resulting in moderate right and mild left stenosis. The basilar artery is unremarkable. Short segment high-grade stenosis/occlusion involving the P1/P2 segments of the right posterior cerebral artery. Persistent fetal origin of the left PCA, which is patent.    Visualized dural venous sinuses and deep cerebral venous structures demonstrate normal enhancement without evidence for filling defect.    Other:  Left thyroid nodules measuring up to 1.4 cm.  Please see separately dictated report for the concurrently performed CT of the head for intracranial findings.    IMPRESSION:    CTA HEAD:  Short segment stenosis involving the P1/P2 segmentsof the right posterior cerebral artery.    Focal moderate right/mild left atherosclerotic stenosis in the bilateral intradural vertebral artery.    CTA NECK:  No evidence of carotid or vertebral artery stenosis.      Dr. Carmita Alas discussed preliminary findings with MARY MENDEZ on 6/27/2021 8:00 PM with readback.      CARMITA ALAS MD; Resident Radiologist  This document has been electronically signed.  TODD ROSARIO MD; Attending Radiologist  This document has been electronicallysigned. Jun 28 2021  8:46AM                      
CARDIOLOGY CONSULT NOTE     CHIEF COMPLAINT/REASON FOR CONSULT:    HPI:   Patient is a 55y old  Male who presents with a chief complaint of AMS - 2 days     T(F): 98.7 (06-27-21 @ 19:49), Max: 98.7 (06-27-21 @ 19:49)  HR: 79 (06-27-21 @ 21:09)  BP: 197/89 (06-27-21 @ 21:09)  RR: 20 (06-27-21 @ 21:09)  SpO2: 98% (06-27-21 @ 21:09) (95% - 98%)    PHYSICAL EXAM:  GENERAL: NAD, well-groomed, well-developed sleeping  HEAD:  Atraumatic, Normocephalic  EYES: EOMI, PERRLA, conjunctiva and sclera clear  ENMT: No tonsillar erythema, exudates, or enlargement; Moist mucous membranes, Good dentition, No lesions  NECK: Supple, No JVD, Normal thyroid  NERVOUS SYSTEM:  Alert & Oriented X3, Good concentration; Motor Strength 5/5 B/L upper and lower extremities; DTRs 2+ intact and symmetric  CHEST/LUNG: Clear to percussion bilaterally; No rales, rhonchi, wheezing, or rubs  HEART: Regular rate and rhythm; No murmurs, rubs, or gallops  ABDOMEN: Soft, Nontender, Nondistended; Bowel sounds present  EXTREMITIES:  2+ Peripheral Pulses, No clubbing, cyanosis, or edema  LYMPH: No lymphadenopathy noted  SKIN: No rashes or lesions    labs  06-27    145  |  106  |  27<H>  ----------------------------<  120<H>  4.0   |  24  |  0.9    Ca    9.4      27 Jun 2021 17:06  Mg     1.9     06-27    TPro  7.2  /  Alb  3.8  /  TBili  0.4  /  DBili  x   /  AST  16  /  ALT  14  /  AlkPhos  88  06-27                          15.8   10.35 )-----------( 356      ( 27 Jun 2021 17:06 )             49.2               radiology     (27 Jun 2021 21:21)      PAST MEDICAL & SURGICAL HISTORY:  Diabetes    Hypertension    Pancreatitis    CVA (cerebral vascular accident)    No significant past surgical history        Cardiac Risks:   [x ]HTN, [x ] DM, [ ] Smoking, [ ] FH,  [ ] Lipids        MEDICATIONS:  MEDICATIONS  (STANDING):  amLODIPine   Tablet 10 milliGRAM(s) Oral daily  aspirin enteric coated 325 milliGRAM(s) Oral daily  atorvastatin 80 milliGRAM(s) Oral at bedtime  chlorhexidine 4% Liquid 1 Application(s) Topical every 12 hours  clopidogrel Tablet 75 milliGRAM(s) Oral daily  dextrose 40% Gel 15 Gram(s) Oral once  dextrose 5%. 1000 milliLiter(s) (50 mL/Hr) IV Continuous <Continuous>  dextrose 5%. 1000 milliLiter(s) (100 mL/Hr) IV Continuous <Continuous>  dextrose 50% Injectable 25 Gram(s) IV Push once  dextrose 50% Injectable 12.5 Gram(s) IV Push once  dextrose 50% Injectable 25 Gram(s) IV Push once  enoxaparin Injectable 40 milliGRAM(s) SubCutaneous daily  glucagon  Injectable 1 milliGRAM(s) IntraMuscular once  insulin glargine Injectable (LANTUS) 27 Unit(s) SubCutaneous at bedtime  insulin lispro (ADMELOG) corrective regimen sliding scale   SubCutaneous three times a day before meals  insulin lispro Injectable (ADMELOG) 9 Unit(s) SubCutaneous three times a day before meals  lisinopril 40 milliGRAM(s) Oral daily  melatonin 5 milliGRAM(s) Oral at bedtime  metoprolol succinate ER 50 milliGRAM(s) Oral daily  pantoprazole    Tablet 40 milliGRAM(s) Oral before breakfast      FAMILY HISTORY:      SOCIAL HISTORY:      [ ] Marital status    Allergies    No Known Allergies      	    REVIEW OF SYSTEMS:  Patient sedated      [x ] Unable to obtain    PHYSICAL EXAM:  T(C): 35.8 (07-04-21 @ 04:52), Max: 36 (07-03-21 @ 14:34)  HR: 82 (07-04-21 @ 06:06) (80 - 83)  BP: 141/67 (07-04-21 @ 04:52) (136/70 - 154/72)  RR: 18 (07-04-21 @ 06:06) (18 - 18)  SpO2: 100% (07-04-21 @ 06:06) (100% - 100%)  Wt(kg): --  I&O's Summary    03 Jul 2021 07:01  -  04 Jul 2021 07:00  --------------------------------------------------------  IN: 250 mL / OUT: 800 mL / NET: -550 mL        Appearance: Normal	  Psychiatry: A & O x 3, Mood & affect appropriate  HEENT:   Normal oral mucosa, PERRL, EOMI	  Lymphatic: No lymphadenopathy  Cardiovascular: Normal S1 S2,RRR, No JVD, No murmurs  Respiratory: Lungs clear to auscultation	  Gastrointestinal:  Soft, Non-tender, + BS	  Skin: No rashes, No ecchymoses, No cyanosis	  Neurologic: Non-focal  Extremities: Normal range of motion, No clubbing, cyanosis or edema  Vascular: Peripheral pulses palpable 2+ bilaterally      ECG:  	< from: 12 Lead ECG (06.29.21 @ 07:50) >  Diagnosis Line Normal sinus rhythm    Confirmed by GLENNA GOODMAN MD ([id number]) on 6/29/2021 11:00:51 AM    < end of copied text >      	  LABS:	 	    CARDIAC MARKERS:                                    15.7   11.20 )-----------( 302      ( 04 Jul 2021 07:06 )             47.9     07-04    143  |  107  |  9<L>  ----------------------------<  119<H>  3.4<L>   |  25  |  0.7    Ca    8.9      04 Jul 2021 07:06                  
  Patient is a 55y old  Male who presents with a chief complaint of CVA (27 Jun 2021 21:21)      HPI:   Patient is a 55y old  Male who presents with a chief complaint of AMS - 2 days   55 year old male w hx of DM, HTN, previous stroke with residual left sided facial droop and left arm weakness presents to the ED with altered mental status. Per EMS, last known well 4d ago when wife went away, found him at home confused and the house in disarray not answering questions    T(F): 98.7 (06-27-21 @ 19:49), Max: 98.7 (06-27-21 @ 19:49)  HR: 79 (06-27-21 @ 21:09)  BP: 197/89 (06-27-21 @ 21:09)  RR: 20 (06-27-21 @ 21:09)  SpO2: 98% (06-27-21 @ 21:09) (95% - 98%)        PAST MEDICAL & SURGICAL HISTORY:  Diabetes    Hypertension    Pancreatitis    CVA (cerebral vascular accident)    No significant past surgical history      Allergies    No Known Allergies    Intolerances      Family history : no cardiovscular family history   Home Medications:  ezetimibe 10 mg oral tablet: 1 tab(s) orally once a day (27 Jun 2021 20:46)  hydroCHLOROthiazide 25 mg oral tablet: 1 tab(s) orally once a day (27 Jun 2021 17:32)  Insulin Aspart FlexPen 100 units/mL injectable solution:  (27 Jun 2021 20:46)  Invokamet 150 mg-1000 mg oral tablet: 1 tab(s) orally 2 times a day (with meals) (27 Jun 2021 20:46)  metoprolol succinate 25 mg oral tablet, extended release: 50 mg 1 1/2  tabs daily (27 Jun 2021 20:46)  pioglitazone 30 mg oral tablet: 1 tab(s) orally once a day (27 Jun 2021 20:46)    Occupation:  Alochol: Denied  Smoking: Denied  Drug Use: Denied  Marital Status:         ROS: as in HPI; All other systems reviewed are negative    ICU Vital Signs Last 24 Hrs  T(C): 36.4 (28 Jun 2021 07:00), Max: 37.1 (27 Jun 2021 19:49)  T(F): 97.5 (28 Jun 2021 07:00), Max: 98.7 (27 Jun 2021 19:49)  HR: 70 (28 Jun 2021 08:24) (70 - 97)  BP: 156/68 (28 Jun 2021 08:24) (154/72 - 197/89)  BP(mean): 120 (28 Jun 2021 07:02) (115 - 120)  ABP: --  ABP(mean): --  RR: 17 (28 Jun 2021 08:24) (11 - 20)  SpO2: 96% (28 Jun 2021 08:24) (92% - 98%)        Physical Examination:    General: No acute distress.  Alert, oriented, ix2    HEENT: Pupils equal, reactive to light.  Symmetric.    PULM: Clear to auscultation bilaterally, no significant sputum production    CVS: Regular rate and rhythm, no murmurs, rubs, or gallops    ABD: Soft, nondistended, nontender, normoactive bowel sounds, no masses    EXT: No edema, nontender, no clubbing     SKIN: Warm and well perfused, no rashes noted.    Neurology : mild left deficit     Musculoskeletal : move all extremity     Lymphatic system: no Palpable node               I&O's Detail        LABS:                        15.1   11.55 )-----------( 334      ( 28 Jun 2021 05:59 )             46.5     28 Jun 2021 05:59    144    |  105    |  18     ----------------------------<  91     3.9     |  23     |  0.7      Ca    9.1        28 Jun 2021 05:59  Mg     1.9       27 Jun 2021 17:06    TPro  6.9    /  Alb  3.4    /  TBili  0.4    /  DBili  x      /  AST  17     /  ALT  13     /  AlkPhos  88     28 Jun 2021 05:59  Amylase x     lipase x          CARDIAC MARKERS ( 27 Jun 2021 17:06 )  x     / <0.01 ng/mL / x     / x     / x          CAPILLARY BLOOD GLUCOSE      POCT Blood Glucose.: 97 mg/dL (27 Jun 2021 20:55)        Culture        MEDICATIONS  (STANDING):  amLODIPine   Tablet 5 milliGRAM(s) Oral daily  aspirin enteric coated 325 milliGRAM(s) Oral daily  atorvastatin 80 milliGRAM(s) Oral at bedtime  chlorhexidine 4% Liquid 1 Application(s) Topical every 12 hours  clopidogrel Tablet 75 milliGRAM(s) Oral daily  lisinopril 20 milliGRAM(s) Oral daily  metoprolol succinate ER 50 milliGRAM(s) Oral daily  pantoprazole  Injectable 40 milliGRAM(s) IV Push daily    MEDICATIONS  (PRN):        RADIOLOGY: ***   radiologyc< from: CT Angio Neck w/ IV Cont (06.27.21 @ 18:54) >  CTA HEAD:  Short segment high-grade stenosis/occlusion involving the P1 segment of the right posterior cerebral artery.  Short segment moderate stenosis involving the right intradural vertebral artery.    CTA NECK:  No evidence of carotid or vertebral artery stenosis.    < end of copied text >  CXR:  TLC:  OG:  ET tube:        [facility] ICU:  ECHO:        
HPI:   Patient is a 55y old  Male who presents with a chief complaint of AMS - 2 days ptn  in  icu  referred to acute rehab  for eval and tx  pmh  of htn  dm  hx  of  cva   ptn  seen at  bed side  aox2    command  .    T(F): 98.7 (06-27-21 @ 19:49), Max: 98.7 (06-27-21 @ 19:49)  HR: 79 (06-27-21 @ 21:09)  BP: 197/89 (06-27-21 @ 21:09)  RR: 20 (06-27-21 @ 21:09)  SpO2: 98% (06-27-21 @ 21:09) (95% - 98%)    PHYSICAL EXAM:  GENERAL: NAD, well-groomed, well-developed  HEAD:  Atraumatic, Normocephalic  EYES: EOMI, PERRLA, conjunctiva and sclera clear  ENMT: No tonsillar erythema, exudates, or enlargement; Moist mucous membranes, Good dentition, No lesions  NECK: Supple, No JVD, Normal thyroid  NERVOUS SYSTEM:  Alert & Oriented X3, Good concentration; Motor Strength 5/5 B/L upper and lower extremities; DTRs 2+ intact and symmetric  CHEST/LUNG: Clear to percussion bilaterally; No rales, rhonchi, wheezing, or rubs  HEART: Regular rate and rhythm; No murmurs, rubs, or gallops  ABDOMEN: Soft, Nontender, Nondistended; Bowel sounds present  EXTREMITIES:  2+ Peripheral Pulses, No clubbing, cyanosis, or edema  LYMPH: No lymphadenopathy noted  SKIN: No rashes or lesions    labs  06-27    145  |  106  |  27<H>  ----------------------------<  120<H>  4.0   |  24  |  0.9    Ca    9.4      27 Jun 2021 17:06  Mg     1.9     06-27    TPro  7.2  /  Alb  3.8  /  TBili  0.4  /  DBili  x   /  AST  16  /  ALT  14  /  AlkPhos  88  06-27                          15.8   10.35 )-----------( 356      ( 27 Jun 2021 17:06 )             49.2       PTN  REFERRED TO ACUTE  REHAB  FOR  EVAL AND  TX   PAST MEDICAL & SURGICAL HISTORY:  Diabetes    Hypertension    Pancreatitis    CVA (cerebral vascular accident)    No significant past surgical history        Hospital Course:    TODAY'S SUBJECTIVE & REVIEW OF SYMPTOMS:     Constitutional WNL   Cardio WNL   Resp WNL   GI WNL  Heme WNL  Endo WNL  Skin WNL  MSK WNL  Neuro WNL  Cognitive WNL  Psych WNL      MEDICATIONS  (STANDING):  amLODIPine   Tablet 5 milliGRAM(s) Oral daily  aspirin enteric coated 325 milliGRAM(s) Oral daily  atorvastatin 80 milliGRAM(s) Oral at bedtime  chlorhexidine 4% Liquid 1 Application(s) Topical every 12 hours  clopidogrel Tablet 75 milliGRAM(s) Oral daily  lisinopril 20 milliGRAM(s) Oral daily  metoprolol succinate ER 50 milliGRAM(s) Oral daily  pantoprazole  Injectable 40 milliGRAM(s) IV Push daily    MEDICATIONS  (PRN):      FAMILY HISTORY:      Allergies    No Known Allergies    Intolerances        SOCIAL HISTORY:    [  ] Etoh  [  ] Smoking  [  ] Substance abuse     Home Environment:  [ x ] Home Alone  [  ] Lives with Family  [  ] Home Health Aid    Dwelling:  [  x] Apartment  [  ] Private House  [  ] Adult Home  [  ] Skilled Nursing Facility      [  ] Short Term  [  ] Long Term  [ x ] Stairs       Elevator [  ]    FUNCTIONAL STATUS PTA: (Check all that apply)  Ambulation: [ x  ]Independent    [  ] Dependent     [  ] Non-Ambulatory  Assistive Device: [  ] SA Cane  [  ]  Q Cane  [  ] Walker  [  ]  Wheelchair  ADL : [ x ] Independent  [  ]  Dependent       Vital Signs Last 24 Hrs  T(C): 36.4 (28 Jun 2021 07:00), Max: 37.1 (27 Jun 2021 19:49)  T(F): 97.5 (28 Jun 2021 07:00), Max: 98.7 (27 Jun 2021 19:49)  HR: 70 (28 Jun 2021 08:24) (70 - 97)  BP: 156/68 (28 Jun 2021 08:24) (154/72 - 197/89)  BP(mean): 120 (28 Jun 2021 07:02) (115 - 120)  RR: 17 (28 Jun 2021 08:24) (11 - 20)  SpO2: 96% (28 Jun 2021 08:24) (92% - 98%)      PHYSICAL EXAM: Alert & Oriented X 2  GENERAL: NAD, well-groomed, well-developed  HEAD:  Atraumatic, Normocephalic  EYES: EOMI, PERRLA, conjunctiva and sclera clear  NECK: Supple, No JVD, Normal thyroid  CHEST/LUNG: Clear to percussion bilaterally; No rales, rhonchi, wheezing, or rubs  HEART: Regular rate and rhythm; No murmurs, rubs, or gallops  ABDOMEN: Soft, Nontender, Nondistended; Bowel sounds present  EXTREMITIES:  2+ Peripheral Pulses, No clubbing, cyanosis, or edema    NERVOUS SYSTEM:  Cranial Nerves 2-12 intact [x ] Abnormal  [  ]  ROM: WFL all extremities [ x ]  Abnormal [  ]  Motor Strength: WFL all extremities  [4/5  ]  Abnormal [  ]  Sensation: intact to light touch [ x ] Abnormal [  ]  Reflexes: Symmetric [  ]  Abnormal [ x ]    FUNCTIONAL STATUS:  Bed Mobility: Independent [  ]  Supervision [  ]  Needs Assistance [ x ]  N/A [  ]  Transfers: Independent [  ]  Supervision [  ]  Needs Assistance [x ]  N/A [  ]   Ambulation: Independent [  ]  Supervision [  ]  Needs Assistance [  x]  N/A [  ]  ADL: Independent [  ] Requires Assistance [  ] N/A [ x ]  SEE PT/OT IE NOTES    LABS:                        15.1   11.55 )-----------( 334      ( 28 Jun 2021 05:59 )             46.5     06-28    144  |  105  |  18  ----------------------------<  91  3.9   |  23  |  0.7    Ca    9.1      28 Jun 2021 05:59  Mg     1.9     06-27    TPro  6.9  /  Alb  3.4<L>  /  TBili  0.4  /  DBili  x   /  AST  17  /  ALT  13  /  AlkPhos  88  06-28          RADIOLOGY & ADDITIONAL STUDIES:< from: CT Head No Cont (06.27.21 @ 17:14) >  INTERPRETATION:  Clinical History / Reason for exam: Altered mental status  The study was performed without IV contrast.  Comparison 5/15/2020  The cisterns and ventricles are normalwith no shift in midline structures.  There is an old infarct involving the right occipital lobe and right splenorenal the corpus callosum.  There is a new 1 cm low density lesion in the left thalamus presumably representing an acute/subacute ischemic infarct.  No hemorrhage or mass formation is seen.  The skull is intact.  Impression: Acute/subacute infarct left thalamus.    < end of copied text >      Assesment:
CC: pt denies any complaints    HPI: 56yo  M domiciled with his wife, w/PMH HTN, DM, R PCA infarct with residual L sided deficit p/w AMS x4 days, found to have acute-subacute L thalamic CVA. Pt has no PPH. Consult was called due to an epizode of agitation at night during which pt did not respond to small dose of Seroquel and required Haldol 7mg and 4 point restraint. Pt was seen, collaterals were obtained from pt's family (wife Jolynn, [phone], brother Familia). Currently pt presents calm, quiet, confused, with no memories for the incident and can not provide much Hx. As per family, Pt never was violent, does not have any PPH, and yesterday at daytime was in a great mood, alert, having good time with his family. At night he reportedly became disoriented and aggressive, was walking into other patient's room and required restraints.    PPH: none known, no h/o SAs, no IPP admissions.    PMH: as above    Drug Hx: no h/o illicit drug use, no etoh, non-smoker.    FH: father had Alzheimers disease    SH: lives with wife, disabled.    MSE: Drowsy, oriented only to his name, (date - 5/24/2024, place - "we are on a boat"), +PMR, speech mumbling, mood "OK", affect constricted, t/p tangential, t/c no delusions, no ah/vh/si/hi, i/j poor    MEDICATIONS  (STANDING):  aspirin enteric coated 325 milliGRAM(s) Oral daily  atorvastatin 80 milliGRAM(s) Oral at bedtime  chlorhexidine 4% Liquid 1 Application(s) Topical every 12 hours  clopidogrel Tablet 75 milliGRAM(s) Oral daily  dextrose 40% Gel 15 Gram(s) Oral once  dextrose 5%. 1000 milliLiter(s) (50 mL/Hr) IV Continuous <Continuous>  dextrose 5%. 1000 milliLiter(s) (100 mL/Hr) IV Continuous <Continuous>  dextrose 50% Injectable 25 Gram(s) IV Push once  dextrose 50% Injectable 12.5 Gram(s) IV Push once  dextrose 50% Injectable 25 Gram(s) IV Push once  enoxaparin Injectable 40 milliGRAM(s) SubCutaneous daily  glucagon  Injectable 1 milliGRAM(s) IntraMuscular once  insulin glargine Injectable (LANTUS) 30 Unit(s) SubCutaneous at bedtime  insulin lispro (ADMELOG) corrective regimen sliding scale   SubCutaneous three times a day before meals  insulin lispro Injectable (ADMELOG) 10 Unit(s) SubCutaneous before breakfast  insulin lispro Injectable (ADMELOG) 10 Unit(s) SubCutaneous before lunch  insulin lispro Injectable (ADMELOG) 10 Unit(s) SubCutaneous before dinner  lisinopril 40 milliGRAM(s) Oral daily  magnesium oxide 400 milliGRAM(s) Oral three times a day with meals  melatonin 5 milliGRAM(s) Oral at bedtime  metoprolol succinate ER 50 milliGRAM(s) Oral daily  pantoprazole    Tablet 40 milliGRAM(s) Oral before breakfast  QUEtiapine 25 milliGRAM(s) Oral <User Schedule>    MEDICATIONS  (PRN):

## 2021-07-11 NOTE — CONSULT NOTE ADULT - ASSESSMENT
IMPRESSION:  Stroke   DM  htn    PLAN:    CNS: neurology eval now   marcella regarding ct finding   neurocheck as per neurology     HEENT: oralc are    PULMONARY: keep pox > 92%    CARDIOVASCULAR: echo asa, plavix statin     GI: GI prophylaxis.  Feeding speech and swallow eval     RENAL: follow lytes is an os     INFECTIOUS DISEASE: nbo abx   bld cx     HEMATOLOGICAL:  DVT prophylaxis. follow INR, cbc     ENDOCRINE:  Follow up FS.  Insulin protocol if needed.    MUSCULOSKELETAL: rehab / PT     care as neurology     CRITICAL CARE TIME SPENT: ***
IMPRESSION: Rehab of 56 y/o m  rehab  for  cva      PRECAUTIONS: [  ] Cardiac  [  ] Respiratory  [  ] Seizures [  ] Contact Isolation  [  ] Droplet Isolation  [ FALL ] Other    Weight Bearing Status:     RECOMMENDATION:    Out of Bed to Chair     DVT/Decubiti Prophylaxis    REHAB PLAN:     [  xx ] Bedside P/T 3-5 times a week   [ xx  ]   Bedside O/T  2-3 times a week             [   ] No Rehab Therapy Indicated                   [ xx  ]  Speech Therapy   Conditioning/ROM                                    ADL  Bed Mobility                                               Conditioning/ROM  Transfers                                                     Bed Mobility  Sitting /Standing Balance                         Transfers                                        Gait Training                                               Sitting/Standing Balance  Stair Training [   ]Applicable                    Home equipment Eval                                                                        Splinting  [   ] Only      GOALS:   ADL   [ x  ]   Independent                    Transfers  [x   ] Independent                          Ambulation  [ x  ] Independent     [   x ] With device                            [  x ]  CG                                                         [   x]  CG                                                                  [ x  ] CG                            [    ] Min A                                                   [   ] Min A                                                              [   ] Min  A          DISCHARGE PLAN:   [   ]  Good candidate for Intensive Rehabilitation/Hospital based-4A [facility]                                             Will tolerate 3hrs Intensive Rehab Daily                                       [  xx  ]  Short Term Rehab in Skilled Nursing Facility                                       [    ]  Home with Outpatient or VN services                                         [    ]  Possible Candidate for Intensive Hospital based Rehab                                       
Patient had a cva. ECHO ERICKSON normal lv no pfo. Brother with coagulopathy. He needs out patient heme work up. Can consider if indicated out patient 30 day event monitor
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 p/w AMS x4 days, found to have acute-subacute L thalamic CVA. Patient disoriented to time. CTH demonstrating acute/subacute infarct of left thalamus. CTA head revealed P1/P2 stenosis of R PCA.    Recommend:  - q4h neuro checks  - continue DAPT, high intensity statin  - BP control, glycemic control  - consider EP consult for possible loop recorder to r/o cardiogenic cause  - consider TTE with bubble study, or ERICKSON    seen and examined with Dr. Garcia
56yo  M domiciled with his wife, w/PMH HTN, DM, R PCA infarct with residual L sided deficit p/w AMS x4 days, found to have acute-subacute L thalamic CVA. Consult was called due to episode of agitation at night which currently is resolved. Pt remains confused and disoriented.    Dx Major neurocognitive d/o, vascular, r/o delirium due to stroke.    Case was discussed with Dr. Viveros. Signing off, call with questions.

## 2021-07-11 NOTE — PROGRESS NOTE ADULT - SUBJECTIVE AND OBJECTIVE BOX
Patient is a 55y old male who presents with a CVA      INTERVAL HPI/OVERNIGHT EVENTS:   Patient seen and examined this morning  Lying comfortably in bed  In NAD  On 1:1 sit for agitated behavior yesterday that required 7mg of Haldol IM and pt was placed in 4 point restraints     REVIEW OF SYSTEMS:   unable to assess due to mental status      Vital Signs Last 24 Hrs  T(C): 36.3 (11 Jul 2021 04:55), Max: 36.8 (10 Jul 2021 13:46)  T(F): 97.4 (11 Jul 2021 04:55), Max: 98.2 (10 Jul 2021 13:46)  HR: 98 (11 Jul 2021 04:55) (88 - 98)  BP: 161/73 (11 Jul 2021 04:55) (111/67 - 161/73)  BP(mean): --  RR: 16 (11 Jul 2021 04:55) (16 - 16)  SpO2: --      PHYSICAL EXAM:  GENERAL: NAD, well-groomed, well-developed  HEAD:  Atraumatic, Normocephalic  EYES: EOMI, PERRLA, conjunctiva and sclera clear  NERVOUS SYSTEM:  sleeping this morning  CHEST/LUNG: Clear to percussion bilaterally; No rales, rhonchi, wheezing, or rubs  HEART: Regular rate and rhythm; No murmurs, rubs, or gallops  ABDOMEN: Soft, Nontender, Nondistended; Bowel sounds present, obese  EXTREMITIES:  2+ Peripheral Pulses, No clubbing, cyanosis, or edema  SKIN: No rashes or lesions      LABS: No new labs                        15.4   10.31 )-----------( 334      ( 09 Jul 2021 06:16 )             46.7     07-09    139  |  103  |  14  ----------------------------<  131<H>  3.8   |  28  |  0.6<L>    Ca    9.4      09 Jul 2021 06:16  Mg     1.7     07-09    TPro  6.3  /  Alb  3.2<L>  /  TBili  0.6  /  DBili  x   /  AST  14  /  ALT  16  /  AlkPhos  92  07-09      A1c = 9.8      CAPILLARY BLOOD GLUCOSE  POCT Blood Glucose.: 182 mg/dL (11 Jul 2021 08:18)  POCT Blood Glucose.: 154 mg/dL (10 Jul 2021 22:07)  POCT Blood Glucose.: 248 mg/dL (10 Jul 2021 16:37)  POCT Blood Glucose.: 266 mg/dL (10 Jul 2021 11:44)        MEDICATIONS  (STANDING):  aspirin enteric coated 325 milliGRAM(s) Oral daily  atorvastatin 80 milliGRAM(s) Oral at bedtime  chlorhexidine 4% Liquid 1 Application(s) Topical every 12 hours  clopidogrel Tablet 75 milliGRAM(s) Oral daily  dextrose 40% Gel 15 Gram(s) Oral once  dextrose 5%. 1000 milliLiter(s) (50 mL/Hr) IV Continuous <Continuous>  dextrose 5%. 1000 milliLiter(s) (100 mL/Hr) IV Continuous <Continuous>  dextrose 50% Injectable 25 Gram(s) IV Push once  dextrose 50% Injectable 12.5 Gram(s) IV Push once  dextrose 50% Injectable 25 Gram(s) IV Push once  enoxaparin Injectable 40 milliGRAM(s) SubCutaneous daily  glucagon  Injectable 1 milliGRAM(s) IntraMuscular once  insulin glargine Injectable (LANTUS) 30 Unit(s) SubCutaneous at bedtime  insulin lispro (ADMELOG) corrective regimen sliding scale   SubCutaneous three times a day before meals  insulin lispro Injectable (ADMELOG) 10 Unit(s) SubCutaneous before breakfast  insulin lispro Injectable (ADMELOG) 10 Unit(s) SubCutaneous before lunch  insulin lispro Injectable (ADMELOG) 10 Unit(s) SubCutaneous before dinner  lisinopril 40 milliGRAM(s) Oral daily  magnesium oxide 400 milliGRAM(s) Oral three times a day with meals  melatonin 5 milliGRAM(s) Oral at bedtime  metoprolol succinate ER 50 milliGRAM(s) Oral daily  pantoprazole    Tablet 40 milliGRAM(s) Oral before breakfast  QUEtiapine 25 milliGRAM(s) Oral <User Schedule>    MEDICATIONS  (PRN):

## 2021-07-11 NOTE — CHART NOTE - NSCHARTNOTEFT_GEN_A_CORE
Per EMR pt was placed in 4 points for his own and other people's safety overnight.  Now pt noted to be agitated again.  Pt Dr. Herrera recommendation will order Seroquel 25 mg x1 and psych consult given pt's symptoms.

## 2021-07-11 NOTE — CHART NOTE - NSCHARTNOTEFT_GEN_A_CORE
Ramses has become very violent and agitated and is entering other patient's rooms with this behavior. Given Seroquel followed by haldol without any benefit and a Code Rizvi needed to be called. Now has to be in 4 points for his own and other people's safety.

## 2021-07-12 LAB
ALBUMIN SERPL ELPH-MCNC: 3 G/DL — LOW (ref 3.5–5.2)
ALP SERPL-CCNC: 89 U/L — SIGNIFICANT CHANGE UP (ref 30–115)
ALT FLD-CCNC: 12 U/L — SIGNIFICANT CHANGE UP (ref 0–41)
ANION GAP SERPL CALC-SCNC: 10 MMOL/L — SIGNIFICANT CHANGE UP (ref 7–14)
AST SERPL-CCNC: 13 U/L — SIGNIFICANT CHANGE UP (ref 0–41)
BILIRUB SERPL-MCNC: 0.4 MG/DL — SIGNIFICANT CHANGE UP (ref 0.2–1.2)
BUN SERPL-MCNC: 13 MG/DL — SIGNIFICANT CHANGE UP (ref 10–20)
CALCIUM SERPL-MCNC: 8.7 MG/DL — SIGNIFICANT CHANGE UP (ref 8.5–10.1)
CHLORIDE SERPL-SCNC: 102 MMOL/L — SIGNIFICANT CHANGE UP (ref 98–110)
CO2 SERPL-SCNC: 28 MMOL/L — SIGNIFICANT CHANGE UP (ref 17–32)
CREAT SERPL-MCNC: 0.6 MG/DL — LOW (ref 0.7–1.5)
GLUCOSE BLDC GLUCOMTR-MCNC: 160 MG/DL — HIGH (ref 70–99)
GLUCOSE BLDC GLUCOMTR-MCNC: 170 MG/DL — HIGH (ref 70–99)
GLUCOSE BLDC GLUCOMTR-MCNC: 263 MG/DL — HIGH (ref 70–99)
GLUCOSE BLDC GLUCOMTR-MCNC: 284 MG/DL — HIGH (ref 70–99)
GLUCOSE SERPL-MCNC: 186 MG/DL — HIGH (ref 70–99)
HCT VFR BLD CALC: 43.3 % — SIGNIFICANT CHANGE UP (ref 42–52)
HGB BLD-MCNC: 14.2 G/DL — SIGNIFICANT CHANGE UP (ref 14–18)
MAGNESIUM SERPL-MCNC: 1.8 MG/DL — SIGNIFICANT CHANGE UP (ref 1.8–2.4)
MCHC RBC-ENTMCNC: 28.6 PG — SIGNIFICANT CHANGE UP (ref 27–31)
MCHC RBC-ENTMCNC: 32.8 G/DL — SIGNIFICANT CHANGE UP (ref 32–37)
MCV RBC AUTO: 87.1 FL — SIGNIFICANT CHANGE UP (ref 80–94)
NRBC # BLD: 0 /100 WBCS — SIGNIFICANT CHANGE UP (ref 0–0)
PLATELET # BLD AUTO: 331 K/UL — SIGNIFICANT CHANGE UP (ref 130–400)
POTASSIUM SERPL-MCNC: 4.2 MMOL/L — SIGNIFICANT CHANGE UP (ref 3.5–5)
POTASSIUM SERPL-SCNC: 4.2 MMOL/L — SIGNIFICANT CHANGE UP (ref 3.5–5)
PROT SERPL-MCNC: 6.1 G/DL — SIGNIFICANT CHANGE UP (ref 6–8)
RBC # BLD: 4.97 M/UL — SIGNIFICANT CHANGE UP (ref 4.7–6.1)
RBC # FLD: 13.3 % — SIGNIFICANT CHANGE UP (ref 11.5–14.5)
SARS-COV-2 RNA SPEC QL NAA+PROBE: SIGNIFICANT CHANGE UP
SODIUM SERPL-SCNC: 140 MMOL/L — SIGNIFICANT CHANGE UP (ref 135–146)
WBC # BLD: 8.12 K/UL — SIGNIFICANT CHANGE UP (ref 4.8–10.8)
WBC # FLD AUTO: 8.12 K/UL — SIGNIFICANT CHANGE UP (ref 4.8–10.8)

## 2021-07-12 PROCEDURE — 99233 SBSQ HOSP IP/OBS HIGH 50: CPT

## 2021-07-12 RX ORDER — ASPIRIN/CALCIUM CARB/MAGNESIUM 324 MG
324 TABLET ORAL DAILY
Refills: 0 | Status: DISCONTINUED | OUTPATIENT
Start: 2021-07-12 | End: 2021-07-14

## 2021-07-12 RX ADMIN — OLANZAPINE 2.5 MILLIGRAM(S): 15 TABLET, FILM COATED ORAL at 06:56

## 2021-07-12 RX ADMIN — MAGNESIUM OXIDE 400 MG ORAL TABLET 400 MILLIGRAM(S): 241.3 TABLET ORAL at 17:32

## 2021-07-12 RX ADMIN — INSULIN GLARGINE 30 UNIT(S): 100 INJECTION, SOLUTION SUBCUTANEOUS at 21:22

## 2021-07-12 RX ADMIN — Medication 10 UNIT(S): at 13:54

## 2021-07-12 RX ADMIN — ENOXAPARIN SODIUM 40 MILLIGRAM(S): 100 INJECTION SUBCUTANEOUS at 13:56

## 2021-07-12 RX ADMIN — Medication 6: at 17:43

## 2021-07-12 RX ADMIN — CHLORHEXIDINE GLUCONATE 1 APPLICATION(S): 213 SOLUTION TOPICAL at 06:53

## 2021-07-12 RX ADMIN — MAGNESIUM OXIDE 400 MG ORAL TABLET 400 MILLIGRAM(S): 241.3 TABLET ORAL at 13:56

## 2021-07-12 RX ADMIN — Medication 324 MILLIGRAM(S): at 17:44

## 2021-07-12 RX ADMIN — Medication 5 MILLIGRAM(S): at 21:22

## 2021-07-12 RX ADMIN — CHLORHEXIDINE GLUCONATE 1 APPLICATION(S): 213 SOLUTION TOPICAL at 17:44

## 2021-07-12 RX ADMIN — ATORVASTATIN CALCIUM 80 MILLIGRAM(S): 80 TABLET, FILM COATED ORAL at 21:22

## 2021-07-12 RX ADMIN — OLANZAPINE 2.5 MILLIGRAM(S): 15 TABLET, FILM COATED ORAL at 00:44

## 2021-07-12 RX ADMIN — Medication 10 UNIT(S): at 17:43

## 2021-07-12 RX ADMIN — MAGNESIUM OXIDE 400 MG ORAL TABLET 400 MILLIGRAM(S): 241.3 TABLET ORAL at 17:44

## 2021-07-12 RX ADMIN — Medication 2: at 13:55

## 2021-07-12 RX ADMIN — QUETIAPINE FUMARATE 25 MILLIGRAM(S): 200 TABLET, FILM COATED ORAL at 21:22

## 2021-07-12 RX ADMIN — CLOPIDOGREL BISULFATE 75 MILLIGRAM(S): 75 TABLET, FILM COATED ORAL at 13:56

## 2021-07-12 NOTE — PROGRESS NOTE ADULT - NSICDXPILOT_GEN_ALL_CORE
[place]

## 2021-07-12 NOTE — PROGRESS NOTE ADULT - ASSESSMENT
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 p/w AMS x4 days, found to have acute-subacute L thalamic CVA    # h/o R PCA infarct p/w acute-subacute L thalamic infarct   - CTA shows R P1 occlusion/high grade stenosis  - MRI brain shows acute lacunar L thalamic infarct measuring 1.5 cm, mod size R occipital and medial temporal lobe infarcts combo of late subacute and chronic ,chronic R thalmic lacunar infarct and splenium of corpus callosum and poss wallerian degeneration  - Coag studies - patients brother has protein S def on long term anticoagulation   - initial hyper coag work up negative - will need outpatient hem/onc follow up   - Dr. Kowalski reconsulted for holter placement - will be placed prior to discharge to SNF - discussed with cardiac center nurse - Florentin  - ERICKSON no vegetation noted   - c/w ASA, plavix and statin  - PT, OT daily   - speech cleared for dysphagia 1 puree nectar - MBS recommended by speech   - c/w BP meds, Keep SBP >140   - Last TTE from 5/20 mild LVH, otherwise normal. Unclear whether agitated saline was done  - No h/o AF, off tele  - neuro checks q 8  - patient seen by psych - rec prn zyprexa; Seroquel will continue for night time  - need frequent reorientation and needs to sleep through night   - s/p MBS on 7/9  - patient became very aggressive and agitated at night on 7/10; given 7mg of haldol and placed in 4 point restraints    # HTN  - con't lisinopril and metoprolol  - off amlodipine due to low BP several days ago - may need to restart if repeat BP still elevated     # h/o uncontrolled DM - better controlled   - increased insulin on 7/7  - hba1c = 9.8    # 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 All questions answered   Disposition: Home_____/SNF__x____/Other_____/Unknown at this time_____    spent over 36 min on medical management

## 2021-07-12 NOTE — PROGRESS NOTE ADULT - SUBJECTIVE AND OBJECTIVE BOX
Patient is a 55y old male who presents with a CVA      INTERVAL HPI/OVERNIGHT EVENTS:   Patient seen and examined this morning  Lying comfortably in bed  In NAD  off 1:1 sit  Patient seen by psych - zyprexa recommended       REVIEW OF SYSTEMS:   unable to assess due to mental status      Vital Signs Last 24 Hrs  T(C): 36.8 (12 Jul 2021 05:23), Max: 37.4 (11 Jul 2021 21:11)  T(F): 98.2 (12 Jul 2021 05:23), Max: 99.3 (11 Jul 2021 21:11)  HR: 82 (12 Jul 2021 05:23) (82 - 87)  BP: 138/80 (12 Jul 2021 05:23) (138/80 - 139/65)  BP(mean): --  RR: 16 (12 Jul 2021 05:23) (16 - 16)  SpO2: --      PHYSICAL EXAM:  GENERAL: NAD, well-groomed, well-developed  HEAD:  Atraumatic, Normocephalic  EYES: EOMI, PERRLA, conjunctiva and sclera clear  NERVOUS SYSTEM:  sleeping this morning  CHEST/LUNG: good air entry   HEART: Regular rate and rhythm; No murmurs, rubs, or gallops  ABDOMEN: Soft, Nontender, Nondistended; Bowel sounds present, obese  EXTREMITIES:  2+ Peripheral Pulses, No clubbing, cyanosis, or edema  SKIN: No rashes or lesions      LABS:                         14.2   8.12  )-----------( 331      ( 12 Jul 2021 05:54 )             43.3       07-12    140  |  102  |  13  ----------------------------<  186<H>  4.2   |  28  |  0.6<L>    Ca    8.7      12 Jul 2021 05:54  Mg     1.8     07-12    TPro  6.1  /  Alb  3.0<L>  /  TBili  0.4  /  DBili  x   /  AST  13  /  ALT  12  /  AlkPhos  89  07-12        A1c = 9.8      CAPILLARY BLOOD GLUCOSE  POCT Blood Glucose.: 170 mg/dL (12 Jul 2021 12:03)  POCT Blood Glucose.: 160 mg/dL (12 Jul 2021 07:59)  POCT Blood Glucose.: 350 mg/dL (11 Jul 2021 21:08)  POCT Blood Glucose.: 399 mg/dL (11 Jul 2021 16:24)          MEDICATIONS  (STANDING):  aspirin enteric coated 325 milliGRAM(s) Oral daily  atorvastatin 80 milliGRAM(s) Oral at bedtime  chlorhexidine 4% Liquid 1 Application(s) Topical every 12 hours  clopidogrel Tablet 75 milliGRAM(s) Oral daily  dextrose 40% Gel 15 Gram(s) Oral once  dextrose 5%. 1000 milliLiter(s) (50 mL/Hr) IV Continuous <Continuous>  dextrose 5%. 1000 milliLiter(s) (100 mL/Hr) IV Continuous <Continuous>  dextrose 50% Injectable 25 Gram(s) IV Push once  dextrose 50% Injectable 12.5 Gram(s) IV Push once  dextrose 50% Injectable 25 Gram(s) IV Push once  enoxaparin Injectable 40 milliGRAM(s) SubCutaneous daily  glucagon  Injectable 1 milliGRAM(s) IntraMuscular once  insulin glargine Injectable (LANTUS) 30 Unit(s) SubCutaneous at bedtime  insulin lispro (ADMELOG) corrective regimen sliding scale   SubCutaneous three times a day before meals  insulin lispro Injectable (ADMELOG) 10 Unit(s) SubCutaneous before dinner  insulin lispro Injectable (ADMELOG) 10 Unit(s) SubCutaneous before breakfast  insulin lispro Injectable (ADMELOG) 10 Unit(s) SubCutaneous before lunch  lisinopril 40 milliGRAM(s) Oral daily  magnesium oxide 400 milliGRAM(s) Oral three times a day with meals  melatonin 5 milliGRAM(s) Oral at bedtime  metoprolol succinate ER 50 milliGRAM(s) Oral daily  pantoprazole    Tablet 40 milliGRAM(s) Oral before breakfast  QUEtiapine 25 milliGRAM(s) Oral <User Schedule>    MEDICATIONS  (PRN):  OLANZapine Injectable 2.5 milliGRAM(s) IntraMuscular every 6 hours PRN agitation

## 2021-07-12 NOTE — PROGRESS NOTE ADULT - PROVIDER SPECIALTY LIST ADULT
Internal Medicine
Critical Care
Critical Care
Hospitalist
Internal Medicine
Internal Medicine
Neurology
Pulmonology
Pulmonology
Critical Care
Critical Care
Hospitalist
Hospitalist
Internal Medicine
Neurology
Critical Care
Hospitalist
Internal Medicine
Pulmonology
Internal Medicine
Internal Medicine

## 2021-07-13 LAB
GLUCOSE BLDC GLUCOMTR-MCNC: 166 MG/DL — HIGH (ref 70–99)
GLUCOSE BLDC GLUCOMTR-MCNC: 182 MG/DL — HIGH (ref 70–99)
GLUCOSE BLDC GLUCOMTR-MCNC: 195 MG/DL — HIGH (ref 70–99)
GLUCOSE BLDC GLUCOMTR-MCNC: 245 MG/DL — HIGH (ref 70–99)

## 2021-07-13 PROCEDURE — 99233 SBSQ HOSP IP/OBS HIGH 50: CPT

## 2021-07-13 RX ORDER — OLANZAPINE 15 MG/1
2.5 TABLET, FILM COATED ORAL ONCE
Refills: 0 | Status: COMPLETED | OUTPATIENT
Start: 2021-07-13 | End: 2021-07-13

## 2021-07-13 RX ORDER — OLANZAPINE 15 MG/1
2.5 TABLET, FILM COATED ORAL
Qty: 0 | Refills: 0 | DISCHARGE
Start: 2021-07-13

## 2021-07-13 RX ADMIN — Medication 5 MILLIGRAM(S): at 21:06

## 2021-07-13 RX ADMIN — Medication 10 UNIT(S): at 16:57

## 2021-07-13 RX ADMIN — CHLORHEXIDINE GLUCONATE 1 APPLICATION(S): 213 SOLUTION TOPICAL at 18:57

## 2021-07-13 RX ADMIN — Medication 2: at 11:25

## 2021-07-13 RX ADMIN — ATORVASTATIN CALCIUM 80 MILLIGRAM(S): 80 TABLET, FILM COATED ORAL at 21:06

## 2021-07-13 RX ADMIN — Medication 10 UNIT(S): at 11:24

## 2021-07-13 RX ADMIN — LISINOPRIL 40 MILLIGRAM(S): 2.5 TABLET ORAL at 05:08

## 2021-07-13 RX ADMIN — CLOPIDOGREL BISULFATE 75 MILLIGRAM(S): 75 TABLET, FILM COATED ORAL at 11:14

## 2021-07-13 RX ADMIN — PANTOPRAZOLE SODIUM 40 MILLIGRAM(S): 20 TABLET, DELAYED RELEASE ORAL at 05:12

## 2021-07-13 RX ADMIN — MAGNESIUM OXIDE 400 MG ORAL TABLET 400 MILLIGRAM(S): 241.3 TABLET ORAL at 16:58

## 2021-07-13 RX ADMIN — QUETIAPINE FUMARATE 25 MILLIGRAM(S): 200 TABLET, FILM COATED ORAL at 21:06

## 2021-07-13 RX ADMIN — Medication 50 MILLIGRAM(S): at 05:08

## 2021-07-13 RX ADMIN — Medication 2: at 16:57

## 2021-07-13 RX ADMIN — Medication 324 MILLIGRAM(S): at 11:15

## 2021-07-13 RX ADMIN — CHLORHEXIDINE GLUCONATE 1 APPLICATION(S): 213 SOLUTION TOPICAL at 04:27

## 2021-07-13 RX ADMIN — INSULIN GLARGINE 30 UNIT(S): 100 INJECTION, SOLUTION SUBCUTANEOUS at 21:06

## 2021-07-13 RX ADMIN — ENOXAPARIN SODIUM 40 MILLIGRAM(S): 100 INJECTION SUBCUTANEOUS at 11:15

## 2021-07-13 RX ADMIN — MAGNESIUM OXIDE 400 MG ORAL TABLET 400 MILLIGRAM(S): 241.3 TABLET ORAL at 11:16

## 2021-07-13 RX ADMIN — OLANZAPINE 2.5 MILLIGRAM(S): 15 TABLET, FILM COATED ORAL at 04:27

## 2021-07-13 NOTE — CHART NOTE - NSCHARTNOTEFT_GEN_A_CORE
-Holter monitor activated by LINDSEY Estes from [facility] cardiology clinic   -checked was working properly   -was placed together with hospitalist Dr. Chapa on pt's chest  -cellphone has to be kept next to pt all the time and charged every day

## 2021-07-14 ENCOUNTER — TRANSCRIPTION ENCOUNTER (OUTPATIENT)
Age: 56
End: 2021-07-14

## 2021-07-14 VITALS
HEART RATE: 80 BPM | TEMPERATURE: 97 F | RESPIRATION RATE: 18 BRPM | SYSTOLIC BLOOD PRESSURE: 133 MMHG | DIASTOLIC BLOOD PRESSURE: 74 MMHG

## 2021-07-14 LAB
GLUCOSE BLDC GLUCOMTR-MCNC: 102 MG/DL — HIGH (ref 70–99)
GLUCOSE BLDC GLUCOMTR-MCNC: 128 MG/DL — HIGH (ref 70–99)
GLUCOSE BLDC GLUCOMTR-MCNC: 198 MG/DL — HIGH (ref 70–99)

## 2021-07-14 PROCEDURE — 99239 HOSP IP/OBS DSCHRG MGMT >30: CPT

## 2021-07-14 RX ORDER — QUETIAPINE FUMARATE 200 MG/1
25 TABLET, FILM COATED ORAL ONCE
Refills: 0 | Status: COMPLETED | OUTPATIENT
Start: 2021-07-14 | End: 2021-07-14

## 2021-07-14 RX ORDER — OLANZAPINE 15 MG/1
2.5 TABLET, FILM COATED ORAL ONCE
Refills: 0 | Status: COMPLETED | OUTPATIENT
Start: 2021-07-14 | End: 2021-07-14

## 2021-07-14 RX ORDER — EZETIMIBE 10 MG/1
1 TABLET ORAL
Qty: 0 | Refills: 0 | DISCHARGE
Start: 2021-07-14

## 2021-07-14 RX ADMIN — Medication 324 MILLIGRAM(S): at 11:08

## 2021-07-14 RX ADMIN — LISINOPRIL 40 MILLIGRAM(S): 2.5 TABLET ORAL at 05:41

## 2021-07-14 RX ADMIN — Medication 10 UNIT(S): at 13:50

## 2021-07-14 RX ADMIN — QUETIAPINE FUMARATE 25 MILLIGRAM(S): 200 TABLET, FILM COATED ORAL at 03:59

## 2021-07-14 RX ADMIN — OLANZAPINE 2.5 MILLIGRAM(S): 15 TABLET, FILM COATED ORAL at 02:16

## 2021-07-14 RX ADMIN — PANTOPRAZOLE SODIUM 40 MILLIGRAM(S): 20 TABLET, DELAYED RELEASE ORAL at 07:54

## 2021-07-14 RX ADMIN — Medication 50 MILLIGRAM(S): at 05:41

## 2021-07-14 RX ADMIN — MAGNESIUM OXIDE 400 MG ORAL TABLET 400 MILLIGRAM(S): 241.3 TABLET ORAL at 12:36

## 2021-07-14 RX ADMIN — Medication 2: at 07:53

## 2021-07-14 RX ADMIN — MAGNESIUM OXIDE 400 MG ORAL TABLET 400 MILLIGRAM(S): 241.3 TABLET ORAL at 07:54

## 2021-07-14 RX ADMIN — ENOXAPARIN SODIUM 40 MILLIGRAM(S): 100 INJECTION SUBCUTANEOUS at 11:08

## 2021-07-14 RX ADMIN — Medication 10 UNIT(S): at 07:53

## 2021-07-14 RX ADMIN — CLOPIDOGREL BISULFATE 75 MILLIGRAM(S): 75 TABLET, FILM COATED ORAL at 11:08

## 2021-07-14 NOTE — SWALLOW BEDSIDE ASSESSMENT ADULT - SLP GENERAL OBSERVATIONS
Pt encountered asleep on RA, awaken to verbal stim. Pt denies pain +lethargic however able to maintain arousal throughout the evaluation. Pt able to follow simple commands
Pt encountered awake and confused on RA. Pt denies pain, inconsistently follows commands, +low vocal intensity , cognitive deficits suspected, +aphasic, baseline?
Pt asleep, arousable to tactile cues however pt with difficulty sustaining arousal despite max cues. Minimal verbalizations noted despite max cues, inconsistently following 1 step commands
Pt received in bed and asleep on RA, awaken to tactile and verbal stim.  Pt in no apparent pain.  Pt lethargic however able to maintain arousal for the evaluation. +generalized weakness
Pt awake/cooperative. OOB to chair. Minimal verbalizations, +aphasia and moderate dysarthria. Pt in no apparent pain
Pt received more awake and alert on RA, OOB to chair with wife at bedside. Pt appeared comfortable

## 2021-07-14 NOTE — SWALLOW BEDSIDE ASSESSMENT ADULT - COMMENTS
SLP reconsulted 2' pt more awake +toleration of thin liquids w/o overt s/s of aspiration/penetration

## 2021-07-14 NOTE — SWALLOW BEDSIDE ASSESSMENT ADULT - SWALLOW EVAL: DIAGNOSIS
+mild oral dysphagia of soft solids, + toleration of thin liquids w/o overt s/s of aspiration/penetration

## 2021-07-14 NOTE — SWALLOW BEDSIDE ASSESSMENT ADULT - ASR SWALLOW ASPIRATION MONITOR
oral hygiene/position upright (90Y)/cough/gurgly voice/pneumonia/throat clearing
change of breathing pattern/oral hygiene/position upright (90Y)/cough/gurgly voice/fever/pneumonia/throat clearing/upper respiratory infection

## 2021-07-14 NOTE — SWALLOW BEDSIDE ASSESSMENT ADULT - SWALLOW EVAL: RECOMMENDED DIET
Dysphagia 1 puree and nectar thick liquids
Dysphagia 2 with thin liquids
Dysphagia 1 puree and nectar thick liquids
Dys1 puree consistency w/ nectar thick liquids
Dysphagia 1 puree w/ thin liquids
Dysphagia 1 puree consistency w/ nectar thick liquids

## 2021-07-14 NOTE — SWALLOW BEDSIDE ASSESSMENT ADULT - NS ASR SWALLOW FINDINGS DISCUS
LINDSEY Del Castillo, PAULA Iglesias/Physician/Nursing
LINDSEY Parekh
RN Praveena/Physician/Nursing
Physician/Nursing
LINDSEY Del Castillo, PAULA Iglesias/Physician/Nursing
LINDSEY Jorgensen

## 2021-07-14 NOTE — SWALLOW BEDSIDE ASSESSMENT ADULT - SWALLOW EVAL: CURRENT DIET
puree and nectar thick liquids
Puree and nectar thick liquids
Dys1 puree consistency w/ nectar thick liquids
puree and nectar thick liquids
Dysphagia 1 w/ nectar thick liquids
Dysphagia 1 with thin liquids

## 2021-07-14 NOTE — SWALLOW BEDSIDE ASSESSMENT ADULT - COMMENTS
Moderate oral dysphagia. Pt at increased risk of aspiration 2' generalized weakness and prolonged mastication

## 2021-07-14 NOTE — CHART NOTE - NSCHARTNOTEFT_GEN_A_CORE
Was called by speech and swallow who recommended to change diet to dysphagia 1 pureed-thin liquids.  Diet changed as recommended.

## 2021-07-14 NOTE — CHART NOTE - NSCHARTNOTEFT_GEN_A_CORE
RD limited note      Patients PO intake ~100% of meals. Current diet order Diet, Dysphagia 1 Pureed-Thin Liquids. Labs and medications reviewed. Noted with DM, recommend to add carbohydrate consistent modifier to diet. skin intact/ no edema noted per RN flow sheets. No gastrointestinal signs/symptoms, No recent bowel movement documented at this time. SLP · Recommended Consistencies: Dysphagia 1 puree w/ thin liquids. Weight noted relatively stable. No RD interventions at this time. Consult RD/ Call [phone] if pt needs to be seen by nutrition.

## 2021-07-14 NOTE — SWALLOW BEDSIDE ASSESSMENT ADULT - SWALLOW EVAL: RECOMMENDED FEEDING/EATING TECHNIQUES
small sips/bites
oral hygiene/position upright (90 degrees)
oral hygiene/position upright (90 degrees)
Feed only when awake/oral hygiene/position upright (90 degrees)/small sips/bites

## 2021-07-14 NOTE — DISCHARGE NOTE NURSING/CASE MANAGEMENT/SOCIAL WORK - PATIENT PORTAL LINK FT
You can access the FollowMyHealth Patient Portal offered by [facility] by registering at the following website: [email or web address]. By joining [facility]’s FollowMyHealth portal, you will also be able to view your health information using other applications (apps) compatible with our system.

## 2021-07-14 NOTE — SWALLOW BEDSIDE ASSESSMENT ADULT - POSITIONING
upright (45 degrees)
upright (90 degrees)
upright (90 degrees)

## 2021-07-14 NOTE — SWALLOW BEDSIDE ASSESSMENT ADULT - SWALLOW EVAL: FEEDING ASSISTANCE
ONLY feed pt when awake/alert/dependent
1:1 feeding assistance/dependent
1: 1 feeding assistance/dependent
1:1 feed/dependent
1:1 feed

## 2021-07-14 NOTE — DISCHARGE NOTE NURSING/CASE MANAGEMENT/SOCIAL WORK - NSDCVIVACCINE_GEN_ALL_CORE_FT
Tdap; 03-Apr-2019 18:34; Amico, Francine M (LINDSEY); [employer]; [id number] (Exp. Date: 20-Nov-2020); IntraMuscular; Deltoid Right.; 0.5 milliLiter(s); VIS (VIS Published: 09-May-2013, VIS Presented: 03-Apr-2019);

## 2021-07-14 NOTE — SWALLOW BEDSIDE ASSESSMENT ADULT - SLP PERTINENT HISTORY OF CURRENT PROBLEM
55 year old male presented to ED with AMS.  MRI: Acute lacunar infarct within the left thalamus measuring about 1.5 cm, Moderate-sized infarct within the right occipital and medial temporal lobes, combination of late subacute and chronic, Chronic infarct involving the splenium of the corpus callosum. Chronic lacunar infarct within the right thalamus
55 year old male presented to ED with AMS.  CT head revealed a new 1 cm low density lesion in the left thalamus presumably representing an acute/subacute ischemic infarct. PMHx:  DM, HTN, CVA ( right occipital lobe and right splenorenal the corpus callosum.
55 year old male presented to ED with AMS.  MRI: Acute lacunar infarct within the left thalamus measuring about 1.5 cm, Moderate-sized infarct within the right occipital and medial temporal lobes, combination of late subacute and chronic, Chronic infarct involving the splenium of the corpus callosum. Chronic lacunar infarct within the right thalamus
55 year old male presented to ED with AMS.  MRI: Acute lacunar infarct within the left thalamus measuring about 1.5 cm, Moderate-sized infarct within the right occipital and medial temporal lobes, combination of late subacute and chronic, Chronic infarct involving the splenium of the corpus callosum. Chronic lacunar infarct within the right thalamus
55 year old male presented to ED with AMS.  CT head revealed a new 1 cm low density lesion in the left thalamus presumably representing an acute/subacute ischemic infarct. PMHx:  DM, HTN, CVA ( right occipital lobe and right splenorenal the corpus callosum. Pending MRI
55 year old male presented to ED with AMS.  MRI: Acute lacunar infarct within the left thalamus measuring about 1.5 cm, Moderate-sized infarct within the right occipital and medial temporal lobes, combination of late subacute and chronic, Chronic infarct involving the splenium of the corpus callosum. Chronic lacunar infarct within the right thalamus

## 2021-07-14 NOTE — SWALLOW BEDSIDE ASSESSMENT ADULT - NS SPL SWALLOW CLINIC TRIAL FT
+toleration of thin liquids w/o overt s/s of aspiration/penetration
Suspected pharyngeal dysphagia
moderate oral dysphagia, +toleration of puree w/o overt s/s of aspiration/penetration
suspected pharyngeal dysphagia
+mild oral dysphagia w/ soft solids, no overt s/s of aspiration/penetration with soft solids
moderate oral dysphagia, + toleration of puree consistency and nectar thick liquids w/o s/s of aspiration  Further PO trials deferred 2' pt's oral weakness

## 2021-07-21 DIAGNOSIS — R47.01 APHASIA: ICD-10-CM

## 2021-07-21 DIAGNOSIS — E11.9 TYPE 2 DIABETES MELLITUS WITHOUT COMPLICATIONS: ICD-10-CM

## 2021-07-21 DIAGNOSIS — E87.6 HYPOKALEMIA: ICD-10-CM

## 2021-07-21 DIAGNOSIS — G93.41 METABOLIC ENCEPHALOPATHY: ICD-10-CM

## 2021-07-21 DIAGNOSIS — R45.6 VIOLENT BEHAVIOR: ICD-10-CM

## 2021-07-21 DIAGNOSIS — I63.9 CEREBRAL INFARCTION, UNSPECIFIED: ICD-10-CM

## 2021-07-21 DIAGNOSIS — Z78.1 PHYSICAL RESTRAINT STATUS: ICD-10-CM

## 2021-07-21 DIAGNOSIS — R13.11 DYSPHAGIA, ORAL PHASE: ICD-10-CM

## 2021-07-21 DIAGNOSIS — I69.354 HEMIPLEGIA AND HEMIPARESIS FOLLOWING CEREBRAL INFARCTION AFFECTING LEFT NON-DOMINANT SIDE: ICD-10-CM

## 2021-07-21 DIAGNOSIS — H53.462 HOMONYMOUS BILATERAL FIELD DEFECTS, LEFT SIDE: ICD-10-CM

## 2021-07-21 DIAGNOSIS — I63.81 OTHER CEREBRAL INFARCTION DUE TO OCCLUSION OR STENOSIS OF SMALL ARTERY: ICD-10-CM

## 2021-07-21 DIAGNOSIS — I10 ESSENTIAL (PRIMARY) HYPERTENSION: ICD-10-CM

## 2021-07-21 DIAGNOSIS — E83.42 HYPOMAGNESEMIA: ICD-10-CM

## 2021-07-21 DIAGNOSIS — G93.49 OTHER ENCEPHALOPATHY: ICD-10-CM

## 2021-08-08 ENCOUNTER — EMERGENCY (EMERGENCY)
Facility: HOSPITAL | Age: 56
LOS: 0 days | Discharge: HOME | End: 2021-08-08
Attending: EMERGENCY MEDICINE | Admitting: EMERGENCY MEDICINE
Payer: COMMERCIAL

## 2021-08-08 VITALS
HEIGHT: 71 IN | RESPIRATION RATE: 18 BRPM | DIASTOLIC BLOOD PRESSURE: 74 MMHG | HEART RATE: 73 BPM | SYSTOLIC BLOOD PRESSURE: 134 MMHG | TEMPERATURE: 99 F | OXYGEN SATURATION: 95 %

## 2021-08-08 DIAGNOSIS — Z79.899 OTHER LONG TERM (CURRENT) DRUG THERAPY: ICD-10-CM

## 2021-08-08 DIAGNOSIS — E11.9 TYPE 2 DIABETES MELLITUS WITHOUT COMPLICATIONS: ICD-10-CM

## 2021-08-08 DIAGNOSIS — I10 ESSENTIAL (PRIMARY) HYPERTENSION: ICD-10-CM

## 2021-08-08 DIAGNOSIS — Z23 ENCOUNTER FOR IMMUNIZATION: ICD-10-CM

## 2021-08-08 DIAGNOSIS — Z79.82 LONG TERM (CURRENT) USE OF ASPIRIN: ICD-10-CM

## 2021-08-08 DIAGNOSIS — S90.811A ABRASION, RIGHT FOOT, INITIAL ENCOUNTER: ICD-10-CM

## 2021-08-08 DIAGNOSIS — Z86.59 PERSONAL HISTORY OF OTHER MENTAL AND BEHAVIORAL DISORDERS: ICD-10-CM

## 2021-08-08 DIAGNOSIS — Z86.73 PERSONAL HISTORY OF TRANSIENT ISCHEMIC ATTACK (TIA), AND CEREBRAL INFARCTION WITHOUT RESIDUAL DEFICITS: ICD-10-CM

## 2021-08-08 DIAGNOSIS — Z79.02 LONG TERM (CURRENT) USE OF ANTITHROMBOTICS/ANTIPLATELETS: ICD-10-CM
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
## 2021-08-08 DIAGNOSIS — Z79.4 LONG TERM (CURRENT) USE OF INSULIN: ICD-10-CM
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
## 2021-08-08 DIAGNOSIS — E78.5 HYPERLIPIDEMIA, UNSPECIFIED: ICD-10-CM

## 2021-08-08 PROBLEM — I63.9 CEREBRAL INFARCTION, UNSPECIFIED: Chronic | Status: ACTIVE | Noted: 2021-06-27

## 2021-08-08 PROCEDURE — 99283 EMERGENCY DEPT VISIT LOW MDM: CPT

## 2021-08-08 RX ORDER — TETANUS TOXOID, REDUCED DIPHTHERIA TOXOID AND ACELLULAR PERTUSSIS VACCINE, ADSORBED 5; 2.5; 8; 8; 2.5 [IU]/.5ML; [IU]/.5ML; UG/.5ML; UG/.5ML; UG/.5ML
0.5 SUSPENSION INTRAMUSCULAR ONCE
Refills: 0 | Status: COMPLETED | OUTPATIENT
Start: 2021-08-08 | End: 2021-08-08

## 2021-08-08 RX ADMIN — TETANUS TOXOID, REDUCED DIPHTHERIA TOXOID AND ACELLULAR PERTUSSIS VACCINE, ADSORBED 0.5 MILLILITER(S): 5; 2.5; 8; 8; 2.5 SUSPENSION INTRAMUSCULAR at 16:15

## 2021-08-08 NOTE — ED PROVIDER NOTE - ATTENDING CONTRIBUTION TO CARE
55M PMH CVA L deficits, speech deficits, dm, htn, hl, p/w R foot abrasion to bottom of foot. pt denies trauma/falling. pt found bleeding and BIBEMS from [facility]. family at bedside, states pt at baseline. pt has no medical complaints. denies fever, cough. denies ha, new numbness, weakness. no neck pain, bp. no cp, sob. no abd pain, nvdc. no urinary sx.     on exam, AFVSS, well michael nad, ncat, eomi, perrla, mmm, lctab, rrr nl s1s2 no mrg, abd soft ntnd, aaox2-3, L deficits, dec speech but answers yes/no appropriately and follows commands, no focal deficits, no le edema or calf ttp, minor superficial abrasion to bottom of foot. no other signs of trauma to entire body head to toe.     a/p; Foot abrasion, wound cleaned and dressed, d/w prison RN, f/u pmd 1-2 weeks, strict return precautions provided. dispo home w transport

## 2021-08-08 NOTE — ED PROVIDER NOTE - CLINICAL SUMMARY MEDICAL DECISION MAKING FREE TEXT BOX
a/p; Foot abrasion, wound cleaned and dressed, d/w detention RN, f/u pmd 1-2 weeks, strict return precautions provided. dispo home w transport

## 2021-08-08 NOTE — ED ADULT NURSE NOTE - NSFALLRSKOUTCOME_ED_ALL_ED
Is This A New Presentation, Or A Follow-Up?: Rash
Additional History: Itchy rash related to celiac. Uses coconut oil and sunscreen.
Universal Safety Interventions

## 2021-08-08 NOTE — ED PROVIDER NOTE - OBJECTIVE STATEMENT
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 Patient is minimally verbal, but able to answer yes/no questions. Denies fall, head trauma, LOC, AC, headache, vision changes, dizziness, numbness, weakness, tingling, chest pain, SOB, NVD, dysuria, hematuria, melena, hematochezia, fever, chills, cold/flu symptoms.

## 2021-08-08 NOTE — ED PROVIDER NOTE - PROGRESS NOTE DETAILS
BK: Discussed case with [facility]'s residence reception. Patient was found in room with bleeding wound and sent in to ER. Patient's nurse currently unavailable to provide more information, callback number given. Awaiting response. BK: Spoke with patient's nurse Saida. Patient does not talk at baseline other than one or two word responses. This morning at 7 am, patient noticed abrasion on R foot, which was bleeding. Mechanism of injury unknown. BK: Patient refusing tetanus shot, says he had one recently. Wife in waiting room says patient had tetanus shot in the past 5 years. BK: Per patient's mother and daughter, patient's mental status is at baseline or improved from his baseline since his CVA 1 year ago.

## 2021-08-08 NOTE — ED PROVIDER NOTE - PHYSICAL EXAMINATION
CONSTITUTIONAL: well-appearing, in NAD  SKIN: Warm dry, normal skin turgor  HEAD: NCAT; no raccoon eyes, dunbar sign, or hemotympanum  EYES: EOMI, PERRLA, no scleral icterus, conjunctiva pink  ENT: normal pharynx with no erythema or exudates  NECK: Supple; non tender. Full ROM. no c/t/l/s tenderness  CARD: RRR, no murmurs.  RESP: clear to ausculation b/l. No crackles or wheezing.  ABD: soft, non-tender, non-distended, no rebound or guarding.  EXT: Full ROM, no bony tenderness, no pedal edema, no calf tenderness; DP and radial pulses 2+ b/l; 2cm superficial  abrasion on plantar aspect at base of first digit, not bleeding, non erythematous, no drainage  NEURO: normal motor. normal sensory. CN II-XII intact.   PSYCH: Cooperative, appropriate.

## 2021-08-08 NOTE — ED PROVIDER NOTE - PATIENT PORTAL LINK FT
You can access the FollowMyHealth Patient Portal offered by [facility] by registering at the following website: [email or web address]. By joining [employer]’s FollowMyHealth portal, you will also be able to view your health information using other applications (apps) compatible with our system.

## 2021-08-08 NOTE — ED ADULT NURSE NOTE - CHIEF COMPLAINT QUOTE
Patient BIBEMS from [facility] for laceration to right foot. On assessment no active bleeding noted, dressing dry and intact at this time.

## 2021-08-08 NOTE — ED ADULT TRIAGE NOTE - CHIEF COMPLAINT QUOTE
Patient BIBEMS from [facility] for laceration to right foot. On assessment no active bleeding noted, dressing dry and intact at this time.

## 2021-08-11 ENCOUNTER — APPOINTMENT (OUTPATIENT)
Dept: CARDIOLOGY | Facility: CLINIC | Age: 56
End: 2021-08-11
Payer: COMMERCIAL

## 2021-08-11 PROCEDURE — 93228 REMOTE 30 DAY ECG REV/REPORT: CPT

## 2021-09-09 ENCOUNTER — APPOINTMENT (OUTPATIENT)
Dept: NEUROLOGY | Facility: CLINIC | Age: 56
End: 2021-09-09

## 2021-11-21 ENCOUNTER — EMERGENCY (EMERGENCY)
Facility: HOSPITAL | Age: 56
LOS: 0 days | Discharge: HOME | End: 2021-11-22
Attending: EMERGENCY MEDICINE | Admitting: EMERGENCY MEDICINE
Payer: MEDICAID

## 2021-11-21 VITALS
SYSTOLIC BLOOD PRESSURE: 196 MMHG | TEMPERATURE: 98 F | HEART RATE: 63 BPM | OXYGEN SATURATION: 96 % | HEIGHT: 71 IN | DIASTOLIC BLOOD PRESSURE: 93 MMHG | RESPIRATION RATE: 18 BRPM

## 2021-11-21 DIAGNOSIS — I10 ESSENTIAL (PRIMARY) HYPERTENSION: ICD-10-CM

## 2021-11-21 DIAGNOSIS — R10.9 UNSPECIFIED ABDOMINAL PAIN: ICD-10-CM

## 2021-11-21 DIAGNOSIS — E11.9 TYPE 2 DIABETES MELLITUS WITHOUT COMPLICATIONS: ICD-10-CM

## 2021-11-21 DIAGNOSIS — K59.00 CONSTIPATION, UNSPECIFIED: ICD-10-CM

## 2021-11-21 DIAGNOSIS — Z87.19 PERSONAL HISTORY OF OTHER DISEASES OF THE DIGESTIVE SYSTEM: ICD-10-CM

## 2021-11-21 DIAGNOSIS — R10.84 GENERALIZED ABDOMINAL PAIN: ICD-10-CM

## 2021-11-21 LAB
ALBUMIN SERPL ELPH-MCNC: 3.6 G/DL — SIGNIFICANT CHANGE UP (ref 3.5–5.2)
ALP SERPL-CCNC: 90 U/L — SIGNIFICANT CHANGE UP (ref 30–115)
ALT FLD-CCNC: 22 U/L — SIGNIFICANT CHANGE UP (ref 0–41)
ANION GAP SERPL CALC-SCNC: 13 MMOL/L — SIGNIFICANT CHANGE UP (ref 7–14)
ANION GAP SERPL CALC-SCNC: 14 MMOL/L — SIGNIFICANT CHANGE UP (ref 7–14)
APPEARANCE UR: CLEAR — SIGNIFICANT CHANGE UP
AST SERPL-CCNC: 44 U/L — HIGH (ref 0–41)
BACTERIA # UR AUTO: NEGATIVE — SIGNIFICANT CHANGE UP
BASOPHILS # BLD AUTO: 0.07 K/UL — SIGNIFICANT CHANGE UP (ref 0–0.2)
BASOPHILS NFR BLD AUTO: 0.6 % — SIGNIFICANT CHANGE UP (ref 0–1)
BILIRUB SERPL-MCNC: 0.3 MG/DL — SIGNIFICANT CHANGE UP (ref 0.2–1.2)
BILIRUB UR-MCNC: NEGATIVE — SIGNIFICANT CHANGE UP
BUN SERPL-MCNC: 13 MG/DL — SIGNIFICANT CHANGE UP (ref 10–20)
BUN SERPL-MCNC: 14 MG/DL — SIGNIFICANT CHANGE UP (ref 10–20)
CALCIUM SERPL-MCNC: 8.5 MG/DL — SIGNIFICANT CHANGE UP (ref 8.5–10.1)
CALCIUM SERPL-MCNC: 8.8 MG/DL — SIGNIFICANT CHANGE UP (ref 8.5–10.1)
CHLORIDE SERPL-SCNC: 104 MMOL/L — SIGNIFICANT CHANGE UP (ref 98–110)
CHLORIDE SERPL-SCNC: 106 MMOL/L — SIGNIFICANT CHANGE UP (ref 98–110)
CO2 SERPL-SCNC: 20 MMOL/L — SIGNIFICANT CHANGE UP (ref 17–32)
CO2 SERPL-SCNC: 21 MMOL/L — SIGNIFICANT CHANGE UP (ref 17–32)
COLOR SPEC: YELLOW — SIGNIFICANT CHANGE UP
CREAT SERPL-MCNC: 0.7 MG/DL — SIGNIFICANT CHANGE UP (ref 0.7–1.5)
CREAT SERPL-MCNC: 0.7 MG/DL — SIGNIFICANT CHANGE UP (ref 0.7–1.5)
DIFF PNL FLD: NEGATIVE — SIGNIFICANT CHANGE UP
EOSINOPHIL # BLD AUTO: 0.32 K/UL — SIGNIFICANT CHANGE UP (ref 0–0.7)
EOSINOPHIL NFR BLD AUTO: 2.9 % — SIGNIFICANT CHANGE UP (ref 0–8)
EPI CELLS # UR: 0 /HPF — SIGNIFICANT CHANGE UP (ref 0–5)
GLUCOSE SERPL-MCNC: 154 MG/DL — HIGH (ref 70–99)
GLUCOSE SERPL-MCNC: 158 MG/DL — HIGH (ref 70–99)
GLUCOSE UR QL: SIGNIFICANT CHANGE UP
HCT VFR BLD CALC: 44.1 % — SIGNIFICANT CHANGE UP (ref 42–52)
HGB BLD-MCNC: 14.8 G/DL — SIGNIFICANT CHANGE UP (ref 14–18)
HYALINE CASTS # UR AUTO: 1 /LPF — SIGNIFICANT CHANGE UP (ref 0–7)
IMM GRANULOCYTES NFR BLD AUTO: 0.4 % — HIGH (ref 0.1–0.3)
KETONES UR-MCNC: NEGATIVE — SIGNIFICANT CHANGE UP
LEUKOCYTE ESTERASE UR-ACNC: NEGATIVE — SIGNIFICANT CHANGE UP
LIDOCAIN IGE QN: 18 U/L — SIGNIFICANT CHANGE UP (ref 7–60)
LYMPHOCYTES # BLD AUTO: 2.32 K/UL — SIGNIFICANT CHANGE UP (ref 1.2–3.4)
LYMPHOCYTES # BLD AUTO: 21.4 % — SIGNIFICANT CHANGE UP (ref 20.5–51.1)
MCHC RBC-ENTMCNC: 30.1 PG — SIGNIFICANT CHANGE UP (ref 27–31)
MCHC RBC-ENTMCNC: 33.6 G/DL — SIGNIFICANT CHANGE UP (ref 32–37)
MCV RBC AUTO: 89.8 FL — SIGNIFICANT CHANGE UP (ref 80–94)
MONOCYTES # BLD AUTO: 0.69 K/UL — HIGH (ref 0.1–0.6)
MONOCYTES NFR BLD AUTO: 6.4 % — SIGNIFICANT CHANGE UP (ref 1.7–9.3)
NEUTROPHILS # BLD AUTO: 7.41 K/UL — HIGH (ref 1.4–6.5)
NEUTROPHILS NFR BLD AUTO: 68.3 % — SIGNIFICANT CHANGE UP (ref 42.2–75.2)
NITRITE UR-MCNC: NEGATIVE — SIGNIFICANT CHANGE UP
NRBC # BLD: 0 /100 WBCS — SIGNIFICANT CHANGE UP (ref 0–0)
PH UR: 6 — SIGNIFICANT CHANGE UP (ref 5–8)
PLATELET # BLD AUTO: 338 K/UL — SIGNIFICANT CHANGE UP (ref 130–400)
POTASSIUM SERPL-MCNC: 4.2 MMOL/L — SIGNIFICANT CHANGE UP (ref 3.5–5)
POTASSIUM SERPL-MCNC: 6.1 MMOL/L — CRITICAL HIGH (ref 3.5–5)
POTASSIUM SERPL-SCNC: 4.2 MMOL/L — SIGNIFICANT CHANGE UP (ref 3.5–5)
POTASSIUM SERPL-SCNC: 6.1 MMOL/L — CRITICAL HIGH (ref 3.5–5)
PROT SERPL-MCNC: 6.9 G/DL — SIGNIFICANT CHANGE UP (ref 6–8)
PROT UR-MCNC: ABNORMAL
RBC # BLD: 4.91 M/UL — SIGNIFICANT CHANGE UP (ref 4.7–6.1)
RBC # FLD: 12.7 % — SIGNIFICANT CHANGE UP (ref 11.5–14.5)
RBC CASTS # UR COMP ASSIST: 3 /HPF — SIGNIFICANT CHANGE UP (ref 0–4)
SODIUM SERPL-SCNC: 138 MMOL/L — SIGNIFICANT CHANGE UP (ref 135–146)
SODIUM SERPL-SCNC: 140 MMOL/L — SIGNIFICANT CHANGE UP (ref 135–146)
SP GR SPEC: 1.03 — SIGNIFICANT CHANGE UP (ref 1.01–1.03)
UROBILINOGEN FLD QL: SIGNIFICANT CHANGE UP
WBC # BLD: 10.85 K/UL — HIGH (ref 4.8–10.8)
WBC # FLD AUTO: 10.85 K/UL — HIGH (ref 4.8–10.8)
WBC UR QL: 1 /HPF — SIGNIFICANT CHANGE UP (ref 0–5)

## 2021-11-21 PROCEDURE — 74177 CT ABD & PELVIS W/CONTRAST: CPT | Mod: 26,MA

## 2021-11-21 PROCEDURE — 99285 EMERGENCY DEPT VISIT HI MDM: CPT

## 2021-11-21 RX ORDER — SODIUM CHLORIDE 9 MG/ML
1000 INJECTION, SOLUTION INTRAVENOUS ONCE
Refills: 0 | Status: COMPLETED | OUTPATIENT
Start: 2021-11-21 | End: 2021-11-21

## 2021-11-21 RX ORDER — DOCUSATE SODIUM 100 MG
1 CAPSULE ORAL
Qty: 5 | Refills: 0
Start: 2021-11-21 | End: 2021-11-25

## 2021-11-21 RX ADMIN — SODIUM CHLORIDE 1000 MILLILITER(S): 9 INJECTION, SOLUTION INTRAVENOUS at 19:59

## 2021-11-21 NOTE — ED ADULT NURSE REASSESSMENT NOTE - NS ED NURSE REASSESS COMMENT FT1
patient made for discharge and waiting for ambulette. Report was given to gen Foster at the adult home.  Patient in stable condition and nad.

## 2021-11-21 NOTE — ED PROVIDER NOTE - CLINICAL SUMMARY MEDICAL DECISION MAKING FREE TEXT BOX
56yM HTN DM CVA pancreatitis p/w generalized abd pain.  Labs reassuring.  CT w/o acute pathology.  Abd soft/benign and pt tolerating PO.  Recommend supportive care, o/p PCP f/u, return precautions.

## 2021-11-21 NOTE — ED PROVIDER NOTE - PHYSICAL EXAMINATION
CONSTITUTIONAL: Well-developed; well-nourished; in no acute distress.   SKIN: warm, dry.  HEAD: Normocephalic; atraumatic.  EYES: PERRL, EOMI, no conjunctival erythema.  ENT: No nasal discharge; airway clear.  NECK: Supple; non tender.  CARD: S1, S2 normal; no murmurs, gallops, or rubs. Regular rate and rhythm.   RESP: No wheezes, rales or rhonchi.  ABD: soft ntnd; no masses, rebound, or guarding.  EXT: Normal ROM.  No clubbing, cyanosis, or edema.  NEURO: Alert, grossly unremarkable, no focal neuro. deficits.  PSYCH: Cooperative, appropriate.

## 2021-11-21 NOTE — ED PROVIDER NOTE - CARE PROVIDER_API CALL
Sang Montoya)  Internal Medicine  [address] [place] [address]  Phone: [phone]  Fax: [phone]  Established Patient  Follow Up Time: 4-6 Days

## 2021-11-21 NOTE — ED ADULT NURSE NOTE - NS TRANSFER PATIENT BELONGINGS
Plan:  1. Labs today  2. Continue same meds, same doses for now   3. The following vaccines are recommended for you.   Some insurances cover better if you have these vaccines at the pharmacy:  -- Prevnar 13 ( pneumonia vaccine)  -- Pneumovax 23 ( different pneumonia vaccine ) - it will be done 1 year after the Prevnar vaccine   -- Shingerix vaccine - the newest vaccine for shingles   4. Abdominal Aortic Aneurysm  Screening ultrasound - To schedule this test you may call Scheduling center at [phone]  Check with insurance first   5. Debrox ear drops every 1-2 weeks to prevent wax   None

## 2021-11-21 NOTE — ED PROVIDER NOTE - OBJECTIVE STATEMENT
57 yo m with pmh of pancreatitis, htn, dm, cva (07/21, with residual cognitive deficits), minimally verbal presenting to the ED for abdominal pain. Patient providing limited hx - states that he has had abdominal pain x1 day, localized to mid-abdomen. Denies any n/v, states last bowel movement was yesterday.

## 2021-11-21 NOTE — ED PROVIDER NOTE - ATTENDING CONTRIBUTION TO CARE
56yM HTN DM CVA w/ residual deficits recurrent pancreatitis p/w abd pain - mid-abdomen/diffuse pain, no fever, n/v/d or PO intolerance. +passing gas.    abd soft, NT, ND, no r/g

## 2021-11-22 VITALS
RESPIRATION RATE: 18 BRPM | OXYGEN SATURATION: 96 % | TEMPERATURE: 97 F | HEART RATE: 69 BPM | SYSTOLIC BLOOD PRESSURE: 167 MMHG | DIASTOLIC BLOOD PRESSURE: 77 MMHG

## 2021-11-22 LAB
CULTURE RESULTS: SIGNIFICANT CHANGE UP
SPECIMEN SOURCE: SIGNIFICANT CHANGE UP

## 2021-12-05 ENCOUNTER — EMERGENCY (EMERGENCY)
Facility: HOSPITAL | Age: 56
LOS: 0 days | Discharge: HOME | End: 2021-12-06
Attending: STUDENT IN AN ORGANIZED HEALTH CARE EDUCATION/TRAINING PROGRAM | Admitting: STUDENT IN AN ORGANIZED HEALTH CARE EDUCATION/TRAINING PROGRAM
Payer: MEDICAID

## 2021-12-05 VITALS
RESPIRATION RATE: 18 BRPM | OXYGEN SATURATION: 98 % | DIASTOLIC BLOOD PRESSURE: 88 MMHG | WEIGHT: 220.02 LBS | SYSTOLIC BLOOD PRESSURE: 177 MMHG | TEMPERATURE: 98 F | HEIGHT: 71 IN | HEART RATE: 72 BPM

## 2021-12-05 VITALS
HEART RATE: 88 BPM | OXYGEN SATURATION: 98 % | RESPIRATION RATE: 18 BRPM | SYSTOLIC BLOOD PRESSURE: 166 MMHG | TEMPERATURE: 98 F | DIASTOLIC BLOOD PRESSURE: 86 MMHG

## 2021-12-05 DIAGNOSIS — Z20.822 CONTACT WITH AND (SUSPECTED) EXPOSURE TO COVID-19: ICD-10-CM

## 2021-12-05 DIAGNOSIS — R41.82 ALTERED MENTAL STATUS, UNSPECIFIED: ICD-10-CM

## 2021-12-05 DIAGNOSIS — E11.9 TYPE 2 DIABETES MELLITUS WITHOUT COMPLICATIONS: ICD-10-CM

## 2021-12-05 DIAGNOSIS — Z79.82 LONG TERM (CURRENT) USE OF ASPIRIN: ICD-10-CM

## 2021-12-05 DIAGNOSIS — I10 ESSENTIAL (PRIMARY) HYPERTENSION: ICD-10-CM

## 2021-12-05 DIAGNOSIS — R45.6 VIOLENT BEHAVIOR: ICD-10-CM

## 2021-12-05 DIAGNOSIS — Z79.01 LONG TERM (CURRENT) USE OF ANTICOAGULANTS: ICD-10-CM

## 2021-12-05 DIAGNOSIS — I69.318 OTHER SYMPTOMS AND SIGNS INVOLVING COGNITIVE FUNCTIONS FOLLOWING CEREBRAL INFARCTION: ICD-10-CM

## 2021-12-05 DIAGNOSIS — R45.1 RESTLESSNESS AND AGITATION: ICD-10-CM

## 2021-12-05 LAB
ALBUMIN SERPL ELPH-MCNC: 3.6 G/DL — SIGNIFICANT CHANGE UP (ref 3.5–5.2)
ALP SERPL-CCNC: 80 U/L — SIGNIFICANT CHANGE UP (ref 30–115)
ALT FLD-CCNC: 23 U/L — SIGNIFICANT CHANGE UP (ref 0–41)
ANION GAP SERPL CALC-SCNC: 9 MMOL/L — SIGNIFICANT CHANGE UP (ref 7–14)
APAP SERPL-MCNC: <5 UG/ML — LOW (ref 10–30)
APPEARANCE UR: CLEAR — SIGNIFICANT CHANGE UP
AST SERPL-CCNC: 56 U/L — HIGH (ref 0–41)
BASE EXCESS BLDV CALC-SCNC: 5.4 MMOL/L — HIGH (ref -2–3)
BASE EXCESS BLDV CALC-SCNC: 5.6 MMOL/L — HIGH (ref -2–3)
BASOPHILS # BLD AUTO: 0.07 K/UL — SIGNIFICANT CHANGE UP (ref 0–0.2)
BASOPHILS NFR BLD AUTO: 0.7 % — SIGNIFICANT CHANGE UP (ref 0–1)
BILIRUB SERPL-MCNC: 0.5 MG/DL — SIGNIFICANT CHANGE UP (ref 0.2–1.2)
BILIRUB UR-MCNC: NEGATIVE — SIGNIFICANT CHANGE UP
BUN SERPL-MCNC: 11 MG/DL — SIGNIFICANT CHANGE UP (ref 10–20)
CA-I SERPL-SCNC: 1.03 MMOL/L — LOW (ref 1.15–1.33)
CA-I SERPL-SCNC: 1.12 MMOL/L — LOW (ref 1.15–1.33)
CALCIUM SERPL-MCNC: 8.5 MG/DL — SIGNIFICANT CHANGE UP (ref 8.5–10.1)
CHLORIDE SERPL-SCNC: 103 MMOL/L — SIGNIFICANT CHANGE UP (ref 98–110)
CO2 SERPL-SCNC: 26 MMOL/L — SIGNIFICANT CHANGE UP (ref 17–32)
COLOR SPEC: YELLOW — SIGNIFICANT CHANGE UP
CREAT SERPL-MCNC: 0.7 MG/DL — SIGNIFICANT CHANGE UP (ref 0.7–1.5)
DIFF PNL FLD: NEGATIVE — SIGNIFICANT CHANGE UP
EOSINOPHIL # BLD AUTO: 0.45 K/UL — SIGNIFICANT CHANGE UP (ref 0–0.7)
EOSINOPHIL NFR BLD AUTO: 4.8 % — SIGNIFICANT CHANGE UP (ref 0–8)
ETHANOL SERPL-MCNC: <10 MG/DL — SIGNIFICANT CHANGE UP
GAS PNL BLDV: 135 MMOL/L — LOW (ref 136–145)
GAS PNL BLDV: 136 MMOL/L — SIGNIFICANT CHANGE UP (ref 136–145)
GAS PNL BLDV: SIGNIFICANT CHANGE UP
GAS PNL BLDV: SIGNIFICANT CHANGE UP
GLUCOSE SERPL-MCNC: 94 MG/DL — SIGNIFICANT CHANGE UP (ref 70–99)
GLUCOSE UR QL: NEGATIVE MG/DL — SIGNIFICANT CHANGE UP
HCO3 BLDV-SCNC: 31 MMOL/L — HIGH (ref 22–29)
HCO3 BLDV-SCNC: 31 MMOL/L — HIGH (ref 22–29)
HCT VFR BLD CALC: 43.7 % — SIGNIFICANT CHANGE UP (ref 42–52)
HCT VFR BLDA CALC: 45 % — SIGNIFICANT CHANGE UP (ref 39–51)
HCT VFR BLDA CALC: 50 % — SIGNIFICANT CHANGE UP (ref 39–51)
HGB BLD CALC-MCNC: 14.9 G/DL — SIGNIFICANT CHANGE UP (ref 12.6–17.4)
HGB BLD CALC-MCNC: 16.5 G/DL — SIGNIFICANT CHANGE UP (ref 12.6–17.4)
HGB BLD-MCNC: 14.5 G/DL — SIGNIFICANT CHANGE UP (ref 14–18)
IMM GRANULOCYTES NFR BLD AUTO: 0.2 % — SIGNIFICANT CHANGE UP (ref 0.1–0.3)
KETONES UR-MCNC: NEGATIVE — SIGNIFICANT CHANGE UP
LACTATE BLDV-MCNC: 1.3 MMOL/L — SIGNIFICANT CHANGE UP (ref 0.5–2)
LACTATE BLDV-MCNC: 1.3 MMOL/L — SIGNIFICANT CHANGE UP (ref 0.5–2)
LEUKOCYTE ESTERASE UR-ACNC: NEGATIVE — SIGNIFICANT CHANGE UP
LYMPHOCYTES # BLD AUTO: 1.89 K/UL — SIGNIFICANT CHANGE UP (ref 1.2–3.4)
LYMPHOCYTES # BLD AUTO: 20.1 % — LOW (ref 20.5–51.1)
MAGNESIUM SERPL-MCNC: 2.1 MG/DL — SIGNIFICANT CHANGE UP (ref 1.8–2.4)
MCHC RBC-ENTMCNC: 29.6 PG — SIGNIFICANT CHANGE UP (ref 27–31)
MCHC RBC-ENTMCNC: 33.2 G/DL — SIGNIFICANT CHANGE UP (ref 32–37)
MCV RBC AUTO: 89.2 FL — SIGNIFICANT CHANGE UP (ref 80–94)
MONOCYTES # BLD AUTO: 0.61 K/UL — HIGH (ref 0.1–0.6)
MONOCYTES NFR BLD AUTO: 6.5 % — SIGNIFICANT CHANGE UP (ref 1.7–9.3)
NEUTROPHILS # BLD AUTO: 6.34 K/UL — SIGNIFICANT CHANGE UP (ref 1.4–6.5)
NEUTROPHILS NFR BLD AUTO: 67.7 % — SIGNIFICANT CHANGE UP (ref 42.2–75.2)
NITRITE UR-MCNC: NEGATIVE — SIGNIFICANT CHANGE UP
NRBC # BLD: 0 /100 WBCS — SIGNIFICANT CHANGE UP (ref 0–0)
PCO2 BLDV: 47 MMHG — SIGNIFICANT CHANGE UP (ref 42–55)
PCO2 BLDV: 48 MMHG — SIGNIFICANT CHANGE UP (ref 42–55)
PH BLDV: 7.42 — SIGNIFICANT CHANGE UP (ref 7.32–7.43)
PH BLDV: 7.43 — SIGNIFICANT CHANGE UP (ref 7.32–7.43)
PH UR: 7 — SIGNIFICANT CHANGE UP (ref 5–8)
PLATELET # BLD AUTO: 322 K/UL — SIGNIFICANT CHANGE UP (ref 130–400)
PO2 BLDV: 38 MMHG — SIGNIFICANT CHANGE UP
PO2 BLDV: 40 MMHG — SIGNIFICANT CHANGE UP
POTASSIUM BLDV-SCNC: 5.6 MMOL/L — HIGH (ref 3.5–5.1)
POTASSIUM BLDV-SCNC: 8.9 MMOL/L — CRITICAL HIGH (ref 3.5–5.1)
POTASSIUM SERPL-MCNC: 6.7 MMOL/L — CRITICAL HIGH (ref 3.5–5)
POTASSIUM SERPL-SCNC: 6.7 MMOL/L — CRITICAL HIGH (ref 3.5–5)
PROT SERPL-MCNC: 7.2 G/DL — SIGNIFICANT CHANGE UP (ref 6–8)
PROT UR-MCNC: NEGATIVE MG/DL — SIGNIFICANT CHANGE UP
RBC # BLD: 4.9 M/UL — SIGNIFICANT CHANGE UP (ref 4.7–6.1)
RBC # FLD: 12.6 % — SIGNIFICANT CHANGE UP (ref 11.5–14.5)
SALICYLATES SERPL-MCNC: <0.3 MG/DL — LOW (ref 4–30)
SAO2 % BLDV: 74.7 % — SIGNIFICANT CHANGE UP
SAO2 % BLDV: 75.2 % — SIGNIFICANT CHANGE UP
SARS-COV-2 RNA SPEC QL NAA+PROBE: SIGNIFICANT CHANGE UP
SODIUM SERPL-SCNC: 138 MMOL/L — SIGNIFICANT CHANGE UP (ref 135–146)
SP GR SPEC: 1.02 — SIGNIFICANT CHANGE UP (ref 1.01–1.03)
TROPONIN T SERPL-MCNC: <0.01 NG/ML — SIGNIFICANT CHANGE UP
UROBILINOGEN FLD QL: 0.2 MG/DL — SIGNIFICANT CHANGE UP
WBC # BLD: 9.38 K/UL — SIGNIFICANT CHANGE UP (ref 4.8–10.8)
WBC # FLD AUTO: 9.38 K/UL — SIGNIFICANT CHANGE UP (ref 4.8–10.8)

## 2021-12-05 PROCEDURE — 99285 EMERGENCY DEPT VISIT HI MDM: CPT

## 2021-12-05 PROCEDURE — 71045 X-RAY EXAM CHEST 1 VIEW: CPT | Mod: 26

## 2021-12-05 PROCEDURE — 93010 ELECTROCARDIOGRAM REPORT: CPT

## 2021-12-05 PROCEDURE — 70450 CT HEAD/BRAIN W/O DYE: CPT | Mod: 26,MA

## 2021-12-05 NOTE — ED PROVIDER NOTE - PHYSICAL EXAMINATION
CONSTITUTIONAL: well developed, well nourished, in no acute distress, speaking in full sentences, nontoxic appearing  SKIN: warm, dry, no rash  HEAD: normocephalic, atraumatic  EYES: PERRL at 3mm, EOMI, no conjunctival erythema, no nystagmus  ENT: patent airway, moist mucous membranes, no tongue deviation  NECK: supple, no masses, full flexion/extension without pain  CV:  regular rate, regular rhythm, 2+ radial pulses bilaterally  RESP: no wheezes, no rales, no rhonchi, normal work of breathing  ABD: soft, nontender, nondistended, no rebound, no guarding  MSK: normal ROM, no cyanosis, no edema  NEURO: alert, oriented, CN 2-12 grossly intact, sensation intact to light touch symmetrically, 5/5 motor strength in all extremities, normal finger to nose, no pronator drift, no facial asymmetry  PSYCH: cooperative, appropriate

## 2021-12-05 NOTE — ED PROVIDER NOTE - ATTENDING CONTRIBUTION TO CARE
57 yo M with PMHx of CVA with residual cognitive deficits, DM, HTN, pancreatitis who was BIBEMS from [facility] for change in behavior upon waking up. Here in ED, vitals wnl. Pt calm, cooperative, appropriate. No focal neuro deficits. Will obtain labs, ekg, cxr, CT head r/o ICH vs infectious etiology vs metabolic encephalopathy vs electrolyte abnormality.

## 2021-12-05 NOTE — ED PROVIDER NOTE - PATIENT PORTAL LINK FT
You can access the FollowMyHealth Patient Portal offered by [facility] by registering at the following website: [email or web address]. By joining [employer]’s FollowMyHealth portal, you will also be able to view your health information using other applications (apps) compatible with our system.

## 2021-12-05 NOTE — ED PROVIDER NOTE - PROGRESS NOTE DETAILS
patient sent to ed for agitation with staff members at [facility]. patient cooperative and appropriate in ED. patient sent to ed for agitation with staff members at [facility]. patient cooperative and appropriate in ED. spoke with nurse @ [facility] regarding patient, agree with discharge back to facility.

## 2021-12-05 NOTE — ED PROVIDER NOTE - CLINICAL SUMMARY MEDICAL DECISION MAKING FREE TEXT BOX
57 yo M with PMHx of CVA with residual cognitive deficits, DM, HTN, pancreatitis who was BIBEMS from [facility] for change in behavior upon waking up. Here in ED, vitals wnl. Pt calm, cooperative, appropriate. No focal neuro deficits. Labs wnl. Ua negative. Cxr clear. CT head negative. Ekg unremarkable. Discussed with [facility] staff, states that pt is back to baseline and they are comfortable with taking him back.

## 2021-12-05 NOTE — ED PROVIDER NOTE - NS ED ROS FT
GEN:  no fever, no chills, no generalized weakness  NEURO:  no headache, no dizziness  ENT: no runny nose  CV:  no chest pain  RESP:  no sob, no cough  GI:  no nausea, no vomiting, no abdominal pain, no diarrhea  :  no dysuria  MSK:  no edema  SKIN:  no rash  PSYCH:  no homicidal ideation, no suicidal ideation, no visual hallucinations, no auditory hallucinations

## 2021-12-05 NOTE — ED PROVIDER NOTE - NSFOLLOWUPINSTRUCTIONS_ED_ALL_ED_FT
Please follow up with your primary care doctor in 1-3 days  Please be aware of any new or worsening signs or symptoms that should prompt your return to the ER.

## 2021-12-05 NOTE — ED ADULT NURSE NOTE - NSICDXPASTMEDICALHX_GEN_ALL_CORE_FT
PAST MEDICAL HISTORY:  CVA (cerebral vascular accident)     Diabetes     Hypertension     Pancreatitis

## 2021-12-05 NOTE — ED PROVIDER NOTE - OBJECTIVE STATEMENT
55 yo M with PMHx of CVA with residual cognitive deficits, DM, HTN, pancreatitis who was BIBEMS from [facility] for change in behavior. Per transfer papers, pt woke up and was more agitated and combative than normal. Last time well unknown. Currently pt is A&Ox2 (does not know year) and denies any fever, headache, cp, sob, abd pain, dysuria, diarrhea, unilateral weakness, numbness, blurry vision, slurred speech, recent head trauma. No SI, HI, auditory/visual hallucinations.

## 2021-12-05 NOTE — ED PROVIDER NOTE - CARE PROVIDER_API CALL
Sang Montoya)  Internal Medicine  [address] [place] [address]  Phone: [phone]  Fax: [phone]  Follow Up Time: Routine

## 2021-12-06 LAB
CULTURE RESULTS: SIGNIFICANT CHANGE UP
SPECIMEN SOURCE: SIGNIFICANT CHANGE UP

## 2022-01-12 ENCOUNTER — EMERGENCY (EMERGENCY)
Facility: HOSPITAL | Age: 57
LOS: 0 days | Discharge: NURSING, REHAB OR LONG-TERM CARE | End: 2022-01-13
Attending: EMERGENCY MEDICINE | Admitting: EMERGENCY MEDICINE
Payer: MEDICAID

## 2022-01-12 VITALS
OXYGEN SATURATION: 98 % | SYSTOLIC BLOOD PRESSURE: 186 MMHG | HEIGHT: 71 IN | TEMPERATURE: 98 F | HEART RATE: 74 BPM | RESPIRATION RATE: 18 BRPM | WEIGHT: 220.02 LBS | DIASTOLIC BLOOD PRESSURE: 89 MMHG

## 2022-01-12 DIAGNOSIS — Z79.02 LONG TERM (CURRENT) USE OF ANTITHROMBOTICS/ANTIPLATELETS: ICD-10-CM

## 2022-01-12 DIAGNOSIS — Z79.82 LONG TERM (CURRENT) USE OF ASPIRIN: ICD-10-CM

## 2022-01-12 DIAGNOSIS — I10 ESSENTIAL (PRIMARY) HYPERTENSION: ICD-10-CM

## 2022-01-12 DIAGNOSIS — U07.1 COVID-19: ICD-10-CM

## 2022-01-12 DIAGNOSIS — E11.9 TYPE 2 DIABETES MELLITUS WITHOUT COMPLICATIONS: ICD-10-CM

## 2022-01-12 DIAGNOSIS — I69.319 UNSPECIFIED SYMPTOMS AND SIGNS INVOLVING COGNITIVE FUNCTIONS FOLLOWING CEREBRAL INFARCTION: ICD-10-CM

## 2022-01-12 DIAGNOSIS — R63.0 ANOREXIA: ICD-10-CM

## 2022-01-12 DIAGNOSIS — Z79.4 LONG TERM (CURRENT) USE OF INSULIN: ICD-10-CM

## 2022-01-12 DIAGNOSIS — R53.83 OTHER FATIGUE: ICD-10-CM

## 2022-01-12 LAB
ALBUMIN SERPL ELPH-MCNC: 3.8 G/DL — SIGNIFICANT CHANGE UP (ref 3.5–5.2)
ALP SERPL-CCNC: 98 U/L — SIGNIFICANT CHANGE UP (ref 30–115)
ALT FLD-CCNC: 25 U/L — SIGNIFICANT CHANGE UP (ref 0–41)
ANION GAP SERPL CALC-SCNC: 11 MMOL/L — SIGNIFICANT CHANGE UP (ref 7–14)
AST SERPL-CCNC: 17 U/L — SIGNIFICANT CHANGE UP (ref 0–41)
BASOPHILS # BLD AUTO: 0.05 K/UL — SIGNIFICANT CHANGE UP (ref 0–0.2)
BASOPHILS NFR BLD AUTO: 0.5 % — SIGNIFICANT CHANGE UP (ref 0–1)
BILIRUB SERPL-MCNC: 0.5 MG/DL — SIGNIFICANT CHANGE UP (ref 0.2–1.2)
BUN SERPL-MCNC: 19 MG/DL — SIGNIFICANT CHANGE UP (ref 10–20)
CALCIUM SERPL-MCNC: 9.2 MG/DL — SIGNIFICANT CHANGE UP (ref 8.5–10.1)
CHLORIDE SERPL-SCNC: 105 MMOL/L — SIGNIFICANT CHANGE UP (ref 98–110)
CO2 SERPL-SCNC: 29 MMOL/L — SIGNIFICANT CHANGE UP (ref 17–32)
CREAT SERPL-MCNC: 0.9 MG/DL — SIGNIFICANT CHANGE UP (ref 0.7–1.5)
EOSINOPHIL # BLD AUTO: 0.31 K/UL — SIGNIFICANT CHANGE UP (ref 0–0.7)
EOSINOPHIL NFR BLD AUTO: 3.3 % — SIGNIFICANT CHANGE UP (ref 0–8)
GLUCOSE SERPL-MCNC: 208 MG/DL — HIGH (ref 70–99)
HCT VFR BLD CALC: 47.6 % — SIGNIFICANT CHANGE UP (ref 42–52)
HGB BLD-MCNC: 15.7 G/DL — SIGNIFICANT CHANGE UP (ref 14–18)
IMM GRANULOCYTES NFR BLD AUTO: 0.3 % — SIGNIFICANT CHANGE UP (ref 0.1–0.3)
LIDOCAIN IGE QN: 15 U/L — SIGNIFICANT CHANGE UP (ref 7–60)
LYMPHOCYTES # BLD AUTO: 1.99 K/UL — SIGNIFICANT CHANGE UP (ref 1.2–3.4)
LYMPHOCYTES # BLD AUTO: 21.2 % — SIGNIFICANT CHANGE UP (ref 20.5–51.1)
MAGNESIUM SERPL-MCNC: 1.9 MG/DL — SIGNIFICANT CHANGE UP (ref 1.8–2.4)
MCHC RBC-ENTMCNC: 28.9 PG — SIGNIFICANT CHANGE UP (ref 27–31)
MCHC RBC-ENTMCNC: 33 G/DL — SIGNIFICANT CHANGE UP (ref 32–37)
MCV RBC AUTO: 87.5 FL — SIGNIFICANT CHANGE UP (ref 80–94)
MONOCYTES # BLD AUTO: 0.66 K/UL — HIGH (ref 0.1–0.6)
MONOCYTES NFR BLD AUTO: 7 % — SIGNIFICANT CHANGE UP (ref 1.7–9.3)
NEUTROPHILS # BLD AUTO: 6.36 K/UL — SIGNIFICANT CHANGE UP (ref 1.4–6.5)
NEUTROPHILS NFR BLD AUTO: 67.7 % — SIGNIFICANT CHANGE UP (ref 42.2–75.2)
NRBC # BLD: 0 /100 WBCS — SIGNIFICANT CHANGE UP (ref 0–0)
PLATELET # BLD AUTO: 309 K/UL — SIGNIFICANT CHANGE UP (ref 130–400)
POTASSIUM SERPL-MCNC: 3.6 MMOL/L — SIGNIFICANT CHANGE UP (ref 3.5–5)
POTASSIUM SERPL-SCNC: 3.6 MMOL/L — SIGNIFICANT CHANGE UP (ref 3.5–5)
PROT SERPL-MCNC: 6.9 G/DL — SIGNIFICANT CHANGE UP (ref 6–8)
RBC # BLD: 5.44 M/UL — SIGNIFICANT CHANGE UP (ref 4.7–6.1)
RBC # FLD: 12.7 % — SIGNIFICANT CHANGE UP (ref 11.5–14.5)
SARS-COV-2 RNA SPEC QL NAA+PROBE: DETECTED
SODIUM SERPL-SCNC: 145 MMOL/L — SIGNIFICANT CHANGE UP (ref 135–146)
WBC # BLD: 9.4 K/UL — SIGNIFICANT CHANGE UP (ref 4.8–10.8)
WBC # FLD AUTO: 9.4 K/UL — SIGNIFICANT CHANGE UP (ref 4.8–10.8)

## 2022-01-12 PROCEDURE — 99285 EMERGENCY DEPT VISIT HI MDM: CPT

## 2022-01-12 PROCEDURE — 71045 X-RAY EXAM CHEST 1 VIEW: CPT | Mod: 26

## 2022-01-12 PROCEDURE — 70450 CT HEAD/BRAIN W/O DYE: CPT | Mod: 26,MA

## 2022-01-12 RX ORDER — SODIUM CHLORIDE 9 MG/ML
1000 INJECTION INTRAMUSCULAR; INTRAVENOUS; SUBCUTANEOUS ONCE
Refills: 0 | Status: COMPLETED | OUTPATIENT
Start: 2022-01-12 | End: 2022-01-12

## 2022-01-12 RX ADMIN — SODIUM CHLORIDE 2000 MILLILITER(S): 9 INJECTION INTRAMUSCULAR; INTRAVENOUS; SUBCUTANEOUS at 19:53

## 2022-01-12 NOTE — ED PROVIDER NOTE - CLINICAL SUMMARY MEDICAL DECISION MAKING FREE TEXT BOX
Patient with covid, explains decreased appetite and increased sleeping. No PNA on XR, labs normal. CT head without acute findings. After fluids is more awake, tolerating PO.    Will dc back to [facility]'s residence - no sign of PNA, no hypoxia to warrant admission that time.    Discussed results with [facility]'s staff who escalated to their supervisor to ensure that patient can properly quarantine at the facility -- will dc home

## 2022-01-12 NOTE — ED PROVIDER NOTE - PHYSICAL EXAMINATION
VITAL SIGNS: I have reviewed nursing notes and confirm.  CONSTITUTIONAL: Well-developed; chronically ill  SKIN: Skin exam is warm and dry, no acute rash.  HEAD: Normocephalic; atraumatic.  EYES: PERRL, EOM intact; conjunctiva and sclera clear.  ENT: No nasal discharge; airway clear.  CARD: RRR, no murmur  RESP: No wheezes, rales or rhonchi.  ABD: Normal bowel sounds; soft; non-distended; non-tender  EXT: Normal ROM. No clubbing, cyanosis or edema.  NEURO: Alert, oriented x 2, not time, moving limbs slowly, speaking slowly but answering questions and following commands.  PSYCH: Cooperative, appropriate.

## 2022-01-12 NOTE — ED PROVIDER NOTE - NS ED ROS FT
Limited due to cognitive deficit from CVA   Constitutional: no fever, chills, NH staff reports decreased appetite  Cardiac: No chest pain, SOB or edema.  Respiratory: No cough or respiratory distress  GI: see HPI  : No dysuria, frequency, urgency or hematuria  MS: no pain to back or extremities, no loss of ROM, no weakness  Neuro: see HPI  Skin: No skin rash.  Except as documented in the HPI, all other systems are negative.

## 2022-01-12 NOTE — ED PROVIDER NOTE - PATIENT PORTAL LINK FT
You can access the FollowMyHealth Patient Portal offered by [facility] by registering at the following website: [email or web address]. By joining [employer]’s FollowMyHealth portal, you will also be able to view your health information using other applications (apps) compatible with our system.

## 2022-01-12 NOTE — ED PROVIDER NOTE - OBJECTIVE STATEMENT
57 yo M with PMHx of CVA with residual cognitive deficits, DM, HTN sent in from [facility]'s Residence for change in behavior -- patient noted by staff to be "lethargic" and have decreased appetite today. The patient has no complaints, but on ROS admits to being tired.    Denies CP, dyspnea, nausea, vomiting, abdominal pain, fever, chills.

## 2022-01-12 NOTE — ED ADULT TRIAGE NOTE - CHIEF COMPLAINT QUOTE
BIBA from [facility] for lethargy and decreased appetite BIBA from [facility] for lethargy and decreased appetite

## 2022-01-13 VITALS
DIASTOLIC BLOOD PRESSURE: 78 MMHG | HEART RATE: 67 BPM | SYSTOLIC BLOOD PRESSURE: 168 MMHG | OXYGEN SATURATION: 95 % | RESPIRATION RATE: 18 BRPM | TEMPERATURE: 97 F

## 2022-01-30 ENCOUNTER — EMERGENCY (EMERGENCY)
Facility: HOSPITAL | Age: 57
LOS: 0 days | Discharge: HOME | End: 2022-01-30
Attending: EMERGENCY MEDICINE | Admitting: EMERGENCY MEDICINE
Payer: MEDICAID

## 2022-01-30 VITALS
OXYGEN SATURATION: 98 % | RESPIRATION RATE: 18 BRPM | TEMPERATURE: 96 F | DIASTOLIC BLOOD PRESSURE: 57 MMHG | HEIGHT: 71 IN | SYSTOLIC BLOOD PRESSURE: 173 MMHG | WEIGHT: 220.02 LBS | HEART RATE: 72 BPM

## 2022-01-30 VITALS
RESPIRATION RATE: 18 BRPM | SYSTOLIC BLOOD PRESSURE: 167 MMHG | HEART RATE: 73 BPM | TEMPERATURE: 98 F | DIASTOLIC BLOOD PRESSURE: 80 MMHG | OXYGEN SATURATION: 98 %

## 2022-01-30 DIAGNOSIS — R22.0 LOCALIZED SWELLING, MASS AND LUMP, HEAD: ICD-10-CM

## 2022-01-30 DIAGNOSIS — I10 ESSENTIAL (PRIMARY) HYPERTENSION: ICD-10-CM

## 2022-01-30 DIAGNOSIS — E11.9 TYPE 2 DIABETES MELLITUS WITHOUT COMPLICATIONS: ICD-10-CM

## 2022-01-30 DIAGNOSIS — Z87.891 PERSONAL HISTORY OF NICOTINE DEPENDENCE: ICD-10-CM

## 2022-01-30 DIAGNOSIS — R11.10 VOMITING, UNSPECIFIED: ICD-10-CM
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
## 2022-01-30 DIAGNOSIS — S00.83XA CONTUSION OF OTHER PART OF HEAD, INITIAL ENCOUNTER: ICD-10-CM

## 2022-01-30 DIAGNOSIS — Z86.73 PERSONAL HISTORY OF TRANSIENT ISCHEMIC ATTACK (TIA), AND CEREBRAL INFARCTION WITHOUT RESIDUAL DEFICITS: ICD-10-CM

## 2022-01-30 DIAGNOSIS — Z79.4 LONG TERM (CURRENT) USE OF INSULIN: ICD-10-CM

## 2022-01-30 DIAGNOSIS — S09.90XA UNSPECIFIED INJURY OF HEAD, INITIAL ENCOUNTER: ICD-10-CM
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
## 2022-01-30 DIAGNOSIS — Z79.82 LONG TERM (CURRENT) USE OF ASPIRIN: ICD-10-CM

## 2022-01-30 PROCEDURE — 93010 ELECTROCARDIOGRAM REPORT: CPT

## 2022-01-30 PROCEDURE — 70450 CT HEAD/BRAIN W/O DYE: CPT | Mod: 26,MA

## 2022-01-30 PROCEDURE — 99285 EMERGENCY DEPT VISIT HI MDM: CPT

## 2022-01-30 PROCEDURE — 72125 CT NECK SPINE W/O DYE: CPT | Mod: 26,MA

## 2022-01-30 NOTE — ED ADULT NURSE NOTE - NSIMPLEMENTINTERV_GEN_ALL_ED
Implemented All Fall Risk Interventions:  [place] to call system. Call bell, personal items and telephone within reach. Instruct patient to call for assistance. Room bathroom lighting operational. Non-slip footwear when patient is off stretcher. Physically safe environment: no spills, clutter or unnecessary equipment. Stretcher in lowest position, wheels locked, appropriate side rails in place. Provide visual cue, wrist band, yellow gown, etc. Monitor gait and stability. Monitor for mental status changes and reorient to person, place, and time. Review medications for side effects contributing to fall risk. Reinforce activity limits and safety measures with patient and family.

## 2022-01-30 NOTE — ED ADULT NURSE REASSESSMENT NOTE - NS ED NURSE REASSESS COMMENT FT1
Verbal report given to receiving RN Lee at [facility]. Pt's CT scans negative. Pt to be discharged back to Adult Home.

## 2022-01-30 NOTE — ED PROVIDER NOTE - CLINICAL SUMMARY MEDICAL DECISION MAKING FREE TEXT BOX
56 year old male with a pmh of CVA DM HTN pancreatitis biba from [facility] after a witnessed fall in the dining room. + head trauma no loc not on a/c with 1 episode of vomiting. Patient denies any headache neck pain cp sob abdominal pain. VS reviewed. CT HEAD & cervical negative. Patient discharged back to [facility].

## 2022-01-30 NOTE — ED PROVIDER NOTE - PATIENT PORTAL LINK FT
You can access the FollowMyHealth Patient Portal offered by [facility] by registering at the following website: [email or web address]. By joining [employer]’s FollowMyHealth portal, you will also be able to view your health information using other applications (apps) compatible with our system.

## 2022-01-30 NOTE — ED PROVIDER NOTE - ATTENDING CONTRIBUTION TO CARE
I personally evaluated the patient. I reviewed the Resident’s or Physician Assistant’s note (as assigned above), and agree with the findings and plan except as documented in my note.  56 year old male with a pmh of CVA DM HTN pancreatitis biba from [facility] after a witnessed fall in the dining room. + head trauma no loc not on a/c with 1 episode of vomiting. Patient denies any headache neck pain cp sob abdominal pain.  on exam  CONSTITUTIONAL: WA / WN / NAD  HEAD: +frontal hematoma  EYES: PERRL; EOMI;   ENT: Normal pharynx; mucous membranes pink/moist, no erythema.  NECK: Supple; no midline c spine  CARD: RRR; nl S1/S2; no M/R/G.   RESP: Respiratory rate and effort are normal; breath sounds clear and equal bilaterally.  ABD: Soft, NT ND   MSK/EXT: No gross deformities; full range of motion. No midline CTLS TTP  SKIN: Warm and dry;   NEURO: no focal weakness   PSYCH: Normal Affect

## 2022-01-30 NOTE — ED PROVIDER NOTE - OBJECTIVE STATEMENT
patient sent from [facility], witnessed fall in dining room this am, Hit head, No LOC, Vomited 1 time after hitting head, Sent for eval, Patient H/o CVA with cognitive disorder/dementia, doesn't remember why he is here, Denies, HA,chest pain, SOB<. neck or back pain

## 2022-01-30 NOTE — ED PROVIDER NOTE - ENMT, MLM
Airway patent, Nasal mucosa clear. Mouth with normal mucosa. Throat has no vesicles, no oropharyngeal exudates and uvula is midline. 1 cm hard lump to mid forehead, nontender

## 2022-01-30 NOTE — ED ADULT NURSE NOTE - NSFALLRSKASSESASSIST_ED_ALL_ED
Mother rtnd call, notified that rx was sent to preferred pharm in [place], and reviewed Dr Giron recommendations.  She voiced understanding.   yes

## 2022-01-30 NOTE — ED ADULT TRIAGE NOTE - CHIEF COMPLAINT QUOTE
As per EMS, patient had a fall at [facility]. Patient has hematoma to forehead, patient does not remember falling

## 2022-03-10 ENCOUNTER — INPATIENT (INPATIENT)
Facility: HOSPITAL | Age: 57
LOS: 5 days | Discharge: NURSING, REHAB OR LONG-TERM CARE | End: 2022-03-16
Attending: INTERNAL MEDICINE | Admitting: INTERNAL MEDICINE
Payer: MEDICAID

## 2022-03-10 VITALS
OXYGEN SATURATION: 97 % | RESPIRATION RATE: 18 BRPM | DIASTOLIC BLOOD PRESSURE: 78 MMHG | HEART RATE: 100 BPM | WEIGHT: 220.02 LBS | HEIGHT: 71 IN | TEMPERATURE: 100 F | SYSTOLIC BLOOD PRESSURE: 139 MMHG

## 2022-03-10 LAB
ALBUMIN SERPL ELPH-MCNC: 3.8 G/DL — SIGNIFICANT CHANGE UP (ref 3.5–5.2)
ALP SERPL-CCNC: 89 U/L — SIGNIFICANT CHANGE UP (ref 30–115)
ALT FLD-CCNC: 22 U/L — SIGNIFICANT CHANGE UP (ref 0–41)
ANION GAP SERPL CALC-SCNC: 14 MMOL/L — SIGNIFICANT CHANGE UP (ref 7–14)
APPEARANCE UR: CLEAR — SIGNIFICANT CHANGE UP
AST SERPL-CCNC: 18 U/L — SIGNIFICANT CHANGE UP (ref 0–41)
BACTERIA # UR AUTO: NEGATIVE — SIGNIFICANT CHANGE UP
BASE EXCESS BLDV CALC-SCNC: 0.8 MMOL/L — SIGNIFICANT CHANGE UP (ref -2–3)
BASOPHILS # BLD AUTO: 0.06 K/UL — SIGNIFICANT CHANGE UP (ref 0–0.2)
BASOPHILS NFR BLD AUTO: 0.3 % — SIGNIFICANT CHANGE UP (ref 0–1)
BILIRUB SERPL-MCNC: 0.7 MG/DL — SIGNIFICANT CHANGE UP (ref 0.2–1.2)
BILIRUB UR-MCNC: ABNORMAL
BUN SERPL-MCNC: 18 MG/DL — SIGNIFICANT CHANGE UP (ref 10–20)
CALCIUM SERPL-MCNC: 9.2 MG/DL — SIGNIFICANT CHANGE UP (ref 8.5–10.1)
CHLORIDE SERPL-SCNC: 106 MMOL/L — SIGNIFICANT CHANGE UP (ref 98–110)
CO2 SERPL-SCNC: 22 MMOL/L — SIGNIFICANT CHANGE UP (ref 17–32)
COLOR SPEC: ABNORMAL
CREAT SERPL-MCNC: 0.7 MG/DL — SIGNIFICANT CHANGE UP (ref 0.7–1.5)
DIFF PNL FLD: NEGATIVE — SIGNIFICANT CHANGE UP
EGFR: 107 ML/MIN/1.73M2 — SIGNIFICANT CHANGE UP
EOSINOPHIL # BLD AUTO: 0.01 K/UL — SIGNIFICANT CHANGE UP (ref 0–0.7)
EOSINOPHIL NFR BLD AUTO: 0.1 % — SIGNIFICANT CHANGE UP (ref 0–8)
EPI CELLS # UR: 4 /HPF — SIGNIFICANT CHANGE UP (ref 0–5)
FLUAV AG NPH QL: SIGNIFICANT CHANGE UP
FLUBV AG NPH QL: SIGNIFICANT CHANGE UP
GLUCOSE BLDC GLUCOMTR-MCNC: 158 MG/DL — HIGH (ref 70–99)
GLUCOSE SERPL-MCNC: 273 MG/DL — HIGH (ref 70–99)
GLUCOSE UR QL: ABNORMAL
HCO3 BLDV-SCNC: 25 MMOL/L — SIGNIFICANT CHANGE UP (ref 22–29)
HCT VFR BLD CALC: 45.9 % — SIGNIFICANT CHANGE UP (ref 42–52)
HGB BLD-MCNC: 15.4 G/DL — SIGNIFICANT CHANGE UP (ref 14–18)
HYALINE CASTS # UR AUTO: 38 /LPF — HIGH (ref 0–7)
IMM GRANULOCYTES NFR BLD AUTO: 0.7 % — HIGH (ref 0.1–0.3)
KETONES UR-MCNC: SIGNIFICANT CHANGE UP
LACTATE BLDV-MCNC: 1.1 MMOL/L — SIGNIFICANT CHANGE UP (ref 0.5–2)
LACTATE SERPL-SCNC: 1.1 MMOL/L — SIGNIFICANT CHANGE UP (ref 0.7–2)
LEUKOCYTE ESTERASE UR-ACNC: NEGATIVE — SIGNIFICANT CHANGE UP
LYMPHOCYTES # BLD AUTO: 0.56 K/UL — LOW (ref 1.2–3.4)
LYMPHOCYTES # BLD AUTO: 3.2 % — LOW (ref 20.5–51.1)
MCHC RBC-ENTMCNC: 29.1 PG — SIGNIFICANT CHANGE UP (ref 27–31)
MCHC RBC-ENTMCNC: 33.6 G/DL — SIGNIFICANT CHANGE UP (ref 32–37)
MCV RBC AUTO: 86.6 FL — SIGNIFICANT CHANGE UP (ref 80–94)
MONOCYTES # BLD AUTO: 0.58 K/UL — SIGNIFICANT CHANGE UP (ref 0.1–0.6)
MONOCYTES NFR BLD AUTO: 3.3 % — SIGNIFICANT CHANGE UP (ref 1.7–9.3)
NEUTROPHILS # BLD AUTO: 16.05 K/UL — HIGH (ref 1.4–6.5)
NEUTROPHILS NFR BLD AUTO: 92.4 % — HIGH (ref 42.2–75.2)
NITRITE UR-MCNC: NEGATIVE — SIGNIFICANT CHANGE UP
NRBC # BLD: 0 /100 WBCS — SIGNIFICANT CHANGE UP (ref 0–0)
PCO2 BLDV: 39 MMHG — LOW (ref 42–55)
PH BLDV: 7.42 — SIGNIFICANT CHANGE UP (ref 7.32–7.43)
PH UR: 5.5 — SIGNIFICANT CHANGE UP (ref 5–8)
PLATELET # BLD AUTO: 297 K/UL — SIGNIFICANT CHANGE UP (ref 130–400)
PO2 BLDV: 57 MMHG — SIGNIFICANT CHANGE UP
POTASSIUM SERPL-MCNC: 3.7 MMOL/L — SIGNIFICANT CHANGE UP (ref 3.5–5)
POTASSIUM SERPL-SCNC: 3.7 MMOL/L — SIGNIFICANT CHANGE UP (ref 3.5–5)
PROT SERPL-MCNC: 6.7 G/DL — SIGNIFICANT CHANGE UP (ref 6–8)
PROT UR-MCNC: ABNORMAL
RBC # BLD: 5.3 M/UL — SIGNIFICANT CHANGE UP (ref 4.7–6.1)
RBC # FLD: 13.4 % — SIGNIFICANT CHANGE UP (ref 11.5–14.5)
RBC CASTS # UR COMP ASSIST: 8 /HPF — HIGH (ref 0–4)
RSV RNA NPH QL NAA+NON-PROBE: SIGNIFICANT CHANGE UP
SAO2 % BLDV: 89.9 % — SIGNIFICANT CHANGE UP
SARS-COV-2 RNA SPEC QL NAA+PROBE: SIGNIFICANT CHANGE UP
SODIUM SERPL-SCNC: 142 MMOL/L — SIGNIFICANT CHANGE UP (ref 135–146)
SP GR SPEC: 1.03 — HIGH (ref 1.01–1.03)
TROPONIN T SERPL-MCNC: <0.01 NG/ML — SIGNIFICANT CHANGE UP
UROBILINOGEN FLD QL: ABNORMAL
WBC # BLD: 17.39 K/UL — HIGH (ref 4.8–10.8)
WBC # FLD AUTO: 17.39 K/UL — HIGH (ref 4.8–10.8)
WBC UR QL: 8 /HPF — HIGH (ref 0–5)

## 2022-03-10 PROCEDURE — 71045 X-RAY EXAM CHEST 1 VIEW: CPT | Mod: 26

## 2022-03-10 PROCEDURE — 99285 EMERGENCY DEPT VISIT HI MDM: CPT

## 2022-03-10 PROCEDURE — 70450 CT HEAD/BRAIN W/O DYE: CPT | Mod: 26,MA

## 2022-03-10 PROCEDURE — 93010 ELECTROCARDIOGRAM REPORT: CPT

## 2022-03-10 PROCEDURE — 74176 CT ABD & PELVIS W/O CONTRAST: CPT | Mod: 26,MA

## 2022-03-10 RX ORDER — SODIUM CHLORIDE 9 MG/ML
1000 INJECTION INTRAMUSCULAR; INTRAVENOUS; SUBCUTANEOUS ONCE
Refills: 0 | Status: COMPLETED | OUTPATIENT
Start: 2022-03-10 | End: 2022-03-10

## 2022-03-10 RX ADMIN — SODIUM CHLORIDE 1000 MILLILITER(S): 9 INJECTION INTRAMUSCULAR; INTRAVENOUS; SUBCUTANEOUS at 16:01

## 2022-03-10 NOTE — ED ADULT TRIAGE NOTE - CHIEF COMPLAINT QUOTE
Weakness for 2-4 days. "Couldn't get OOB this morning." Pt vomited x1 en route to hospital, was given zofran by EMS.

## 2022-03-10 NOTE — ED PROVIDER NOTE - ATTENDING CONTRIBUTION TO CARE
57 y/o male h/o HTN, DM, CVA, denies sig PSH p/w generalized weakness and inability to ambulate x several days, persistent, denies modifying factors, + nbnb vomiting, denies fever, cough, diarrhea, respiratory sx, change in bowel habits or urinary sx, penile d/c or other associated complaints at present.     Old chart reviewed.  I have reviewed and agree with the initial nursing note, except as documented in my note.    VSS, awake, alert, non-toxic appearing, lying comfortably in stretcher, in NAD, oropharynx clear, mmm, no skin rash or lesions, chest CTAB, non-labored breathing, no w/r/r, +S1/S2, RRR, no m/r/g, abdomen soft, NT, ND, +BS, no hernias or distention, no pulsatile masses or bruits appreciated, no CVA tenderness, no peripheral edema or deformities, alert, soft / mumbled speech.

## 2022-03-10 NOTE — ED ADULT NURSE REASSESSMENT NOTE - NS ED NURSE REASSESS COMMENT FT1
received pt from previous RN in bed, pt refuses to speak, uses nod to respond to questions. when asked if in pain, will only shake head, and when asked to say why he came, will not voice any words. when asked if he is fine, he nods.

## 2022-03-10 NOTE — ED PROVIDER NOTE - PHYSICAL EXAMINATION
CONST: Well appearing, non toxic  EYES: PERRL, EOMI, Sclera and conjunctiva clear.   ENT: No nasal discharge. Oropharynx normal appearing  NECK: Non-tender, no meningeal signs. normal ROM. supple   CARD: Normal S1 S2; Normal rate and rhythm  RESP: Equal BS B/L, No wheezes, rhonchi or rales. No distress  GI: Soft, non-tender, non-distended. no cva tenderness. normal BS  MS: Normal ROM in all extremities. No midline spinal tenderness. pulses 2 +. no calf tenderness or swelling  SKIN: Warm, dry, no acute rashes. Good turgor  NEURO: Alert and oriented, No focal deficits.

## 2022-03-10 NOTE — ED ADULT NURSE NOTE - OBJECTIVE STATEMENT
Patient present to ED from home via EMS, states that he is not able to get out of bed and weak x 4 days. Patient not speaking upon assessment but follow command.

## 2022-03-10 NOTE — PATIENT PROFILE ADULT - NSPROHMSYMPCOND_GEN_A_NUR
none Pt reports living in a 1 story house with 4 steps to enter.  Lives with family, who is always there to assist.  Independent with all PTA, without devices.

## 2022-03-10 NOTE — PATIENT PROFILE ADULT - FALL HARM RISK - RISK INTERVENTIONS
Assistance OOB with selected safe patient handling equipment/Assistance with ambulation/Communicate Fall Risk and Risk Factors to all staff, patient, and family/Discuss with provider need for PT consult/Monitor gait and stability/Reinforce activity limits and safety measures with patient and family/Visual Cue: Yellow wristband/Bed in lowest position, wheels locked, appropriate side rails in place/Call bell, personal items and telephone in reach/Instruct patient to call for assistance before getting out of bed or chair/Non-slip footwear when patient is out of bed/[place] to call system/Physically safe environment - no spills, clutter or unnecessary equipment/Purposeful Proactive Rounding/Room/bathroom lighting operational, light cord in reach

## 2022-03-10 NOTE — ED PROVIDER NOTE - CARE PLAN
1 Principal Discharge DX:	Lethargy  Secondary Diagnosis:	Impaired ambulation   Principal Discharge DX:	Lethargy  Secondary Diagnosis:	Leukocytosis

## 2022-03-10 NOTE — ED PROVIDER NOTE - OBJECTIVE STATEMENT
56 year old male with pmhx of dm, hnt, cva, covid in Jan, sent in for increased lethargy over last week. decrease ambulation and PO. + nbnb. no fever, chills, chest pain, sob, abd pain, diarrhea or urinary symptoms.

## 2022-03-11 LAB
ALBUMIN SERPL ELPH-MCNC: 3.2 G/DL — LOW (ref 3.5–5.2)
ALP SERPL-CCNC: 74 U/L — SIGNIFICANT CHANGE UP (ref 30–115)
ALT FLD-CCNC: 22 U/L — SIGNIFICANT CHANGE UP (ref 0–41)
ANION GAP SERPL CALC-SCNC: 11 MMOL/L — SIGNIFICANT CHANGE UP (ref 7–14)
AST SERPL-CCNC: 20 U/L — SIGNIFICANT CHANGE UP (ref 0–41)
BILIRUB SERPL-MCNC: 0.5 MG/DL — SIGNIFICANT CHANGE UP (ref 0.2–1.2)
BUN SERPL-MCNC: 15 MG/DL — SIGNIFICANT CHANGE UP (ref 10–20)
CALCIUM SERPL-MCNC: 8.6 MG/DL — SIGNIFICANT CHANGE UP (ref 8.5–10.1)
CHLORIDE SERPL-SCNC: 108 MMOL/L — SIGNIFICANT CHANGE UP (ref 98–110)
CO2 SERPL-SCNC: 24 MMOL/L — SIGNIFICANT CHANGE UP (ref 17–32)
CREAT SERPL-MCNC: 0.6 MG/DL — LOW (ref 0.7–1.5)
CULTURE RESULTS: NO GROWTH — SIGNIFICANT CHANGE UP
EGFR: 112 ML/MIN/1.73M2 — SIGNIFICANT CHANGE UP
GLUCOSE BLDC GLUCOMTR-MCNC: 133 MG/DL — HIGH (ref 70–99)
GLUCOSE BLDC GLUCOMTR-MCNC: 175 MG/DL — HIGH (ref 70–99)
GLUCOSE BLDC GLUCOMTR-MCNC: 198 MG/DL — HIGH (ref 70–99)
GLUCOSE BLDC GLUCOMTR-MCNC: 203 MG/DL — HIGH (ref 70–99)
GLUCOSE SERPL-MCNC: 123 MG/DL — HIGH (ref 70–99)
HCT VFR BLD CALC: 40.6 % — LOW (ref 42–52)
HGB BLD-MCNC: 14 G/DL — SIGNIFICANT CHANGE UP (ref 14–18)
MAGNESIUM SERPL-MCNC: 1.7 MG/DL — LOW (ref 1.8–2.4)
MCHC RBC-ENTMCNC: 30 PG — SIGNIFICANT CHANGE UP (ref 27–31)
MCHC RBC-ENTMCNC: 34.5 G/DL — SIGNIFICANT CHANGE UP (ref 32–37)
MCV RBC AUTO: 86.9 FL — SIGNIFICANT CHANGE UP (ref 80–94)
NRBC # BLD: 0 /100 WBCS — SIGNIFICANT CHANGE UP (ref 0–0)
PLATELET # BLD AUTO: 251 K/UL — SIGNIFICANT CHANGE UP (ref 130–400)
POTASSIUM SERPL-MCNC: 3.4 MMOL/L — LOW (ref 3.5–5)
POTASSIUM SERPL-SCNC: 3.4 MMOL/L — LOW (ref 3.5–5)
PROT SERPL-MCNC: 5.8 G/DL — LOW (ref 6–8)
RBC # BLD: 4.67 M/UL — LOW (ref 4.7–6.1)
RBC # FLD: 13.5 % — SIGNIFICANT CHANGE UP (ref 11.5–14.5)
SODIUM SERPL-SCNC: 143 MMOL/L — SIGNIFICANT CHANGE UP (ref 135–146)
SPECIMEN SOURCE: SIGNIFICANT CHANGE UP
WBC # BLD: 8.01 K/UL — SIGNIFICANT CHANGE UP (ref 4.8–10.8)
WBC # FLD AUTO: 8.01 K/UL — SIGNIFICANT CHANGE UP (ref 4.8–10.8)

## 2022-03-11 PROCEDURE — 99222 1ST HOSP IP/OBS MODERATE 55: CPT

## 2022-03-11 PROCEDURE — 99222 1ST HOSP IP/OBS MODERATE 55: CPT | Mod: GC

## 2022-03-11 PROCEDURE — 71045 X-RAY EXAM CHEST 1 VIEW: CPT | Mod: 26

## 2022-03-11 PROCEDURE — 95819 EEG AWAKE AND ASLEEP: CPT | Mod: 26

## 2022-03-11 RX ORDER — ATORVASTATIN CALCIUM 80 MG/1
80 TABLET, FILM COATED ORAL AT BEDTIME
Refills: 0 | Status: DISCONTINUED | OUTPATIENT
Start: 2022-03-11 | End: 2022-03-16

## 2022-03-11 RX ORDER — MEMANTINE HYDROCHLORIDE 10 MG/1
10 TABLET ORAL
Refills: 0 | Status: DISCONTINUED | OUTPATIENT
Start: 2022-03-11 | End: 2022-03-16

## 2022-03-11 RX ORDER — ASPIRIN/CALCIUM CARB/MAGNESIUM 324 MG
81 TABLET ORAL DAILY
Refills: 0 | Status: DISCONTINUED | OUTPATIENT
Start: 2022-03-11 | End: 2022-03-16

## 2022-03-11 RX ORDER — POTASSIUM CHLORIDE 20 MEQ
20 PACKET (EA) ORAL ONCE
Refills: 0 | Status: COMPLETED | OUTPATIENT
Start: 2022-03-11 | End: 2022-03-11

## 2022-03-11 RX ORDER — SODIUM CHLORIDE 9 MG/ML
1000 INJECTION INTRAMUSCULAR; INTRAVENOUS; SUBCUTANEOUS
Refills: 0 | Status: DISCONTINUED | OUTPATIENT
Start: 2022-03-11 | End: 2022-03-11

## 2022-03-11 RX ORDER — LISINOPRIL 2.5 MG/1
40 TABLET ORAL DAILY
Refills: 0 | Status: DISCONTINUED | OUTPATIENT
Start: 2022-03-11 | End: 2022-03-16

## 2022-03-11 RX ORDER — METOPROLOL TARTRATE 50 MG
50 TABLET ORAL DAILY
Refills: 0 | Status: DISCONTINUED | OUTPATIENT
Start: 2022-03-11 | End: 2022-03-16

## 2022-03-11 RX ORDER — MAGNESIUM SULFATE 500 MG/ML
2 VIAL (ML) INJECTION ONCE
Refills: 0 | Status: COMPLETED | OUTPATIENT
Start: 2022-03-11 | End: 2022-03-11

## 2022-03-11 RX ORDER — PANTOPRAZOLE SODIUM 20 MG/1
40 TABLET, DELAYED RELEASE ORAL
Refills: 0 | Status: DISCONTINUED | OUTPATIENT
Start: 2022-03-11 | End: 2022-03-16

## 2022-03-11 RX ORDER — ASPIRIN/CALCIUM CARB/MAGNESIUM 324 MG
325 TABLET ORAL DAILY
Refills: 0 | Status: DISCONTINUED | OUTPATIENT
Start: 2022-03-11 | End: 2022-03-11

## 2022-03-11 RX ORDER — SENNA PLUS 8.6 MG/1
2 TABLET ORAL AT BEDTIME
Refills: 0 | Status: DISCONTINUED | OUTPATIENT
Start: 2022-03-11 | End: 2022-03-16

## 2022-03-11 RX ORDER — CLOPIDOGREL BISULFATE 75 MG/1
75 TABLET, FILM COATED ORAL DAILY
Refills: 0 | Status: DISCONTINUED | OUTPATIENT
Start: 2022-03-11 | End: 2022-03-16

## 2022-03-11 RX ORDER — ENOXAPARIN SODIUM 100 MG/ML
40 INJECTION SUBCUTANEOUS EVERY 24 HOURS
Refills: 0 | Status: DISCONTINUED | OUTPATIENT
Start: 2022-03-11 | End: 2022-03-16

## 2022-03-11 RX ORDER — DONEPEZIL HYDROCHLORIDE 10 MG/1
20 TABLET, FILM COATED ORAL AT BEDTIME
Refills: 0 | Status: DISCONTINUED | OUTPATIENT
Start: 2022-03-11 | End: 2022-03-16

## 2022-03-11 RX ADMIN — Medication 25 GRAM(S): at 10:50

## 2022-03-11 RX ADMIN — PANTOPRAZOLE SODIUM 40 MILLIGRAM(S): 20 TABLET, DELAYED RELEASE ORAL at 06:26

## 2022-03-11 RX ADMIN — CLOPIDOGREL BISULFATE 75 MILLIGRAM(S): 75 TABLET, FILM COATED ORAL at 11:22

## 2022-03-11 RX ADMIN — MEMANTINE HYDROCHLORIDE 10 MILLIGRAM(S): 10 TABLET ORAL at 17:03

## 2022-03-11 RX ADMIN — ATORVASTATIN CALCIUM 80 MILLIGRAM(S): 80 TABLET, FILM COATED ORAL at 23:05

## 2022-03-11 RX ADMIN — SODIUM CHLORIDE 75 MILLILITER(S): 9 INJECTION INTRAMUSCULAR; INTRAVENOUS; SUBCUTANEOUS at 02:26

## 2022-03-11 RX ADMIN — DONEPEZIL HYDROCHLORIDE 20 MILLIGRAM(S): 10 TABLET, FILM COATED ORAL at 23:05

## 2022-03-11 RX ADMIN — SENNA PLUS 2 TABLET(S): 8.6 TABLET ORAL at 23:06

## 2022-03-11 RX ADMIN — Medication 50 MILLIGRAM(S): at 06:26

## 2022-03-11 RX ADMIN — MEMANTINE HYDROCHLORIDE 10 MILLIGRAM(S): 10 TABLET ORAL at 06:26

## 2022-03-11 RX ADMIN — Medication 81 MILLIGRAM(S): at 11:24

## 2022-03-11 RX ADMIN — ENOXAPARIN SODIUM 40 MILLIGRAM(S): 100 INJECTION SUBCUTANEOUS at 17:03

## 2022-03-11 RX ADMIN — LISINOPRIL 40 MILLIGRAM(S): 2.5 TABLET ORAL at 06:25

## 2022-03-11 RX ADMIN — Medication 20 MILLIEQUIVALENT(S): at 18:23

## 2022-03-11 NOTE — H&P ADULT - ASSESSMENT
56 year old male with PMH  of DM, HTN, CVA, COVID in Jan, sent in from [facility] for increased lethargy over last week    Lethargy/decreased PO intake  -as per nursing staff from ED note  -pt is poor historian and does not understand why he is here  -labs notable for wbc 17; sterile pyuria on UA, afebrile  -will resume diet  -IVF    DM  -30U lantus at bedtime and lispro 8U TID at nursing home  -will resume    HTN  -continue lisinopril    Hx of CVA  -pt is poor historian, AAOX2  -continue lipitor, aspirin, plavix  -continue donepezil    DVT ppx: none indicated  GI ppx: protonix  Diet: DASH/TLC  Activity: ambulate as tolerated  Dispo: possibly in 24-48 hrs 56 year old male with PMH  of DM, HTN, CVA, COVID in Jan, sent in from [facility] for increased lethargy over last week    Lethargy/decreased PO intake  -as per nursing staff from ED note  -pt is poor historian and does not understand why he is here  -labs notable for wbc 17; sterile pyuria on UA, afebrile  -will resume diet  -IVF  -PT consult    DM  -30U lantus at bedtime and lispro 8U TID at nursing home  -will resume    HTN  -continue lopressor, lisinopril    Hx of CVA  -pt is poor historian, AAOX2  -continue lipitor, aspirin, plavix  -continue donepezil    DVT ppx: none indicated  GI ppx: protonix  Diet: DASH/TLC  Activity: ambulate as tolerated  Dispo: possibly in 24-48 hrs 56 year old male with PMH  of DM, HTN, CVA, COVID in Jan, sent in from [facility] for increased lethargy over last week    Lethargy/decreased PO intake  -as per nursing staff from ED note  -pt is poor historian and does not understand why he is here  -labs notable for wbc 17; sterile pyuria on UA, afebrile  -will resume diet  -IVF  -PT consult    DM  -30U lantus at bedtime and lispro 8U TID at nursing home  -will resume    HTN  -continue lopressor, lisinopril    Hx of CVA  -pt is poor historian, AAOX2  -continue lipitor, aspirin, plavix  -continue donepezil, namenda    DVT ppx: none indicated  GI ppx: protonix  Diet: DASH/TLC  Activity: ambulate as tolerated  Dispo: possibly in 24-48 hrs 56 year old male with PMH  of DM, HTN, CVA, COVID in Jan, sent in from [facility] residence for increased lethargy over last week    Lethargy/decreased PO intake  -as per nursing staff from ED note  -pt is poor historian and does not understand why he is here  -labs notable for wbc 17; sterile pyuria on UA, afebrile  -will order CXR  -will resume diet  -IVF  -PT consult    DM  -30U lantus at bedtime and lispro 8U TID at nursing home  -will resume    HTN  -continue lopressor, lisinopril    Hx of CVA  -pt is poor historian, AAOX2  -continue lipitor, aspirin, plavix  -continue donepezil, namenda    DVT ppx: lovenox  GI ppx: protonix  Diet: DASH/TLC  Activity: ambulate as tolerated  Dispo: possibly in 24-48 hrs

## 2022-03-11 NOTE — CONSULT NOTE ADULT - ASSESSMENT
56 year old male with PMH  of DM, HTN, CVA, COVID in Jan, sent in from [facility] residence for increased lethargy over last week. As per nursing home pt also has been experiencing decreased ambulation and PO intake.    on evaluation, findings consistent with previous stroke present, appears roughly at baseline mentation per nursing home report. Current presentation less likely to be new-onset stroke/neurologic in findings; however, neurology recommends:    #AMS  - Continue w/u per medicine  - f/u eeg  - may obtain repeat CTH if concerns of AMS persist  56 year old male with PMH  of DM, HTN, CVA, COVID in Jan, sent in from [facility] residence for increased lethargy over last week. As per nursing home pt also has been experiencing decreased ambulation and PO intake.    on evaluation, findings consistent with previous stroke present, appears roughly at baseline mentation per nursing home report. Current presentation less likely to be new-onset stroke/neurologic in findings; however, neurology recommends:    #AMS  - Continue w/u per medicine  - REEG: general slowing   - may obtain repeat CTH if concerns of AMS persist

## 2022-03-11 NOTE — H&P ADULT - NSHPPHYSICALEXAM_GEN_ALL_CORE
GENERAL: Sitting in bed comfortably, NAD  PULMONARY: Clear to auscultation bilaterally. No rales, ronchi, or wheezing.   CARDIOVASCULAR: Regular rate and rhythm, S1-S2, no murmurs   GASTROINTESTINAL: Soft, non-tender, non-distended, no guarding.   SKIN/EXTREMITIES: No LE edema b/l  NEUROLOGIC: AAOX2

## 2022-03-11 NOTE — PHYSICAL THERAPY INITIAL EVALUATION ADULT - PERTINENT HX OF CURRENT PROBLEM, REHAB EVAL
56 year old male with PMH  of DM, HTN, CVA, COVID in Jan, sent in from [facility] for increased lethargy over last week. As per nursing home pt also has been experiencing decreased ambulation and PO intake. Pt is a poor historian and when asked how he ended up in the hospital pt says "I think because I need a COVID test." Pt does not recall feeling weak or lethargic at nursing home.

## 2022-03-11 NOTE — PHYSICAL THERAPY INITIAL EVALUATION ADULT - IMPAIRMENTS FOUND, PT EVAL
aerobic capacity/endurance/cognitive impairment/fine motor/gait, locomotion, and balance/gross motor/muscle strength/posture

## 2022-03-11 NOTE — CHART NOTE - NSCHARTNOTEFT_GEN_A_CORE
Brief Attending PN:     56 year old male with PMH  of DM, HTN, CVA, COVID in Jan, sent in from [facility] for increased lethargy over last week. As per nursing home pt also has been experiencing decreased ambulation and PO intake. Pt is a poor historian and when asked how he ended up in the hospital pt says "I think because I need a COVID test." Pt does not recall feeling weak or lethargic at nursing home.    In the ED vitals notable for BP of 182/84. Labs notable for wbc of 17, sterile pyuria. 1L bolus NS given in ED. CT abdomen/pelvis and CTH unremarkable for acute changes.       #Lethargy/ Leukocytosis in patient w/ H/o CVA, Dementia and recently w/ COVID-19  - r/o infection/metabolic encephalopathy r/o seizure r/o CVA   -f/u Blood Cx and Ucx  - f/u CRP, ESR, B12, RPR, TSH  -REEG- mild diffuse electrophysiological dysfunction   -Neurology following no further imaging needed at this time   -PT eval / Fall precautions   - c/w ASA/Plavix/Namenda/Aricept     #HTN  #DM type II   #Dyslipidemia  -statin/metoprolol/lisinopril   -monitor BG       Lovenox sq     #Progress Note Handoff:  Pending (specify): culture data, and then dc 24-48   Disposition: Home___/SNF___/Other________/Unknown at this time__x______      Cherie Norwood, DO

## 2022-03-11 NOTE — PHYSICAL THERAPY INITIAL EVALUATION ADULT - GAIT DEVIATIONS NOTED, PT EVAL
decreased heel strike, landing with foot flat/decreased jose roberto/increased time in double stance/decreased step length/decreased weight-shifting ability

## 2022-03-11 NOTE — PHYSICAL THERAPY INITIAL EVALUATION ADULT - GENERAL OBSERVATIONS, REHAB EVAL
Chart reviewed, PT IE completed. Pt encountered supine, +R IV (locked by RN), A&Ox2 (confused about situation, varying answers as to reason for admission), able to answer yes/no questions accurately, however appears distant and zoned-out/ spacey. Pt reports coming to hospital from home despite coming from NH per chart. Pt minimally verbal, responds to yes or no with head nods, responds verbally with open ended questions most of the time. Pt was able to transfer to standing with CGA with RW and ambulate 15' with CGA with RW, 15' no AD with min A. Pt p/w increased balance with AD, RW recommended. Pt vomited after stand to sit transfer after amb, declines when asked if happens often after ambulation. BP taken at end of session: 180/50mmHg, HR 75. Resident MD present at end of session and aware of functional status.

## 2022-03-11 NOTE — PHYSICAL THERAPY INITIAL EVALUATION ADULT - ADDITIONAL COMMENTS
Confusion when answering interview questions. Pt reports living at home alone despite coming from NH per chart. Pt states he does not use AD and ambulates independently. 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

## 2022-03-11 NOTE — H&P ADULT - ATTENDING COMMENTS
HPI:  56 year old male with PMH  of DM, HTN, CVA, COVID in Jan, sent in from [facility] residence for increased lethargy over last week. As per nursing home pt also has been experiencing decreased ambulation and PO intake. Pt is a poor historian and when asked how he ended up in the hospital pt says "I think because I need a COVID test." Pt does not recall feeling weak or lethargic at nursing home. Denies chest pain, SOB, fever, chills, abdominal pain, diarrhea.      In the ED vitals notable for BP of 182/84. Labs notable for wbc of 17, sterile pyuria. 1L bolus NS given in ED. CT abdomen/pelvis and CTH unremarkable for acute changes.  (11 Mar 2022 01:03)    REVIEW OF SYSTEMS: see cc/HPI   CONSTITUTIONAL: (+) generalized weakness, NO fevers or chills  EYES/ENT: No visual changes;  No vertigo or throat pain   NECK: No pain or stiffness  RESPIRATORY: No cough, wheezing, hemoptysis; No shortness of breath  CARDIOVASCULAR: No chest pain or palpitations  GASTROINTESTINAL: No abdominal or epigastric pain. No nausea, vomiting, or hematemesis; No diarrhea or constipation. No melena or hematochezia.  GENITOURINARY: No dysuria, frequency or hematuria  NEUROLOGICAL: No numbness or weakness  SKIN: No itching, rashes    Physical Exam:  General: WN/WD NAD, Poorly responsive   Neurology: Arousable &Ox2, nonfocal, follows commands  Eyes: PERRLA/ EOMI  ENT/Neck: Neck supple, trachea midline, No JVD  Respiratory: CTA B/L, No wheezing, rales, rhonchi  CV: Normal rate regular rhythm, S1S2, no murmurs, rubs or gallops  Abdominal: Soft, NT, ND +BS,   Extremities: No edema, + peripheral pulses  Skin: No Rashes, Hematoma, Ecchymosis  Incisions:   Tubes:    A/p   Lethargy/ Leukocytosis in patient w/ H/o CVA, Dementia and recently w/ COVID-19 r/o infection/metabolic encephalopathy r/o seizure r/o CVA   -Agree w/ Blood Cx and Ucx  -send CRP, ESR, B12, RPR, TSH, repeat   -REEG  -IV fluids   -c/w ASA, Plavix, Donepezil and Namenda  -if patient becomes febrile will need LP   -Neurology eval   -PT eval / Fall precautions     DM type II   Dyslipidemia  -statin /zetia  -Lantus and lispro ( unless also not eating - in which case hold Lantus) w/ F/s monitoring     HTN   -c/w outpatient Rx     DVT prophylaxis

## 2022-03-11 NOTE — CONSULT NOTE ADULT - SUBJECTIVE AND OBJECTIVE BOX
Neurology Consult    Patient is a 56y old  Male who presents with a chief complaint of lethargy (11 Mar 2022 01:03)    On approach, patient alert, oriented to self and place, not to time, age, or situation, believing he is in hospital for "Tests." He denies hx of stroke, denies feeling weak, but endorses feeling "crappy, nauseated." He denies feeling dizzy, light headed, change in vision.       HPI:  56 year old male with PMH  of DM, HTN, CVA, COVID in , sent in from [facility] for increased lethargy over last week. As per nursing home pt also has been experiencing decreased ambulation and PO intake. Pt is a poor historian and when asked how he ended up in the hospital pt says "I think because I need a COVID test." Pt does not recall feeling weak or lethargic at nursing home. Denies chest pain, SOB, fever, chills, abdominal pain, diarrhea.      In the ED vitals notable for BP of 182/84. Labs notable for wbc of 17, sterile pyuria. 1L bolus NS given in ED. CT abdomen/pelvis and CTH unremarkable for acute changes.  (11 Mar 2022 01:03)      PAST MEDICAL & SURGICAL HISTORY:  Diabetes  Hypertension  Pancreatitis  CVA (cerebral vascular accident)  No significant past surgical history    FAMILY HISTORY:  Social History: (-) x 3  Allergies  No Known Allergies  Intolerances    MEDICATIONS  (STANDING):  aspirin  chewable 81 milliGRAM(s) Oral daily  atorvastatin 80 milliGRAM(s) Oral at bedtime  clopidogrel Tablet 75 milliGRAM(s) Oral daily  donepezil 20 milliGRAM(s) Oral at bedtime  enoxaparin Injectable 40 milliGRAM(s) SubCutaneous every 24 hours  lisinopril 40 milliGRAM(s) Oral daily  memantine 10 milliGRAM(s) Oral two times a day  metoprolol succinate ER 50 milliGRAM(s) Oral daily  pantoprazole    Tablet 40 milliGRAM(s) Oral before breakfast  senna 2 Tablet(s) Oral at bedtime    MEDICATIONS  (PRN):      Review of systems:    Constitutional: as per HPI  Eyes: No eye pain or discharge  ENMT:  No difficulty hearing; No sinus or throat pain  Neck: No pain or stiffness  Respiratory: No cough, wheezing, chills or hemoptysis  Cardiovascular: No chest pain, palpitations, shortness of breath, dyspnea on exertion  Gastrointestinal: No abdominal pain, or hematemesis; No diarrhea or constipation. +nausea, vomiting   Genitourinary: No dysuria, frequency, hematuria or incontinence  Neurological: As per HPI  Skin: No rashes or lesions   Endocrine: No heat or cold intolerance; No hair loss  Musculoskeletal: No joint pain or swelling  Psychiatric: No depression, anxiety, mood swings  Heme/Lymph: No easy bruising or bleeding gums    Vital Signs Last 24 Hrs  T(C): 37 (11 Mar 2022 05:00), Max: 37.6 (10 Mar 2022 13:47)  T(F): 98.6 (11 Mar 2022 05:00), Max: 99.6 (10 Mar 2022 13:47)  HR: 75 (11 Mar 2022 11:06) (75 - 100)  BP: 180/80 (11 Mar 2022 11:06) (139/78 - 182/84)  BP(mean): --  RR: 19 (11 Mar 2022 05:00) (17 - 19)  SpO2: 94% (11 Mar 2022 08:16) (94% - 98%)        Respiratory:  no audible wheezing or inspiratory stridor.  no use of accessory muscles.   Cardiac: pulse palpable, no audible bruits  Abdomen: supple, no guarding, no TTP    Labs:   CBC Full  -  ( 11 Mar 2022 07:10 )  WBC Count : 8.01 K/uL  RBC Count : 4.67 M/uL  Hemoglobin : 14.0 g/dL  Hematocrit : 40.6 %  Platelet Count - Automated : 251 K/uL  Mean Cell Volume : 86.9 fL  Mean Cell Hemoglobin : 30.0 pg  Mean Cell Hemoglobin Concentration : 34.5 g/dL  Auto Neutrophil # : x  Auto Lymphocyte # : x  Auto Monocyte # : x  Auto Eosinophil # : x  Auto Basophil # : x  Auto Neutrophil % : x  Auto Lymphocyte % : x  Auto Monocyte % : x  Auto Eosinophil % : x  Auto Basophil % : x        143  |  108  |  15  ----------------------------<  123<H>  3.4<L>   |  24  |  0.6<L>    Ca    8.6      11 Mar 2022 07:10  Mg     1.7         TPro  5.8<L>  /  Alb  3.2<L>  /  TBili  0.5  /  DBili  x   /  AST  20  /  ALT  22  /  AlkPhos  74  03-11    LIVER FUNCTIONS - ( 11 Mar 2022 07:10 )  Alb: 3.2 g/dL / Pro: 5.8 g/dL / ALK PHOS: 74 U/L / ALT: 22 U/L / AST: 20 U/L / GGT: x             Urinalysis Basic - ( 10 Mar 2022 17:20 )    Color: Mana / Appearance: Clear / S.035 / pH: x  Gluc: x / Ketone: Trace  / Bili: Small / Urobili: 3 mg/dL   Blood: x / Protein: 100 mg/dL / Nitrite: Negative   Leuk Esterase: Negative / RBC: 8 /HPF / WBC 8 /HPF   Sq Epi: x / Non Sq Epi: 4 /HPF / Bacteria: Negative      Neuroimaging:  NCHCT: CT Head No Cont:   ACC: [id number] EXAM:  CT BRAIN                          PROCEDURE DATE:  03/10/2022      INTERPRETATION:  CLINICAL INDICATION: Altered mental status.    Technique: CT of the head was performed without contrast.    Multiple contiguous axial images were acquired from the skullbase to the   vertex without the administration of intravenous contrast.  Coronal and   sagittal reformations were made.    COMPARISON: CT head dated 2022    FINDINGS:    Ventricles and sulci are unremarkable. There is no hydrocephalus.    There is redemonstration of chronic infarcts involving the right   occipital lobe and left thalamus, stable since the previous exam.    There are patchy areas of hypodensity involving the subcortical and   periventricular white matter.    There is no intraparenchymal hematoma,  mass effect or midline shift. No   abnormal extra-axial fluid collections are present.    The calvarium is intact. 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 There is a right   mucous retention cyst or polyp within the right sphenoid sinus. The   visualized intraorbital compartments and mastoid complexes appear free of   acute disease.    IMPRESSION:    No significant change since recent head CT of 2022.    No acute intracranial pathology. No evidence of midline shift, mass   effect or intracranial hemorrhage.    Stable chronic infarcts and microvascular ischemic changes as described   above.    --- End of Report ---          RAQUEL LANDRY MD; Resident Radiologist  This document has been electronically signed.  DOMONIQUE AMAYA MD; Attending Radiologist  This document has been electronically signed. Mar 10 2022  7:29PM (03-10-22 @ 17:23)      22 @ 11:51       Neurology Consult    Patient is a 56y old  Male who presents with a chief complaint of lethargy (11 Mar 2022 01:03)    On approach, patient alert, oriented to self and place, not to time, age, or situation, believing he is in hospital for "Tests." He denies hx of stroke, denies feeling weak, but endorses feeling "crappy, nauseated." He denies feeling dizzy, light headed, change in vision.       HPI:  56 year old male with PMH  of DM, HTN, CVA, COVID in , sent in from [facility] for increased lethargy over last week. As per nursing home pt also has been experiencing decreased ambulation and PO intake. Pt is a poor historian and when asked how he ended up in the hospital pt says "I think because I need a COVID test." Pt does not recall feeling weak or lethargic at nursing home. Denies chest pain, SOB, fever, chills, abdominal pain, diarrhea.      In the ED vitals notable for BP of 182/84. Labs notable for wbc of 17, sterile pyuria. 1L bolus NS given in ED. CT abdomen/pelvis and CTH unremarkable for acute changes.  (11 Mar 2022 01:03)      PAST MEDICAL & SURGICAL HISTORY:  Diabetes  Hypertension  Pancreatitis  CVA (cerebral vascular accident)  No significant past surgical history    FAMILY HISTORY:  Social History: (-) x 3  Allergies  No Known Allergies  Intolerances    MEDICATIONS  (STANDING):  aspirin  chewable 81 milliGRAM(s) Oral daily  atorvastatin 80 milliGRAM(s) Oral at bedtime  clopidogrel Tablet 75 milliGRAM(s) Oral daily  donepezil 20 milliGRAM(s) Oral at bedtime  enoxaparin Injectable 40 milliGRAM(s) SubCutaneous every 24 hours  lisinopril 40 milliGRAM(s) Oral daily  memantine 10 milliGRAM(s) Oral two times a day  metoprolol succinate ER 50 milliGRAM(s) Oral daily  pantoprazole    Tablet 40 milliGRAM(s) Oral before breakfast  senna 2 Tablet(s) Oral at bedtime    MEDICATIONS  (PRN):      Review of systems:    Constitutional: as per HPI  Eyes: No eye pain or discharge  ENMT:  No difficulty hearing; No sinus or throat pain  Neck: No pain or stiffness  Respiratory: No cough, wheezing, chills or hemoptysis  Cardiovascular: No chest pain, palpitations, shortness of breath, dyspnea on exertion  Gastrointestinal: No abdominal pain, or hematemesis; No diarrhea or constipation. +nausea, vomiting   Genitourinary: No dysuria, frequency, hematuria or incontinence  Neurological: As per HPI  Skin: No rashes or lesions   Endocrine: No heat or cold intolerance; No hair loss  Musculoskeletal: No joint pain or swelling  Psychiatric: No depression, anxiety, mood swings  Heme/Lymph: No easy bruising or bleeding gums    Vital Signs Last 24 Hrs  T(C): 37 (11 Mar 2022 05:00), Max: 37.6 (10 Mar 2022 13:47)  T(F): 98.6 (11 Mar 2022 05:00), Max: 99.6 (10 Mar 2022 13:47)  HR: 75 (11 Mar 2022 11:06) (75 - 100)  BP: 180/80 (11 Mar 2022 11:06) (139/78 - 182/84)  BP(mean): --  RR: 19 (11 Mar 2022 05:00) (17 - 19)  SpO2: 94% (11 Mar 2022 08:16) (94% - 98%)      Examination:  General:  Appearance is consistent with chronologic age.  No abnormal facies.  Gross skin survey within normal limits.    Cognitive/Language:  The patient is oriented to person, place, not age, date, time.  Recent and remote memory appear limited.  Fund of knowledge is intact and normal.  Language with normal repetition, comprehension and naming.  Nondysarthric.    Eyes: intact VA, L hemianopia noted.  EOMI w/o nystagmus, skew or reported double vision.  PERRL.  No ptosis/weakness of eyelid closure.    Face:  Facial sensation normal V1 - 3, no facial asymmetry.    Ears/Nose/Throat:  Hearing grossly intact b/l.  Palate elevates midline.  Tongue and uvula midline.   Motor examination:   Normal tone, bulk and range of motion.  No tenderness, twitching, tremors or involuntary movements.  Formal Muscle Strength Testing: (MRC grade R/L) 5/5 UE; 5/5 LE.  No observable drift.  Reflexes:   2+ R biceps, triceps, brachioradialis, patella and Achilles. 3+ L , biceps, triceps, brachioradialis, patella and Achilles  Plantar response downgoing b/l.  Jaw jerk, Noam, clonus absent.  Sensory examination:   Intact to light touch and pinprick, pain, temperature and proprioception and vibration in all extremities.  Cerebellum:   FTN intact R, limited FTN L with normal VALERIA in all limbs.  No dysmetria or dysdiadokinesia.  Gait not assessed    Respiratory:  no audible wheezing or inspiratory stridor.  no use of accessory muscles.   Cardiac: pulse palpable, no audible bruits  Abdomen: supple, no guarding, no TTP    Labs:   CBC Full  -  ( 11 Mar 2022 07:10 )  WBC Count : 8.01 K/uL  RBC Count : 4.67 M/uL  Hemoglobin : 14.0 g/dL  Hematocrit : 40.6 %  Platelet Count - Automated : 251 K/uL  Mean Cell Volume : 86.9 fL  Mean Cell Hemoglobin : 30.0 pg  Mean Cell Hemoglobin Concentration : 34.5 g/dL  Auto Neutrophil # : x  Auto Lymphocyte # : x  Auto Monocyte # : x  Auto Eosinophil # : x  Auto Basophil # : x  Auto Neutrophil % : x  Auto Lymphocyte % : x  Auto Monocyte % : x  Auto Eosinophil % : x  Auto Basophil % : x    03-    143  |  108  |  15  ----------------------------<  123<H>  3.4<L>   |  24  |  0.6<L>    Ca    8.6      11 Mar 2022 07:10  Mg     1.7     -    TPro  5.8<L>  /  Alb  3.2<L>  /  TBili  0.5  /  DBili  x   /  AST  20  /  ALT  22  /  AlkPhos  74  03-11    LIVER FUNCTIONS - ( 11 Mar 2022 07:10 )  Alb: 3.2 g/dL / Pro: 5.8 g/dL / ALK PHOS: 74 U/L / ALT: 22 U/L / AST: 20 U/L / GGT: x             Urinalysis Basic - ( 10 Mar 2022 17:20 )    Color: Mana / Appearance: Clear / S.035 / pH: x  Gluc: x / Ketone: Trace  / Bili: Small / Urobili: 3 mg/dL   Blood: x / Protein: 100 mg/dL / Nitrite: Negative   Leuk Esterase: Negative / RBC: 8 /HPF / WBC 8 /HPF   Sq Epi: x / Non Sq Epi: 4 /HPF / Bacteria: Negative      Neuroimaging:  NCHCT: CT Head No Cont:   ACC: [id number] EXAM:  CT BRAIN                          PROCEDURE DATE:  03/10/2022      INTERPRETATION:  CLINICAL INDICATION: Altered mental status.    Technique: CT of the head was performed without contrast.    Multiple contiguous axial images were acquired from the skullbase to the   vertex without the administration of intravenous contrast.  Coronal and   sagittal reformations were made.    COMPARISON: CT head dated 2022    FINDINGS:    Ventricles and sulci are unremarkable. There is no hydrocephalus.    There is redemonstration of chronic infarcts involving the right   occipital lobe and left thalamus, stable since the previous exam.    There are patchy areas of hypodensity involving the subcortical and   periventricular white matter.    There is no intraparenchymal hematoma,  mass effect or midline shift. No   abnormal extra-axial fluid collections are present.    The calvarium is intact. 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 There is a right   mucous retention cyst or polyp within the right sphenoid sinus. The   visualized intraorbital compartments and mastoid complexes appear free of   acute disease.    IMPRESSION:    No significant change since recent head CT of 2022.    No acute intracranial pathology. No evidence of midline shift, mass   effect or intracranial hemorrhage.    Stable chronic infarcts and microvascular ischemic changes as described   above.    --- End of Report ---          RAQUEL LANDRY MD; Resident Radiologist  This document has been electronically signed.  DOMONIQUE AMAYA MD; Attending Radiologist  This document has been electronically signed. Mar 10 2022  7:29PM (03-10-22 @ 17:23)      22 @ 11:51

## 2022-03-11 NOTE — PROVIDER CONTACT NOTE (OTHER) - SITUATION
[id number] MD notified about patient's lethargy/confusion and if this is baseline?  New orders received for neuro consult/eeg to r/o seizures.  MD aware of patient's labs today.

## 2022-03-11 NOTE — H&P ADULT - NSHPLABSRESULTS_GEN_ALL_CORE
15.4   17.39 )-----------( 297      ( 10 Mar 2022 15:02 )             45.9   03-10    142  |  106  |  18  ----------------------------<  273<H>  3.7   |  22  |  0.7    Ca    9.2      10 Mar 2022 15:02    TPro  6.7  /  Alb  3.8  /  TBili  0.7  /  DBili  x   /  AST  18  /  ALT  22  /  AlkPhos  89  03-10    CT Abdomen/Pelvis   IMPRESSION:    No evidence of abdominal or pelvic mass or inflammatory process    CT Head  IMPRESSION:    No significant change since recent head CT of 1/30/2022.    No acute intracranial pathology. No evidence of midline shift, mass   effect or intracranial hemorrhage.    Stable chronic infarcts and microvascular ischemic changes as described   above.

## 2022-03-11 NOTE — CONSULT NOTE ADULT - ATTENDING COMMENTS
I have personally seen and examined this patient on 3/11.  I have fully participated in the care of this patient.  I have reviewed all pertinent clinical information, including history, physical exam, plan and note. Patient with history of occipital infarct present with lethargy. Currently alert and follows command. Exam shows left HH consistent with chronic CVA findings. Otherwise no other focal deficit. REEG showed general slowing. low suspicion for new infarct. Might repeat CT head. Otherwise no further work up per neurology standpoint.   I have reviewed all pertinent clinical information and reviewed all relevant imaging and diagnostic studies personally.  Recommendations as above.  Agree with above assessment except as noted.

## 2022-03-11 NOTE — H&P ADULT - HISTORY OF PRESENT ILLNESS
56 year old male with PMH  of DM, HTN, CVA, COVID in Jan, sent in from [facility] for increased lethargy over last week. As per nursing home pt also has been experiencing decreased ambulation and PO intake. Pt is a poor historian and when asked how he ended up in the hospital pt says "I think because I need a COVID test." Pt does not recall feeling weak or lethargic at nursing home. Denies chest pain, SOB, fever, chills, abdominal pain, diarrhea.      In the ED vitals notable for BP of 182/84. Labs notable for wbc of 17, sterile pyuria. 1L bolus NS given in ED. CT abdomen/pelvis and CTH unremarkable for acute changes.

## 2022-03-12 ENCOUNTER — TRANSCRIPTION ENCOUNTER (OUTPATIENT)
Age: 57
End: 2022-03-12

## 2022-03-12 LAB
ANION GAP SERPL CALC-SCNC: 12 MMOL/L — SIGNIFICANT CHANGE UP (ref 7–14)
BUN SERPL-MCNC: 12 MG/DL — SIGNIFICANT CHANGE UP (ref 10–20)
CALCIUM SERPL-MCNC: 8.7 MG/DL — SIGNIFICANT CHANGE UP (ref 8.5–10.1)
CHLORIDE SERPL-SCNC: 106 MMOL/L — SIGNIFICANT CHANGE UP (ref 98–110)
CO2 SERPL-SCNC: 25 MMOL/L — SIGNIFICANT CHANGE UP (ref 17–32)
CREAT SERPL-MCNC: 0.6 MG/DL — LOW (ref 0.7–1.5)
EGFR: 112 ML/MIN/1.73M2 — SIGNIFICANT CHANGE UP
GLUCOSE BLDC GLUCOMTR-MCNC: 125 MG/DL — HIGH (ref 70–99)
GLUCOSE BLDC GLUCOMTR-MCNC: 178 MG/DL — HIGH (ref 70–99)
GLUCOSE BLDC GLUCOMTR-MCNC: 251 MG/DL — HIGH (ref 70–99)
GLUCOSE BLDC GLUCOMTR-MCNC: 251 MG/DL — HIGH (ref 70–99)
GLUCOSE SERPL-MCNC: 165 MG/DL — HIGH (ref 70–99)
HCT VFR BLD CALC: 41 % — LOW (ref 42–52)
HGB BLD-MCNC: 13.9 G/DL — LOW (ref 14–18)
MAGNESIUM SERPL-MCNC: 2 MG/DL — SIGNIFICANT CHANGE UP (ref 1.8–2.4)
MCHC RBC-ENTMCNC: 29.2 PG — SIGNIFICANT CHANGE UP (ref 27–31)
MCHC RBC-ENTMCNC: 33.9 G/DL — SIGNIFICANT CHANGE UP (ref 32–37)
MCV RBC AUTO: 86.1 FL — SIGNIFICANT CHANGE UP (ref 80–94)
NRBC # BLD: 0 /100 WBCS — SIGNIFICANT CHANGE UP (ref 0–0)
PLATELET # BLD AUTO: 250 K/UL — SIGNIFICANT CHANGE UP (ref 130–400)
POTASSIUM SERPL-MCNC: 3.5 MMOL/L — SIGNIFICANT CHANGE UP (ref 3.5–5)
POTASSIUM SERPL-SCNC: 3.5 MMOL/L — SIGNIFICANT CHANGE UP (ref 3.5–5)
RBC # BLD: 4.76 M/UL — SIGNIFICANT CHANGE UP (ref 4.7–6.1)
RBC # FLD: 13.3 % — SIGNIFICANT CHANGE UP (ref 11.5–14.5)
SODIUM SERPL-SCNC: 143 MMOL/L — SIGNIFICANT CHANGE UP (ref 135–146)
WBC # BLD: 9.79 K/UL — SIGNIFICANT CHANGE UP (ref 4.8–10.8)
WBC # FLD AUTO: 9.79 K/UL — SIGNIFICANT CHANGE UP (ref 4.8–10.8)

## 2022-03-12 PROCEDURE — 99232 SBSQ HOSP IP/OBS MODERATE 35: CPT

## 2022-03-12 RX ADMIN — LISINOPRIL 40 MILLIGRAM(S): 2.5 TABLET ORAL at 05:58

## 2022-03-12 RX ADMIN — MEMANTINE HYDROCHLORIDE 10 MILLIGRAM(S): 10 TABLET ORAL at 17:45

## 2022-03-12 RX ADMIN — SENNA PLUS 2 TABLET(S): 8.6 TABLET ORAL at 21:24

## 2022-03-12 RX ADMIN — MEMANTINE HYDROCHLORIDE 10 MILLIGRAM(S): 10 TABLET ORAL at 05:58

## 2022-03-12 RX ADMIN — ATORVASTATIN CALCIUM 80 MILLIGRAM(S): 80 TABLET, FILM COATED ORAL at 21:23

## 2022-03-12 RX ADMIN — DONEPEZIL HYDROCHLORIDE 20 MILLIGRAM(S): 10 TABLET, FILM COATED ORAL at 21:23

## 2022-03-12 RX ADMIN — PANTOPRAZOLE SODIUM 40 MILLIGRAM(S): 20 TABLET, DELAYED RELEASE ORAL at 05:58

## 2022-03-12 RX ADMIN — ENOXAPARIN SODIUM 40 MILLIGRAM(S): 100 INJECTION SUBCUTANEOUS at 17:45

## 2022-03-12 RX ADMIN — Medication 81 MILLIGRAM(S): at 11:25

## 2022-03-12 RX ADMIN — Medication 50 MILLIGRAM(S): at 05:59

## 2022-03-12 RX ADMIN — CLOPIDOGREL BISULFATE 75 MILLIGRAM(S): 75 TABLET, FILM COATED ORAL at 11:25

## 2022-03-12 NOTE — DISCHARGE NOTE PROVIDER - NSDCCPCAREPLAN_GEN_ALL_CORE_FT
PRINCIPAL DISCHARGE DIAGNOSIS  Diagnosis: Lethargy  Assessment and Plan of Treatment: You presented to the hospital for lethargy. While in the hospital infectious etiology was negative for any on going process. You were seen by neurology and no intervention were recommeded. CT head did not reveal any new changes. Please follow up with your primary care provider upon discharge.

## 2022-03-12 NOTE — DISCHARGE NOTE PROVIDER - CARE PROVIDERS DIRECT ADDRESSES
,[email or web address]

## 2022-03-12 NOTE — PROGRESS NOTE ADULT - ASSESSMENT
Patient is a 56y old Male  with PMH  of DM, HTN, CVA, COVID in Jan, sent in from [facility] for increased lethargy over last week.    Currently admitted to medicine with the primary diagnosis of Lethargy    Lethargy/decreased PO intake  -as per nursing staff from ED note  -pt is poor historian and does not understand why he is here  -at baseline patient is mostly AO x2, barely   -labs notable for wbc 17; sterile pyuria on UA, afebrile  -will order CXR  -will resume diet  -IVF  -PT consult    DM  -30U lantus at bedtime and lispro 8U TID at nursing home  -will resume    HTN  -continue lopressor, lisinopril    Hx of CVA  -pt is poor historian, AAOX2  -continue lipitor, aspirin, plavix  -continue donepezil, namenda    DVT ppx: lovenox  GI ppx: protonix  Diet: DASH/TLC  Activity: ambulate as tolerated  Dispo: possibly in 24-48 hrs       Patient is a 56y old Male  with PMH  of DM, HTN, CVA, COVID in Jan, sent in from [facility] residence for increased lethargy over last week.    Currently admitted to medicine with the primary diagnosis of Lethargy    Lethargy/decreased PO intake  -as per nursing staff from ED note  -pt is poor historian and does not understand why he is here  -at baseline patient is mostly AO x2 mostly sometimes AO3  -infectious etiology r/o, bcx neg, cxr neg, UA neg,  -PT recs appreciated: walker needed  - EEG: generalized slowing  -Neurology consulted: no acute intervention, CTH if pt becomes altered    DM  -30U lantus at bedtime and lispro 8U TID at nursing home  -will resume    HTN - controlled  -continue lopressor, lisinopril    Hx of CVA  -pt is poor historian, AAOX2  -continue lipitor, aspirin, plavix  -continue donepezil, namenda  -CTH: no new findings    DVT ppx: lovenox  GI ppx: protonix  Diet: DASH/TLC  Activity: ambulate as tolerated  Dispo: back to [facility]       Patient is a 56y old Male  with PMH  of DM, HTN, CVA, COVID in Jan, sent in from [facility] residence for increased lethargy over last week.    Currently admitted to medicine with the primary diagnosis of Lethargy    Lethargy/decreased PO intake  -as per nursing staff from ED note  -pt is poor historian and does not understand why he is here  -at baseline patient is mostly AO x2 mostly sometimes AO3  -infectious etiology r/o, bcx neg, cxr neg, UA neg,  -PT recs appreciated: walker needed  - EEG: generalized slowing  -Neurology consulted: no acute intervention, CTH if pt becomes altered    DM  -30U lantus at bedtime and lispro 8U TID at nursing home  -will resume    HTN - controlled  -continue lopressor, lisinopril    Hx of CVA  -pt is poor historian, AAOX2  -continue lipitor, aspirin, plavix  -continue donepezil, namenda  -CTH: no new findings    DVT ppx: lovenox  GI ppx: protonix  Diet: DASH/TLC  Activity: ambulate as tolerated  Dispo: back to [facility] possibly today or [facility]

## 2022-03-12 NOTE — DISCHARGE NOTE PROVIDER - ATTENDING DISCHARGE PHYSICAL EXAMINATION:
CONSTITUTIONAL: No acute distress, well-developed, awake but minimally conversant   HEAD: Atraumatic, normocephalic  EYES:  PERRLA, conjunctiva and sclera clear  ENT: Supple, no masses, moist mucous membranes  PULMONARY: Clear to auscultation bilaterally; no wheezes, rales, or rhonchi  CARDIOVASCULAR: Regular rate and rhythm; no murmurs, rubs, or gallops  GASTROINTESTINAL: Soft, non-tender, non-distended; bowel sounds present  MUSCULOSKELETAL: 2+ peripheral pulses; no clubbing, no cyanosis, no edema  NEUROLOGY: awake, AAOx1-2   SKIN: No rashes or lesions; warm and dry

## 2022-03-12 NOTE — PROGRESS NOTE ADULT - ATTENDING COMMENTS
56 year old male with PMH  of DM, HTN, CVA, COVID in Jan, sent in from [facility] residence for increased lethargy over last week.    #Increased Lethargy/ Weakness likely deconditioning   #Reactive Leukocytosis- resolved   #Hx of CVA/Dementia  - pt with H/o CVA, Dementia and recently w/ COVID-19 which may contribute to deconditioning   - correlated clinical findings with [facility] and he is baseline lethargic minimally communicative and AAO x2 at baseline, but previously could ambulate independently   -Neuro consulted- they agree this is patient's baseline, no need for further imaging  -REEG- mild diffuse electrophysiological dysfunction   - Blood and Urine Cx negative   -PT following- may need rehab   - c/w ASA/Plavix/Namenda/Aricept     #HTN  #DM type II   #Dyslipidemia  -statin/metoprolol/lisinopril   -monitor BG       Lovenox sq     #Progress Note Handoff:  Pending (specify): culture data, and then dc 24-48   Disposition: Home___/SNF___/Other________/Unknown at this time__x______      Cherie Norwood DO. 56 year old male with PMH  of DM, HTN, CVA, COVID in Jan, sent in from [facility] residence for increased lethargy over last week.    #Increased Lethargy/ Weakness likely deconditioning   #Reactive Leukocytosis- resolved   #Hx of CVA/Dementia  - pt with H/o CVA, Dementia and recently w/ COVID-19 which may contribute to deconditioning   - correlated clinical findings with [facility] and he is baseline lethargic minimally communicative and AAO x2 at baseline, but previously could ambulate independently   -Neuro consulted- they agree this is patient's baseline, no need for further imaging  -REEG- mild diffuse electrophysiological dysfunction   - Blood and Urine Cx negative   -PT following- may need rehab   - c/w ASA/Plavix/Namenda/Aricept     #HTN  #DM type II   #Dyslipidemia  -statin/metoprolol/lisinopril   -monitor BG - well controlled       Lovenox sq     #Progress Note Handoff:  Pending (specify): dc pending PT re-eval and then dc to [facility] or [facility]   Disposition: Home___/SNF___/Other________/Unknown at this time__x______    Total time spent to complete patient's bedside assessment, review medical chart, discuss medical plan of care with covering medical team was more than 25 minutes  with >50% of time spendt face to face with patient, discussion with patient/family and/or coordination of care      Cherie Norwood DO.

## 2022-03-12 NOTE — DISCHARGE NOTE PROVIDER - PROVIDER TOKENS
PROVIDER:[TOKEN:[[id number]],FOLLOWUP:[1 week],ESTABLISHEDPATIENT:[T]] PROVIDER:[TOKEN:[[id number]],FOLLOWUP:[1 week],ESTABLISHEDPATIENT:[T]],PROVIDER:[TOKEN:[[id number]],FOLLOWUP:[1 month]]

## 2022-03-12 NOTE — DISCHARGE NOTE PROVIDER - NSDCMRMEDTOKEN_GEN_ALL_CORE_FT
Admelog 100 units/mL injectable solution: 8 unit(s) injectable 3 times a day (before meals)  aspirin 325 mg oral delayed release tablet: 1 tab(s) orally once a day  atorvastatin 80 mg oral tablet: 1 tab(s) orally once a day (at bedtime)  clopidogrel 75 mg oral tablet: 1 tab(s) orally once a day  donepezil 10 mg oral tablet: 2 tab(s) orally once a day at 5PM  Ecotrin 325 mg oral delayed release tablet: 1 tab(s) orally once a day  Fleet Enema 19 g-7 g rectal enema: 133 milliliter(s) rectally once a day   insulin glargine: 30 unit(s) subcutaneous once a day (at bedtime)  lisinopril 40 mg oral tablet: 1 tab(s) orally once a day  magnesium oxide 400 mg oral tablet: 1 tab(s) orally 3 times a day (with meals) x 3 days  melatonin 5 mg oral tablet: 1 tab(s) orally once a day (at bedtime)  metoprolol succinate 50 mg oral tablet, extended release: 1 tab(s) orally once a day  Namenda 10 mg oral tablet: 1 tab(s) orally 2 times a day  Prinivil 40 mg oral tablet: 1 tab(s) orally once a day  Senna 8.6 mg oral tablet:   Zetia 10 mg oral tablet: 1 tab(s) orally once a day   Admelog 100 units/mL injectable solution: 8 unit(s) injectable 3 times a day (before meals)  aspirin 325 mg oral delayed release tablet: 1 tab(s) orally once a day  atorvastatin 80 mg oral tablet: 1 tab(s) orally once a day (at bedtime)  clopidogrel 75 mg oral tablet: 1 tab(s) orally once a day  donepezil 10 mg oral tablet: 2 tab(s) orally once a day at 5PM  Ecotrin 325 mg oral delayed release tablet: 1 tab(s) orally once a day  Fleet Enema 19 g-7 g rectal enema: 133 milliliter(s) rectally once a day   insulin glargine: 30 unit(s) subcutaneous once a day (at bedtime)  lisinopril 40 mg oral tablet: 1 tab(s) orally once a day  magnesium oxide 400 mg oral tablet: 1 tab(s) orally 3 times a day (with meals) x 3 days  melatonin 5 mg oral tablet: 1 tab(s) orally once a day (at bedtime)  metoprolol succinate 50 mg oral tablet, extended release: 1 tab(s) orally once a day  Namenda 10 mg oral tablet: 1 tab(s) orally 2 times a day  Prinivil 40 mg oral tablet: 1 tab(s) orally once a day  Senna 8.6 mg oral tablet: orally once a day  Zetia 10 mg oral tablet: 1 tab(s) orally once a day   Admelog 100 units/mL injectable solution: 8 unit(s) injectable 3 times a day (before meals)  aspirin 325 mg oral delayed release tablet: 1 tab(s) orally once a day  atorvastatin 80 mg oral tablet: 1 tab(s) orally once a day (at bedtime)  clopidogrel 75 mg oral tablet: 1 tab(s) orally once a day  donepezil 10 mg oral tablet: 2 tab(s) orally once a day at 5PM  Ecotrin 325 mg oral delayed release tablet: 1 tab(s) orally once a day  Fleet Enema 19 g-7 g rectal enema: 133 milliliter(s) rectally once a day   insulin glargine: 30 unit(s) subcutaneous once a day (at bedtime)  lisinopril 40 mg oral tablet: 1 tab(s) orally once a day  magnesium oxide 400 mg oral tablet: 1 tab(s) orally 3 times a day (with meals) x 3 days  melatonin 5 mg oral tablet: 1 tab(s) orally once a day (at bedtime)  metoprolol succinate 50 mg oral tablet, extended release: 1 tab(s) orally once a day  Namenda 10 mg oral tablet: 1 tab(s) orally 2 times a day  Senna 8.6 mg oral tablet: orally once a day  Zetia 10 mg oral tablet: 1 tab(s) orally once a day   Admelog 100 units/mL injectable solution: 8 unit(s) injectable 3 times a day (before meals)  atorvastatin 80 mg oral tablet: 1 tab(s) orally once a day (at bedtime)  clopidogrel 75 mg oral tablet: 1 tab(s) orally once a day  donepezil 10 mg oral tablet: 2 tab(s) orally once a day at 5PM  insulin glargine: 30 unit(s) subcutaneous once a day (at bedtime)  lisinopril 40 mg oral tablet: 1 tab(s) orally once a day  metoprolol succinate 50 mg oral tablet, extended release: 1 tab(s) orally once a day  Namenda 10 mg oral tablet: 1 tab(s) orally 2 times a day  Senna 8.6 mg oral tablet: orally once a day  Zetia 10 mg oral tablet: 1 tab(s) orally once a day

## 2022-03-12 NOTE — PROGRESS NOTE ADULT - SUBJECTIVE AND OBJECTIVE BOX
ALEA MORRIS 56y Male  MRN#: [id number]   Hospital Day: 2d    SUBJECTIVE  Patient is a 56y old Male  with PMH  of DM, HTN, CVA, COVID in , sent in from [facility] residence for increased lethargy over last week.    Currently admitted to medicine with the primary diagnosis of Lethargy      INTERVAL HPI AND OVERNIGHT EVENTS:  Patient was examined and seen at bedside. This morning he is resting comfortably in bed and reports no issues or overnight events.    REVIEW OF SYMPTOMS:  patient is asleep, awakens but ROS is limited due to patient's effort    OBJECTIVE  PAST MEDICAL & SURGICAL HISTORY  Diabetes    Hypertension    Pancreatitis    CVA (cerebral vascular accident)    No significant past surgical history      ALLERGIES:  No Known Allergies    MEDICATIONS:  STANDING MEDICATIONS  aspirin  chewable 81 milliGRAM(s) Oral daily  atorvastatin 80 milliGRAM(s) Oral at bedtime  clopidogrel Tablet 75 milliGRAM(s) Oral daily  donepezil 20 milliGRAM(s) Oral at bedtime  enoxaparin Injectable 40 milliGRAM(s) SubCutaneous every 24 hours  lisinopril 40 milliGRAM(s) Oral daily  memantine 10 milliGRAM(s) Oral two times a day  metoprolol succinate ER 50 milliGRAM(s) Oral daily  pantoprazole    Tablet 40 milliGRAM(s) Oral before breakfast  senna 2 Tablet(s) Oral at bedtime    PRN MEDICATIONS      VITAL SIGNS: Last 24 Hours  T(C): 36.3 (12 Mar 2022 05:11), Max: 36.6 (11 Mar 2022 12:35)  T(F): 97.3 (12 Mar 2022 05:11), Max: 97.8 (11 Mar 2022 12:35)  HR: 76 (12 Mar 2022 05:11) (69 - 85)  BP: 144/75 (12 Mar 2022 05:11) (144/75 - 180/80)  BP(mean): --  RR: 18 (12 Mar 2022 05:11) (18 - 20)  SpO2: 95% (11 Mar 2022 12:35) (94% - 95%)    LABS:                        14.0   8.01  )-----------( 251      ( 11 Mar 2022 07:10 )             40.6     03-11    143  |  108  |  15  ----------------------------<  123<H>  3.4<L>   |  24  |  0.6<L>    Ca    8.6      11 Mar 2022 07:10  Mg     1.7     03-11    TPro  5.8<L>  /  Alb  3.2<L>  /  TBili  0.5  /  DBili  x   /  AST  20  /  ALT  22  /  AlkPhos  74  03-11      Urinalysis Basic - ( 10 Mar 2022 17:20 )    Color: Mana / Appearance: Clear / S.035 / pH: x  Gluc: x / Ketone: Trace  / Bili: Small / Urobili: 3 mg/dL   Blood: x / Protein: 100 mg/dL / Nitrite: Negative   Leuk Esterase: Negative / RBC: 8 /HPF / WBC 8 /HPF   Sq Epi: x / Non Sq Epi: 4 /HPF / Bacteria: Negative            Culture - Urine (collected 10 Mar 2022 17:20)  Source: Clean Catch Clean Catch (Midstream)  Final Report (11 Mar 2022 22:37):    No growth    Culture - Blood (collected 10 Mar 2022 15:02)  Source: .Blood Blood  Preliminary Report (11 Mar 2022 22:02):    No growth to date.    Culture - Blood (collected 10 Mar 2022 15:02)  Source: .Blood Blood  Preliminary Report (11 Mar 2022 22:02):    No growth to date.      CARDIAC MARKERS ( 10 Mar 2022 15:02 )  x     / <0.01 ng/mL / x     / x     / x          RADIOLOGY:      PHYSICAL EXAM:  CONSTITUTIONAL: No acute distress, AAOx2  HEAD: Atraumatic, normocephalic  EYES: conjunctiva and sclera clear  ENT: Supple,, no JVD; moist mucous membranes  PULMONARY: Clear to auscultation bilaterally; no wheezes  CARDIOVASCULAR: Regular rate and rhythm; no murmurs, s1,s2  GASTROINTESTINAL: Soft, non-tender, non-distended; bowel sounds present  MUSCULOSKELETAL: no edema  SKIN: warm and dry     ALEA MORRIS 56y Male  MRN#: [id number]   Hospital Day: 2d    SUBJECTIVE  Patient is a 56y old Male  with PMH  of DM, HTN, CVA, COVID in , sent in from [facility] residence for increased lethargy over last week.    Currently admitted to medicine with the primary diagnosis of Lethargy      INTERVAL HPI AND OVERNIGHT EVENTS:  Patient was examined and seen at bedside. This morning he is resting comfortably in bed , no events overnight.     REVIEW OF SYMPTOMS:  patient is asleep, awakens but ROS is limited due to patient's effort    OBJECTIVE  PAST MEDICAL & SURGICAL HISTORY  Diabetes    Hypertension    Pancreatitis    CVA (cerebral vascular accident)    No significant past surgical history      ALLERGIES:  No Known Allergies    MEDICATIONS:  STANDING MEDICATIONS  aspirin  chewable 81 milliGRAM(s) Oral daily  atorvastatin 80 milliGRAM(s) Oral at bedtime  clopidogrel Tablet 75 milliGRAM(s) Oral daily  donepezil 20 milliGRAM(s) Oral at bedtime  enoxaparin Injectable 40 milliGRAM(s) SubCutaneous every 24 hours  lisinopril 40 milliGRAM(s) Oral daily  memantine 10 milliGRAM(s) Oral two times a day  metoprolol succinate ER 50 milliGRAM(s) Oral daily  pantoprazole    Tablet 40 milliGRAM(s) Oral before breakfast  senna 2 Tablet(s) Oral at bedtime    PRN MEDICATIONS      VITAL SIGNS: Last 24 Hours  T(C): 36.3 (12 Mar 2022 05:11), Max: 36.6 (11 Mar 2022 12:35)  T(F): 97.3 (12 Mar 2022 05:11), Max: 97.8 (11 Mar 2022 12:35)  HR: 76 (12 Mar 2022 05:11) (69 - 85)  BP: 144/75 (12 Mar 2022 05:11) (144/75 - 180/80)  BP(mean): --  RR: 18 (12 Mar 2022 05:11) (18 - 20)  SpO2: 95% (11 Mar 2022 12:35) (94% - 95%)    LABS:                        14.0   8.01  )-----------( 251      ( 11 Mar 2022 07:10 )             40.6     03-11    143  |  108  |  15  ----------------------------<  123<H>  3.4<L>   |  24  |  0.6<L>    Ca    8.6      11 Mar 2022 07:10  Mg     1.7     03-11    TPro  5.8<L>  /  Alb  3.2<L>  /  TBili  0.5  /  DBili  x   /  AST  20  /  ALT  22  /  AlkPhos  74  03-11      Urinalysis Basic - ( 10 Mar 2022 17:20 )    Color: Mana / Appearance: Clear / S.035 / pH: x  Gluc: x / Ketone: Trace  / Bili: Small / Urobili: 3 mg/dL   Blood: x / Protein: 100 mg/dL / Nitrite: Negative   Leuk Esterase: Negative / RBC: 8 /HPF / WBC 8 /HPF   Sq Epi: x / Non Sq Epi: 4 /HPF / Bacteria: Negative            Culture - Urine (collected 10 Mar 2022 17:20)  Source: Clean Catch Clean Catch (Midstream)  Final Report (11 Mar 2022 22:37):    No growth    Culture - Blood (collected 10 Mar 2022 15:02)  Source: .Blood Blood  Preliminary Report (11 Mar 2022 22:02):    No growth to date.    Culture - Blood (collected 10 Mar 2022 15:02)  Source: .Blood Blood  Preliminary Report (11 Mar 2022 22:02):    No growth to date.      CARDIAC MARKERS ( 10 Mar 2022 15:02 )  x     / <0.01 ng/mL / x     / x     / x          RADIOLOGY:      PHYSICAL EXAM:  CONSTITUTIONAL: No acute distress, AAOx2  HEAD: Atraumatic, normocephalic  EYES: conjunctiva and sclera clear  ENT: Supple,, no JVD; moist mucous membranes  PULMONARY: Clear to auscultation bilaterally; no wheezes  CARDIOVASCULAR: Regular rate and rhythm; no murmurs, s1,s2  GASTROINTESTINAL: Soft, non-tender, non-distended; bowel sounds present  MUSCULOSKELETAL: no edema, warm well perfused   SKIN: warm and dry

## 2022-03-12 NOTE — DISCHARGE NOTE PROVIDER - CARE PROVIDER_API CALL
Sang Montoya)  Internal Medicine  [address] [place] [address]  Phone: [phone]  Fax: [phone]  Established Patient  Follow Up Time: 1 week   Sang Montoya)  Internal Medicine  [address] [place] [address]  Phone: [phone]  Fax: [phone]  Established Patient  Follow Up Time: 1 week    Piter Key)  Psychiatry  [address] [place] [address]  Phone: [phone]  Fax: [phone]  Follow Up Time: 1 month

## 2022-03-12 NOTE — DISCHARGE NOTE PROVIDER - HOSPITAL COURSE
56 year old male with PMH  of DM, HTN, CVA, COVID in Jan, sent in from [facility] for increased lethargy over last week. As per nursing home pt also has been experiencing decreased ambulation and PO intake. Pt is a poor historian and when asked how he ended up in the hospital pt says "I think because I need a COVID test." Pt does not recall feeling weak or lethargic at nursing home. Denies chest pain, SOB, fever, chills, abdominal pain, diarrhea.      In the ED vitals notable for BP of 182/84. Labs notable for wbc of 17, sterile pyuria. 1L bolus NS given in ED. CT abdomen/pelvis and CTH unremarkable for acute changes.     Patient was admitted for lethargy. While in the hospital, infectious w/u was neg. EEG came back negative. Leukocytosis resolved w/o antibiotics. Patient's mental state waxed and waned, seems to be baseline according to [facility]'s staff. Neuro consulted, no acute intervention were recommeded. Patient evaluated by PT, walker was recommended. Patient stable and ready for dc. 56 year old male with PMH  of DM, HTN, CVA, COVID in Jan, sent in from [facility] for increased lethargy over last week. As per nursing home pt also has been experiencing decreased ambulation and PO intake. Pt is a poor historian and when asked how he ended up in the hospital pt says "I think because I need a COVID test." Pt does not recall feeling weak or lethargic at nursing home. Denies chest pain, SOB, fever, chills, abdominal pain, diarrhea.      In the ED vitals notable for BP of 182/84. Labs notable for wbc of 17, sterile pyuria. 1L bolus NS given in ED. CT abdomen/pelvis and CTH unremarkable for acute changes.     Patient was admitted for lethargy. While in the hospital, infectious w/u was neg. EEG came back negative. Leukocytosis resolved w/o antibiotics. Patient's mental state waxed and waned, seems to be baseline according to [facility]'s staff and wife. Neuro consulted, no acute intervention were recommeded. Patient evaluated by PT, walker was recommended. Prior to dc, patient had an episode of AMS with unresponsiveness, stroke and seizure ruled out, possibly catatonia. Wife endorses pt has had episodes like this at home as well, not a new symptom. Will need OP Psychiatry eval. Patient stable and ready for dc. 56 year old male with PMH  of DM, HTN, CVA, COVID in Jan, sent in from [facility] residence for increased lethargy over last week. As per nursing home pt also has been experiencing decreased ambulation and PO intake. Pt is a poor historian and when asked how he ended up in the hospital pt says "I think because I need a COVID test." Pt does not recall feeling weak or lethargic at nursing home. Denies chest pain, SOB, fever, chills, abdominal pain, diarrhea.      In the ED vitals notable for BP of 182/84. Labs notable for wbc of 17, sterile pyuria. 1L bolus NS given in ED. CT abdomen/pelvis and CTH unremarkable for acute changes.     Patient was admitted for lethargy. While in the hospital, infectious w/u was neg. EEG came back negative. Leukocytosis resolved w/o antibiotics. Patient's mental state waxed and waned, seems to be baseline according to [facility]'s staff and wife. Neuro consulted, no acute intervention were recommeded. Patient evaluated by PT, walker was recommended. Prior to dc, patient had an episode of AMS with unresponsiveness, stroke and seizure ruled out, possibly catatonia. Wife endorses pt has had episodes like this at home as well, not a new symptom. Will need OP Psychiatry eval. Patient stable and ready for dc.    #Increased Lethargy/ Weakness w a Hx of CVA/Dementia likely in the setting of Deconditioning in home   Recent COVID-19 which may contribute to deconditioning   As per [facility] he is baseline lethargic minimally communicative and AAO x2 at baseline, but previously could ambulate independently   Neuro consulted- they agree this is patient's baseline, no need for further imaging, outpatient Psych eval  -REEG- mild diffuse electrophysiological dysfunction   -Blood and Urine Cx negative   -PT following- significantly improved function on exam today   - c/w ASA/Plavix/Namenda/Aricept       #Leukocytosis- resolved  on admission WBC ~17K  afebrile  UA and Bl Cx negative to date  In light of current findings Leukocytosis was likely reactive and not indicative of an active infection       56 year old male with PMH  of DM, HTN, CVA, COVID in Jan, sent in from [facility] residence for increased lethargy over last week. As per nursing home pt also has been experiencing decreased ambulation and PO intake. Pt is a poor historian and when asked how he ended up in the hospital pt says "I think because I need a COVID test." Pt does not recall feeling weak or lethargic at nursing home. Denies chest pain, SOB, fever, chills, abdominal pain, diarrhea.      In the ED vitals notable for BP of 182/84. Labs notable for wbc of 17, sterile pyuria. 1L bolus NS given in ED. CT abdomen/pelvis and CTH unremarkable for acute changes.     Patient was admitted for lethargy. While in the hospital, infectious w/u was neg. EEG came back negative. Leukocytosis resolved w/o antibiotics. Patient's mental state waxed and waned, seems to be baseline according to [facility]'s staff and wife. Neuro consulted, no acute intervention were recommeded. Patient evaluated by PT, walker was recommended. Prior to dc, patient had an episode of AMS with unresponsiveness, stroke and seizure ruled out, possibly catatonia. Wife endorses pt has had episodes like this at home as well, not a new symptom. Will need OP Psychiatry eval. REPEAT EEG DID NOT SHOW ANY NEW SEIZURE LIKE ACTIVITY Patient stable and ready for dc.    #Increased Lethargy/ Weakness w a Hx of CVA/Dementia likely in the setting of Deconditioning in home   Recent COVID-19 which may contribute to deconditioning   As per [facility] he is baseline lethargic minimally communicative and AAO x2 at baseline, but previously could ambulate independently   Neuro consulted- they agree this is patient's baseline, no need for further imaging, outpatient Psych eval  -REEG- mild diffuse electrophysiological dysfunction   -Blood and Urine Cx negative   -PT following- significantly improved function on exam today   - c/w ASA/Plavix/Namenda/Aricept       #Leukocytosis- resolved  on admission WBC ~17K  afebrile  UA and Bl Cx negative to date  In light of current findings Leukocytosis was likely reactive and not indicative of an active infection       56 year old male with PMH  of DM, HTN, CVA, COVID in Jan, sent in from [facility] residence for increased lethargy over last week. As per nursing home pt also has been experiencing decreased ambulation and PO intake. Pt is a poor historian and when asked how he ended up in the hospital pt says "I think because I need a COVID test." Pt does not recall feeling weak or lethargic at nursing home. Denies chest pain, SOB, fever, chills, abdominal pain, diarrhea.      In the ED vitals notable for BP of 182/84. Labs notable for wbc of 17, sterile pyuria. 1L bolus NS given in ED. CT abdomen/pelvis and CTH unremarkable for acute changes.     Patient was admitted for lethargy. While in the hospital, infectious w/u was neg. EEG came back negative. Leukocytosis resolved w/o antibiotics. Patient's mental state waxed and waned, seems to be baseline according to [facility]'s staff and wife. Neuro consulted, no acute intervention were recommeded. Patient evaluated by PT, walker was recommended. Prior to dc, patient had an episode of AMS with unresponsiveness, stroke and seizure ruled out, possibly catatonia. Wife endorses pt has had episodes like this at home as well, not a new symptom. Will need OP Psychiatry eval. REPEAT EEG DID NOT SHOW ANY NEW SEIZURE LIKE ACTIVITY Patient stable and ready for dc.    #Increased Lethargy/ Weakness w a Hx of CVA/Dementia likely in the setting of Deconditioning in home   - Recent COVID-19 which may contribute to deconditioning   - As per [facility] he is baseline lethargic minimally communicative and AAO x2 at baseline, but previously could ambulate independently   - Neuro consulted- they agree this is patient's baseline, no need for further imaging, outpatient Psych eval  - REEG- mild diffuse electrophysiological dysfunction   - Blood and Urine Cx negative   - PT following- significantly improved function on exam today   - c/w ASA/Plavix/Namenda/Aricept       #Leukocytosis- resolved  - reactive vs heme concentrated 2/2 to decreased PO intake?  - on admission WBC ~17K-->8 and stable through admission  - afebrile  - UA and Bl Cx negative to date

## 2022-03-13 LAB
ALBUMIN SERPL ELPH-MCNC: 3.4 G/DL — LOW (ref 3.5–5.2)
ALBUMIN SERPL ELPH-MCNC: 3.6 G/DL — SIGNIFICANT CHANGE UP (ref 3.5–5.2)
ALP SERPL-CCNC: 81 U/L — SIGNIFICANT CHANGE UP (ref 30–115)
ALP SERPL-CCNC: 85 U/L — SIGNIFICANT CHANGE UP (ref 30–115)
ALT FLD-CCNC: 18 U/L — SIGNIFICANT CHANGE UP (ref 0–41)
ALT FLD-CCNC: 22 U/L — SIGNIFICANT CHANGE UP (ref 0–41)
ANION GAP SERPL CALC-SCNC: 12 MMOL/L — SIGNIFICANT CHANGE UP (ref 7–14)
ANION GAP SERPL CALC-SCNC: 14 MMOL/L — SIGNIFICANT CHANGE UP (ref 7–14)
APTT BLD: 43.1 SEC — HIGH (ref 27–39.2)
AST SERPL-CCNC: 13 U/L — SIGNIFICANT CHANGE UP (ref 0–41)
AST SERPL-CCNC: 16 U/L — SIGNIFICANT CHANGE UP (ref 0–41)
BASOPHILS # BLD AUTO: 0.04 K/UL — SIGNIFICANT CHANGE UP (ref 0–0.2)
BASOPHILS # BLD AUTO: 0.05 K/UL — SIGNIFICANT CHANGE UP (ref 0–0.2)
BASOPHILS NFR BLD AUTO: 0.5 % — SIGNIFICANT CHANGE UP (ref 0–1)
BASOPHILS NFR BLD AUTO: 0.7 % — SIGNIFICANT CHANGE UP (ref 0–1)
BILIRUB SERPL-MCNC: 0.3 MG/DL — SIGNIFICANT CHANGE UP (ref 0.2–1.2)
BILIRUB SERPL-MCNC: 0.6 MG/DL — SIGNIFICANT CHANGE UP (ref 0.2–1.2)
BLD GP AB SCN SERPL QL: SIGNIFICANT CHANGE UP
BUN SERPL-MCNC: 11 MG/DL — SIGNIFICANT CHANGE UP (ref 10–20)
BUN SERPL-MCNC: 9 MG/DL — LOW (ref 10–20)
CALCIUM SERPL-MCNC: 8.6 MG/DL — SIGNIFICANT CHANGE UP (ref 8.5–10.1)
CALCIUM SERPL-MCNC: 9 MG/DL — SIGNIFICANT CHANGE UP (ref 8.5–10.1)
CHLORIDE SERPL-SCNC: 103 MMOL/L — SIGNIFICANT CHANGE UP (ref 98–110)
CHLORIDE SERPL-SCNC: 105 MMOL/L — SIGNIFICANT CHANGE UP (ref 98–110)
CK MB CFR SERPL CALC: 1.5 NG/ML — SIGNIFICANT CHANGE UP (ref 0.6–6.3)
CK SERPL-CCNC: 56 U/L — SIGNIFICANT CHANGE UP (ref 0–225)
CO2 SERPL-SCNC: 26 MMOL/L — SIGNIFICANT CHANGE UP (ref 17–32)
CO2 SERPL-SCNC: 28 MMOL/L — SIGNIFICANT CHANGE UP (ref 17–32)
CREAT SERPL-MCNC: 0.6 MG/DL — LOW (ref 0.7–1.5)
CREAT SERPL-MCNC: 0.7 MG/DL — SIGNIFICANT CHANGE UP (ref 0.7–1.5)
EGFR: 107 ML/MIN/1.73M2 — SIGNIFICANT CHANGE UP
EGFR: 112 ML/MIN/1.73M2 — SIGNIFICANT CHANGE UP
EOSINOPHIL # BLD AUTO: 0.24 K/UL — SIGNIFICANT CHANGE UP (ref 0–0.7)
EOSINOPHIL # BLD AUTO: 0.29 K/UL — SIGNIFICANT CHANGE UP (ref 0–0.7)
EOSINOPHIL NFR BLD AUTO: 3.1 % — SIGNIFICANT CHANGE UP (ref 0–8)
EOSINOPHIL NFR BLD AUTO: 4.2 % — SIGNIFICANT CHANGE UP (ref 0–8)
GLUCOSE BLDC GLUCOMTR-MCNC: 167 MG/DL — HIGH (ref 70–99)
GLUCOSE BLDC GLUCOMTR-MCNC: 247 MG/DL — HIGH (ref 70–99)
GLUCOSE BLDC GLUCOMTR-MCNC: 254 MG/DL — HIGH (ref 70–99)
GLUCOSE BLDC GLUCOMTR-MCNC: 258 MG/DL — HIGH (ref 70–99)
GLUCOSE BLDC GLUCOMTR-MCNC: 297 MG/DL — HIGH (ref 70–99)
GLUCOSE SERPL-MCNC: 188 MG/DL — HIGH (ref 70–99)
GLUCOSE SERPL-MCNC: 283 MG/DL — HIGH (ref 70–99)
HCT VFR BLD CALC: 41 % — LOW (ref 42–52)
HCT VFR BLD CALC: 44.1 % — SIGNIFICANT CHANGE UP (ref 42–52)
HGB BLD-MCNC: 14 G/DL — SIGNIFICANT CHANGE UP (ref 14–18)
HGB BLD-MCNC: 14.7 G/DL — SIGNIFICANT CHANGE UP (ref 14–18)
IMM GRANULOCYTES NFR BLD AUTO: 0.1 % — SIGNIFICANT CHANGE UP (ref 0.1–0.3)
IMM GRANULOCYTES NFR BLD AUTO: 0.3 % — SIGNIFICANT CHANGE UP (ref 0.1–0.3)
INR BLD: 1.02 RATIO — SIGNIFICANT CHANGE UP (ref 0.65–1.3)
LACTATE SERPL-SCNC: 1.4 MMOL/L — SIGNIFICANT CHANGE UP (ref 0.7–2)
LYMPHOCYTES # BLD AUTO: 2.23 K/UL — SIGNIFICANT CHANGE UP (ref 1.2–3.4)
LYMPHOCYTES # BLD AUTO: 2.32 K/UL — SIGNIFICANT CHANGE UP (ref 1.2–3.4)
LYMPHOCYTES # BLD AUTO: 28.4 % — SIGNIFICANT CHANGE UP (ref 20.5–51.1)
LYMPHOCYTES # BLD AUTO: 33.8 % — SIGNIFICANT CHANGE UP (ref 20.5–51.1)
MAGNESIUM SERPL-MCNC: 1.7 MG/DL — LOW (ref 1.8–2.4)
MAGNESIUM SERPL-MCNC: 1.9 MG/DL — SIGNIFICANT CHANGE UP (ref 1.8–2.4)
MCHC RBC-ENTMCNC: 28.9 PG — SIGNIFICANT CHANGE UP (ref 27–31)
MCHC RBC-ENTMCNC: 28.9 PG — SIGNIFICANT CHANGE UP (ref 27–31)
MCHC RBC-ENTMCNC: 33.3 G/DL — SIGNIFICANT CHANGE UP (ref 32–37)
MCHC RBC-ENTMCNC: 34.1 G/DL — SIGNIFICANT CHANGE UP (ref 32–37)
MCV RBC AUTO: 84.7 FL — SIGNIFICANT CHANGE UP (ref 80–94)
MCV RBC AUTO: 86.6 FL — SIGNIFICANT CHANGE UP (ref 80–94)
MONOCYTES # BLD AUTO: 0.54 K/UL — SIGNIFICANT CHANGE UP (ref 0.1–0.6)
MONOCYTES # BLD AUTO: 0.6 K/UL — SIGNIFICANT CHANGE UP (ref 0.1–0.6)
MONOCYTES NFR BLD AUTO: 6.9 % — SIGNIFICANT CHANGE UP (ref 1.7–9.3)
MONOCYTES NFR BLD AUTO: 8.7 % — SIGNIFICANT CHANGE UP (ref 1.7–9.3)
NEUTROPHILS # BLD AUTO: 3.6 K/UL — SIGNIFICANT CHANGE UP (ref 1.4–6.5)
NEUTROPHILS # BLD AUTO: 4.77 K/UL — SIGNIFICANT CHANGE UP (ref 1.4–6.5)
NEUTROPHILS NFR BLD AUTO: 52.5 % — SIGNIFICANT CHANGE UP (ref 42.2–75.2)
NEUTROPHILS NFR BLD AUTO: 60.8 % — SIGNIFICANT CHANGE UP (ref 42.2–75.2)
NRBC # BLD: 0 /100 WBCS — SIGNIFICANT CHANGE UP (ref 0–0)
NRBC # BLD: 0 /100 WBCS — SIGNIFICANT CHANGE UP (ref 0–0)
PHOSPHATE SERPL-MCNC: 3.2 MG/DL — SIGNIFICANT CHANGE UP (ref 2.1–4.9)
PLATELET # BLD AUTO: 258 K/UL — SIGNIFICANT CHANGE UP (ref 130–400)
PLATELET # BLD AUTO: 268 K/UL — SIGNIFICANT CHANGE UP (ref 130–400)
POTASSIUM SERPL-MCNC: 3.7 MMOL/L — SIGNIFICANT CHANGE UP (ref 3.5–5)
POTASSIUM SERPL-MCNC: 4.4 MMOL/L — SIGNIFICANT CHANGE UP (ref 3.5–5)
POTASSIUM SERPL-SCNC: 3.7 MMOL/L — SIGNIFICANT CHANGE UP (ref 3.5–5)
POTASSIUM SERPL-SCNC: 4.4 MMOL/L — SIGNIFICANT CHANGE UP (ref 3.5–5)
PROT SERPL-MCNC: 5.9 G/DL — LOW (ref 6–8)
PROT SERPL-MCNC: 6 G/DL — SIGNIFICANT CHANGE UP (ref 6–8)
PROTHROM AB SERPL-ACNC: 11.7 SEC — SIGNIFICANT CHANGE UP (ref 9.95–12.87)
RBC # BLD: 4.84 M/UL — SIGNIFICANT CHANGE UP (ref 4.7–6.1)
RBC # BLD: 5.09 M/UL — SIGNIFICANT CHANGE UP (ref 4.7–6.1)
RBC # FLD: 13.2 % — SIGNIFICANT CHANGE UP (ref 11.5–14.5)
RBC # FLD: 13.2 % — SIGNIFICANT CHANGE UP (ref 11.5–14.5)
SARS-COV-2 RNA SPEC QL NAA+PROBE: SIGNIFICANT CHANGE UP
SODIUM SERPL-SCNC: 141 MMOL/L — SIGNIFICANT CHANGE UP (ref 135–146)
SODIUM SERPL-SCNC: 147 MMOL/L — HIGH (ref 135–146)
TROPONIN T SERPL-MCNC: <0.01 NG/ML — SIGNIFICANT CHANGE UP
WBC # BLD: 6.87 K/UL — SIGNIFICANT CHANGE UP (ref 4.8–10.8)
WBC # BLD: 7.84 K/UL — SIGNIFICANT CHANGE UP (ref 4.8–10.8)
WBC # FLD AUTO: 6.87 K/UL — SIGNIFICANT CHANGE UP (ref 4.8–10.8)
WBC # FLD AUTO: 7.84 K/UL — SIGNIFICANT CHANGE UP (ref 4.8–10.8)

## 2022-03-13 PROCEDURE — 0042T: CPT

## 2022-03-13 PROCEDURE — 70450 CT HEAD/BRAIN W/O DYE: CPT | Mod: 26

## 2022-03-13 PROCEDURE — 99232 SBSQ HOSP IP/OBS MODERATE 35: CPT

## 2022-03-13 PROCEDURE — 93010 ELECTROCARDIOGRAM REPORT: CPT

## 2022-03-13 RX ORDER — HYDRALAZINE HCL 50 MG
10 TABLET ORAL ONCE
Refills: 0 | Status: COMPLETED | OUTPATIENT
Start: 2022-03-13 | End: 2022-03-13

## 2022-03-13 RX ADMIN — PANTOPRAZOLE SODIUM 40 MILLIGRAM(S): 20 TABLET, DELAYED RELEASE ORAL at 05:58

## 2022-03-13 RX ADMIN — CLOPIDOGREL BISULFATE 75 MILLIGRAM(S): 75 TABLET, FILM COATED ORAL at 12:44

## 2022-03-13 RX ADMIN — Medication 81 MILLIGRAM(S): at 12:44

## 2022-03-13 RX ADMIN — Medication 10 MILLIGRAM(S): at 20:21

## 2022-03-13 RX ADMIN — DONEPEZIL HYDROCHLORIDE 20 MILLIGRAM(S): 10 TABLET, FILM COATED ORAL at 22:05

## 2022-03-13 RX ADMIN — MEMANTINE HYDROCHLORIDE 10 MILLIGRAM(S): 10 TABLET ORAL at 05:58

## 2022-03-13 RX ADMIN — SENNA PLUS 2 TABLET(S): 8.6 TABLET ORAL at 22:05

## 2022-03-13 RX ADMIN — Medication 50 MILLIGRAM(S): at 05:58

## 2022-03-13 RX ADMIN — Medication 2 MILLIGRAM(S): at 19:53

## 2022-03-13 RX ADMIN — ATORVASTATIN CALCIUM 80 MILLIGRAM(S): 80 TABLET, FILM COATED ORAL at 22:04

## 2022-03-13 RX ADMIN — LISINOPRIL 40 MILLIGRAM(S): 2.5 TABLET ORAL at 05:58

## 2022-03-13 NOTE — PROGRESS NOTE ADULT - SUBJECTIVE AND OBJECTIVE BOX
ALEA MORRIS  56y, Male  Allergy: No Known Allergies    Hospital Day: 3d    Patient seen and examined earlier today. Awake, responding to questions appropriately, AAOx2.     PMH/PSH:  PAST MEDICAL & SURGICAL HISTORY:  Diabetes    Hypertension    Pancreatitis    CVA (cerebral vascular accident)    No significant past surgical history        LAST 24-Hr EVENTS:    VITALS:  T(F): 97.1 (03-13-22 @ 14:07), Max: 97.6 (03-13-22 @ 06:05)  HR: 63 (03-13-22 @ 14:07)  BP: 161/77 (03-13-22 @ 14:07) (148/77 - 164/77)  RR: 18 (03-13-22 @ 14:07)  SpO2: --        TESTS & MEASUREMENTS:  Weight (Kg):   BMI (kg/m2): 28.5 (03-10)                          14.7   7.84  )-----------( 268      ( 13 Mar 2022 05:50 )             44.1       03-13    147<H>  |  105  |  9<L>  ----------------------------<  188<H>  4.4   |  28  |  0.6<L>    Ca    9.0      13 Mar 2022 05:50  Mg     1.9     03-13    TPro  6.0  /  Alb  3.6  /  TBili  0.6  /  DBili  x   /  AST  16  /  ALT  22  /  AlkPhos  85  03-13    LIVER FUNCTIONS - ( 13 Mar 2022 05:50 )  Alb: 3.6 g/dL / Pro: 6.0 g/dL / ALK PHOS: 85 U/L / ALT: 22 U/L / AST: 16 U/L / GGT: x                 Culture - Urine (collected 03-10-22 @ 17:20)  Source: Clean Catch Clean Catch (Midstream)  Final Report (03-11-22 @ 22:37):    No growth    Culture - Blood (collected 03-10-22 @ 15:02)  Source: .Blood Blood  Preliminary Report (03-11-22 @ 22:02):    No growth to date.    Culture - Blood (collected 03-10-22 @ 15:02)  Source: .Blood Blood  Preliminary Report (03-11-22 @ 22:02):    No growth to date.                COVID-19 PCR: NotDete (03-13-22 @ 07:05)      RADIOLOGY, ECG, & ADDITIONAL TESTS:    CT Abdomen and Pelvis No Cont:   ACC: [id number] EXAM:  CT ABDOMEN AND PELVIS                          PROCEDURE DATE:  03/10/2022          INTERPRETATION:  REASON FOR EXAM / CLINICAL STATEMENT:  Vomiting.   Weakness  WBC 17.39    PMHx of CVA, DM, HTN, Pancreatitis    TECHNIQUE:  Contiguous axial CT images were obtained from the lower chest   to the pubic symphysis without IV contrast.  Reformatted images in the   coronal and sagittal planes were acquired.      COMPARISON CT: CT scan of the abdomen and pelvis dated 11/21/2021    OTHER STUDIES USED FOR CORRELATION: None.    FINDINGS:    TUBES AND LINES: None.    LOWER CHEST: There are mild dependent atelectatic changes at the lung   bases. No pleural or pericardial effusion.    HEPATIC: The liver is normal in size with no evidence of solid mass or   bile duct dilatation.    BILIARY: Status post cholecystectomy    SPLEEN: Unremarkable.    PANCREAS: The pancreas is normal in size and configuration. No evidence   of mass or pancreatitis.    ADRENAL GLANDS: Unremarkable.    KIDNEYS: No evidence of hydronephrosis or calcified stones.    ABDOMINOPELVIC NODES: Unremarkable.    PELVIC ORGANS: No evidence of pelvic mass, lymphadenopathy, or fluid   collection.    BLADDER: The bladder is decompressed limiting evaluation.    PERITONEUM/MESENTERY/BOWEL: No evidence of bowel obstruction, colitis,   inflammatory process, or ascites. No pneumoperitoneum.  The appendix is   normal in appearance.    BONES/SOFT TISSUES: Degenerative changes of the spine are noted.    OTHER: Normal caliber aorta.      IMPRESSION:    No evidence of abdominal or pelvic mass or inflammatory process    --- End of Report ---            GLENNA AN MD; Attending Interventional Radiologist  This document has been electronically signed. Mar 10 2022 6:29PM (03-10-22 @ 17:25)    RECENT DIAGNOSTIC ORDERS:      MEDICATIONS:  MEDICATIONS  (STANDING):  aspirin  chewable 81 milliGRAM(s) Oral daily  atorvastatin 80 milliGRAM(s) Oral at bedtime  clopidogrel Tablet 75 milliGRAM(s) Oral daily  donepezil 20 milliGRAM(s) Oral at bedtime  enoxaparin Injectable 40 milliGRAM(s) SubCutaneous every 24 hours  lisinopril 40 milliGRAM(s) Oral daily  memantine 10 milliGRAM(s) Oral two times a day  metoprolol succinate ER 50 milliGRAM(s) Oral daily  pantoprazole    Tablet 40 milliGRAM(s) Oral before breakfast  senna 2 Tablet(s) Oral at bedtime    MEDICATIONS  (PRN):      HOME MEDICATIONS:  Admelog 100 units/mL injectable solution (03-11)  aspirin 325 mg oral delayed release tablet (12-05)  atorvastatin 80 mg oral tablet (12-05)  clopidogrel 75 mg oral tablet (12-05)  donepezil 10 mg oral tablet (03-11)  Ecotrin 325 mg oral delayed release tablet (12-05)  insulin glargine (12-05)  lisinopril 40 mg oral tablet (12-05)  magnesium oxide 400 mg oral tablet (12-05)  melatonin 5 mg oral tablet (12-05)  metoprolol succinate 50 mg oral tablet, extended release (12-05)  Namenda 10 mg oral tablet (03-11)  Prinivil 40 mg oral tablet (12-05)  Senna 8.6 mg oral tablet (12-05)  Zetia 10 mg oral tablet (12-05)      PHYSICAL EXAM:  CONSTITUTIONAL: No acute distress, AAOx2  HEAD: Atraumatic, normocephalic  EYES: conjunctiva and sclera clear  ENT: Supple, no JVD; moist mucous membranes  PULMONARY: Clear to auscultation bilaterally; no wheezes  CARDIOVASCULAR: Regular rate and rhythm; no murmurs, s1,s2  GASTROINTESTINAL: Soft, non-tender, non-distended; bowel sounds present  MUSCULOSKELETAL: no edema, warm well perfused   SKIN: warm and dry

## 2022-03-13 NOTE — PROGRESS NOTE ADULT - ASSESSMENT
56 year old male with PMH  of DM, HTN, CVA, COVID in Jan, sent in from [facility] residence for increased lethargy over last week.    #Increased Lethargy/ Weakness likely deconditioning   #Reactive Leukocytosis- resolved   #Hx of CVA/Dementia  - pt with H/o CVA, Dementia and recently w/ COVID-19 which may contribute to deconditioning   - correlated clinical findings with [facility] and he is baseline lethargic minimally communicative and AAO x2 at baseline, but previously could ambulate independently   -Neuro consulted- they agree this is patient's baseline, no need for further imaging  -REEG- mild diffuse electrophysiological dysfunction   -Blood and Urine Cx negative   -PT following- significantly improved function on exam today   - c/w ASA/Plavix/Namenda/Aricept     #HTN  #DM type II   #Dyslipidemia  -statin/metoprolol/lisinopril   -monitor BG - well controlled       Lovenox sq     #Progress Note Handoff:  Pending (specify): dc to [facility] tomorrow   Disposition: Home___/SNF___/Other________/Unknown at this time__x______    Total time spent to complete patient's bedside assessment, review medical chart, discuss medical plan of care with covering medical team was more than 25 minutes  with >50% of time spendt face to face with patient, discussion with patient/family and/or coordination of care      Cherie Norwood DO.

## 2022-03-13 NOTE — RAPID RESPONSE TEAM SUMMARY - NSADDTLFINDINGSRRT_GEN_ALL_CORE
Decreased responsiveness to verbal stimuli, laying in bed calmly. No eye contact. Closes eyes to threat, pupils equal and reactive. Does not withdraw hand topain. DTR 2+ patellar left, 0 patellar right. Babinski+ve right? Does not localize to pain. Gluc 258. /110. No recent narcotics, only aspirin/plavix at around noon. Called stroke code. Will do CT Head. CTA. Cr within normal limits. ECG, EEG. Stat labs cbc, cmp, mg, pt, ptt, trop, lactate, t&s, procal.

## 2022-03-13 NOTE — CHART NOTE - NSCHARTNOTEFT_GEN_A_CORE
RN called for patient with BP of 177/89 and non-responsiveness, called Rapid Response. FS: 258, Stroke code called, CT head did not show any new CVAs, CT perfusion negative.    Per wife, patient has dementia at baseline s/p CVA and lives [facility]. His mentation fluctuates over the week as well as throughout the day, worsening in the evening. She reports that his current presentation (not responding to verbal stimuli, not responding to commands) occurs frequently at baseline.     Patient demonstrates odd posturing and hypoactive repetitive behaviors including volitional eye tracking, however not responsive to commands. Symptoms and onset (worsening over 1 week) suspicious for catatonia, ordered 2 mg IV Ativan, f/u for response.    < from: CT Perfusion w/ Maps w/ IV Cont (03.13.22 @ 18:55) >    IMPRESSION:    No evidence of mismatch volume corresponding to a vascular territory and   eligible for evaluation for neuro intervention. CT perfusion study   demonstrates likely artifactual mismatch volume which is likely   artifactual.    Note: RAPID threshold for ischemic tissue volume is Tmax equal or greater   than 6 seconds. Threshold for infarct core volume estimate is CBF   equivalent less than 30 percent. Threshold for poor collateral flow or   severe delayed perfusion is Tmax equal or greater greater than 10 seconds.      < end of copied text >    < from: CT Brain Stroke Protocol (03.13.22 @ 18:33) >    IMPRESSION:    No acute intracranial pathology.    Redemonstration of right occipital lobe and left thalamic chronic infarcts    Stable examination since CT head dated 3/10/2022.      < end of copied text >                          14.0   6.87  )-----------( 258      ( 13 Mar 2022 18:28 )             41.0   03-13    141  |  103  |  11  ----------------------------<  283<H>  3.7   |  26  |  0.7    Ca    8.6      13 Mar 2022 18:28  Phos  3.2     03-13  Mg     1.7     03-13    TPro  5.9<L>  /  Alb  3.4<L>  /  TBili  0.3  /  DBili  x   /  AST  13  /  ALT  18  /  AlkPhos  81  03-13

## 2022-03-13 NOTE — STROKE CODE NOTE - NSMDCONSULT QTN_Y FT
55 yo male with PMH of HTN, DM, CVAx2, Dementia, depression presented to the hospital with increase lethargy. Code stroke was called today because patient stopped responding. He is awake and alert, tracks examiner and localizes to pain , non focal.  NIHSS is 10 including prior vfc and the fact that he is not responding.    As per residency staff, patient is known to have intermittent episodes when he stops responding to the questions/commands and just look ahead.      No IV tpa due to lack of focal findings and  patient LWN more than 4 hrs ago      Recommend outpatient psych consult  Continue Dapt and Statin  If no improvement in the ss consider brain MRI 57 yo male with PMH of HTN, DM, CVAx2, Dementia, depression presented to the hospital with increase lethargy. Code stroke was called today because patient stopped responding. He is awake and alert, tracks examiner and localizes to pain , non focal.  NIHSS is 10 including prior vfc and the fact that he is not responding.    As per residency staff, patient is known to have intermittent episodes when he stops responding to the questions/commands and just look ahead.      No IV tpa due to lack of focal findings and  patient LWN more than 4.5 hrs ago      Recommend outpatient psych consult  Continue Dapt and Statin  If no improvement in the ss consider brain MRI    Stroke Attending Attestation:  Pt discussed over the phone with neuro ACP.

## 2022-03-14 LAB
ALBUMIN SERPL ELPH-MCNC: 3.3 G/DL — LOW (ref 3.5–5.2)
ALP SERPL-CCNC: 75 U/L — SIGNIFICANT CHANGE UP (ref 30–115)
ALT FLD-CCNC: 18 U/L — SIGNIFICANT CHANGE UP (ref 0–41)
ANION GAP SERPL CALC-SCNC: 11 MMOL/L — SIGNIFICANT CHANGE UP (ref 7–14)
AST SERPL-CCNC: 15 U/L — SIGNIFICANT CHANGE UP (ref 0–41)
BASOPHILS # BLD AUTO: 0.05 K/UL — SIGNIFICANT CHANGE UP (ref 0–0.2)
BASOPHILS NFR BLD AUTO: 0.6 % — SIGNIFICANT CHANGE UP (ref 0–1)
BILIRUB SERPL-MCNC: 0.4 MG/DL — SIGNIFICANT CHANGE UP (ref 0.2–1.2)
BUN SERPL-MCNC: 12 MG/DL — SIGNIFICANT CHANGE UP (ref 10–20)
CALCIUM SERPL-MCNC: 8.9 MG/DL — SIGNIFICANT CHANGE UP (ref 8.5–10.1)
CHLORIDE SERPL-SCNC: 104 MMOL/L — SIGNIFICANT CHANGE UP (ref 98–110)
CO2 SERPL-SCNC: 28 MMOL/L — SIGNIFICANT CHANGE UP (ref 17–32)
CREAT SERPL-MCNC: 0.5 MG/DL — LOW (ref 0.7–1.5)
EGFR: 118 ML/MIN/1.73M2 — SIGNIFICANT CHANGE UP
EOSINOPHIL # BLD AUTO: 0.34 K/UL — SIGNIFICANT CHANGE UP (ref 0–0.7)
EOSINOPHIL NFR BLD AUTO: 4.4 % — SIGNIFICANT CHANGE UP (ref 0–8)
GLUCOSE SERPL-MCNC: 192 MG/DL — HIGH (ref 70–99)
HCT VFR BLD CALC: 43 % — SIGNIFICANT CHANGE UP (ref 42–52)
HGB BLD-MCNC: 15.1 G/DL — SIGNIFICANT CHANGE UP (ref 14–18)
IMM GRANULOCYTES NFR BLD AUTO: 0.4 % — HIGH (ref 0.1–0.3)
LYMPHOCYTES # BLD AUTO: 2.39 K/UL — SIGNIFICANT CHANGE UP (ref 1.2–3.4)
LYMPHOCYTES # BLD AUTO: 30.7 % — SIGNIFICANT CHANGE UP (ref 20.5–51.1)
MAGNESIUM SERPL-MCNC: 1.9 MG/DL — SIGNIFICANT CHANGE UP (ref 1.8–2.4)
MCHC RBC-ENTMCNC: 30 PG — SIGNIFICANT CHANGE UP (ref 27–31)
MCHC RBC-ENTMCNC: 35.1 G/DL — SIGNIFICANT CHANGE UP (ref 32–37)
MCV RBC AUTO: 85.3 FL — SIGNIFICANT CHANGE UP (ref 80–94)
MONOCYTES # BLD AUTO: 0.63 K/UL — HIGH (ref 0.1–0.6)
MONOCYTES NFR BLD AUTO: 8.1 % — SIGNIFICANT CHANGE UP (ref 1.7–9.3)
NEUTROPHILS # BLD AUTO: 4.35 K/UL — SIGNIFICANT CHANGE UP (ref 1.4–6.5)
NEUTROPHILS NFR BLD AUTO: 55.8 % — SIGNIFICANT CHANGE UP (ref 42.2–75.2)
NRBC # BLD: 0 /100 WBCS — SIGNIFICANT CHANGE UP (ref 0–0)
PLATELET # BLD AUTO: 259 K/UL — SIGNIFICANT CHANGE UP (ref 130–400)
POTASSIUM SERPL-MCNC: 3.6 MMOL/L — SIGNIFICANT CHANGE UP (ref 3.5–5)
POTASSIUM SERPL-SCNC: 3.6 MMOL/L — SIGNIFICANT CHANGE UP (ref 3.5–5)
PROLACTIN SERPL-MCNC: 14.6 NG/ML — SIGNIFICANT CHANGE UP (ref 4.1–18.4)
PROT SERPL-MCNC: 6 G/DL — SIGNIFICANT CHANGE UP (ref 6–8)
RBC # BLD: 5.04 M/UL — SIGNIFICANT CHANGE UP (ref 4.7–6.1)
RBC # FLD: 13.4 % — SIGNIFICANT CHANGE UP (ref 11.5–14.5)
SODIUM SERPL-SCNC: 143 MMOL/L — SIGNIFICANT CHANGE UP (ref 135–146)
WBC # BLD: 7.79 K/UL — SIGNIFICANT CHANGE UP (ref 4.8–10.8)
WBC # FLD AUTO: 7.79 K/UL — SIGNIFICANT CHANGE UP (ref 4.8–10.8)

## 2022-03-14 PROCEDURE — 99233 SBSQ HOSP IP/OBS HIGH 50: CPT

## 2022-03-14 PROCEDURE — ZZZZZ: CPT

## 2022-03-14 PROCEDURE — 95816 EEG AWAKE AND DROWSY: CPT | Mod: 26

## 2022-03-14 RX ADMIN — SENNA PLUS 2 TABLET(S): 8.6 TABLET ORAL at 21:49

## 2022-03-14 RX ADMIN — ENOXAPARIN SODIUM 40 MILLIGRAM(S): 100 INJECTION SUBCUTANEOUS at 18:20

## 2022-03-14 RX ADMIN — LISINOPRIL 40 MILLIGRAM(S): 2.5 TABLET ORAL at 06:54

## 2022-03-14 RX ADMIN — Medication 50 MILLIGRAM(S): at 06:54

## 2022-03-14 RX ADMIN — ATORVASTATIN CALCIUM 80 MILLIGRAM(S): 80 TABLET, FILM COATED ORAL at 21:49

## 2022-03-14 RX ADMIN — CLOPIDOGREL BISULFATE 75 MILLIGRAM(S): 75 TABLET, FILM COATED ORAL at 12:21

## 2022-03-14 RX ADMIN — PANTOPRAZOLE SODIUM 40 MILLIGRAM(S): 20 TABLET, DELAYED RELEASE ORAL at 06:54

## 2022-03-14 RX ADMIN — DONEPEZIL HYDROCHLORIDE 20 MILLIGRAM(S): 10 TABLET, FILM COATED ORAL at 21:49

## 2022-03-14 RX ADMIN — Medication 81 MILLIGRAM(S): at 12:21

## 2022-03-14 RX ADMIN — MEMANTINE HYDROCHLORIDE 10 MILLIGRAM(S): 10 TABLET ORAL at 06:54

## 2022-03-14 RX ADMIN — MEMANTINE HYDROCHLORIDE 10 MILLIGRAM(S): 10 TABLET ORAL at 18:19

## 2022-03-14 NOTE — PROGRESS NOTE ADULT - ASSESSMENT
ASSESSMENT & PLAN:  Mr Jj is a 56 YOM w a PMH of DM II, HTN, CVA w baseline dementia (MS fluctuates throughout the day as per wife 0-2) was brought in from [facility] for increased Lethargy over the last week.     Problem List:  #Increased Lethargy/ Weakness w a Hx of CVA/Dementia likely in the setting of Deconditioning in home   Recent COVID-19 which may contribute to deconditioning   As per [facility] he is baseline lethargic minimally communicative and AAO x2 at baseline, but previously could ambulate independently   Neuro consulted- they agree this is patient's baseline, no need for further imaging, outpatient Psych eval  -REEG- mild diffuse electrophysiological dysfunction   -Blood and Urine Cx negative   -PT following- significantly improved function on exam today   - c/w ASA/Plavix/Namenda/Aricept       #Leukocytosis- resolved  on admission WBC ~17K  afebrile  UA and Bl Cx negative to date  In light of current findings Leukocytosis was likely reactive and not indicative of an active infection      #HTN  #DM type II   #Dyslipidemia  -statin/metoprolol/lisinopril   -monitor BG - well controlled       PAST MEDICAL & SURGICAL HISTORY:  Diabetes    Hypertension    Pancreatitis    CVA (cerebral vascular accident)    No significant past surgical history        #Misc  - DVT Prophylaxis:  - GI Prophylaxis:  - Diet:  - Activity:  - IV Fluids:  - Code Status:    Dispo:

## 2022-03-14 NOTE — PROGRESS NOTE ADULT - SUBJECTIVE AND OBJECTIVE BOX
ALEA MORRIS 56y Male  MRN#: [id number]   Hospital Day: 4d    SUBJECTIVE  Patient is a 56y old Male who presents with a chief complaint of lethargy (13 Mar 2022 15:40)  Currently admitted to medicine with the primary diagnosis of Lethargy      INTERVAL HPI AND OVERNIGHT EVENTS:  Patient was examined and seen at bedside. This morning he is resting comfortably in bed and attempts to open eyes when asked but is unable to.  OVERNIGHT: RAPID RESPONSE AND STROKE CODE CALLED ON SUNDAY. IMAGING NEGATIVE FOR ACUTE INTRACRANIAL PATHOLOGY, PATIENT GIVEN ATIVAN AS CATATONIA WAS SUSPECTED     REVIEW OF SYMPTOMS:  patient unable to answer at this time, will continue to assess.    OBJECTIVE  PAST MEDICAL & SURGICAL HISTORY  Diabetes    Hypertension    Pancreatitis    CVA (cerebral vascular accident)    No significant past surgical history      ALLERGIES:  No Known Allergies    MEDICATIONS:  STANDING MEDICATIONS  aspirin  chewable 81 milliGRAM(s) Oral daily  atorvastatin 80 milliGRAM(s) Oral at bedtime  clopidogrel Tablet 75 milliGRAM(s) Oral daily  donepezil 20 milliGRAM(s) Oral at bedtime  enoxaparin Injectable 40 milliGRAM(s) SubCutaneous every 24 hours  lisinopril 40 milliGRAM(s) Oral daily  memantine 10 milliGRAM(s) Oral two times a day  metoprolol succinate ER 50 milliGRAM(s) Oral daily  pantoprazole    Tablet 40 milliGRAM(s) Oral before breakfast  senna 2 Tablet(s) Oral at bedtime    PRN MEDICATIONS      VITAL SIGNS: Last 24 Hours  T(C): 36.9 (14 Mar 2022 06:19), Max: 36.9 (13 Mar 2022 21:53)  T(F): 98.4 (14 Mar 2022 06:19), Max: 98.4 (13 Mar 2022 21:53)  HR: 67 (14 Mar 2022 06:19) (63 - 84)  BP: 144/72 (14 Mar 2022 06:19) (129/59 - 185/84)  BP(mean): --  RR: 18 (14 Mar 2022 06:19) (18 - 18)  SpO2: 99% (14 Mar 2022 06:19) (98% - 99%)    LABS:                        15.1   7.79  )-----------( 259      ( 14 Mar 2022 07:39 )             43.0     03-14    143  |  104  |  12  ----------------------------<  192<H>  3.6   |  28  |  0.5<L>    Ca    8.9      14 Mar 2022 07:39  Phos  3.2     03-13  Mg     1.9     03-14    TPro  6.0  /  Alb  3.3<L>  /  TBili  0.4  /  DBili  x   /  AST  15  /  ALT  18  /  AlkPhos  75  03-14    PT/INR - ( 13 Mar 2022 18:28 )   PT: 11.70 sec;   INR: 1.02 ratio         PTT - ( 13 Mar 2022 18:28 )  PTT:43.1 sec      Creatine Kinase, Serum: 56 U/L (03-13-22 @ 18:28)  Troponin T, Serum: <0.01 ng/mL (03-13-22 @ 18:28)  Lactate, Blood: 1.4 mmol/L (03-13-22 @ 18:28)      CARDIAC MARKERS ( 13 Mar 2022 18:28 )  x     / <0.01 ng/mL / 56 U/L / x     / 1.5 ng/mL      RADIOLOGY:  < from: CT Perfusion w/ Maps w/ IV Cont (03.13.22 @ 18:55) >  IMPRESSION:    1.  Limited study, as above.    2.  No definite ischemic penumbra corresponding to a vascular territory   and eligible for evaluation for neuro intervention is demonstrated.    3.  Chronic right occipital infarct    < end of copied text >  < from: CT Brain Stroke Protocol (03.13.22 @ 18:33) >  IMPRESSION:    No acute intracranial pathology.    Redemonstration of right occipital lobe and left thalamic chronic infarcts    Stable examination since CT head dated 3/10/2022.    < end of copied text >    EEG PERFORMED READ PENDING    PHYSICAL EXAM:  CONSTITUTIONAL: No acute distress, well-developed, sleepy, attemps to open eyes when asked, able to squeeze hand on command   HEAD: Atraumatic, normocephalic  EYES:  PERRLA, conjunctiva and sclera clear  ENT: Supple, no masses, moist mucous membranes  PULMONARY: Clear to auscultation bilaterally; no wheezes, rales, or rhonchi  CARDIOVASCULAR: Regular rate and rhythm; no murmurs, rubs, or gallops  GASTROINTESTINAL: Soft, non-tender, non-distended; bowel sounds present  MUSCULOSKELETAL: 2+ peripheral pulses; no clubbing, no cyanosis, no edema  NEUROLOGY: lethargic   SKIN: No rashes or lesions; warm and dry     ALEA MORRIS 56y Male  MRN#: [id number]   Hospital Day: 4d    SUBJECTIVE  Patient is a 56y old Male who presents with a chief complaint of lethargy (13 Mar 2022 15:40)  Currently admitted to medicine with the primary diagnosis of Lethargy      INTERVAL HPI AND OVERNIGHT EVENTS:  Patient was examined and seen at bedside. This morning he is resting comfortably in bed and attempts to open eyes when asked but is unable to.  OVERNIGHT: RAPID RESPONSE AND STROKE CODE CALLED ON SUNDAY. IMAGING NEGATIVE FOR ACUTE INTRACRANIAL PATHOLOGY, PATIENT GIVEN ATIVAN AS CATATONIA WAS SUSPECTED     REVIEW OF SYMPTOMS:  patient unable to answer at this time, will continue to assess.    OBJECTIVE  PAST MEDICAL & SURGICAL HISTORY  Diabetes    Hypertension    Pancreatitis    CVA (cerebral vascular accident)    No significant past surgical history      ALLERGIES:  No Known Allergies    MEDICATIONS:  STANDING MEDICATIONS  aspirin  chewable 81 milliGRAM(s) Oral daily  atorvastatin 80 milliGRAM(s) Oral at bedtime  clopidogrel Tablet 75 milliGRAM(s) Oral daily  donepezil 20 milliGRAM(s) Oral at bedtime  enoxaparin Injectable 40 milliGRAM(s) SubCutaneous every 24 hours  lisinopril 40 milliGRAM(s) Oral daily  memantine 10 milliGRAM(s) Oral two times a day  metoprolol succinate ER 50 milliGRAM(s) Oral daily  pantoprazole    Tablet 40 milliGRAM(s) Oral before breakfast  senna 2 Tablet(s) Oral at bedtime    PRN MEDICATIONS      VITAL SIGNS: Last 24 Hours  T(C): 36.9 (14 Mar 2022 06:19), Max: 36.9 (13 Mar 2022 21:53)  T(F): 98.4 (14 Mar 2022 06:19), Max: 98.4 (13 Mar 2022 21:53)  HR: 67 (14 Mar 2022 06:19) (63 - 84)  BP: 144/72 (14 Mar 2022 06:19) (129/59 - 185/84)  BP(mean): --  RR: 18 (14 Mar 2022 06:19) (18 - 18)  SpO2: 99% (14 Mar 2022 06:19) (98% - 99%)    LABS:                        15.1   7.79  )-----------( 259      ( 14 Mar 2022 07:39 )             43.0     03-14    143  |  104  |  12  ----------------------------<  192<H>  3.6   |  28  |  0.5<L>    Ca    8.9      14 Mar 2022 07:39  Phos  3.2     03-13  Mg     1.9     03-14    TPro  6.0  /  Alb  3.3<L>  /  TBili  0.4  /  DBili  x   /  AST  15  /  ALT  18  /  AlkPhos  75  03-14    PT/INR - ( 13 Mar 2022 18:28 )   PT: 11.70 sec;   INR: 1.02 ratio         PTT - ( 13 Mar 2022 18:28 )  PTT:43.1 sec      Creatine Kinase, Serum: 56 U/L (03-13-22 @ 18:28)  Troponin T, Serum: <0.01 ng/mL (03-13-22 @ 18:28)  Lactate, Blood: 1.4 mmol/L (03-13-22 @ 18:28)      CARDIAC MARKERS ( 13 Mar 2022 18:28 )  x     / <0.01 ng/mL / 56 U/L / x     / 1.5 ng/mL      RADIOLOGY:  < from: CT Perfusion w/ Maps w/ IV Cont (03.13.22 @ 18:55) >  IMPRESSION:    1.  Limited study, as above.    2.  No definite ischemic penumbra corresponding to a vascular territory   and eligible for evaluation for neuro intervention is demonstrated.    3.  Chronic right occipital infarct    < end of copied text >  < from: CT Brain Stroke Protocol (03.13.22 @ 18:33) >  IMPRESSION:    No acute intracranial pathology.    Redemonstration of right occipital lobe and left thalamic chronic infarcts    Stable examination since CT head dated 3/10/2022.    < end of copied text >    EEG PERFORMED READ PENDING    PHYSICAL EXAM:  CONSTITUTIONAL: No acute distress, well-developed, awake but minimally conversant   HEAD: Atraumatic, normocephalic  EYES:  PERRLA, conjunctiva and sclera clear  ENT: Supple, no masses, moist mucous membranes  PULMONARY: Clear to auscultation bilaterally; no wheezes, rales, or rhonchi  CARDIOVASCULAR: Regular rate and rhythm; no murmurs, rubs, or gallops  GASTROINTESTINAL: Soft, non-tender, non-distended; bowel sounds present  MUSCULOSKELETAL: 2+ peripheral pulses; no clubbing, no cyanosis, no edema  NEUROLOGY: lethargic   SKIN: No rashes or lesions; warm and dry

## 2022-03-14 NOTE — PROGRESS NOTE ADULT - ATTENDING COMMENTS
56 year old male with PMH  of DM, HTN, CVA, COVID in Jan, sent in from [facility] residence for increased lethargy over last week.    #Increased Lethargy/ Weakness likely deconditioning   #Reactive Leukocytosis- resolved   #Hx of CVA/Dementia  - pt with H/o CVA, Dementia and recently w/ COVID-19 which may contribute to deconditioning   - correlated clinical findings with [facility] and he is baseline lethargic minimally communicative and AAO x2 at baseline, but previously could ambulate independently   -Neuro consulted- they agree this is patient's baseline, no need for further imaging  -REEG- mild diffuse electrophysiological dysfunction   -Blood and Urine Cx negative   -PT following- significantly improved function on exam   - c/w ASA/Plavix/Namenda/Aricept     # Episode of Unresponsiveness/ possible Catatonia   - stroke code on 3/13 ON- imaging unremarkable, seen by neuro  - s/p 1x dose ativan   - per wife this is not a new occurrence for pt   - needs OP psych     #HTN  #DM type II   #Dyslipidemia  -statin/metoprolol/lisinopril   -monitor BG - well controlled       Lovenox sq     #Progress Note Handoff:  Pending (specify): dc to [facility] tomorrow   Disposition: Home___/SNF___/Other________/Unknown at this time__x______    Total time spent to complete patient's bedside assessment, review medical chart, discuss medical plan of care with covering medical team was more than 35 minutes  with >50% of time spent face to face with patient, discussion with patient/family and/or coordination of care      Cherie Norwood DO.

## 2022-03-15 LAB
ALBUMIN SERPL ELPH-MCNC: 3.4 G/DL — LOW (ref 3.5–5.2)
ALP SERPL-CCNC: 79 U/L — SIGNIFICANT CHANGE UP (ref 30–115)
ALT FLD-CCNC: 28 U/L — SIGNIFICANT CHANGE UP (ref 0–41)
ANION GAP SERPL CALC-SCNC: 13 MMOL/L — SIGNIFICANT CHANGE UP (ref 7–14)
AST SERPL-CCNC: 23 U/L — SIGNIFICANT CHANGE UP (ref 0–41)
BASOPHILS # BLD AUTO: 0.07 K/UL — SIGNIFICANT CHANGE UP (ref 0–0.2)
BASOPHILS NFR BLD AUTO: 0.8 % — SIGNIFICANT CHANGE UP (ref 0–1)
BILIRUB SERPL-MCNC: 0.5 MG/DL — SIGNIFICANT CHANGE UP (ref 0.2–1.2)
BUN SERPL-MCNC: 13 MG/DL — SIGNIFICANT CHANGE UP (ref 10–20)
CALCIUM SERPL-MCNC: 8.6 MG/DL — SIGNIFICANT CHANGE UP (ref 8.5–10.1)
CHLORIDE SERPL-SCNC: 106 MMOL/L — SIGNIFICANT CHANGE UP (ref 98–110)
CO2 SERPL-SCNC: 27 MMOL/L — SIGNIFICANT CHANGE UP (ref 17–32)
CREAT SERPL-MCNC: 0.6 MG/DL — LOW (ref 0.7–1.5)
CULTURE RESULTS: SIGNIFICANT CHANGE UP
CULTURE RESULTS: SIGNIFICANT CHANGE UP
EGFR: 112 ML/MIN/1.73M2 — SIGNIFICANT CHANGE UP
EOSINOPHIL # BLD AUTO: 0.3 K/UL — SIGNIFICANT CHANGE UP (ref 0–0.7)
EOSINOPHIL NFR BLD AUTO: 3.4 % — SIGNIFICANT CHANGE UP (ref 0–8)
GLUCOSE BLDC GLUCOMTR-MCNC: 282 MG/DL — HIGH (ref 70–99)
GLUCOSE SERPL-MCNC: 190 MG/DL — HIGH (ref 70–99)
HCT VFR BLD CALC: 40.9 % — LOW (ref 42–52)
HGB BLD-MCNC: 14.2 G/DL — SIGNIFICANT CHANGE UP (ref 14–18)
IMM GRANULOCYTES NFR BLD AUTO: 0.3 % — SIGNIFICANT CHANGE UP (ref 0.1–0.3)
LYMPHOCYTES # BLD AUTO: 2.75 K/UL — SIGNIFICANT CHANGE UP (ref 1.2–3.4)
LYMPHOCYTES # BLD AUTO: 31.5 % — SIGNIFICANT CHANGE UP (ref 20.5–51.1)
MAGNESIUM SERPL-MCNC: 1.7 MG/DL — LOW (ref 1.8–2.4)
MCHC RBC-ENTMCNC: 29.5 PG — SIGNIFICANT CHANGE UP (ref 27–31)
MCHC RBC-ENTMCNC: 34.7 G/DL — SIGNIFICANT CHANGE UP (ref 32–37)
MCV RBC AUTO: 85 FL — SIGNIFICANT CHANGE UP (ref 80–94)
MONOCYTES # BLD AUTO: 0.73 K/UL — HIGH (ref 0.1–0.6)
MONOCYTES NFR BLD AUTO: 8.4 % — SIGNIFICANT CHANGE UP (ref 1.7–9.3)
NEUTROPHILS # BLD AUTO: 4.86 K/UL — SIGNIFICANT CHANGE UP (ref 1.4–6.5)
NEUTROPHILS NFR BLD AUTO: 55.6 % — SIGNIFICANT CHANGE UP (ref 42.2–75.2)
NRBC # BLD: 0 /100 WBCS — SIGNIFICANT CHANGE UP (ref 0–0)
PLATELET # BLD AUTO: 270 K/UL — SIGNIFICANT CHANGE UP (ref 130–400)
POTASSIUM SERPL-MCNC: 3.4 MMOL/L — LOW (ref 3.5–5)
POTASSIUM SERPL-SCNC: 3.4 MMOL/L — LOW (ref 3.5–5)
PROT SERPL-MCNC: 5.8 G/DL — LOW (ref 6–8)
RBC # BLD: 4.81 M/UL — SIGNIFICANT CHANGE UP (ref 4.7–6.1)
RBC # FLD: 13.3 % — SIGNIFICANT CHANGE UP (ref 11.5–14.5)
SODIUM SERPL-SCNC: 146 MMOL/L — SIGNIFICANT CHANGE UP (ref 135–146)
SPECIMEN SOURCE: SIGNIFICANT CHANGE UP
SPECIMEN SOURCE: SIGNIFICANT CHANGE UP
WBC # BLD: 8.74 K/UL — SIGNIFICANT CHANGE UP (ref 4.8–10.8)
WBC # FLD AUTO: 8.74 K/UL — SIGNIFICANT CHANGE UP (ref 4.8–10.8)

## 2022-03-15 PROCEDURE — 99232 SBSQ HOSP IP/OBS MODERATE 35: CPT

## 2022-03-15 RX ORDER — INSULIN GLARGINE 100 [IU]/ML
20 INJECTION, SOLUTION SUBCUTANEOUS AT BEDTIME
Refills: 0 | Status: DISCONTINUED | OUTPATIENT
Start: 2022-03-15 | End: 2022-03-16

## 2022-03-15 RX ORDER — DEXTROSE 50 % IN WATER 50 %
25 SYRINGE (ML) INTRAVENOUS ONCE
Refills: 0 | Status: DISCONTINUED | OUTPATIENT
Start: 2022-03-15 | End: 2022-03-16

## 2022-03-15 RX ORDER — INSULIN LISPRO 100/ML
5 VIAL (ML) SUBCUTANEOUS
Refills: 0 | Status: DISCONTINUED | OUTPATIENT
Start: 2022-03-15 | End: 2022-03-16

## 2022-03-15 RX ORDER — DEXTROSE 50 % IN WATER 50 %
12.5 SYRINGE (ML) INTRAVENOUS ONCE
Refills: 0 | Status: DISCONTINUED | OUTPATIENT
Start: 2022-03-15 | End: 2022-03-16

## 2022-03-15 RX ORDER — SODIUM CHLORIDE 9 MG/ML
1000 INJECTION, SOLUTION INTRAVENOUS
Refills: 0 | Status: DISCONTINUED | OUTPATIENT
Start: 2022-03-15 | End: 2022-03-16

## 2022-03-15 RX ORDER — DEXTROSE 50 % IN WATER 50 %
15 SYRINGE (ML) INTRAVENOUS ONCE
Refills: 0 | Status: DISCONTINUED | OUTPATIENT
Start: 2022-03-15 | End: 2022-03-16

## 2022-03-15 RX ORDER — MAGNESIUM SULFATE 500 MG/ML
2 VIAL (ML) INJECTION ONCE
Refills: 0 | Status: COMPLETED | OUTPATIENT
Start: 2022-03-15 | End: 2022-03-15

## 2022-03-15 RX ORDER — GLUCAGON INJECTION, SOLUTION 0.5 MG/.1ML
1 INJECTION, SOLUTION SUBCUTANEOUS ONCE
Refills: 0 | Status: DISCONTINUED | OUTPATIENT
Start: 2022-03-15 | End: 2022-03-16

## 2022-03-15 RX ORDER — POTASSIUM CHLORIDE 20 MEQ
40 PACKET (EA) ORAL ONCE
Refills: 0 | Status: COMPLETED | OUTPATIENT
Start: 2022-03-15 | End: 2022-03-15

## 2022-03-15 RX ORDER — LISINOPRIL 2.5 MG/1
1 TABLET ORAL
Qty: 0 | Refills: 0 | DISCHARGE

## 2022-03-15 RX ORDER — SENNA PLUS 8.6 MG/1
0 TABLET ORAL
Qty: 0 | Refills: 0 | DISCHARGE

## 2022-03-15 RX ORDER — ASPIRIN/CALCIUM CARB/MAGNESIUM 324 MG
1 TABLET ORAL
Qty: 0 | Refills: 0 | DISCHARGE

## 2022-03-15 RX ADMIN — Medication 25 GRAM(S): at 11:29

## 2022-03-15 RX ADMIN — SENNA PLUS 2 TABLET(S): 8.6 TABLET ORAL at 22:29

## 2022-03-15 RX ADMIN — PANTOPRAZOLE SODIUM 40 MILLIGRAM(S): 20 TABLET, DELAYED RELEASE ORAL at 06:12

## 2022-03-15 RX ADMIN — MEMANTINE HYDROCHLORIDE 10 MILLIGRAM(S): 10 TABLET ORAL at 17:19

## 2022-03-15 RX ADMIN — LISINOPRIL 40 MILLIGRAM(S): 2.5 TABLET ORAL at 06:12

## 2022-03-15 RX ADMIN — DONEPEZIL HYDROCHLORIDE 20 MILLIGRAM(S): 10 TABLET, FILM COATED ORAL at 22:29

## 2022-03-15 RX ADMIN — MEMANTINE HYDROCHLORIDE 10 MILLIGRAM(S): 10 TABLET ORAL at 06:12

## 2022-03-15 RX ADMIN — Medication 50 MILLIGRAM(S): at 06:12

## 2022-03-15 RX ADMIN — CLOPIDOGREL BISULFATE 75 MILLIGRAM(S): 75 TABLET, FILM COATED ORAL at 11:29

## 2022-03-15 RX ADMIN — INSULIN GLARGINE 20 UNIT(S): 100 INJECTION, SOLUTION SUBCUTANEOUS at 23:04

## 2022-03-15 RX ADMIN — Medication 40 MILLIEQUIVALENT(S): at 11:29

## 2022-03-15 RX ADMIN — ENOXAPARIN SODIUM 40 MILLIGRAM(S): 100 INJECTION SUBCUTANEOUS at 17:19

## 2022-03-15 RX ADMIN — Medication 81 MILLIGRAM(S): at 11:30

## 2022-03-15 RX ADMIN — ATORVASTATIN CALCIUM 80 MILLIGRAM(S): 80 TABLET, FILM COATED ORAL at 22:29

## 2022-03-15 NOTE — PROGRESS NOTE ADULT - ASSESSMENT
56 year old male with PMH  of DM, HTN, CVA, COVID in Jan, sent in from [facility] residence for increased lethargy over last week.    #Increased Lethargy/ Weakness likely deconditioning   #Reactive Leukocytosis- resolved   #Hx of CVA/Dementia  - pt with H/o CVA, Dementia and recently w/ COVID-19 which may contribute to deconditioning   - correlated clinical findings with [facility] and he is baseline lethargic minimally communicative and AAO x2 at baseline, but previously could ambulate independently   -Neuro consulted- they agree this is patient's baseline, no need for further imaging  -REEG- mild diffuse electrophysiological dysfunction   -Blood and Urine Cx negative   -PT following- significantly improved function on exam   - c/w ASA/Plavix/Namenda/Aricept     # Episode of Unresponsiveness/ possible Catatonia   - stroke code on 3/13 ON- imaging unremarkable, seen by neuro  - s/p 1x dose ativan   - per wife this is not a new occurrence for pt   - needs OP psych     #HTN  #DM type II   #Dyslipidemia  -statin/metoprolol/lisinopril   -monitor BG - well controlled       Lovenox sq     #Progress Note Handoff:  Pending (specify): dc to [facility] tomorrow   Disposition: Home___/SNF___/Other________/Unknown at this time__x______    Total time spent to complete patient's bedside assessment, review medical chart, discuss medical plan of care with covering medical team was more than 25 minutes  with >50% of time spent face to face with patient, discussion with patient/family and/or coordination of care      Cherie Norwood DO.

## 2022-03-15 NOTE — PROGRESS NOTE ADULT - SUBJECTIVE AND OBJECTIVE BOX
ALEA MORRIS  56y, Male  Allergy: No Known Allergies    Hospital Day: 5d    Patient seen and examined earlier today. Feeling well, no complaints, no events overnight.     PMH/PSH:  PAST MEDICAL & SURGICAL HISTORY:  Diabetes    Hypertension    Pancreatitis    CVA (cerebral vascular accident)    No significant past surgical history        LAST 24-Hr EVENTS:    VITALS:  T(F): 95.5 (03-15-22 @ 12:56), Max: 98.5 (03-14-22 @ 20:19)  HR: 66 (03-15-22 @ 12:56)  BP: 156/68 (03-15-22 @ 12:56) (138/70 - 196/88)  RR: 20 (03-15-22 @ 12:56)  SpO2: --        TESTS & MEASUREMENTS:  Weight (Kg):   BMI (kg/m2): 28.5 (03-10)                          14.2   8.74  )-----------( 270      ( 15 Mar 2022 06:27 )             40.9     PT/INR - ( 13 Mar 2022 18:28 )   PT: 11.70 sec;   INR: 1.02 ratio         PTT - ( 13 Mar 2022 18:28 )  PTT:43.1 sec  03-15    146  |  106  |  13  ----------------------------<  190<H>  3.4<L>   |  27  |  0.6<L>    Ca    8.6      15 Mar 2022 06:27  Phos  3.2     03-13  Mg     1.7     03-15    TPro  5.8<L>  /  Alb  3.4<L>  /  TBili  0.5  /  DBili  x   /  AST  23  /  ALT  28  /  AlkPhos  79  03-15    LIVER FUNCTIONS - ( 15 Mar 2022 06:27 )  Alb: 3.4 g/dL / Pro: 5.8 g/dL / ALK PHOS: 79 U/L / ALT: 28 U/L / AST: 23 U/L / GGT: x           CARDIAC MARKERS ( 13 Mar 2022 18:28 )  x     / <0.01 ng/mL / 56 U/L / x     / 1.5 ng/mL        Culture - Blood (collected 03-13-22 @ 18:28)  Source: .Blood Blood-Peripheral  Preliminary Report (03-15-22 @ 02:02):    No growth to date.    Culture - Urine (collected 03-10-22 @ 17:20)  Source: Clean Catch Clean Catch (Midstream)  Final Report (03-11-22 @ 22:37):    No growth    Culture - Blood (collected 03-10-22 @ 15:02)  Source: .Blood Blood  Preliminary Report (03-11-22 @ 22:02):    No growth to date.    Culture - Blood (collected 03-10-22 @ 15:02)  Source: .Blood Blood  Preliminary Report (03-11-22 @ 22:02):    No growth to date.                COVID-19 PCR: NotDetec (03-13-22 @ 07:05)      RADIOLOGY, ECG, & ADDITIONAL TESTS:  12 Lead ECG:   Ventricular Rate 64 BPM    Atrial Rate 64 BPM    P-R Interval 242 ms    QRS Duration 104 ms    Q-T Interval 468 ms    QTC Calculation(Bazett) 482 ms    P Axis 27 degrees    R Axis 22 degrees    T Axis -3 degrees    Diagnosis Line Sinus rhythm with 1st degree A-V block  Minimal voltage criteria for LVH, may be normal variant  Possible Inferior infarct , age undetermined  Abnormal ECG    Confirmed by RASHMI CASILLAS MD ([id number]) on 3/13/2022 10:43:10 PM (03-13-22 @ 18:20)    CT Abdomen and Pelvis No Cont:   ACC: [id number] EXAM:  CT ABDOMEN AND PELVIS                          PROCEDURE DATE:  03/10/2022          INTERPRETATION:  REASON FOR EXAM / CLINICAL STATEMENT:  Vomiting.   Weakness  WBC 17.39    PMHx of CVA, DM, HTN, Pancreatitis    TECHNIQUE:  Contiguous axial CT images were obtained from the lower chest   to the pubic symphysis without IV contrast.  Reformatted images in the   coronal and sagittal planes were acquired.      COMPARISON CT: CT scan of the abdomen and pelvis dated 11/21/2021    OTHER STUDIES USED FOR CORRELATION: None.    FINDINGS:    TUBES AND LINES: None.    LOWER CHEST: There are mild dependent atelectatic changes at the lung   bases. No pleural or pericardial effusion.    HEPATIC: The liver is normal in size with no evidence of solid mass or   bile duct dilatation.    BILIARY: Status post cholecystectomy    SPLEEN: Unremarkable.    PANCREAS: The pancreas is normal in size and configuration. No evidence   of mass or pancreatitis.    ADRENAL GLANDS: Unremarkable.    KIDNEYS: No evidence of hydronephrosis or calcified stones.    ABDOMINOPELVIC NODES: Unremarkable.    PELVIC ORGANS: No evidence of pelvic mass, lymphadenopathy, or fluid   collection.    BLADDER: The bladder is decompressed limiting evaluation.    PERITONEUM/MESENTERY/BOWEL: No evidence of bowel obstruction, colitis,   inflammatory process, or ascites. No pneumoperitoneum.  The appendix is   normal in appearance.    BONES/SOFT TISSUES: Degenerative changes of the spine are noted.    OTHER: Normal caliber aorta.      IMPRESSION:    No evidence of abdominal or pelvic mass or inflammatory process    --- End of Report ---            GLENNA AN MD; Attending Interventional Radiologist  This document has been electronically signed. Mar 10 2022 6:29PM (03-10-22 @ 17:25)    RECENT DIAGNOSTIC ORDERS:  Magnesium, Serum: AM Sched. Collection: 16-Mar-2022 04:30 (03-15-22 @ 14:20)  Comprehensive Metabolic Panel: AM Sched. Collection: 16-Mar-2022 04:30 (03-15-22 @ 14:20)  Complete Blood Count + Automated Diff: AM Sched. Collection: 16-Mar-2022 04:30 (03-15-22 @ 14:20)      MEDICATIONS:  MEDICATIONS  (STANDING):  aspirin  chewable 81 milliGRAM(s) Oral daily  atorvastatin 80 milliGRAM(s) Oral at bedtime  clopidogrel Tablet 75 milliGRAM(s) Oral daily  donepezil 20 milliGRAM(s) Oral at bedtime  enoxaparin Injectable 40 milliGRAM(s) SubCutaneous every 24 hours  lisinopril 40 milliGRAM(s) Oral daily  memantine 10 milliGRAM(s) Oral two times a day  metoprolol succinate ER 50 milliGRAM(s) Oral daily  pantoprazole    Tablet 40 milliGRAM(s) Oral before breakfast  senna 2 Tablet(s) Oral at bedtime    MEDICATIONS  (PRN):      HOME MEDICATIONS:  Admelog 100 units/mL injectable solution (03-15)  atorvastatin 80 mg oral tablet (03-15)  clopidogrel 75 mg oral tablet (03-15)  donepezil 10 mg oral tablet (03-15)  insulin glargine (03-15)  lisinopril 40 mg oral tablet (03-15)  metoprolol succinate 50 mg oral tablet, extended release (03-15)  Namenda 10 mg oral tablet (03-15)  Senna 8.6 mg oral tablet (03-15)  Zetia 10 mg oral tablet (03-15)      PHYSICAL EXAM:  CONSTITUTIONAL: No acute distress, well-developed, awake but minimally conversant   HEAD: Atraumatic, normocephalic  EYES:  PERRLA, conjunctiva and sclera clear  ENT: Supple, no masses, moist mucous membranes  PULMONARY: Clear to auscultation bilaterally; no wheezes, rales, or rhonchi  CARDIOVASCULAR: Regular rate and rhythm; no murmurs, rubs, or gallops  GASTROINTESTINAL: Soft, non-tender, non-distended; bowel sounds present  MUSCULOSKELETAL: 2+ peripheral pulses; no clubbing, no cyanosis, no edema  NEUROLOGY: awake, not agitated   SKIN: No rashes or lesions; warm and dry

## 2022-03-16 ENCOUNTER — TRANSCRIPTION ENCOUNTER (OUTPATIENT)
Age: 57
End: 2022-03-16

## 2022-03-16 VITALS
TEMPERATURE: 98 F | HEART RATE: 72 BPM | DIASTOLIC BLOOD PRESSURE: 81 MMHG | RESPIRATION RATE: 18 BRPM | SYSTOLIC BLOOD PRESSURE: 160 MMHG

## 2022-03-16 LAB
A1C WITH ESTIMATED AVERAGE GLUCOSE RESULT: 7.6 % — HIGH (ref 4–5.6)
ALBUMIN SERPL ELPH-MCNC: 3.4 G/DL — LOW (ref 3.5–5.2)
ALP SERPL-CCNC: 77 U/L — SIGNIFICANT CHANGE UP (ref 30–115)
ALT FLD-CCNC: 33 U/L — SIGNIFICANT CHANGE UP (ref 0–41)
ANION GAP SERPL CALC-SCNC: 9 MMOL/L — SIGNIFICANT CHANGE UP (ref 7–14)
AST SERPL-CCNC: 26 U/L — SIGNIFICANT CHANGE UP (ref 0–41)
BASOPHILS # BLD AUTO: 0.05 K/UL — SIGNIFICANT CHANGE UP (ref 0–0.2)
BASOPHILS NFR BLD AUTO: 0.6 % — SIGNIFICANT CHANGE UP (ref 0–1)
BILIRUB SERPL-MCNC: 0.5 MG/DL — SIGNIFICANT CHANGE UP (ref 0.2–1.2)
BUN SERPL-MCNC: 12 MG/DL — SIGNIFICANT CHANGE UP (ref 10–20)
CALCIUM SERPL-MCNC: 8.7 MG/DL — SIGNIFICANT CHANGE UP (ref 8.5–10.1)
CHLORIDE SERPL-SCNC: 104 MMOL/L — SIGNIFICANT CHANGE UP (ref 98–110)
CO2 SERPL-SCNC: 29 MMOL/L — SIGNIFICANT CHANGE UP (ref 17–32)
CREAT SERPL-MCNC: 0.6 MG/DL — LOW (ref 0.7–1.5)
EGFR: 112 ML/MIN/1.73M2 — SIGNIFICANT CHANGE UP
EOSINOPHIL # BLD AUTO: 0.28 K/UL — SIGNIFICANT CHANGE UP (ref 0–0.7)
EOSINOPHIL NFR BLD AUTO: 3.2 % — SIGNIFICANT CHANGE UP (ref 0–8)
ESTIMATED AVERAGE GLUCOSE: 171 MG/DL — HIGH (ref 68–114)
GLUCOSE BLDC GLUCOMTR-MCNC: 125 MG/DL — HIGH (ref 70–99)
GLUCOSE BLDC GLUCOMTR-MCNC: 140 MG/DL — HIGH (ref 70–99)
GLUCOSE SERPL-MCNC: 145 MG/DL — HIGH (ref 70–99)
HCT VFR BLD CALC: 39.4 % — LOW (ref 42–52)
HGB BLD-MCNC: 13.7 G/DL — LOW (ref 14–18)
IMM GRANULOCYTES NFR BLD AUTO: 0.3 % — SIGNIFICANT CHANGE UP (ref 0.1–0.3)
LYMPHOCYTES # BLD AUTO: 2.79 K/UL — SIGNIFICANT CHANGE UP (ref 1.2–3.4)
LYMPHOCYTES # BLD AUTO: 32.2 % — SIGNIFICANT CHANGE UP (ref 20.5–51.1)
MAGNESIUM SERPL-MCNC: 1.8 MG/DL — SIGNIFICANT CHANGE UP (ref 1.8–2.4)
MCHC RBC-ENTMCNC: 29.7 PG — SIGNIFICANT CHANGE UP (ref 27–31)
MCHC RBC-ENTMCNC: 34.8 G/DL — SIGNIFICANT CHANGE UP (ref 32–37)
MCV RBC AUTO: 85.5 FL — SIGNIFICANT CHANGE UP (ref 80–94)
MONOCYTES # BLD AUTO: 0.63 K/UL — HIGH (ref 0.1–0.6)
MONOCYTES NFR BLD AUTO: 7.3 % — SIGNIFICANT CHANGE UP (ref 1.7–9.3)
NEUTROPHILS # BLD AUTO: 4.88 K/UL — SIGNIFICANT CHANGE UP (ref 1.4–6.5)
NEUTROPHILS NFR BLD AUTO: 56.4 % — SIGNIFICANT CHANGE UP (ref 42.2–75.2)
NRBC # BLD: 0 /100 WBCS — SIGNIFICANT CHANGE UP (ref 0–0)
PLATELET # BLD AUTO: 285 K/UL — SIGNIFICANT CHANGE UP (ref 130–400)
POTASSIUM SERPL-MCNC: 3.5 MMOL/L — SIGNIFICANT CHANGE UP (ref 3.5–5)
POTASSIUM SERPL-SCNC: 3.5 MMOL/L — SIGNIFICANT CHANGE UP (ref 3.5–5)
PROT SERPL-MCNC: 5.9 G/DL — LOW (ref 6–8)
RBC # BLD: 4.61 M/UL — LOW (ref 4.7–6.1)
RBC # FLD: 13.2 % — SIGNIFICANT CHANGE UP (ref 11.5–14.5)
SODIUM SERPL-SCNC: 142 MMOL/L — SIGNIFICANT CHANGE UP (ref 135–146)
WBC # BLD: 8.66 K/UL — SIGNIFICANT CHANGE UP (ref 4.8–10.8)
WBC # FLD AUTO: 8.66 K/UL — SIGNIFICANT CHANGE UP (ref 4.8–10.8)

## 2022-03-16 PROCEDURE — 99239 HOSP IP/OBS DSCHRG MGMT >30: CPT

## 2022-03-16 RX ADMIN — Medication 5 UNIT(S): at 13:15

## 2022-03-16 RX ADMIN — MEMANTINE HYDROCHLORIDE 10 MILLIGRAM(S): 10 TABLET ORAL at 06:07

## 2022-03-16 RX ADMIN — Medication 50 MILLIGRAM(S): at 06:06

## 2022-03-16 RX ADMIN — LISINOPRIL 40 MILLIGRAM(S): 2.5 TABLET ORAL at 06:07

## 2022-03-16 RX ADMIN — Medication 5 UNIT(S): at 08:21

## 2022-03-16 RX ADMIN — PANTOPRAZOLE SODIUM 40 MILLIGRAM(S): 20 TABLET, DELAYED RELEASE ORAL at 06:06

## 2022-03-16 RX ADMIN — CLOPIDOGREL BISULFATE 75 MILLIGRAM(S): 75 TABLET, FILM COATED ORAL at 13:15

## 2022-03-16 RX ADMIN — Medication 81 MILLIGRAM(S): at 13:15

## 2022-03-16 NOTE — DISCHARGE NOTE NURSING/CASE MANAGEMENT/SOCIAL WORK - NSDCVIVACCINE_GEN_ALL_CORE_FT
Tdap; 03-Apr-2019 18:34; Amico, Francine M (RN); [employer]; [id number] (Exp. Date: 20-Nov-2020); IntraMuscular; Deltoid Right.; 0.5 milliLiter(s); VIS (VIS Published: 09-May-2013, VIS Presented: 03-Apr-2019);   Tdap; 08-Aug-2021 16:15; Janey Vora (LINDSEY); [employer]; [id number] (Exp. Date: 28-Jan-2023); IntraMuscular; Deltoid Right.; 0.5 milliLiter(s); VIS (VIS Published: 09-May-2013, VIS Presented: 08-Aug-2021);

## 2022-03-16 NOTE — DISCHARGE NOTE NURSING/CASE MANAGEMENT/SOCIAL WORK - PATIENT PORTAL LINK FT
You can access the FollowMyHealth Patient Portal offered by [facility] by registering at the following website: [email or web address]. By joining [facility]’s FollowMyHealth portal, you will also be able to view your health information using other applications (apps) compatible with our system.

## 2022-03-16 NOTE — DISCHARGE NOTE NURSING/CASE MANAGEMENT/SOCIAL WORK - NSDCPEFALRISK_GEN_ALL_CORE
For information on Fall & Injury Prevention, visit: [email or web address] OR  [email or web address] OR  https://www.cdc.gov/steadi/patient.html

## 2022-03-19 LAB
CULTURE RESULTS: SIGNIFICANT CHANGE UP
SPECIMEN SOURCE: SIGNIFICANT CHANGE UP

## 2022-03-22 DIAGNOSIS — Z86.16 PERSONAL HISTORY OF COVID-19: ICD-10-CM

## 2022-03-22 DIAGNOSIS — D72.829 ELEVATED WHITE BLOOD CELL COUNT, UNSPECIFIED: ICD-10-CM

## 2022-03-22 DIAGNOSIS — F03.90 UNSPECIFIED DEMENTIA WITHOUT BEHAVIORAL DISTURBANCE: ICD-10-CM

## 2022-03-22 DIAGNOSIS — Z79.4 LONG TERM (CURRENT) USE OF INSULIN: ICD-10-CM

## 2022-03-22 DIAGNOSIS — I10 ESSENTIAL (PRIMARY) HYPERTENSION: ICD-10-CM

## 2022-03-22 DIAGNOSIS — I69.30 UNSPECIFIED SEQUELAE OF CEREBRAL INFARCTION: ICD-10-CM

## 2022-03-22 DIAGNOSIS — E11.9 TYPE 2 DIABETES MELLITUS WITHOUT COMPLICATIONS: ICD-10-CM

## 2022-03-22 DIAGNOSIS — R53.83 OTHER FATIGUE: ICD-10-CM

## 2022-03-22 DIAGNOSIS — R82.81 PYURIA: ICD-10-CM

## 2022-03-22 DIAGNOSIS — E78.5 HYPERLIPIDEMIA, UNSPECIFIED: ICD-10-CM

## 2022-03-22 DIAGNOSIS — Z79.82 LONG TERM (CURRENT) USE OF ASPIRIN: ICD-10-CM

## 2022-03-28 NOTE — ED ADULT TRIAGE NOTE - SPO2 (%)
98 Instructions (Optional): The patient was counseled to follow up with David CHANDLER PA-C for an evaluation of this area, as well as to consider following up with a plastic surgeon for treatment Detail Level: Detailed Introduction Text (Please End With A Colon): The following procedure was deferred: for cool peel with David CLEMENS

## 2022-03-30 ENCOUNTER — INPATIENT (INPATIENT)
Facility: HOSPITAL | Age: 57
LOS: 8 days | Discharge: NURSING, REHAB OR LONG-TERM CARE | End: 2022-04-08
Attending: HOSPITALIST | Admitting: HOSPITALIST
Payer: MEDICAID

## 2022-03-30 VITALS
OXYGEN SATURATION: 97 % | RESPIRATION RATE: 18 BRPM | HEART RATE: 64 BPM | TEMPERATURE: 97 F | DIASTOLIC BLOOD PRESSURE: 90 MMHG | HEIGHT: 69 IN | SYSTOLIC BLOOD PRESSURE: 190 MMHG

## 2022-03-30 DIAGNOSIS — E11.9 TYPE 2 DIABETES MELLITUS WITHOUT COMPLICATIONS: ICD-10-CM

## 2022-03-30 DIAGNOSIS — R41.82 ALTERED MENTAL STATUS, UNSPECIFIED: ICD-10-CM

## 2022-03-30 DIAGNOSIS — Z86.16 PERSONAL HISTORY OF COVID-19: ICD-10-CM

## 2022-03-30 DIAGNOSIS — I10 ESSENTIAL (PRIMARY) HYPERTENSION: ICD-10-CM

## 2022-03-30 DIAGNOSIS — F05 DELIRIUM DUE TO KNOWN PHYSIOLOGICAL CONDITION: ICD-10-CM

## 2022-03-30 DIAGNOSIS — E78.5 HYPERLIPIDEMIA, UNSPECIFIED: ICD-10-CM

## 2022-03-30 DIAGNOSIS — Z79.02 LONG TERM (CURRENT) USE OF ANTITHROMBOTICS/ANTIPLATELETS: ICD-10-CM

## 2022-03-30 DIAGNOSIS — R56.9 UNSPECIFIED CONVULSIONS: ICD-10-CM

## 2022-03-30 DIAGNOSIS — Z86.73 PERSONAL HISTORY OF TRANSIENT ISCHEMIC ATTACK (TIA), AND CEREBRAL INFARCTION WITHOUT RESIDUAL DEFICITS: ICD-10-CM

## 2022-03-30 DIAGNOSIS — F01.50 VASCULAR DEMENTIA WITHOUT BEHAVIORAL DISTURBANCE: ICD-10-CM

## 2022-03-30 DIAGNOSIS — I69.311 MEMORY DEFICIT FOLLOWING CEREBRAL INFARCTION: ICD-10-CM

## 2022-03-30 DIAGNOSIS — E87.0 HYPEROSMOLALITY AND HYPERNATREMIA: ICD-10-CM

## 2022-03-30 DIAGNOSIS — F32.A DEPRESSION, UNSPECIFIED: ICD-10-CM

## 2022-03-30 DIAGNOSIS — F06.1 CATATONIC DISORDER DUE TO KNOWN PHYSIOLOGICAL CONDITION: ICD-10-CM

## 2022-03-30 DIAGNOSIS — Z79.4 LONG TERM (CURRENT) USE OF INSULIN: ICD-10-CM

## 2022-03-30 LAB
ALBUMIN SERPL ELPH-MCNC: 3.4 G/DL — LOW (ref 3.5–5.2)
ALP SERPL-CCNC: 88 U/L — SIGNIFICANT CHANGE UP (ref 30–115)
ALT FLD-CCNC: 17 U/L — SIGNIFICANT CHANGE UP (ref 0–41)
ANION GAP SERPL CALC-SCNC: 11 MMOL/L — SIGNIFICANT CHANGE UP (ref 7–14)
APPEARANCE UR: CLEAR — SIGNIFICANT CHANGE UP
APTT BLD: 35.2 SEC — SIGNIFICANT CHANGE UP (ref 27–39.2)
AST SERPL-CCNC: 19 U/L — SIGNIFICANT CHANGE UP (ref 0–41)
BASOPHILS # BLD AUTO: 0.07 K/UL — SIGNIFICANT CHANGE UP (ref 0–0.2)
BASOPHILS NFR BLD AUTO: 0.8 % — SIGNIFICANT CHANGE UP (ref 0–1)
BILIRUB SERPL-MCNC: 0.5 MG/DL — SIGNIFICANT CHANGE UP (ref 0.2–1.2)
BILIRUB UR-MCNC: NEGATIVE — SIGNIFICANT CHANGE UP
BUN SERPL-MCNC: 16 MG/DL — SIGNIFICANT CHANGE UP (ref 10–20)
CALCIUM SERPL-MCNC: 8.9 MG/DL — SIGNIFICANT CHANGE UP (ref 8.5–10.1)
CHLORIDE SERPL-SCNC: 103 MMOL/L — SIGNIFICANT CHANGE UP (ref 98–110)
CO2 SERPL-SCNC: 22 MMOL/L — SIGNIFICANT CHANGE UP (ref 17–32)
COLOR SPEC: SIGNIFICANT CHANGE UP
CREAT SERPL-MCNC: 0.6 MG/DL — LOW (ref 0.7–1.5)
DIFF PNL FLD: NEGATIVE — SIGNIFICANT CHANGE UP
EGFR: 112 ML/MIN/1.73M2 — SIGNIFICANT CHANGE UP
EOSINOPHIL # BLD AUTO: 0.26 K/UL — SIGNIFICANT CHANGE UP (ref 0–0.7)
EOSINOPHIL NFR BLD AUTO: 3 % — SIGNIFICANT CHANGE UP (ref 0–8)
GLUCOSE SERPL-MCNC: 184 MG/DL — HIGH (ref 70–99)
GLUCOSE UR QL: SIGNIFICANT CHANGE UP
HCT VFR BLD CALC: 42 % — SIGNIFICANT CHANGE UP (ref 42–52)
HGB BLD-MCNC: 14.2 G/DL — SIGNIFICANT CHANGE UP (ref 14–18)
IMM GRANULOCYTES NFR BLD AUTO: 0.3 % — SIGNIFICANT CHANGE UP (ref 0.1–0.3)
INR BLD: 1.08 RATIO — SIGNIFICANT CHANGE UP (ref 0.65–1.3)
KETONES UR-MCNC: NEGATIVE — SIGNIFICANT CHANGE UP
LACTATE SERPL-SCNC: 1.4 MMOL/L — SIGNIFICANT CHANGE UP (ref 0.7–2)
LEUKOCYTE ESTERASE UR-ACNC: NEGATIVE — SIGNIFICANT CHANGE UP
LYMPHOCYTES # BLD AUTO: 1.88 K/UL — SIGNIFICANT CHANGE UP (ref 1.2–3.4)
LYMPHOCYTES # BLD AUTO: 21.9 % — SIGNIFICANT CHANGE UP (ref 20.5–51.1)
MCHC RBC-ENTMCNC: 29.5 PG — SIGNIFICANT CHANGE UP (ref 27–31)
MCHC RBC-ENTMCNC: 33.8 G/DL — SIGNIFICANT CHANGE UP (ref 32–37)
MCV RBC AUTO: 87.3 FL — SIGNIFICANT CHANGE UP (ref 80–94)
MONOCYTES # BLD AUTO: 0.73 K/UL — HIGH (ref 0.1–0.6)
MONOCYTES NFR BLD AUTO: 8.5 % — SIGNIFICANT CHANGE UP (ref 1.7–9.3)
NEUTROPHILS # BLD AUTO: 5.62 K/UL — SIGNIFICANT CHANGE UP (ref 1.4–6.5)
NEUTROPHILS NFR BLD AUTO: 65.5 % — SIGNIFICANT CHANGE UP (ref 42.2–75.2)
NITRITE UR-MCNC: NEGATIVE — SIGNIFICANT CHANGE UP
NRBC # BLD: 0 /100 WBCS — SIGNIFICANT CHANGE UP (ref 0–0)
PH UR: 7 — SIGNIFICANT CHANGE UP (ref 5–8)
PLATELET # BLD AUTO: 292 K/UL — SIGNIFICANT CHANGE UP (ref 130–400)
POTASSIUM SERPL-MCNC: 4.1 MMOL/L — SIGNIFICANT CHANGE UP (ref 3.5–5)
POTASSIUM SERPL-SCNC: 4.1 MMOL/L — SIGNIFICANT CHANGE UP (ref 3.5–5)
PROT SERPL-MCNC: 6.4 G/DL — SIGNIFICANT CHANGE UP (ref 6–8)
PROT UR-MCNC: SIGNIFICANT CHANGE UP
PROTHROM AB SERPL-ACNC: 12.4 SEC — SIGNIFICANT CHANGE UP (ref 9.95–12.87)
RBC # BLD: 4.81 M/UL — SIGNIFICANT CHANGE UP (ref 4.7–6.1)
RBC # FLD: 13.3 % — SIGNIFICANT CHANGE UP (ref 11.5–14.5)
SARS-COV-2 RNA SPEC QL NAA+PROBE: SIGNIFICANT CHANGE UP
SODIUM SERPL-SCNC: 136 MMOL/L — SIGNIFICANT CHANGE UP (ref 135–146)
SP GR SPEC: >1.05 (ref 1.01–1.03)
TROPONIN T SERPL-MCNC: <0.01 NG/ML — SIGNIFICANT CHANGE UP
UROBILINOGEN FLD QL: SIGNIFICANT CHANGE UP
WBC # BLD: 8.59 K/UL — SIGNIFICANT CHANGE UP (ref 4.8–10.8)
WBC # FLD AUTO: 8.59 K/UL — SIGNIFICANT CHANGE UP (ref 4.8–10.8)

## 2022-03-30 PROCEDURE — 99285 EMERGENCY DEPT VISIT HI MDM: CPT

## 2022-03-30 PROCEDURE — 70496 CT ANGIOGRAPHY HEAD: CPT | Mod: 26,MA

## 2022-03-30 PROCEDURE — 70498 CT ANGIOGRAPHY NECK: CPT | Mod: 26,MA

## 2022-03-30 PROCEDURE — 93010 ELECTROCARDIOGRAM REPORT: CPT

## 2022-03-30 PROCEDURE — 0042T: CPT

## 2022-03-30 PROCEDURE — 70450 CT HEAD/BRAIN W/O DYE: CPT | Mod: 26,59,MA

## 2022-03-30 RX ORDER — AMLODIPINE BESYLATE 2.5 MG/1
10 TABLET ORAL ONCE
Refills: 0 | Status: COMPLETED | OUTPATIENT
Start: 2022-03-30 | End: 2022-03-30

## 2022-03-30 RX ADMIN — AMLODIPINE BESYLATE 10 MILLIGRAM(S): 2.5 TABLET ORAL at 22:27

## 2022-03-30 NOTE — STROKE CODE NOTE - IV ALTEPLASE EXCLUSION ABS OTHER
Last known well 1200 not a candidate for IV acute stroke intervention. No LVO on CTA not a candidate for IA intervention.

## 2022-03-30 NOTE — ED PROVIDER NOTE - OBJECTIVE STATEMENT
56 year old male with PMHx of DM, HTN, CVA, COVID in Jan, sent in from [facility] residence for increased lethargy and verbal unresponsiveness. Pt's last know well time was about 1 hour ago. Pt is a poor historian and not answering any questions. No other information available at this time. 56 year old male with PMHx of DM, HTN, CVA, COVID in Jan, sent in from [facility] residence for increased lethargy and verbal unresponsiveness. Pt's last known well time was at 12 pm. Pt is a poor historian and not answering any questions. No other information available at this time.

## 2022-03-30 NOTE — CONSULT NOTE ADULT - ASSESSMENT
Impression:  56 year old male with PMHx of dementia DM, HTN, CVA, COVID in Jan, sent in from [facility] residence for increased lethargy and verbal unresponsiveness. Last known well 1200 as per the facility he resides. Recent similar presentation with UTI. Out of the window for IV thrombolytics. CTA pending. Workup for encephalopathy.    Suggestion:  Follow up CTA  Can get MRI brain without teresa  B12, folate, TSH  UA C+S  Admit to medicine Impression:  56 year old male with PMHx of dementia DM, HTN, CVA, COVID in Jan, sent in from [facility] residence for increased lethargy and verbal unresponsiveness. Last known well 1200 as per the facility he resides. Recent similar presentation with UTI. Out of the window for IV thrombolytics. CTA pending. Workup for encephalopathy.    Suggestion:  CTA and CTP: Perfusion evidence of 65 cc core infarct, most prominent in the region of   the right parieto-occipital lobe as well as left medial cerebellum and chronic right P2 occlusion.    MRI brain without teresa  B12, folate, TSH  UA C+S  REEG

## 2022-03-30 NOTE — ED PROVIDER NOTE - ATTENDING CONTRIBUTION TO CARE
I personally evaluated the patient. I reviewed the Resident´s or Physician Assistant´s note (as assigned above), and agree with the findings and plan except as documented in my note.    56-year-old male here for strokelike symptoms from the assisted living facility.  Patient is a poor historian, mostly catatonic on arrival.  Stroke code activated in triage for acute mental status change.  Assisted living facility contacted remotely, unknown etiology or timing of onset, last seen well approximately 1 to 2 hours prior to ED arrival.    Patient has known behavioral health issues at baseline.    Review of systems otherwise unremarkable  GENERAL: male in no distress.   HEENT: EOMI non icteric no facial droop  NECK: FROM  CHEST: normal work of breathing noted. CTA bilateral.   CV: pulses intact S1S2 regular  ABD: soft, non rigid, non distended  EXTR: No active range of motion, flaccid x4 extremities  NEURO: Aphasic, no facial droop, limited exam, flaccid paralysis x4 extremities, no obvious sensory deficit.  Reflexes intact  SKIN: normal no pallor  PSYCH: Flat affect    Impression: Change in mental status    Plan: Stroke code called in triage, IV, labs, imaging, neuro consult, reevaluation, disposition

## 2022-03-30 NOTE — CONSULT NOTE ADULT - NS ATTEND AMEND GEN_ALL_CORE FT
I have personally seen and examined this patient on 3/31.  I have fully participated in the care of this patient.  I have reviewed all pertinent clinical information, including history, physical exam, plan and note. Acute stroke code management was done but on call CTS attending. I visited the patient on 3/31. Patient is currently wake and alert and oriented to place and person. Agree with Brain MRI and routine EEG. R/O infectious etiologies.   I have reviewed all pertinent clinical information and reviewed all relevant imaging and diagnostic studies personally.  Recommendations as above.  Agree with above assessment except as noted.

## 2022-03-30 NOTE — ED PROVIDER NOTE - PHYSICAL EXAMINATION
VITAL SIGNS: I have reviewed nursing notes and confirm.  CONSTITUTIONAL: non-toxic, NAD  EYES: + L peripheral vison defect, PERRL, pink conjunctiva, anicteric  CARD: RRR, no murmurs, equal radial pulses bilaterally 2+  RESP: CTAB, no respiratory distress  ABD: Soft, non-tender, non-distended  EXT: No edema. No calves tenderness  NEURO: + non-verbal, not answering any questions, no focal deficits

## 2022-03-30 NOTE — CONSULT NOTE ADULT - SUBJECTIVE AND OBJECTIVE BOX
Neurology Consult    Patient is a 56y old  Male who presents with a chief complaint of catatonia    HPI:  56 year old male with PMHx of dementia DM, HTN, CVA, COVID in Jan, sent in from [facility] residence for increased lethargy and verbal unresponsiveness. Last known well 1200 as per the facility he resides. Recent similar presentation with UTI.     PAST MEDICAL & SURGICAL HISTORY:  Diabetes    Hypertension    Pancreatitis    CVA (cerebral vascular accident)    No significant past surgical history        FAMILY HISTORY:      Social History: (-) x 3    Allergies    No Known Allergies    Intolerances        MEDICATIONS  (STANDING):    MEDICATIONS  (PRN):      Vital Signs Last 24 Hrs  T(C): 36.2 (30 Mar 2022 19:16), Max: 36.2 (30 Mar 2022 19:16)  T(F): 97.2 (30 Mar 2022 19:16), Max: 97.2 (30 Mar 2022 19:16)  HR: 64 (30 Mar 2022 19:16) (64 - 64)  BP: 190/90 (30 Mar 2022 19:16) (190/90 - 190/90)  BP(mean): --  RR: 18 (30 Mar 2022 19:16) (18 - 18)  SpO2: 97% (30 Mar 2022 19:16) (97% - 97%)    Examination:  Awake, non verbal  reacts to visual threat in right eye not lateral left(known right occipital stroke)  no gaze   No drift of bilateral UE  Withdraws bilateral LE  Sensory, cerebellar neglect exams limited by ams    NIHSS 16    Labs:   CBC Full  -  ( 30 Mar 2022 20:21 )  WBC Count : 8.59 K/uL  RBC Count : 4.81 M/uL  Hemoglobin : 14.2 g/dL  Hematocrit : 42.0 %  Platelet Count - Automated : 292 K/uL  Mean Cell Volume : 87.3 fL  Mean Cell Hemoglobin : 29.5 pg  Mean Cell Hemoglobin Concentration : 33.8 g/dL  Auto Neutrophil # : 5.62 K/uL  Auto Lymphocyte # : 1.88 K/uL  Auto Monocyte # : 0.73 K/uL  Auto Eosinophil # : 0.26 K/uL  Auto Basophil # : 0.07 K/uL  Auto Neutrophil % : 65.5 %  Auto Lymphocyte % : 21.9 %  Auto Monocyte % : 8.5 %  Auto Eosinophil % : 3.0 %  Auto Basophil % : 0.8 %    03-30    136  |  103  |  16  ----------------------------<  184<H>  4.1   |  22  |  0.6<L>    Ca    8.9      30 Mar 2022 20:21    TPro  6.4  /  Alb  3.4<L>  /  TBili  0.5  /  DBili  x   /  AST  19  /  ALT  17  /  AlkPhos  88  03-30    LIVER FUNCTIONS - ( 30 Mar 2022 20:21 )  Alb: 3.4 g/dL / Pro: 6.4 g/dL / ALK PHOS: 88 U/L / ALT: 17 U/L / AST: 19 U/L / GGT: x           PT/INR - ( 30 Mar 2022 20:21 )   PT: 12.40 sec;   INR: 1.08 ratio         PTT - ( 30 Mar 2022 20:21 )  PTT:35.2 sec        Neuroimaging:  NCHCT:   < from: CT Brain Stroke Protocol (03.30.22 @ 19:30) >  IMPRESSION:    Since 2/13/2022, no significant change.    Moderate chronic microvascular changes as above.    Dr. Kamila Mathew discussed preliminary findings with JESSICA LOPEZ     < end of copied text >    03-30-22 @ 21:09

## 2022-03-30 NOTE — ED ADULT NURSE NOTE - CHIEF COMPLAINT QUOTE
pt mumtaz from [facility]; as per staff pt "is not responding to anyone" x 1 hour, normally ambulatory and talkative; blood sugar is 205

## 2022-03-30 NOTE — ED ADULT NURSE NOTE - NSIMPLEMENTINTERV_GEN_ALL_ED
Implemented All Fall with Harm Risk Interventions:  [place] to call system. Call bell, personal items and telephone within reach. Instruct patient to call for assistance. Room bathroom lighting operational. Non-slip footwear when patient is off stretcher. Physically safe environment: no spills, clutter or unnecessary equipment. Stretcher in lowest position, wheels locked, appropriate side rails in place. Provide visual cue, wrist band, yellow gown, etc. Monitor gait and stability. Monitor for mental status changes and reorient to person, place, and time. Review medications for side effects contributing to fall risk. Reinforce activity limits and safety measures with patient and family. Provide visual clues: red socks.

## 2022-03-30 NOTE — ED PROVIDER NOTE - CLINICAL SUMMARY MEDICAL DECISION MAKING FREE TEXT BOX
48-year-old male sent from [facility] for change in mental status.  Patient had stroke code called on arrival for acute change in mental status, is an unreliable historian, exam not consistent with acute occlusive event.  No acute findings found on screening imaging, reevaluated and symptoms improved.  Plan is for inpatient management of acute neurologic event that resolved.

## 2022-03-30 NOTE — ED ADULT NURSE NOTE - CADM POA PRESS ULCER
[facility] short stay calling and they only need the following information for her blood transfusions:  Orders  Hgb/hct results   Most recent office visit notes.   No

## 2022-03-31 LAB
ALBUMIN SERPL ELPH-MCNC: 3.9 G/DL — SIGNIFICANT CHANGE UP (ref 3.5–5.2)
ALBUMIN SERPL ELPH-MCNC: 4.1 G/DL — SIGNIFICANT CHANGE UP (ref 3.5–5.2)
ALP SERPL-CCNC: 101 U/L — SIGNIFICANT CHANGE UP (ref 30–115)
ALP SERPL-CCNC: 94 U/L — SIGNIFICANT CHANGE UP (ref 30–115)
ALT FLD-CCNC: 18 U/L — SIGNIFICANT CHANGE UP (ref 0–41)
ALT FLD-CCNC: 20 U/L — SIGNIFICANT CHANGE UP (ref 0–41)
ANION GAP SERPL CALC-SCNC: 12 MMOL/L — SIGNIFICANT CHANGE UP (ref 7–14)
ANION GAP SERPL CALC-SCNC: 13 MMOL/L — SIGNIFICANT CHANGE UP (ref 7–14)
ANION GAP SERPL CALC-SCNC: 16 MMOL/L — HIGH (ref 7–14)
AST SERPL-CCNC: 16 U/L — SIGNIFICANT CHANGE UP (ref 0–41)
AST SERPL-CCNC: 18 U/L — SIGNIFICANT CHANGE UP (ref 0–41)
BASOPHILS # BLD AUTO: 0.09 K/UL — SIGNIFICANT CHANGE UP (ref 0–0.2)
BASOPHILS NFR BLD AUTO: 1.1 % — HIGH (ref 0–1)
BILIRUB SERPL-MCNC: 0.5 MG/DL — SIGNIFICANT CHANGE UP (ref 0.2–1.2)
BILIRUB SERPL-MCNC: 0.6 MG/DL — SIGNIFICANT CHANGE UP (ref 0.2–1.2)
BUN SERPL-MCNC: 8 MG/DL — LOW (ref 10–20)
BUN SERPL-MCNC: 8 MG/DL — LOW (ref 10–20)
BUN SERPL-MCNC: 9 MG/DL — LOW (ref 10–20)
CALCIUM SERPL-MCNC: 9.2 MG/DL — SIGNIFICANT CHANGE UP (ref 8.5–10.1)
CALCIUM SERPL-MCNC: 9.4 MG/DL — SIGNIFICANT CHANGE UP (ref 8.5–10.1)
CALCIUM SERPL-MCNC: 9.5 MG/DL — SIGNIFICANT CHANGE UP (ref 8.5–10.1)
CHLORIDE SERPL-SCNC: 105 MMOL/L — SIGNIFICANT CHANGE UP (ref 98–110)
CHLORIDE SERPL-SCNC: 107 MMOL/L — SIGNIFICANT CHANGE UP (ref 98–110)
CHLORIDE SERPL-SCNC: 107 MMOL/L — SIGNIFICANT CHANGE UP (ref 98–110)
CO2 SERPL-SCNC: 25 MMOL/L — SIGNIFICANT CHANGE UP (ref 17–32)
CO2 SERPL-SCNC: 26 MMOL/L — SIGNIFICANT CHANGE UP (ref 17–32)
CO2 SERPL-SCNC: 30 MMOL/L — SIGNIFICANT CHANGE UP (ref 17–32)
CREAT SERPL-MCNC: 0.6 MG/DL — LOW (ref 0.7–1.5)
EGFR: 112 ML/MIN/1.73M2 — SIGNIFICANT CHANGE UP
EOSINOPHIL # BLD AUTO: 0.25 K/UL — SIGNIFICANT CHANGE UP (ref 0–0.7)
EOSINOPHIL NFR BLD AUTO: 3.1 % — SIGNIFICANT CHANGE UP (ref 0–8)
GLUCOSE BLDC GLUCOMTR-MCNC: 107 MG/DL — HIGH (ref 70–99)
GLUCOSE BLDC GLUCOMTR-MCNC: 119 MG/DL — HIGH (ref 70–99)
GLUCOSE BLDC GLUCOMTR-MCNC: 162 MG/DL — HIGH (ref 70–99)
GLUCOSE BLDC GLUCOMTR-MCNC: 96 MG/DL — SIGNIFICANT CHANGE UP (ref 70–99)
GLUCOSE SERPL-MCNC: 104 MG/DL — HIGH (ref 70–99)
GLUCOSE SERPL-MCNC: 123 MG/DL — HIGH (ref 70–99)
GLUCOSE SERPL-MCNC: 141 MG/DL — HIGH (ref 70–99)
HCT VFR BLD CALC: 43.8 % — SIGNIFICANT CHANGE UP (ref 42–52)
HGB BLD-MCNC: 15.3 G/DL — SIGNIFICANT CHANGE UP (ref 14–18)
IMM GRANULOCYTES NFR BLD AUTO: 0.2 % — SIGNIFICANT CHANGE UP (ref 0.1–0.3)
LYMPHOCYTES # BLD AUTO: 2.2 K/UL — SIGNIFICANT CHANGE UP (ref 1.2–3.4)
LYMPHOCYTES # BLD AUTO: 27 % — SIGNIFICANT CHANGE UP (ref 20.5–51.1)
MAGNESIUM SERPL-MCNC: 1.9 MG/DL — SIGNIFICANT CHANGE UP (ref 1.8–2.4)
MCHC RBC-ENTMCNC: 30.1 PG — SIGNIFICANT CHANGE UP (ref 27–31)
MCHC RBC-ENTMCNC: 34.9 G/DL — SIGNIFICANT CHANGE UP (ref 32–37)
MCV RBC AUTO: 86.2 FL — SIGNIFICANT CHANGE UP (ref 80–94)
MONOCYTES # BLD AUTO: 0.6 K/UL — SIGNIFICANT CHANGE UP (ref 0.1–0.6)
MONOCYTES NFR BLD AUTO: 7.4 % — SIGNIFICANT CHANGE UP (ref 1.7–9.3)
NEUTROPHILS # BLD AUTO: 4.99 K/UL — SIGNIFICANT CHANGE UP (ref 1.4–6.5)
NEUTROPHILS NFR BLD AUTO: 61.2 % — SIGNIFICANT CHANGE UP (ref 42.2–75.2)
NRBC # BLD: 0 /100 WBCS — SIGNIFICANT CHANGE UP (ref 0–0)
PLATELET # BLD AUTO: 322 K/UL — SIGNIFICANT CHANGE UP (ref 130–400)
POTASSIUM SERPL-MCNC: 3.5 MMOL/L — SIGNIFICANT CHANGE UP (ref 3.5–5)
POTASSIUM SERPL-MCNC: 3.7 MMOL/L — SIGNIFICANT CHANGE UP (ref 3.5–5)
POTASSIUM SERPL-MCNC: 3.8 MMOL/L — SIGNIFICANT CHANGE UP (ref 3.5–5)
POTASSIUM SERPL-SCNC: 3.5 MMOL/L — SIGNIFICANT CHANGE UP (ref 3.5–5)
POTASSIUM SERPL-SCNC: 3.7 MMOL/L — SIGNIFICANT CHANGE UP (ref 3.5–5)
POTASSIUM SERPL-SCNC: 3.8 MMOL/L — SIGNIFICANT CHANGE UP (ref 3.5–5)
PROT SERPL-MCNC: 6.9 G/DL — SIGNIFICANT CHANGE UP (ref 6–8)
PROT SERPL-MCNC: 7.1 G/DL — SIGNIFICANT CHANGE UP (ref 6–8)
RBC # BLD: 5.08 M/UL — SIGNIFICANT CHANGE UP (ref 4.7–6.1)
RBC # FLD: 13.7 % — SIGNIFICANT CHANGE UP (ref 11.5–14.5)
SODIUM SERPL-SCNC: 143 MMOL/L — SIGNIFICANT CHANGE UP (ref 135–146)
SODIUM SERPL-SCNC: 148 MMOL/L — HIGH (ref 135–146)
SODIUM SERPL-SCNC: 150 MMOL/L — HIGH (ref 135–146)
T PALLIDUM AB TITR SER: NEGATIVE — SIGNIFICANT CHANGE UP
TSH SERPL-MCNC: 0.72 UIU/ML — SIGNIFICANT CHANGE UP (ref 0.27–4.2)
WBC # BLD: 8.15 K/UL — SIGNIFICANT CHANGE UP (ref 4.8–10.8)
WBC # FLD AUTO: 8.15 K/UL — SIGNIFICANT CHANGE UP (ref 4.8–10.8)

## 2022-03-31 PROCEDURE — 99221 1ST HOSP IP/OBS SF/LOW 40: CPT

## 2022-03-31 PROCEDURE — 99223 1ST HOSP IP/OBS HIGH 75: CPT

## 2022-03-31 RX ORDER — DEXTROSE 50 % IN WATER 50 %
15 SYRINGE (ML) INTRAVENOUS ONCE
Refills: 0 | Status: DISCONTINUED | OUTPATIENT
Start: 2022-03-31 | End: 2022-04-08

## 2022-03-31 RX ORDER — DEXTROSE 50 % IN WATER 50 %
25 SYRINGE (ML) INTRAVENOUS ONCE
Refills: 0 | Status: DISCONTINUED | OUTPATIENT
Start: 2022-03-31 | End: 2022-04-08

## 2022-03-31 RX ORDER — HYDRALAZINE HCL 50 MG
10 TABLET ORAL ONCE
Refills: 0 | Status: COMPLETED | OUTPATIENT
Start: 2022-03-31 | End: 2022-03-31

## 2022-03-31 RX ORDER — INSULIN LISPRO 100/ML
8 VIAL (ML) SUBCUTANEOUS
Refills: 0 | Status: DISCONTINUED | OUTPATIENT
Start: 2022-03-31 | End: 2022-04-08

## 2022-03-31 RX ORDER — DEXTROSE 50 % IN WATER 50 %
12.5 SYRINGE (ML) INTRAVENOUS ONCE
Refills: 0 | Status: DISCONTINUED | OUTPATIENT
Start: 2022-03-31 | End: 2022-04-08

## 2022-03-31 RX ORDER — INSULIN LISPRO 100/ML
VIAL (ML) SUBCUTANEOUS
Refills: 0 | Status: DISCONTINUED | OUTPATIENT
Start: 2022-03-31 | End: 2022-04-08

## 2022-03-31 RX ORDER — MEMANTINE HYDROCHLORIDE 10 MG/1
10 TABLET ORAL
Refills: 0 | Status: DISCONTINUED | OUTPATIENT
Start: 2022-03-31 | End: 2022-04-08

## 2022-03-31 RX ORDER — SODIUM CHLORIDE 9 MG/ML
1000 INJECTION, SOLUTION INTRAVENOUS
Refills: 0 | Status: DISCONTINUED | OUTPATIENT
Start: 2022-03-31 | End: 2022-04-01

## 2022-03-31 RX ORDER — LISINOPRIL 2.5 MG/1
40 TABLET ORAL DAILY
Refills: 0 | Status: DISCONTINUED | OUTPATIENT
Start: 2022-03-31 | End: 2022-04-08

## 2022-03-31 RX ORDER — MAGNESIUM SULFATE 500 MG/ML
2 VIAL (ML) INJECTION ONCE
Refills: 0 | Status: COMPLETED | OUTPATIENT
Start: 2022-03-31 | End: 2022-03-31

## 2022-03-31 RX ORDER — ATORVASTATIN CALCIUM 80 MG/1
80 TABLET, FILM COATED ORAL AT BEDTIME
Refills: 0 | Status: DISCONTINUED | OUTPATIENT
Start: 2022-03-31 | End: 2022-04-08

## 2022-03-31 RX ORDER — CLOPIDOGREL BISULFATE 75 MG/1
75 TABLET, FILM COATED ORAL DAILY
Refills: 0 | Status: DISCONTINUED | OUTPATIENT
Start: 2022-03-31 | End: 2022-04-08

## 2022-03-31 RX ORDER — SODIUM CHLORIDE 9 MG/ML
1000 INJECTION, SOLUTION INTRAVENOUS
Refills: 0 | Status: DISCONTINUED | OUTPATIENT
Start: 2022-03-31 | End: 2022-04-08

## 2022-03-31 RX ORDER — DONEPEZIL HYDROCHLORIDE 10 MG/1
20 TABLET, FILM COATED ORAL
Refills: 0 | Status: DISCONTINUED | OUTPATIENT
Start: 2022-03-31 | End: 2022-04-08

## 2022-03-31 RX ORDER — INSULIN GLARGINE 100 [IU]/ML
30 INJECTION, SOLUTION SUBCUTANEOUS AT BEDTIME
Refills: 0 | Status: DISCONTINUED | OUTPATIENT
Start: 2022-03-31 | End: 2022-04-02

## 2022-03-31 RX ORDER — GLUCAGON INJECTION, SOLUTION 0.5 MG/.1ML
1 INJECTION, SOLUTION SUBCUTANEOUS ONCE
Refills: 0 | Status: DISCONTINUED | OUTPATIENT
Start: 2022-03-31 | End: 2022-04-08

## 2022-03-31 RX ORDER — METOPROLOL TARTRATE 50 MG
50 TABLET ORAL DAILY
Refills: 0 | Status: DISCONTINUED | OUTPATIENT
Start: 2022-03-31 | End: 2022-04-08

## 2022-03-31 RX ADMIN — ATORVASTATIN CALCIUM 80 MILLIGRAM(S): 80 TABLET, FILM COATED ORAL at 21:44

## 2022-03-31 RX ADMIN — LISINOPRIL 40 MILLIGRAM(S): 2.5 TABLET ORAL at 05:31

## 2022-03-31 RX ADMIN — Medication 10 MILLIGRAM(S): at 00:41

## 2022-03-31 RX ADMIN — INSULIN GLARGINE 30 UNIT(S): 100 INJECTION, SOLUTION SUBCUTANEOUS at 21:56

## 2022-03-31 RX ADMIN — CLOPIDOGREL BISULFATE 75 MILLIGRAM(S): 75 TABLET, FILM COATED ORAL at 11:49

## 2022-03-31 RX ADMIN — Medication 25 GRAM(S): at 12:51

## 2022-03-31 RX ADMIN — Medication 50 MILLIGRAM(S): at 05:32

## 2022-03-31 RX ADMIN — MEMANTINE HYDROCHLORIDE 10 MILLIGRAM(S): 10 TABLET ORAL at 05:31

## 2022-03-31 RX ADMIN — SODIUM CHLORIDE 100 MILLILITER(S): 9 INJECTION, SOLUTION INTRAVENOUS at 10:53

## 2022-03-31 NOTE — H&P ADULT - ATTENDING COMMENTS
Pt was seen and examined at bedside independently, pt was in deep sleep, reacted to painful stimuli. He was admitted for AMS, pt has a h/o CVAs and vascular dementia lives in assistant living. CT was significant for CVA ( unclear if it is acute or chronic), consult neurology, will c/w high intensity statin, ASA, will get REEG ( pt might need a seizure prophylaxis).   I agree with medical resident's findings, assessment and plan above with a few corrections in my note.       Vital Signs Last 24 Hrs  T(C): 36.2 (31 Mar 2022 05:50), Max: 36.2 (30 Mar 2022 19:16)  T(F): 97.2 (31 Mar 2022 05:50), Max: 97.2 (30 Mar 2022 19:16)  HR: 69 (31 Mar 2022 05:50) (58 - 69)  BP: 145/63 (31 Mar 2022 05:50) (141/65 - 195/90)  BP(mean): --  RR: 18 (31 Mar 2022 05:50) (18 - 18)  SpO2: 97% (31 Mar 2022 04:22) (97% - 99%)    PHYSICAL EXAM:  GENERAL: NAD, in deep sleep   HEAD:  Atraumatic, normocephalic  EYES: EOMI, PERRLA, conjunctiva and sclera clear  ENT: Moist mucous membranes  NECK: Supple, no JVD  HEART: Regular rate and rhythm, no murmurs, rubs, or gallops  LUNGS: decreased BS at bases   ABDOMEN: Soft, nontender, nondistended, +BS  EXTREMITIES: 2+ peripheral pulses bilaterally. No clubbing, cyanosis, or edema  NERVOUS SYSTEM:  noncooperative  SKIN: No rashes or lesions    LABs:                        15.3   8.15  )-----------( 322      ( 31 Mar 2022 04:30 )             43.8   03-31    150<H>  |  107  |  8<L>  ----------------------------<  104<H>  3.8   |  30  |  0.6<L>    Ca    9.5      31 Mar 2022 04:30  Mg     1.9     03-31    TPro  6.9  /  Alb  3.9  /  TBili  0.5  /  DBili  x   /  AST  16  /  ALT  18  /  AlkPhos  94  03-31    RADIOLOGY:  < from: CT Angio Neck w/ IV Cont (03.30.22 @ 19:54) >    CTA HEAD/NECK:    There is a short segment chronic occlusion in the proximal right P1   (602/107), unchanged from previous CT scan of 6/27/2021 (3/389; 602/113)   and MRI of 6/30/2021 (6/11). There is reconstitution of the distal right   PCA from a small right posterior communicating artery.  _____________  NASCET criteria for internal carotid artery stenosis:  Mild: 0% to 49%  Moderate: 50% to 69%  Severe: 70% to 99%  Complete Occlusion    < end of copied text > Pt was seen and examined at bedside independently, pt was in deep sleep, reacted to painful stimuli. He was admitted for AMS, pt has a h/o CVAs and vascular dementia lives in assistant living. CT was significant for CVA ( unclear if it is acute or chronic), consult neurology, will c/w high intensity statin, ASA, will get REEG ( pt might need a seizure prophylaxis).   I agree with medical resident's findings, assessment and plan above with a few corrections in my note.       Vital Signs Last 24 Hrs  T(C): 36.2 (31 Mar 2022 05:50), Max: 36.2 (30 Mar 2022 19:16)  T(F): 97.2 (31 Mar 2022 05:50), Max: 97.2 (30 Mar 2022 19:16)  HR: 69 (31 Mar 2022 05:50) (58 - 69)  BP: 145/63 (31 Mar 2022 05:50) (141/65 - 195/90)  BP(mean): --  RR: 18 (31 Mar 2022 05:50) (18 - 18)  SpO2: 97% (31 Mar 2022 04:22) (97% - 99%)    PHYSICAL EXAM:  GENERAL: NAD, in deep sleep   HEAD:  Atraumatic, normocephalic  EYES: EOMI, PERRLA, conjunctiva and sclera clear  ENT: Moist mucous membranes  NECK: Supple, no JVD  HEART: Regular rate and rhythm, no murmurs, rubs, or gallops  LUNGS: decreased BS at bases   ABDOMEN: Soft, nontender, nondistended, +BS  EXTREMITIES: 2+ peripheral pulses bilaterally. No clubbing, cyanosis, or edema  NERVOUS SYSTEM:  noncooperative  SKIN: No rashes or lesions    LABs:                        15.3   8.15  )-----------( 322      ( 31 Mar 2022 04:30 )             43.8   03-31    150<H>  |  107  |  8<L>  ----------------------------<  104<H>  3.8   |  30  |  0.6<L>    Ca    9.5      31 Mar 2022 04:30  Mg     1.9     03-31    TPro  6.9  /  Alb  3.9  /  TBili  0.5  /  DBili  x   /  AST  16  /  ALT  18  /  AlkPhos  94  03-31    RADIOLOGY:  < from: CT Angio Neck w/ IV Cont (03.30.22 @ 19:54) >    CTA HEAD/NECK:    There is a short segment chronic occlusion in the proximal right P1   (602/107), unchanged from previous CT scan of 6/27/2021 (3/389; 602/113)   and MRI of 6/30/2021 (6/11). There is reconstitution of the distal right   PCA from a small right posterior communicating artery.  _____________  NASCET criteria for internal carotid artery stenosis:  Mild: 0% to 49%  Moderate: 50% to 69%  Severe: 70% to 99%  Complete Occlusion    A/P  # AMS/ Vascular dementia/ suspected seizure   - HCT was significant for CVA ( unclear if stroke is chronic or acute)   - neurology consult, check lipid profile, Hb A1C, high intensity statin, c/w ASA, consider transfer to stroke unit if pt has an acute CVA  - get 2Decho with bubble study   - supportive care, prevent falls and aspiration   - get REEG to r/o seizure   - keep Mg above 2.0    # Hypernatremia   - pt was started on D5 W at 100 ml/h , f/u repeat Na level     # c/w current medical management and DVt prophylaxis

## 2022-03-31 NOTE — PATIENT PROFILE ADULT - FALL HARM RISK - HARM RISK INTERVENTIONS

## 2022-03-31 NOTE — H&P ADULT - ASSESSMENT
56 year old male with PMH  of DM, HTN, CVA, COVID in Jan, sent in from [facility] residence for unresponsiveness.    # Increased Lethargy/ Weakness likely deconditioning   # Hx of CVA/Dementia  - NIHSS at presentation 16, stroke code called  - CTH, CTP, CTA Head & Neck unchanged  - NIHSS shortly after was 1  - Patient uncooperative with history taking and rest of physical exam   - Neuro onboard  - Picture suspicious of subclinical seizures with post-ictal state  - In lieu of early dementia / episodes of unresponsiveness R/o neurodegenerative/neurocognitive disorders   - F/U RPR, VDRL, TSH, HIV  - MRI brain W/WO contrast  - vEEG      #HTN  #DM type II   #Dyslipidemia  -statin/metoprolol/lisinopril   -monitor BG - well controlled     Diet: DASH  Activity: IAT  DVT Prophylaxis: lovenox  GI Prophylaxis: pantoprazole  CHG Order  Code Status: Full  Disposition: from home     56 year old male with PMH  of DM, HTN, CVA, COVID in Jan, sent in from [facility] residence for unresponsiveness.    # Increased Lethargy/ Weakness likely deconditioning   # Hx of CVA/Dementia  - NIHSS at presentation 16, stroke code called  - CTH, CTP, CTA Head & Neck unchanged  - NIHSS shortly after was 1  - Patient uncooperative with history taking and rest of physical exam   - Neuro onboard  - Picture suspicious of subclinical seizures with post-ictal state  - In lieu of early dementia / episodes of unresponsiveness R/o neurodegenerative/neurocognitive disorders   - F/U RPR, VDRL, TSH, HIV  - MRI brain W/WO contrast  - vEEG  - Consider diagnostic lumbar puncture      #HTN  #DM type II   #Dyslipidemia  -statin/metoprolol/lisinopril   -monitor BG - well controlled     Diet: DASH  Activity: IAT  DVT Prophylaxis: lovenox  GI Prophylaxis: pantoprazole  CHG Order  Code Status: Full  Disposition: from home

## 2022-03-31 NOTE — H&P ADULT - NSHPREVIEWOFSYSTEMS_GEN_ALL_CORE
REVIEW OF SYSTEMS:    CONSTITUTIONAL: No weakness, fevers or chills  EYES/ENT: No visual changes;  No vertigo or throat pain   NECK: No pain or stiffness  RESPIRATORY: No cough, wheezing, hemoptysis; No shortness of breath  CARDIOVASCULAR: No chest pain or palpitations  GASTROINTESTINAL: No abdominal or epigastric pain. No nausea, vomiting, or hematemesis; No diarrhea or constipation. No melena or hematochezia.  GENITOURINARY: No dysuria, frequency or hematuria  NEUROLOGICAL: unresponsiveness   SKIN: No itching, rashes

## 2022-03-31 NOTE — PROVIDER CONTACT NOTE (OTHER) - SITUATION
patient wandering, unable to reorient, screams "I want to leave here", refused to go back to room, refusing all due medication. Code gray to be called

## 2022-03-31 NOTE — H&P ADULT - HISTORY OF PRESENT ILLNESS
56 year old male with PMHx of dementia DM, HTN, CVA, COVID in Jan, sent in from [facility] for increased lethargy and verbal unresponsiveness. Pt's last known well time was at 12 pm. Pt is a poor historian and not answering any questions.  At the time of presentation to the ED, NIHSS was 16. Code stroke was called. Shortly after, patient was awake and responsive but not answering any questions.. very poor historian.    CTH, CTP, CTA Head & Neck with no significant change.  Off note patient was recently admitted for similar symptoms.  Seen and evaluated in the ED by neurology.  Admit for further workup and management.

## 2022-03-31 NOTE — H&P ADULT - NSHPPHYSICALEXAM_GEN_ALL_CORE
VITALS:   T(C): 36.2 (03-30-22 @ 19:16), Max: 36.2 (03-30-22 @ 19:16)  HR: 60 (03-30-22 @ 22:41) (58 - 64)  BP: 180/87 (03-30-22 @ 22:41) (180/87 - 195/87)  RR: 18 (03-30-22 @ 22:41) (18 - 18)  SpO2: 99% (03-30-22 @ 22:41) (97% - 99%)    GENERAL: NAD, lying in bed comfortably  HEAD:  Atraumatic, normocephalic  EYES: EOMI, PERRLA, conjunctiva and sclera clear  ENT: Moist mucous membranes  NECK: Supple, no JVD  HEART: Regular rate and rhythm, no murmurs, rubs, or gallops  LUNGS: Unlabored respirations.  Clear to auscultation bilaterally, no crackles, wheezing, or rhonchi  ABDOMEN: Soft, nontender, nondistended, +BS  EXTREMITIES: 2+ peripheral pulses bilaterally. No clubbing, cyanosis, or edema  NERVOUS SYSTEM:  noncooperative  SKIN: No rashes or lesions

## 2022-03-31 NOTE — H&P ADULT - NSHPLABSRESULTS_GEN_ALL_CORE
LABS:  cret                        14.2   8.59  )-----------( 292      ( 30 Mar 2022 20:21 )             42.0     03-30    136  |  103  |  16  ----------------------------<  184<H>  4.1   |  22  |  0.6<L>    Ca    8.9      30 Mar 2022 20:21    TPro  6.4  /  Alb  3.4<L>  /  TBili  0.5  /  DBili  x   /  AST  19  /  ALT  17  /  AlkPhos  88  03-30    PT/INR - ( 30 Mar 2022 20:21 )   PT: 12.40 sec;   INR: 1.08 ratio         PTT - ( 30 Mar 2022 20:21 )  PTT:35.2 sec

## 2022-04-01 LAB
A1C WITH ESTIMATED AVERAGE GLUCOSE RESULT: 7.8 % — HIGH (ref 4–5.6)
ALBUMIN SERPL ELPH-MCNC: 4 G/DL — SIGNIFICANT CHANGE UP (ref 3.5–5.2)
ALP SERPL-CCNC: 97 U/L — SIGNIFICANT CHANGE UP (ref 30–115)
ALT FLD-CCNC: 18 U/L — SIGNIFICANT CHANGE UP (ref 0–41)
ANION GAP SERPL CALC-SCNC: 14 MMOL/L — SIGNIFICANT CHANGE UP (ref 7–14)
AST SERPL-CCNC: 18 U/L — SIGNIFICANT CHANGE UP (ref 0–41)
BASOPHILS # BLD AUTO: 0.06 K/UL — SIGNIFICANT CHANGE UP (ref 0–0.2)
BASOPHILS NFR BLD AUTO: 0.9 % — SIGNIFICANT CHANGE UP (ref 0–1)
BILIRUB SERPL-MCNC: 0.6 MG/DL — SIGNIFICANT CHANGE UP (ref 0.2–1.2)
BUN SERPL-MCNC: 7 MG/DL — LOW (ref 10–20)
CALCIUM SERPL-MCNC: 9.6 MG/DL — SIGNIFICANT CHANGE UP (ref 8.5–10.1)
CHLORIDE SERPL-SCNC: 106 MMOL/L — SIGNIFICANT CHANGE UP (ref 98–110)
CHOLEST SERPL-MCNC: 99 MG/DL — SIGNIFICANT CHANGE UP
CO2 SERPL-SCNC: 25 MMOL/L — SIGNIFICANT CHANGE UP (ref 17–32)
CREAT SERPL-MCNC: 0.6 MG/DL — LOW (ref 0.7–1.5)
EGFR: 112 ML/MIN/1.73M2 — SIGNIFICANT CHANGE UP
EOSINOPHIL # BLD AUTO: 0.25 K/UL — SIGNIFICANT CHANGE UP (ref 0–0.7)
EOSINOPHIL NFR BLD AUTO: 3.6 % — SIGNIFICANT CHANGE UP (ref 0–8)
ESTIMATED AVERAGE GLUCOSE: 177 MG/DL — HIGH (ref 68–114)
GLUCOSE BLDC GLUCOMTR-MCNC: 125 MG/DL — HIGH (ref 70–99)
GLUCOSE BLDC GLUCOMTR-MCNC: 176 MG/DL — HIGH (ref 70–99)
GLUCOSE BLDC GLUCOMTR-MCNC: 177 MG/DL — HIGH (ref 70–99)
GLUCOSE BLDC GLUCOMTR-MCNC: 179 MG/DL — HIGH (ref 70–99)
GLUCOSE BLDC GLUCOMTR-MCNC: 88 MG/DL — SIGNIFICANT CHANGE UP (ref 70–99)
GLUCOSE SERPL-MCNC: 80 MG/DL — SIGNIFICANT CHANGE UP (ref 70–99)
HCT VFR BLD CALC: 46.2 % — SIGNIFICANT CHANGE UP (ref 42–52)
HDLC SERPL-MCNC: 37 MG/DL — LOW
HGB BLD-MCNC: 16.1 G/DL — SIGNIFICANT CHANGE UP (ref 14–18)
IMM GRANULOCYTES NFR BLD AUTO: 0.3 % — SIGNIFICANT CHANGE UP (ref 0.1–0.3)
LIPID PNL WITH DIRECT LDL SERPL: 48 MG/DL — SIGNIFICANT CHANGE UP
LYMPHOCYTES # BLD AUTO: 2.06 K/UL — SIGNIFICANT CHANGE UP (ref 1.2–3.4)
LYMPHOCYTES # BLD AUTO: 30.1 % — SIGNIFICANT CHANGE UP (ref 20.5–51.1)
MAGNESIUM SERPL-MCNC: 2.2 MG/DL — SIGNIFICANT CHANGE UP (ref 1.8–2.4)
MCHC RBC-ENTMCNC: 30.1 PG — SIGNIFICANT CHANGE UP (ref 27–31)
MCHC RBC-ENTMCNC: 34.8 G/DL — SIGNIFICANT CHANGE UP (ref 32–37)
MCV RBC AUTO: 86.5 FL — SIGNIFICANT CHANGE UP (ref 80–94)
MONOCYTES # BLD AUTO: 0.56 K/UL — SIGNIFICANT CHANGE UP (ref 0.1–0.6)
MONOCYTES NFR BLD AUTO: 8.2 % — SIGNIFICANT CHANGE UP (ref 1.7–9.3)
NEUTROPHILS # BLD AUTO: 3.9 K/UL — SIGNIFICANT CHANGE UP (ref 1.4–6.5)
NEUTROPHILS NFR BLD AUTO: 56.9 % — SIGNIFICANT CHANGE UP (ref 42.2–75.2)
NON HDL CHOLESTEROL: 62 MG/DL — SIGNIFICANT CHANGE UP
NRBC # BLD: 0 /100 WBCS — SIGNIFICANT CHANGE UP (ref 0–0)
PLATELET # BLD AUTO: 326 K/UL — SIGNIFICANT CHANGE UP (ref 130–400)
POTASSIUM SERPL-MCNC: 3.7 MMOL/L — SIGNIFICANT CHANGE UP (ref 3.5–5)
POTASSIUM SERPL-SCNC: 3.7 MMOL/L — SIGNIFICANT CHANGE UP (ref 3.5–5)
PROT SERPL-MCNC: 7.1 G/DL — SIGNIFICANT CHANGE UP (ref 6–8)
RBC # BLD: 5.34 M/UL — SIGNIFICANT CHANGE UP (ref 4.7–6.1)
RBC # FLD: 13.9 % — SIGNIFICANT CHANGE UP (ref 11.5–14.5)
SODIUM SERPL-SCNC: 145 MMOL/L — SIGNIFICANT CHANGE UP (ref 135–146)
TRIGL SERPL-MCNC: 79 MG/DL — SIGNIFICANT CHANGE UP
WBC # BLD: 6.85 K/UL — SIGNIFICANT CHANGE UP (ref 4.8–10.8)
WBC # FLD AUTO: 6.85 K/UL — SIGNIFICANT CHANGE UP (ref 4.8–10.8)

## 2022-04-01 PROCEDURE — 95819 EEG AWAKE AND ASLEEP: CPT | Mod: 26

## 2022-04-01 PROCEDURE — 99233 SBSQ HOSP IP/OBS HIGH 50: CPT

## 2022-04-01 RX ORDER — SODIUM CHLORIDE 9 MG/ML
1000 INJECTION, SOLUTION INTRAVENOUS
Refills: 0 | Status: DISCONTINUED | OUTPATIENT
Start: 2022-04-01 | End: 2022-04-02

## 2022-04-01 RX ADMIN — INSULIN GLARGINE 30 UNIT(S): 100 INJECTION, SOLUTION SUBCUTANEOUS at 22:06

## 2022-04-01 RX ADMIN — Medication 2: at 16:55

## 2022-04-01 RX ADMIN — DONEPEZIL HYDROCHLORIDE 20 MILLIGRAM(S): 10 TABLET, FILM COATED ORAL at 18:22

## 2022-04-01 RX ADMIN — Medication 8 UNIT(S): at 16:56

## 2022-04-01 RX ADMIN — MEMANTINE HYDROCHLORIDE 10 MILLIGRAM(S): 10 TABLET ORAL at 05:20

## 2022-04-01 RX ADMIN — Medication 8 UNIT(S): at 13:07

## 2022-04-01 RX ADMIN — SODIUM CHLORIDE 60 MILLILITER(S): 9 INJECTION, SOLUTION INTRAVENOUS at 02:20

## 2022-04-01 RX ADMIN — MEMANTINE HYDROCHLORIDE 10 MILLIGRAM(S): 10 TABLET ORAL at 18:21

## 2022-04-01 RX ADMIN — LISINOPRIL 40 MILLIGRAM(S): 2.5 TABLET ORAL at 05:20

## 2022-04-01 RX ADMIN — Medication 50 MILLIGRAM(S): at 05:21

## 2022-04-01 RX ADMIN — CLOPIDOGREL BISULFATE 75 MILLIGRAM(S): 75 TABLET, FILM COATED ORAL at 11:51

## 2022-04-01 RX ADMIN — ATORVASTATIN CALCIUM 80 MILLIGRAM(S): 80 TABLET, FILM COATED ORAL at 22:07

## 2022-04-01 RX ADMIN — Medication 2: at 11:56

## 2022-04-01 NOTE — PROGRESS NOTE ADULT - SUBJECTIVE AND OBJECTIVE BOX
LENGTH OF HOSPITAL STAY: 2d      CHIEF COMPLAINT: Patient is a 56y old  Male who presents with a chief complaint of catatonia (30 Mar 2022 21:08)      OVER Past 24hrs:  No acute overnight events.     HISTORY OF PRESENTING ILLNESS:    HPI:  56 year old male with PMHx of dementia DM, HTN, CVA, COVID in Jan, sent in from [facility] residence for increased lethargy and verbal unresponsiveness. Pt's last known well time was at 12 pm. Pt is a poor historian and not answering any questions.  At the time of presentation to the ED, NIHSS was 16. Code stroke was called. Shortly after, patient was awake and responsive but not answering any questions.. very poor historian.    CTH, CTP, CTA Head & Neck with no significant change.  Off note patient was recently admitted for similar symptoms.  Seen and evaluated in the ED by neurology.  Admit for further workup and management. (31 Mar 2022 00:01)    PAST MEDICAL & SURGICAL HISTORY  PAST MEDICAL & SURGICAL HISTORY:  Diabetes    Hypertension    Pancreatitis    CVA (cerebral vascular accident)    No significant past surgical history          REVIEW OF SYSTEMS  Negative, except as in Physical exam    ALLERGIES:  No Known Allergies    MEDICATIONS:  STANDING MEDICATIONS  atorvastatin 80 milliGRAM(s) Oral at bedtime  clopidogrel Tablet 75 milliGRAM(s) Oral daily  dextrose 5%. 1000 milliLiter(s) IV Continuous <Continuous>  dextrose 5%. 1000 milliLiter(s) IV Continuous <Continuous>  dextrose 5%. 1000 milliLiter(s) IV Continuous <Continuous>  dextrose 50% Injectable 25 Gram(s) IV Push once  dextrose 50% Injectable 12.5 Gram(s) IV Push once  dextrose 50% Injectable 25 Gram(s) IV Push once  donepezil 20 milliGRAM(s) Oral <User Schedule>  glucagon  Injectable 1 milliGRAM(s) IntraMuscular once  insulin glargine Injectable (LANTUS) 30 Unit(s) SubCutaneous at bedtime  insulin lispro (ADMELOG) corrective regimen sliding scale   SubCutaneous three times a day before meals  insulin lispro Injectable (ADMELOG) 8 Unit(s) SubCutaneous three times a day before meals  lisinopril 40 milliGRAM(s) Oral daily  memantine 10 milliGRAM(s) Oral two times a day  metoprolol succinate ER 50 milliGRAM(s) Oral daily      PRN MEDICATIONS  dextrose Oral Gel 15 Gram(s) Oral once PRN    VITALS:   T(F): 97  HR: 74  BP: 153/80  RR: 20  SpO2: 99%      LABS:                        16.1   6.85  )-----------( 326      ( 01 Apr 2022 07:00 )             46.2     04-01    145  |  106  |  7<L>  ----------------------------<  80  3.7   |  25  |  0.6<L>    Ca    9.6      01 Apr 2022 07:00  Mg     2.2     04-01    TPro  7.1  /  Alb  4.0  /  TBili  0.6  /  DBili  x   /  AST  18  /  ALT  18  /  AlkPhos  97  04-01    PT/INR - ( 30 Mar 2022 20:21 )   PT: 12.40 sec;   INR: 1.08 ratio         PTT - ( 30 Mar 2022 20:21 )  PTT:35.2 sec  Urinalysis Basic - ( 30 Mar 2022 22:32 )    Color: Light Yellow / Appearance: Clear / SG: >1.050 / pH: x  Gluc: x / Ketone: Negative  / Bili: Negative / Urobili: <2 mg/dL   Blood: x / Protein: Trace / Nitrite: Negative   Leuk Esterase: Negative / RBC: x / WBC x   Sq Epi: x / Non Sq Epi: x / Bacteria: x            Culture - Blood (collected 30 Mar 2022 20:18)  Source: .Blood Blood-Peripheral  Preliminary Report (01 Apr 2022 02:00):    No growth to date.    Culture - Blood (collected 30 Mar 2022 20:18)  Source: .Blood Blood-Peripheral  Preliminary Report (01 Apr 2022 02:00):    No growth to date.      CARDIAC MARKERS ( 30 Mar 2022 20:21 )  x     / <0.01 ng/mL / x     / x     / x          RADIOLOGY:    Assessment/Plan:  56 year old male with PMH  of DM, HTN, CVA, COVID in Jan, sent in from [facility] for unresponsiveness.    # Increased Lethargy/ Weakness likely deconditioning   # Hx of CVA/Dementia  - NIHSS at presentation 16, stroke code called>> NIHSS shortly after was 1  - CTH, CTP, CTA Head & Neck unchanged  - F/U RPR, VDRL, TSH, HIV  - EEG: Nonspecific generalized slowing.  > fu MRI Brain  > fu Neuro    #HTN  #DM type II   #Dyslipidemia  -statin/metoprolol/lisinopril   -monitor BG - well controlled     Diet: DASH  Activity: IAT  DVT Prophylaxis: lovenox  GI Prophylaxis: pantoprazole  CHG Order  Code Status: Full  Disposition: from home

## 2022-04-01 NOTE — PROGRESS NOTE ADULT - ASSESSMENT
56 year old male with PMH  of DM, HTN, CVA, COVID in Jan, sent in from [facility] residence for unresponsiveness.    A/P   # AMS/ vascular dementia/ h/o CVA  - neurology evaluation is pending, mental status is back to baseline now   - supportive care, aspiration and fall precautions   - NIHSS at presentation 16, stroke code called  - CTH, CTP, CTA Head & Neck unchanged  - NIHSS shortly after was 1  - Picture suspicious of subclinical seizures with post-ictal state  - likely pt needs VEEG to r/o seizure, REEG was negative for epileptiform activity   - F/U RPR, VDRL, TSH, HIV  - MRI brain W/WO contrast  - check ammonia level   - keep Mg above 2.0   - c/w Plavix and atorvastatin   - on Donepezil and Namenda       #HTN  - DASH diet   - on BB and Lisinopril     #DM type II   - carb consistent diet   - monitor finger stick   - on Insulin   - check Hb A1C     #Dyslipidemia  -statin    Diet: DASH  Activity: IAT  DVT Prophylaxis: lovenox  GI Prophylaxis: pantoprazole  CHG Order  Code Status: Full    #Progress Note Handoff  Pending (specify): neurology follow up to consider VEEG, supportive care, PT/rehab   Family discussion: I spoke with pt, he has a very limited insight   Disposition: Home___/SNF___/Other_____x ___/Unknown at this time________

## 2022-04-01 NOTE — PROGRESS NOTE ADULT - SUBJECTIVE AND OBJECTIVE BOX
56 year old male with PMHx of dementia DM, HTN, CVA, COVID in Jan, sent in from [facility] residence for increased lethargy and verbal unresponsiveness. Pt's last known well time was at 12 pm. Pt is a poor historian and not answering any questions.At the time of presentation to the ED, NIHSS was 16. Code stroke was called. Shortly after, patient was awake and responsive but not answering any questions.   CTH, CTP, CTA Head & Neck with no significant change.Off note patient was recently admitted for similar symptoms. een and evaluated in the ED by neurology.  Today pt is awake, answering questions, demented with poor functional status.       Vital Signs Last 24 Hrs  T(C): 36.1 (01 Apr 2022 12:00), Max: 36.7 (31 Mar 2022 21:40)  T(F): 97 (01 Apr 2022 12:00), Max: 98.1 (31 Mar 2022 21:40)  HR: 74 (01 Apr 2022 12:00) (62 - 74)  BP: 153/80 (01 Apr 2022 12:00) (139/68 - 153/80)  BP(mean): 82 (31 Mar 2022 21:40) (82 - 82)  RR: 20 (01 Apr 2022 12:00) (17 - 20)  SpO2: 99% (01 Apr 2022 12:00) (96% - 99%)    PHYSICAL EXAM:  GENERAL: NAD, demented, AAOx1   HEAD:  Atraumatic, Normocephalic  NECK: Supple, No JVD, Normal thyroid  NERVOUS SYSTEM:  Alert & Oriented X1, moving all extremities, forgetful   CHEST/LUNG: Clear to percussion bilaterally; No rales, rhonchi, wheezing, or rubs  HEART: Regular rate and rhythm; No murmurs, rubs, or gallops  ABDOMEN: Soft, Nontender, Nondistended; Bowel sounds present  EXTREMITIES:  2+ Peripheral Pulses, No clubbing, cyanosis, or edema  LYMPH: No lymphadenopathy noted  SKIN: No rashes or lesions      LABS:                        16.1   6.85  )-----------( 326      ( 01 Apr 2022 07:00 )             46.2     04-01    145  |  106  |  7<L>  ----------------------------<  80  3.7   |  25  |  0.6<L>    Ca    9.6      01 Apr 2022 07:00  Mg     2.2     04-01    TPro  7.1  /  Alb  4.0  /  TBili  0.6  /  DBili  x   /  AST  18  /  ALT  18  /  AlkPhos  97  04-01    PT/INR - ( 30 Mar 2022 20:21 )   PT: 12.40 sec;   INR: 1.08 ratio         PTT - ( 30 Mar 2022 20:21 )  PTT:35.2 sec  Urinalysis Basic - ( 30 Mar 2022 22:32 )    Color: Light Yellow / Appearance: Clear / SG: >1.050 / pH: x  Gluc: x / Ketone: Negative  / Bili: Negative / Urobili: <2 mg/dL   Blood: x / Protein: Trace / Nitrite: Negative   Leuk Esterase: Negative / RBC: x / WBC x   Sq Epi: x / Non Sq Epi: x / Bacteria: x        Culture - Blood (collected 30 Mar 2022 20:18)  Source: .Blood Blood-Peripheral  Preliminary Report (01 Apr 2022 02:00):    No growth to date.    Culture - Blood (collected 30 Mar 2022 20:18)  Source: .Blood Blood-Peripheral  Preliminary Report (01 Apr 2022 02:00):    No growth to date.      RADIOLOGY & ADDITIONAL TESTS:    < from: CT Angio Head w/ IV Cont (03.30.22 @ 19:55) >  IMPRESSION:    CT PERFUSION:    Perfusion evidence of 65 cc core infarct, most prominent in the region of   the right parieto-occipital lobe as well as left medial cerebellum.    Mismatch volume: 65 cc  Mismatch ratio: 65    CTA HEAD/NECK:    There is a short segment chronic occlusion in the proximal right P1   (602/107), unchanged from previous CT scan of 6/27/2021 (3/389; 602/113)   and MRI of 6/30/2021 (6/11). There is reconstitution of the distal right   PCA from a small right posterior communicating artery.  _____________  NASCET criteria for internal carotid artery stenosis:  Mild: 0% to 49%  Moderate: 50% to 69%  Severe: 70% to 99%  Complete Occlusion    < end of copied text >  < from: Transesophageal Echocardiogram (07.01.21 @ 14:55) >  Summary:   1. Left ventricular ejection fraction, by visual estimation, is 60 to 65%.   2. Normal global left ventricular systolic function.   3. Trace mitral valve regurgitation.   4. Color flow doppler and intravenous injection of agitated saline demonstrates the presence of an intact intra atrial septum.   5. No left atrial appendage thrombus and normal left atrial appendage velocities.    MEDICATIONS  (STANDING):  atorvastatin 80 milliGRAM(s) Oral at bedtime  clopidogrel Tablet 75 milliGRAM(s) Oral daily  dextrose 5%. 1000 milliLiter(s) (100 mL/Hr) IV Continuous <Continuous>  dextrose 5%. 1000 milliLiter(s) (50 mL/Hr) IV Continuous <Continuous>  dextrose 5%. 1000 milliLiter(s) (60 mL/Hr) IV Continuous <Continuous>  dextrose 50% Injectable 25 Gram(s) IV Push once  dextrose 50% Injectable 12.5 Gram(s) IV Push once  dextrose 50% Injectable 25 Gram(s) IV Push once  donepezil 20 milliGRAM(s) Oral <User Schedule>  glucagon  Injectable 1 milliGRAM(s) IntraMuscular once  insulin glargine Injectable (LANTUS) 30 Unit(s) SubCutaneous at bedtime  insulin lispro (ADMELOG) corrective regimen sliding scale   SubCutaneous three times a day before meals  insulin lispro Injectable (ADMELOG) 8 Unit(s) SubCutaneous three times a day before meals  lisinopril 40 milliGRAM(s) Oral daily  memantine 10 milliGRAM(s) Oral two times a day  metoprolol succinate ER 50 milliGRAM(s) Oral daily    MEDICATIONS  (PRN):  dextrose Oral Gel 15 Gram(s) Oral once PRN Blood Glucose LESS THAN 70 milliGRAM(s)/deciliter

## 2022-04-02 LAB
ANION GAP SERPL CALC-SCNC: 11 MMOL/L — SIGNIFICANT CHANGE UP (ref 7–14)
BASOPHILS # BLD AUTO: 0.08 K/UL — SIGNIFICANT CHANGE UP (ref 0–0.2)
BASOPHILS NFR BLD AUTO: 0.9 % — SIGNIFICANT CHANGE UP (ref 0–1)
BUN SERPL-MCNC: 11 MG/DL — SIGNIFICANT CHANGE UP (ref 10–20)
CALCIUM SERPL-MCNC: 8.9 MG/DL — SIGNIFICANT CHANGE UP (ref 8.5–10.1)
CHLORIDE SERPL-SCNC: 110 MMOL/L — SIGNIFICANT CHANGE UP (ref 98–110)
CO2 SERPL-SCNC: 27 MMOL/L — SIGNIFICANT CHANGE UP (ref 17–32)
CREAT SERPL-MCNC: 0.6 MG/DL — LOW (ref 0.7–1.5)
CULTURE RESULTS: SIGNIFICANT CHANGE UP
EGFR: 112 ML/MIN/1.73M2 — SIGNIFICANT CHANGE UP
EOSINOPHIL # BLD AUTO: 0.25 K/UL — SIGNIFICANT CHANGE UP (ref 0–0.7)
EOSINOPHIL NFR BLD AUTO: 2.9 % — SIGNIFICANT CHANGE UP (ref 0–8)
GLUCOSE BLDC GLUCOMTR-MCNC: 107 MG/DL — HIGH (ref 70–99)
GLUCOSE BLDC GLUCOMTR-MCNC: 120 MG/DL — HIGH (ref 70–99)
GLUCOSE BLDC GLUCOMTR-MCNC: 131 MG/DL — HIGH (ref 70–99)
GLUCOSE BLDC GLUCOMTR-MCNC: 135 MG/DL — HIGH (ref 70–99)
GLUCOSE BLDC GLUCOMTR-MCNC: 88 MG/DL — SIGNIFICANT CHANGE UP (ref 70–99)
GLUCOSE SERPL-MCNC: 98 MG/DL — SIGNIFICANT CHANGE UP (ref 70–99)
HCT VFR BLD CALC: 44.8 % — SIGNIFICANT CHANGE UP (ref 42–52)
HGB BLD-MCNC: 15.1 G/DL — SIGNIFICANT CHANGE UP (ref 14–18)
IMM GRANULOCYTES NFR BLD AUTO: 0.2 % — SIGNIFICANT CHANGE UP (ref 0.1–0.3)
LYMPHOCYTES # BLD AUTO: 2.02 K/UL — SIGNIFICANT CHANGE UP (ref 1.2–3.4)
LYMPHOCYTES # BLD AUTO: 23.1 % — SIGNIFICANT CHANGE UP (ref 20.5–51.1)
MAGNESIUM SERPL-MCNC: 2 MG/DL — SIGNIFICANT CHANGE UP (ref 1.8–2.4)
MCHC RBC-ENTMCNC: 29.7 PG — SIGNIFICANT CHANGE UP (ref 27–31)
MCHC RBC-ENTMCNC: 33.7 G/DL — SIGNIFICANT CHANGE UP (ref 32–37)
MCV RBC AUTO: 88 FL — SIGNIFICANT CHANGE UP (ref 80–94)
MONOCYTES # BLD AUTO: 0.68 K/UL — HIGH (ref 0.1–0.6)
MONOCYTES NFR BLD AUTO: 7.8 % — SIGNIFICANT CHANGE UP (ref 1.7–9.3)
NEUTROPHILS # BLD AUTO: 5.71 K/UL — SIGNIFICANT CHANGE UP (ref 1.4–6.5)
NEUTROPHILS NFR BLD AUTO: 65.1 % — SIGNIFICANT CHANGE UP (ref 42.2–75.2)
NRBC # BLD: 0 /100 WBCS — SIGNIFICANT CHANGE UP (ref 0–0)
PLATELET # BLD AUTO: 297 K/UL — SIGNIFICANT CHANGE UP (ref 130–400)
POTASSIUM SERPL-MCNC: 4.1 MMOL/L — SIGNIFICANT CHANGE UP (ref 3.5–5)
POTASSIUM SERPL-SCNC: 4.1 MMOL/L — SIGNIFICANT CHANGE UP (ref 3.5–5)
RBC # BLD: 5.09 M/UL — SIGNIFICANT CHANGE UP (ref 4.7–6.1)
RBC # FLD: 13.6 % — SIGNIFICANT CHANGE UP (ref 11.5–14.5)
SODIUM SERPL-SCNC: 148 MMOL/L — HIGH (ref 135–146)
SPECIMEN SOURCE: SIGNIFICANT CHANGE UP
WBC # BLD: 8.76 K/UL — SIGNIFICANT CHANGE UP (ref 4.8–10.8)
WBC # FLD AUTO: 8.76 K/UL — SIGNIFICANT CHANGE UP (ref 4.8–10.8)

## 2022-04-02 PROCEDURE — 99231 SBSQ HOSP IP/OBS SF/LOW 25: CPT

## 2022-04-02 PROCEDURE — 70551 MRI BRAIN STEM W/O DYE: CPT | Mod: 26

## 2022-04-02 PROCEDURE — 99233 SBSQ HOSP IP/OBS HIGH 50: CPT

## 2022-04-02 RX ORDER — INSULIN GLARGINE 100 [IU]/ML
24 INJECTION, SOLUTION SUBCUTANEOUS AT BEDTIME
Refills: 0 | Status: DISCONTINUED | OUTPATIENT
Start: 2022-04-02 | End: 2022-04-08

## 2022-04-02 RX ADMIN — DONEPEZIL HYDROCHLORIDE 20 MILLIGRAM(S): 10 TABLET, FILM COATED ORAL at 17:18

## 2022-04-02 RX ADMIN — MEMANTINE HYDROCHLORIDE 10 MILLIGRAM(S): 10 TABLET ORAL at 17:18

## 2022-04-02 RX ADMIN — Medication 8 UNIT(S): at 13:05

## 2022-04-02 RX ADMIN — MEMANTINE HYDROCHLORIDE 10 MILLIGRAM(S): 10 TABLET ORAL at 06:11

## 2022-04-02 RX ADMIN — ATORVASTATIN CALCIUM 80 MILLIGRAM(S): 80 TABLET, FILM COATED ORAL at 21:59

## 2022-04-02 RX ADMIN — LISINOPRIL 40 MILLIGRAM(S): 2.5 TABLET ORAL at 06:11

## 2022-04-02 RX ADMIN — Medication 8 UNIT(S): at 17:29

## 2022-04-02 RX ADMIN — Medication 50 MILLIGRAM(S): at 06:11

## 2022-04-02 RX ADMIN — INSULIN GLARGINE 24 UNIT(S): 100 INJECTION, SOLUTION SUBCUTANEOUS at 21:59

## 2022-04-02 RX ADMIN — CLOPIDOGREL BISULFATE 75 MILLIGRAM(S): 75 TABLET, FILM COATED ORAL at 11:16

## 2022-04-02 NOTE — PROGRESS NOTE ADULT - SUBJECTIVE AND OBJECTIVE BOX
56 year old male with PMHx of dementia DM, HTN, CVA, COVID in Jan, sent in from [facility] for increased lethargy and verbal unresponsiveness. Pt's last known well time was at 12 pm. Pt is a poor historian and not answering any questions.At the time of presentation to the ED, NIHSS was 16. Code stroke was called. Shortly after, patient was awake and responsive but not answering any questions.   CTH, CTP, CTA Head & Neck with no significant change.Off note patient was recently admitted for similar symptoms, seen and evaluated in the ED by neurology, need  a  follow up to consider VEEG.   Today pt is awake, comfortable, has no complaints, demented.        Vital Signs Last 24 Hrs  T(C): 36.2 (02 Apr 2022 05:00), Max: 36.5 (01 Apr 2022 20:46)  T(F): 97.1 (02 Apr 2022 05:00), Max: 97.7 (01 Apr 2022 20:46)  HR: 66 (02 Apr 2022 05:00) (66 - 74)  BP: 142/66 (02 Apr 2022 05:00) (142/66 - 157/74)  BP(mean): --  RR: 18 (02 Apr 2022 05:00) (18 - 20)  SpO2: 95% (01 Apr 2022 20:46) (95% - 99%)    PHYSICAL EXAM:  GENERAL: NAD, demented, AAOx1   HEAD:  Atraumatic, Normocephalic  NECK: Supple, No JVD, Normal thyroid  NERVOUS SYSTEM:  Alert & Oriented X1, moving all extremities, forgetful   CHEST/LUNG: Clear to percussion bilaterally; No rales, rhonchi, wheezing, or rubs  HEART: Regular rate and rhythm; No murmurs, rubs, or gallops  ABDOMEN: Soft, Nontender, Nondistended; Bowel sounds present  EXTREMITIES:  2+ Peripheral Pulses, No clubbing, cyanosis, or edema  LYMPH: No lymphadenopathy noted  SKIN: No rashes or lesions      LABS:                                   15.1   8.76  )-----------( 297      ( 02 Apr 2022 04:30 )             44.8   04-02    148<H>  |  110  |  11  ----------------------------<  98  4.1   |  27  |  0.6<L>    Ca    8.9      02 Apr 2022 04:30  Mg     2.0     04-02    TPro  7.1  /  Alb  4.0  /  TBili  0.6  /  DBili  x   /  AST  18  /  ALT  18  /  AlkPhos  97  04-01       PTT - ( 30 Mar 2022 20:21 )  PTT:35.2 sec  Urinalysis Basic - ( 30 Mar 2022 22:32 )    Color: Light Yellow / Appearance: Clear / SG: >1.050 / pH: x  Gluc: x / Ketone: Negative  / Bili: Negative / Urobili: <2 mg/dL   Blood: x / Protein: Trace / Nitrite: Negative   Leuk Esterase: Negative / RBC: x / WBC x   Sq Epi: x / Non Sq Epi: x / Bacteria: x        Culture - Blood (collected 30 Mar 2022 20:18)  Source: .Blood Blood-Peripheral  Preliminary Report (01 Apr 2022 02:00):    No growth to date.    Culture - Blood (collected 30 Mar 2022 20:18)  Source: .Blood Blood-Peripheral  Preliminary Report (01 Apr 2022 02:00):    No growth to date.      RADIOLOGY & ADDITIONAL TESTS:    < from: CT Angio Head w/ IV Cont (03.30.22 @ 19:55) >  IMPRESSION:    CT PERFUSION:    Perfusion evidence of 65 cc core infarct, most prominent in the region of   the right parieto-occipital lobe as well as left medial cerebellum.    Mismatch volume: 65 cc  Mismatch ratio: 65    CTA HEAD/NECK:    There is a short segment chronic occlusion in the proximal right P1   (602/107), unchanged from previous CT scan of 6/27/2021 (3/389; 602/113)   and MRI of 6/30/2021 (6/11). There is reconstitution of the distal right   PCA from a small right posterior communicating artery.  _____________  NASCET criteria for internal carotid artery stenosis:  Mild: 0% to 49%  Moderate: 50% to 69%  Severe: 70% to 99%  Complete Occlusion    < end of copied text >  < from: Transesophageal Echocardiogram (07.01.21 @ 14:55) >  Summary:   1. Left ventricular ejection fraction, by visual estimation, is 60 to 65%.   2. Normal global left ventricular systolic function.   3. Trace mitral valve regurgitation.   4. Color flow doppler and intravenous injection of agitated saline demonstrates the presence of an intact intra atrial septum.   5. No left atrial appendage thrombus and normal left atrial appendage velocities.      MEDICATIONS  (STANDING):  atorvastatin 80 milliGRAM(s) Oral at bedtime  clopidogrel Tablet 75 milliGRAM(s) Oral daily  dextrose 5%. 1000 milliLiter(s) (100 mL/Hr) IV Continuous <Continuous>  dextrose 5%. 1000 milliLiter(s) (50 mL/Hr) IV Continuous <Continuous>  dextrose 5%. 1000 milliLiter(s) (60 mL/Hr) IV Continuous <Continuous>  dextrose 50% Injectable 25 Gram(s) IV Push once  dextrose 50% Injectable 12.5 Gram(s) IV Push once  dextrose 50% Injectable 25 Gram(s) IV Push once  donepezil 20 milliGRAM(s) Oral <User Schedule>  glucagon  Injectable 1 milliGRAM(s) IntraMuscular once  insulin glargine Injectable (LANTUS) 24 Unit(s) SubCutaneous at bedtime  insulin lispro (ADMELOG) corrective regimen sliding scale   SubCutaneous three times a day before meals  insulin lispro Injectable (ADMELOG) 8 Unit(s) SubCutaneous three times a day before meals  lisinopril 40 milliGRAM(s) Oral daily  memantine 10 milliGRAM(s) Oral two times a day  metoprolol succinate ER 50 milliGRAM(s) Oral daily    MEDICATIONS  (PRN):  dextrose Oral Gel 15 Gram(s) Oral once PRN Blood Glucose LESS THAN 70 milliGRAM(s)/deciliter

## 2022-04-02 NOTE — PROGRESS NOTE ADULT - ASSESSMENT
56 year old male with PMH  of DM, HTN, CVA, COVID in Jan, sent in from [facility] residence for unresponsiveness.    A/P   # AMS/ vascular dementia/ h/o CVA  - neurology follow up to consider VEEG, mental status is back to baseline now, but at times pt is altered  ( might be postictal)   - supportive care, aspiration and fall precautions   - NIHSS at presentation 16, stroke code called  - CTH, CTP, CTA Head & Neck unchanged  - NIHSS shortly after was 1  - Picture suspicious of subclinical seizures with post-ictal state  -  REEG was negative for epileptiform activity   - F/U RPR, VDRL, TSH, HIV  - keep Mg above 2.0   - c/w Plavix and atorvastatin   - on Donepezil and Namenda     # Hypovolemic hypernatremia  - on D5W at 100 ml/h   - monitor Na level     #HTN  - DASH diet   - on BB and Lisinopril     #DM type II   - carb consistent diet   - monitor finger stick   - on Insulin   - check Hb A1C     #Dyslipidemia  -statin    Diet: DASH  Activity: IAT  DVT Prophylaxis: lovenox  GI Prophylaxis: pantoprazole  CHG Order  Code Status: Full    #Progress Note Handoff  Pending (specify): c/w IV hydration, monitor Na level,  neurology follow up to consider VEEG, supportive care, PT/rehab , anticipate discharge on Monday   Family discussion: I spoke with pt, he has a very limited insight   Disposition: Home___/SNF___/Other_____x ___/Unknown at this time________

## 2022-04-02 NOTE — PROGRESS NOTE ADULT - ASSESSMENT
Impression:  56 year old male with PMHx of dementia DM, HTN, CVA, COVID in Jan, sent in from [facility] residence for increased lethargy and verbal unresponsiveness. Last known well 1200 as per the facility he resides. Recent similar presentation with UTI. CTA pending. Workup for encephalopathy. Now more awake but still not knowing where he is. EEG with gen and focal slowing.     Suggestion:  Follow up MRI brain without teresa  Can get Video EEG.   Keep magnesium >2  Seizure precautions.

## 2022-04-02 NOTE — PROGRESS NOTE ADULT - SUBJECTIVE AND OBJECTIVE BOX
Neurology Progress Note    Interval History:    56 year old male with PMHx of dementia DM, HTN, CVA, COVID in Jan, sent in from [facility] residence for increased lethargy and verbal unresponsiveness. Pt's last known well time was at 12 pm. Pt is a poor historian and not answering any questions.  At the time of presentation to the ED, NIHSS was 16. Code stroke was called. Shortly after, patient was awake and responsive but not answering any questions.. very poor historian.    CTH, CTP, CTA Head & Neck with no significant change.  Off note patient was recently admitted for similar symptoms.  Seen and evaluated in the ED by neurology.  Admit for further workup and management. (31 Mar 2022 00:01)        Vital Signs Last 24 Hrs  T(C): 36.2 (02 Apr 2022 05:00), Max: 36.5 (01 Apr 2022 20:46)  T(F): 97.1 (02 Apr 2022 05:00), Max: 97.7 (01 Apr 2022 20:46)  HR: 66 (02 Apr 2022 05:00) (66 - 70)  BP: 142/66 (02 Apr 2022 05:00) (142/66 - 157/74)  BP(mean): --  RR: 18 (02 Apr 2022 05:00) (18 - 19)  SpO2: 95% (01 Apr 2022 20:46) (95% - 95%)    Neurological Exam:   Mental status: Awake, lethargic and oriented to self not place.    Attention/concentration intact.  No dysarthria, no aphasia.  Fund of knowledge appropriate.    Cranial nerves: Pupils equally round and reactive to light, visual fields full, no nystagmus, extraocular muscles intact, V1 through V3 intact bilaterally and symmetric, face symmetric, hearing intact to finger rub, palate elevation symmetric, tongue was midline.  Motor:  MRC grading 5/5 b/l UE/LE.   strength 5/5.  Normal tone and bulk.  No abnormal movements.    Sensation: Intact to light touch, proprioception, and pinprick.   Coordination: No dysmetria on finger-to-nose and heel-to-shin.  No dysdiadokinesia.      Medications:  MEDICATIONS  (STANDING):  atorvastatin 80 milliGRAM(s) Oral at bedtime  clopidogrel Tablet 75 milliGRAM(s) Oral daily  dextrose 5%. 1000 milliLiter(s) (100 mL/Hr) IV Continuous <Continuous>  dextrose 5%. 1000 milliLiter(s) (50 mL/Hr) IV Continuous <Continuous>  dextrose 5%. 1000 milliLiter(s) (60 mL/Hr) IV Continuous <Continuous>  dextrose 50% Injectable 25 Gram(s) IV Push once  dextrose 50% Injectable 12.5 Gram(s) IV Push once  dextrose 50% Injectable 25 Gram(s) IV Push once  donepezil 20 milliGRAM(s) Oral <User Schedule>  glucagon  Injectable 1 milliGRAM(s) IntraMuscular once  insulin glargine Injectable (LANTUS) 24 Unit(s) SubCutaneous at bedtime  insulin lispro (ADMELOG) corrective regimen sliding scale   SubCutaneous three times a day before meals  insulin lispro Injectable (ADMELOG) 8 Unit(s) SubCutaneous three times a day before meals  lisinopril 40 milliGRAM(s) Oral daily  memantine 10 milliGRAM(s) Oral two times a day  metoprolol succinate ER 50 milliGRAM(s) Oral daily      Labs:  CBC Full  -  ( 02 Apr 2022 04:30 )  WBC Count : 8.76 K/uL  RBC Count : 5.09 M/uL  Hemoglobin : 15.1 g/dL  Hematocrit : 44.8 %  Platelet Count - Automated : 297 K/uL  Mean Cell Volume : 88.0 fL  Mean Cell Hemoglobin : 29.7 pg  Mean Cell Hemoglobin Concentration : 33.7 g/dL  Auto Neutrophil # : 5.71 K/uL  Auto Lymphocyte # : 2.02 K/uL  Auto Monocyte # : 0.68 K/uL  Auto Eosinophil # : 0.25 K/uL  Auto Basophil # : 0.08 K/uL  Auto Neutrophil % : 65.1 %  Auto Lymphocyte % : 23.1 %  Auto Monocyte % : 7.8 %  Auto Eosinophil % : 2.9 %  Auto Basophil % : 0.9 %    04-02    148<H>  |  110  |  11  ----------------------------<  98  4.1   |  27  |  0.6<L>    Ca    8.9      02 Apr 2022 04:30  Mg     2.0     04-02    TPro  7.1  /  Alb  4.0  /  TBili  0.6  /  DBili  x   /  AST  18  /  ALT  18  /  AlkPhos  97  04-01    LIVER FUNCTIONS - ( 01 Apr 2022 07:00 )  Alb: 4.0 g/dL / Pro: 7.1 g/dL / ALK PHOS: 97 U/L / ALT: 18 U/L / AST: 18 U/L / GGT: x           < from: CT Angio Head w/ IV Cont (03.30.22 @ 19:55) >    IMPRESSION:    CT PERFUSION:    Perfusion evidence of 65 cc core infarct, most prominent in the region of   the right parieto-occipital lobe as well as left medial cerebellum.    Mismatch volume: 65 cc  Mismatch ratio: 65    CTA HEAD/NECK:    There is a short segment chronic occlusion in the proximal right P1   (602/107), unchanged from previous CT scan of 6/27/2021 (3/389; 602/113)   and MRI of 6/30/2021 (6/11). There is reconstitution of the distal right   PCA from a small right posterior communicating artery.  _____________  NASCET criteria for internal carotid artery stenosis:  Mild: 0% to 49%  Moderate: 50% to 69%  Severe: 70% to 99%  Complete Occlusion    --- End of Report ---    < end of copied text >  < from: EEG (03.31.22 @ 13:00) >  State  Awake and Drowsy    Symmetry  Symmetric  -    Organization  Less than optimally organized    PDR  Continuous  Background: excessive low amplitude theta and a PDR reaching 7-8 hz    Generalized Slowing  Yes  borderline - mild    Focal Slowing  Yes  Left  mild  -  anterior quadrant, Parasagital  -  Presented as rhythmic theta    Breach Artifact: No  -      Activation Procedure  Hyper Ventilation  No  -  -    Photic Stimulation  No  -  -    Epileptiform Activity  No    Frequency:  -  -    Side:  -    Type:  -  -    Area of Activity:  -    Events:  -  -  -    Impression:  -  focal slowing as above, generalized slowing as above  -    Clinical Correlation & Recommendations    < end of copied text >

## 2022-04-03 LAB
GLUCOSE BLDC GLUCOMTR-MCNC: 127 MG/DL — HIGH (ref 70–99)
GLUCOSE BLDC GLUCOMTR-MCNC: 147 MG/DL — HIGH (ref 70–99)
GLUCOSE BLDC GLUCOMTR-MCNC: 163 MG/DL — HIGH (ref 70–99)
GLUCOSE BLDC GLUCOMTR-MCNC: 173 MG/DL — HIGH (ref 70–99)
GLUCOSE BLDC GLUCOMTR-MCNC: 97 MG/DL — SIGNIFICANT CHANGE UP (ref 70–99)
SARS-COV-2 RNA SPEC QL NAA+PROBE: SIGNIFICANT CHANGE UP

## 2022-04-03 PROCEDURE — 99232 SBSQ HOSP IP/OBS MODERATE 35: CPT

## 2022-04-03 RX ADMIN — CLOPIDOGREL BISULFATE 75 MILLIGRAM(S): 75 TABLET, FILM COATED ORAL at 11:07

## 2022-04-03 RX ADMIN — Medication 8 UNIT(S): at 12:30

## 2022-04-03 RX ADMIN — Medication 8 UNIT(S): at 07:48

## 2022-04-03 RX ADMIN — Medication 8 UNIT(S): at 16:56

## 2022-04-03 RX ADMIN — ATORVASTATIN CALCIUM 80 MILLIGRAM(S): 80 TABLET, FILM COATED ORAL at 21:38

## 2022-04-03 RX ADMIN — Medication 2: at 16:57

## 2022-04-03 RX ADMIN — MEMANTINE HYDROCHLORIDE 10 MILLIGRAM(S): 10 TABLET ORAL at 06:04

## 2022-04-03 RX ADMIN — Medication 50 MILLIGRAM(S): at 06:04

## 2022-04-03 RX ADMIN — MEMANTINE HYDROCHLORIDE 10 MILLIGRAM(S): 10 TABLET ORAL at 17:05

## 2022-04-03 RX ADMIN — DONEPEZIL HYDROCHLORIDE 20 MILLIGRAM(S): 10 TABLET, FILM COATED ORAL at 16:58

## 2022-04-03 RX ADMIN — LISINOPRIL 40 MILLIGRAM(S): 2.5 TABLET ORAL at 06:04

## 2022-04-03 RX ADMIN — INSULIN GLARGINE 24 UNIT(S): 100 INJECTION, SOLUTION SUBCUTANEOUS at 21:38

## 2022-04-03 NOTE — PROGRESS NOTE ADULT - SUBJECTIVE AND OBJECTIVE BOX
56 year old male with PMHx of dementia DM, HTN, CVA, COVID in Jan, sent in from [facility] residence for increased lethargy and verbal unresponsiveness. Pt's last known well time was at 12 pm. Pt is a poor historian and not answering any questions.At the time of presentation to the ED, NIHSS was 16. Code stroke was called. Shortly after, patient was awake and responsive but not answering any questions.   CTH, CTP, CTA Head & Neck with no significant change.Off note patient was recently admitted for similar symptoms, seen and evaluated in the ED by neurology, start VEEG, get brain MRI w/o contrast.   Today pt is awake, denies any complaints, minimally verbal, demented.       Vital Signs Last 24 Hrs  T(C): 36.2 (03 Apr 2022 05:00), Max: 36.8 (02 Apr 2022 13:00)  T(F): 97.2 (03 Apr 2022 05:00), Max: 98.3 (02 Apr 2022 13:00)  HR: 70 (03 Apr 2022 05:00) (54 - 72)  BP: 139/64 (03 Apr 2022 05:00) (138/80 - 180/89)  BP(mean): --  RR: 18 (03 Apr 2022 05:00) (18 - 20)  SpO2: 95% (02 Apr 2022 22:11) (95% - 98%)    PHYSICAL EXAM:  GENERAL: NAD, demented, AAOx1   HEAD:  Atraumatic, Normocephalic  NECK: Supple, No JVD, Normal thyroid  NERVOUS SYSTEM:  Alert & Oriented X1, moving all extremities, forgetful   CHEST/LUNG: Clear to percussion bilaterally; No rales, rhonchi, wheezing, or rubs  HEART: Regular rate and rhythm; No murmurs, rubs, or gallops  ABDOMEN: Soft, Nontender, Nondistended; Bowel sounds present  EXTREMITIES:  2+ Peripheral Pulses, No clubbing, cyanosis, or edema  LYMPH: No lymphadenopathy noted  SKIN: No rashes or lesions      LABS:                                   15.1   8.76  )-----------( 297      ( 02 Apr 2022 04:30 )             44.8   04-02    148<H>  |  110  |  11  ----------------------------<  98  4.1   |  27  |  0.6<L>    Ca    8.9      02 Apr 2022 04:30  Mg     2.0     04-02           PTT - ( 30 Mar 2022 20:21 )  PTT:35.2 sec  Urinalysis Basic - ( 30 Mar 2022 22:32 )    Color: Light Yellow / Appearance: Clear / SG: >1.050 / pH: x  Gluc: x / Ketone: Negative  / Bili: Negative / Urobili: <2 mg/dL   Blood: x / Protein: Trace / Nitrite: Negative   Leuk Esterase: Negative / RBC: x / WBC x   Sq Epi: x / Non Sq Epi: x / Bacteria: x        Culture - Blood (collected 30 Mar 2022 20:18)  Source: .Blood Blood-Peripheral  Preliminary Report (01 Apr 2022 02:00):    No growth to date.    Culture - Blood (collected 30 Mar 2022 20:18)  Source: .Blood Blood-Peripheral  Preliminary Report (01 Apr 2022 02:00):    No growth to date.      RADIOLOGY & ADDITIONAL TESTS:    < from: CT Angio Head w/ IV Cont (03.30.22 @ 19:55) >  IMPRESSION:    CT PERFUSION:    Perfusion evidence of 65 cc core infarct, most prominent in the region of   the right parieto-occipital lobe as well as left medial cerebellum.    Mismatch volume: 65 cc  Mismatch ratio: 65    CTA HEAD/NECK:    There is a short segment chronic occlusion in the proximal right P1   (602/107), unchanged from previous CT scan of 6/27/2021 (3/389; 602/113)   and MRI of 6/30/2021 (6/11). There is reconstitution of the distal right   PCA from a small right posterior communicating artery.  _____________  NASCET criteria for internal carotid artery stenosis:  Mild: 0% to 49%  Moderate: 50% to 69%  Severe: 70% to 99%  Complete Occlusion    < end of copied text >  < from: Transesophageal Echocardiogram (07.01.21 @ 14:55) >  Summary:   1. Left ventricular ejection fraction, by visual estimation, is 60 to 65%.   2. Normal global left ventricular systolic function.   3. Trace mitral valve regurgitation.   4. Color flow doppler and intravenous injection of agitated saline demonstrates the presence of an intact intra atrial septum.   5. No left atrial appendage thrombus and normal left atrial appendage velocities.      MEDICATIONS  (STANDING):  atorvastatin 80 milliGRAM(s) Oral at bedtime  clopidogrel Tablet 75 milliGRAM(s) Oral daily  dextrose 5%. 1000 milliLiter(s) (100 mL/Hr) IV Continuous <Continuous>  dextrose 5%. 1000 milliLiter(s) (50 mL/Hr) IV Continuous <Continuous>  dextrose 50% Injectable 25 Gram(s) IV Push once  dextrose 50% Injectable 12.5 Gram(s) IV Push once  dextrose 50% Injectable 25 Gram(s) IV Push once  donepezil 20 milliGRAM(s) Oral <User Schedule>  glucagon  Injectable 1 milliGRAM(s) IntraMuscular once  insulin glargine Injectable (LANTUS) 24 Unit(s) SubCutaneous at bedtime  insulin lispro (ADMELOG) corrective regimen sliding scale   SubCutaneous three times a day before meals  insulin lispro Injectable (ADMELOG) 8 Unit(s) SubCutaneous three times a day before meals  lisinopril 40 milliGRAM(s) Oral daily  memantine 10 milliGRAM(s) Oral two times a day  metoprolol succinate ER 50 milliGRAM(s) Oral daily    MEDICATIONS  (PRN):  dextrose Oral Gel 15 Gram(s) Oral once PRN Blood Glucose LESS THAN 70 milliGRAM(s)/deciliter

## 2022-04-03 NOTE — PROGRESS NOTE ADULT - ASSESSMENT
56 year old male with PMH  of DM, HTN, CVA, COVID in Jan, sent in from [facility] residence for unresponsiveness.    A/P   # AMS/ vascular dementia/ h/o CVA  - neurology recommended  VEEG, and brain MRI   - supportive care, aspiration and fall precautions   - NIHSS at presentation 16, stroke code called  - CTH, CTP, CTA Head & Neck unchanged  - NIHSS shortly after was 1  - Picture suspicious of subclinical seizures with post-ictal state  -  REEG was negative for epileptiform activity   - F/U RPR, VDRL, TSH, HIV  - keep Mg above 2.0   - c/w Plavix and atorvastatin   - on Donepezil and Namenda     # Hypovolemic hypernatremia  - on D5W at 100 ml/h   - monitor Na level     #HTN  - DASH diet   - on BB and Lisinopril     #DM type II   - carb consistent diet   - monitor finger stick   - on Insulin   - check Hb A1C     #Dyslipidemia  -statin    Diet: DASH  Activity: IAT  DVT Prophylaxis: lovenox  GI Prophylaxis: pantoprazole  CHG Order  Code Status: Full    #Progress Note Handoff  Pending (specify): c/w IV hydration, monitor Na level,  start  VEEG, get a brain MRI   Family discussion: I spoke with pt, he has a very limited insight   Disposition: Home___/SNF___/Other_____x ___/Unknown at this time________

## 2022-04-03 NOTE — PROGRESS NOTE ADULT - SUBJECTIVE AND OBJECTIVE BOX
ALEA MORRIS MRN#: [id number]  CODE STATUS: FULL CODE     SUBJECTIVE   Chief complaint: ams    Currently admitted to medicine with the primary diagnosis of ALTERED MENTAL STATUS      Today is hospital day 4d,   INTERVAL HPI/OVERNIGHT EVENTS: No acute events overnight    This morning, he is laying in bed. Denies chest pain, shortness of breath, abdominal pain, nausea, vomiting or changes in bowel habits. He has no complaints today.     Urinating and stooling appropriately.    Present Today:           Cook Catheter (x)No/ ()Yes?   Indication:             Central Line (x)No/ ()Yes?  Indication:          IV Fluids (x)No/ ()Yes?  Indication:     OBJECTIVE  PAST MEDICAL & SURGICAL HISTORY  Diabetes    Hypertension    Pancreatitis    CVA (cerebral vascular accident)    No significant past surgical history      ALLERGIES:  No Known Allergies    HOME MEDICATIONS:  Home Medications:  Admelog 100 units/mL injectable solution: 8 unit(s) injectable 3 times a day (before meals) (15 Mar 2022 15:03)  atorvastatin 80 mg oral tablet: 1 tab(s) orally once a day (at bedtime) (15 Mar 2022 15:03)  clopidogrel 75 mg oral tablet: 1 tab(s) orally once a day (15 Mar 2022 15:03)  donepezil 10 mg oral tablet: 2 tab(s) orally once a day at 5PM (15 Mar 2022 15:03)  insulin glargine: 30 unit(s) subcutaneous once a day (at bedtime) (15 Mar 2022 15:03)  lisinopril 40 mg oral tablet: 1 tab(s) orally once a day (15 Mar 2022 15:03)  metoprolol succinate 50 mg oral tablet, extended release: 1 tab(s) orally once a day (15 Mar 2022 15:03)  Namenda 10 mg oral tablet: 1 tab(s) orally 2 times a day (15 Mar 2022 15:03)  Senna 8.6 mg oral tablet: orally once a day (15 Mar 2022 15:03)  Zetia 10 mg oral tablet: 1 tab(s) orally once a day (15 Mar 2022 15:03)    MEDICATIONS:  STANDING MEDICATIONS  MEDICATIONS  (STANDING):  atorvastatin 80 milliGRAM(s) Oral at bedtime  clopidogrel Tablet 75 milliGRAM(s) Oral daily  dextrose 5%. 1000 milliLiter(s) (100 mL/Hr) IV Continuous <Continuous>  dextrose 5%. 1000 milliLiter(s) (50 mL/Hr) IV Continuous <Continuous>  dextrose 50% Injectable 25 Gram(s) IV Push once  dextrose 50% Injectable 12.5 Gram(s) IV Push once  dextrose 50% Injectable 25 Gram(s) IV Push once  donepezil 20 milliGRAM(s) Oral <User Schedule>  glucagon  Injectable 1 milliGRAM(s) IntraMuscular once  insulin glargine Injectable (LANTUS) 24 Unit(s) SubCutaneous at bedtime  insulin lispro (ADMELOG) corrective regimen sliding scale   SubCutaneous three times a day before meals  insulin lispro Injectable (ADMELOG) 8 Unit(s) SubCutaneous three times a day before meals  lisinopril 40 milliGRAM(s) Oral daily  memantine 10 milliGRAM(s) Oral two times a day  metoprolol succinate ER 50 milliGRAM(s) Oral daily    PRN MEDICATIONS  MEDICATIONS  (PRN):  dextrose Oral Gel 15 Gram(s) Oral once PRN Blood Glucose LESS THAN 70 milliGRAM(s)/deciliter      VITAL SIGNS  T(F): 97.2 (04-03 @ 05:00), Max: 98.3 (04-02 @ 13:00)  HR: 70 (04-03 @ 05:00) (54 - 72)  BP: 139/64 (04-03 @ 05:00) (138/80 - 180/89)  RR: 18 (04-03 @ 05:00) (18 - 20)  SpO2: 95% (04-02 @ 22:11) (95% - 98%)    LABS:                        15.1   8.76  )-----------( 297      ( 02 Apr 2022 04:30 )             44.8     04-02    148<H>  |  110  |  11  ----------------------------<  98  4.1   |  27  |  0.6<L>    Ca    8.9      02 Apr 2022 04:30  Mg     2.0     04-02        RADIOLOGY:   Reviewed    PHYSICAL EXAM:  General: Comfortably laying on the hospital bed. NAD. AAOx2.  HEENT: Atraumatic. Normocephalic. EOMI. Conjunctiva and sclera clear.  Cardiac: Normal S1, S2. RRR. No murmurs, rubs, or gallops.  Pulm: CTA b/l no wheezes, rhonchi, or rales.  GI: Soft, nontender, nondistended. BSx4q  Ext: 2+ pulses. No edema, cyanosis.  Neuro: CN II-XII grossly intact  Skin: No lesions or rashes

## 2022-04-03 NOTE — PHYSICAL THERAPY INITIAL EVALUATION ADULT - SPECIFY REASON(S)
Pt. attempted to be seen for PT IE. Pt. currently on bedrest orders, RAFAT contacted to place activity orders. Will follow-up when activity orders are placed.

## 2022-04-03 NOTE — PROGRESS NOTE ADULT - ASSESSMENT
56 year old male with PMH  of DM, HTN, CVA, COVID in Jan, sent in from [facility] for unresponsiveness.    # AMS/ vascular dementia/ h/o CVA  - neurology follow up to consider VEEG, mental status is back to baseline now, but at times pt is altered  ( might be postictal)   - supportive care, aspiration and fall precautions   - NIHSS at presentation 16, stroke code called  - CTH, CTP, CTA Head & Neck unchanged  - NIHSS shortly after was 1  - Picture suspicious of subclinical seizures with post-ictal state  -  REEG was negative for epileptiform activity   - syphilis screen negative. TSH wnl.  - keep Mg above 2.0   - c/w Plavix and atorvastatin   - on Donepezil and Namenda  - MRI brain noncon pending  - check vEEG    # Hypovolemic hypernatremia  - was on D5W at 100 ml/h, off now  - monitor Na level - fu today's level may need to resume ivf    #HTN  - DASH diet   - on BB and Lisinopril     #DM type II   - carb consistent diet   - monitor finger stick   - on Insulin   - a1c 7.8%    #Dyslipidemia  -statin    Diet: DASH  Activity: IAT  DVT Prophylaxis: lovenox  GI Prophylaxis: pantoprazole  CHG Order  Code Status: Full    #Progress Note Handoff  Pending (specify): monitor Na level, supportive care, PT/rehab , anticipate discharge on Monday, MRI brain, vEEG

## 2022-04-03 NOTE — CHART NOTE - NSCHARTNOTEFT_GEN_A_CORE
Alerted by RN ~11:10 AM that pt was very lethargic, a change from his mental status this morning.    Pt known to have recurrent episodes during which he is minimally responsive. Pt seen and examined at bedside. Pupils reactive. Pt awake, follows some commands, but not alert or oriented.    Stroke code work-up completed 3/30. Pt being followed by neurology.    MRI head resulted this AM, no acute changes seen.    vEEG ordered as per neuro recs. Consent obtained. Updated wife. Pt seen again ~13:00, pt seen eating lunch seated on edge of bed, mental status now back at baseline.    Plan:  - f/u vEEG

## 2022-04-04 LAB
ALBUMIN SERPL ELPH-MCNC: 3.4 G/DL — LOW (ref 3.5–5.2)
ALP SERPL-CCNC: 87 U/L — SIGNIFICANT CHANGE UP (ref 30–115)
ALT FLD-CCNC: 24 U/L — SIGNIFICANT CHANGE UP (ref 0–41)
ANION GAP SERPL CALC-SCNC: 10 MMOL/L — SIGNIFICANT CHANGE UP (ref 7–14)
AST SERPL-CCNC: 17 U/L — SIGNIFICANT CHANGE UP (ref 0–41)
BASOPHILS # BLD AUTO: 0.05 K/UL — SIGNIFICANT CHANGE UP (ref 0–0.2)
BASOPHILS NFR BLD AUTO: 0.6 % — SIGNIFICANT CHANGE UP (ref 0–1)
BILIRUB SERPL-MCNC: 0.4 MG/DL — SIGNIFICANT CHANGE UP (ref 0.2–1.2)
BUN SERPL-MCNC: 16 MG/DL — SIGNIFICANT CHANGE UP (ref 10–20)
CALCIUM SERPL-MCNC: 9.1 MG/DL — SIGNIFICANT CHANGE UP (ref 8.5–10.1)
CHLORIDE SERPL-SCNC: 107 MMOL/L — SIGNIFICANT CHANGE UP (ref 98–110)
CO2 SERPL-SCNC: 26 MMOL/L — SIGNIFICANT CHANGE UP (ref 17–32)
CREAT SERPL-MCNC: 0.6 MG/DL — LOW (ref 0.7–1.5)
EGFR: 112 ML/MIN/1.73M2 — SIGNIFICANT CHANGE UP
EOSINOPHIL # BLD AUTO: 0.31 K/UL — SIGNIFICANT CHANGE UP (ref 0–0.7)
EOSINOPHIL NFR BLD AUTO: 3.8 % — SIGNIFICANT CHANGE UP (ref 0–8)
GLUCOSE BLDC GLUCOMTR-MCNC: 112 MG/DL — HIGH (ref 70–99)
GLUCOSE BLDC GLUCOMTR-MCNC: 116 MG/DL — HIGH (ref 70–99)
GLUCOSE BLDC GLUCOMTR-MCNC: 118 MG/DL — HIGH (ref 70–99)
GLUCOSE BLDC GLUCOMTR-MCNC: 118 MG/DL — HIGH (ref 70–99)
GLUCOSE BLDC GLUCOMTR-MCNC: 147 MG/DL — HIGH (ref 70–99)
GLUCOSE BLDC GLUCOMTR-MCNC: 165 MG/DL — HIGH (ref 70–99)
GLUCOSE SERPL-MCNC: 145 MG/DL — HIGH (ref 70–99)
HCT VFR BLD CALC: 43 % — SIGNIFICANT CHANGE UP (ref 42–52)
HGB BLD-MCNC: 14.3 G/DL — SIGNIFICANT CHANGE UP (ref 14–18)
IMM GRANULOCYTES NFR BLD AUTO: 0.4 % — HIGH (ref 0.1–0.3)
LYMPHOCYTES # BLD AUTO: 1.88 K/UL — SIGNIFICANT CHANGE UP (ref 1.2–3.4)
LYMPHOCYTES # BLD AUTO: 23.1 % — SIGNIFICANT CHANGE UP (ref 20.5–51.1)
MAGNESIUM SERPL-MCNC: 2.2 MG/DL — SIGNIFICANT CHANGE UP (ref 1.8–2.4)
MCHC RBC-ENTMCNC: 29.5 PG — SIGNIFICANT CHANGE UP (ref 27–31)
MCHC RBC-ENTMCNC: 33.3 G/DL — SIGNIFICANT CHANGE UP (ref 32–37)
MCV RBC AUTO: 88.7 FL — SIGNIFICANT CHANGE UP (ref 80–94)
MONOCYTES # BLD AUTO: 0.74 K/UL — HIGH (ref 0.1–0.6)
MONOCYTES NFR BLD AUTO: 9.1 % — SIGNIFICANT CHANGE UP (ref 1.7–9.3)
NEUTROPHILS # BLD AUTO: 5.13 K/UL — SIGNIFICANT CHANGE UP (ref 1.4–6.5)
NEUTROPHILS NFR BLD AUTO: 63 % — SIGNIFICANT CHANGE UP (ref 42.2–75.2)
NRBC # BLD: 0 /100 WBCS — SIGNIFICANT CHANGE UP (ref 0–0)
PLATELET # BLD AUTO: 287 K/UL — SIGNIFICANT CHANGE UP (ref 130–400)
POTASSIUM SERPL-MCNC: 4.1 MMOL/L — SIGNIFICANT CHANGE UP (ref 3.5–5)
POTASSIUM SERPL-SCNC: 4.1 MMOL/L — SIGNIFICANT CHANGE UP (ref 3.5–5)
PROT SERPL-MCNC: 6.3 G/DL — SIGNIFICANT CHANGE UP (ref 6–8)
RBC # BLD: 4.85 M/UL — SIGNIFICANT CHANGE UP (ref 4.7–6.1)
RBC # FLD: 13.6 % — SIGNIFICANT CHANGE UP (ref 11.5–14.5)
SODIUM SERPL-SCNC: 143 MMOL/L — SIGNIFICANT CHANGE UP (ref 135–146)
WBC # BLD: 8.14 K/UL — SIGNIFICANT CHANGE UP (ref 4.8–10.8)
WBC # FLD AUTO: 8.14 K/UL — SIGNIFICANT CHANGE UP (ref 4.8–10.8)

## 2022-04-04 PROCEDURE — 99232 SBSQ HOSP IP/OBS MODERATE 35: CPT

## 2022-04-04 PROCEDURE — 99291 CRITICAL CARE FIRST HOUR: CPT | Mod: 25

## 2022-04-04 RX ORDER — ENOXAPARIN SODIUM 100 MG/ML
40 INJECTION SUBCUTANEOUS EVERY 24 HOURS
Refills: 0 | Status: DISCONTINUED | OUTPATIENT
Start: 2022-04-04 | End: 2022-04-08

## 2022-04-04 RX ADMIN — CLOPIDOGREL BISULFATE 75 MILLIGRAM(S): 75 TABLET, FILM COATED ORAL at 11:19

## 2022-04-04 RX ADMIN — LISINOPRIL 40 MILLIGRAM(S): 2.5 TABLET ORAL at 05:09

## 2022-04-04 RX ADMIN — ATORVASTATIN CALCIUM 80 MILLIGRAM(S): 80 TABLET, FILM COATED ORAL at 22:03

## 2022-04-04 RX ADMIN — INSULIN GLARGINE 24 UNIT(S): 100 INJECTION, SOLUTION SUBCUTANEOUS at 22:03

## 2022-04-04 RX ADMIN — Medication 8 UNIT(S): at 16:19

## 2022-04-04 RX ADMIN — MEMANTINE HYDROCHLORIDE 10 MILLIGRAM(S): 10 TABLET ORAL at 05:09

## 2022-04-04 RX ADMIN — Medication 8 UNIT(S): at 07:45

## 2022-04-04 RX ADMIN — ENOXAPARIN SODIUM 40 MILLIGRAM(S): 100 INJECTION SUBCUTANEOUS at 12:47

## 2022-04-04 RX ADMIN — Medication 50 MILLIGRAM(S): at 05:09

## 2022-04-04 RX ADMIN — MEMANTINE HYDROCHLORIDE 10 MILLIGRAM(S): 10 TABLET ORAL at 17:19

## 2022-04-04 RX ADMIN — DONEPEZIL HYDROCHLORIDE 20 MILLIGRAM(S): 10 TABLET, FILM COATED ORAL at 17:19

## 2022-04-04 RX ADMIN — Medication 8 UNIT(S): at 11:19

## 2022-04-04 NOTE — PROGRESS NOTE ADULT - ASSESSMENT
56 year old male with PMH  of DM, HTN, CVA, COVID in Jan, sent in from [facility] residence for unresponsiveness.    A/P   # AMS/ vascular dementia/ h/o CVA  - neurology recommended  VEEG, reordered again today   - brain MRI is negative for acute pathology   - supportive care, aspiration and fall precautions   - NIHSS at presentation 16, stroke code called  - CTH, CTP, CTA Head & Neck unchanged  - NIHSS shortly after was 1  - Picture suspicious of subclinical seizures with post-ictal state  -  REEG was negative for epileptiform activity   - F/U RPR, VDRL, TSH, HIV  - keep Mg above 2.0   - c/w Plavix and atorvastatin   - on Donepezil and Namenda     # Hypovolemic hypernatremia  - on D5W at 100 ml/h   - monitor Na level     #HTN  - DASH diet   - on BB and Lisinopril     #DM type II   - carb consistent diet   - monitor finger stick   - on Insulin   - check Hb A1C     #Dyslipidemia  -statin    Diet: DASH  Activity: IAT  DVT Prophylaxis: lovenox  GI Prophylaxis: pantoprazole  CHG Order  Code Status: Full    #Progress Note Handoff  Pending (specify):  start  VEEG, neurology follow up   Family discussion: I spoke with pt, he has a very limited insight   Disposition: Home___/SNF___/Other_____x ___/Unknown at this time________

## 2022-04-04 NOTE — PROGRESS NOTE ADULT - SUBJECTIVE AND OBJECTIVE BOX
Hospital Day:  5d    Subjective: Patient is a 56y old  Male who presents with a chief complaint of ams (02 Apr 2022 12:22)      Pt seen and evaluated at bedside. Pt is AOx1 only to name. Pt stated that its september, 1989, summer, [place]   Complaints:  Over the night Events: removed IV    Past Medical Hx:   Diabetes    Hypertension    Pancreatitis    CVA (cerebral vascular accident)      Past Sx:  No significant past surgical history      Allergies:  No Known Allergies    Current Meds:   Standng Meds:  atorvastatin 80 milliGRAM(s) Oral at bedtime  clopidogrel Tablet 75 milliGRAM(s) Oral daily  dextrose 5%. 1000 milliLiter(s) (100 mL/Hr) IV Continuous <Continuous>  dextrose 5%. 1000 milliLiter(s) (50 mL/Hr) IV Continuous <Continuous>  dextrose 50% Injectable 25 Gram(s) IV Push once  dextrose 50% Injectable 12.5 Gram(s) IV Push once  dextrose 50% Injectable 25 Gram(s) IV Push once  donepezil 20 milliGRAM(s) Oral <User Schedule>  enoxaparin Injectable 40 milliGRAM(s) SubCutaneous every 24 hours  glucagon  Injectable 1 milliGRAM(s) IntraMuscular once  insulin glargine Injectable (LANTUS) 24 Unit(s) SubCutaneous at bedtime  insulin lispro (ADMELOG) corrective regimen sliding scale   SubCutaneous three times a day before meals  insulin lispro Injectable (ADMELOG) 8 Unit(s) SubCutaneous three times a day before meals  lisinopril 40 milliGRAM(s) Oral daily  memantine 10 milliGRAM(s) Oral two times a day  metoprolol succinate ER 50 milliGRAM(s) Oral daily    PRN Meds:  dextrose Oral Gel 15 Gram(s) Oral once PRN Blood Glucose LESS THAN 70 milliGRAM(s)/deciliter      Vital Signs:   T(F): 96.3 (04-04-22 @ 12:22), Max: 96.6 (04-03-22 @ 22:23)  HR: 74 (04-04-22 @ 12:22) (64 - 74)  BP: 182/82 (04-04-22 @ 12:22) (144/75 - 182/82)  RR: 18 (04-04-22 @ 12:22) (18 - 20)  SpO2: 97% (04-04-22 @ 05:00) (96% - 97%)    Physical Exam:   GENERAL: NAD, Resting in bed  HEENT: NCAT, PERRLA, EOMI, clear conjunctiva and sclera, nonreactive pupillary convergence   CHEST/LUNG: Clear to auscultation bilaterally; No wheezing or rubs.   HEART: Regular rate and rhythm; No murmurs, rubs, or gallops  ABDOMEN: Bowel sounds present; Soft, Nontender, Nondistended.   EXTREMITIES:  No clubbing, cyanosis, or edema  NERVOUS SYSTEM:  Alert & Oriented X1    FLUID BALANCE      Labs:                         14.3   8.14  )-----------( 287      ( 04 Apr 2022 06:35 )             43.0     Neutophil% 63.0, Lymphocyte% 23.1, Monocyte% 9.1, Bands% 0.4 04-04-22 @ 06:35    04 Apr 2022 06:35    143    |  107    |  16     ----------------------------<  145    4.1     |  26     |  0.6      Ca    9.1        04 Apr 2022 06:35  Mg     2.2       04 Apr 2022 06:35    TPro  6.3    /  Alb  3.4    /  TBili  0.4    /  DBili  x      /  AST  17     /  ALT  24     /  AlkPhos  87     04 Apr 2022 06:35                          Culture - Blood (collected 03-30-22 @ 20:18)  Source: .Blood Blood-Peripheral  Preliminary Report (04-01-22 @ 02:00):    No growth to date.    Culture - Blood (collected 03-30-22 @ 20:18)  Source: .Blood Blood-Peripheral  Preliminary Report (04-01-22 @ 02:00):    No growth to date.                  Radiology:

## 2022-04-04 NOTE — PROGRESS NOTE ADULT - SUBJECTIVE AND OBJECTIVE BOX
56 year old male with PMHx of dementia DM, HTN, CVA, COVID in Jan, sent in from [facility] for increased lethargy and verbal unresponsiveness. Pt's last known well time was at 12 pm. Pt is a poor historian and not answering any questions.At the time of presentation to the ED, NIHSS was 16. Code stroke was called. Shortly after, patient was awake and responsive but not answering any questions.   CTH, CTP, CTA Head & Neck with no significant change.Off note patient was recently admitted for similar symptoms, seen and evaluated in the ED by neurology, start VEEG,t brain MRI noted.   Today pt is awake, answering questions, mental status is at baseline.       Vital Signs Last 24 Hrs  T(C): 35.8 (04 Apr 2022 05:00), Max: 35.9 (03 Apr 2022 22:23)  T(F): 96.5 (04 Apr 2022 05:00), Max: 96.6 (03 Apr 2022 22:23)  HR: 71 (04 Apr 2022 05:00) (64 - 71)  BP: 144/75 (04 Apr 2022 05:00) (144/75 - 166/73)  BP(mean): --  RR: 20 (04 Apr 2022 05:00) (18 - 20)  SpO2: 97% (04 Apr 2022 05:00) (96% - 98%)    PHYSICAL EXAM:  GENERAL: NAD, demented, AAOx1   HEAD:  Atraumatic, Normocephalic  NECK: Supple, No JVD, Normal thyroid  NERVOUS SYSTEM:  Alert & Oriented X1, moving all extremities, forgetful   CHEST/LUNG: Clear to percussion bilaterally; No rales, rhonchi, wheezing, or rubs  HEART: Regular rate and rhythm; No murmurs, rubs, or gallops  ABDOMEN: Soft, Nontender, Nondistended; Bowel sounds present  EXTREMITIES:  2+ Peripheral Pulses, No clubbing, cyanosis, or edema  LYMPH: No lymphadenopathy noted  SKIN: No rashes or lesions      LABS:                                   14.3   8.14  )-----------( 287      ( 04 Apr 2022 06:35 )             43.0   04-04    143  |  107  |  16  ----------------------------<  145<H>  4.1   |  26  |  0.6<L>    Ca    9.1      04 Apr 2022 06:35  Mg     2.2     04-04    TPro  6.3  /  Alb  3.4<L>  /  TBili  0.4  /  DBili  x   /  AST  17  /  ALT  24  /  AlkPhos  87  04-04         PTT - ( 30 Mar 2022 20:21 )  PTT:35.2 sec  Urinalysis Basic - ( 30 Mar 2022 22:32 )    Color: Light Yellow / Appearance: Clear / SG: >1.050 / pH: x  Gluc: x / Ketone: Negative  / Bili: Negative / Urobili: <2 mg/dL   Blood: x / Protein: Trace / Nitrite: Negative   Leuk Esterase: Negative / RBC: x / WBC x   Sq Epi: x / Non Sq Epi: x / Bacteria: x        Culture - Blood (collected 30 Mar 2022 20:18)  Source: .Blood Blood-Peripheral  Preliminary Report (01 Apr 2022 02:00):    No growth to date.    Culture - Blood (collected 30 Mar 2022 20:18)  Source: .Blood Blood-Peripheral  Preliminary Report (01 Apr 2022 02:00):    No growth to date.      RADIOLOGY & ADDITIONAL TESTS:    < from: CT Angio Head w/ IV Cont (03.30.22 @ 19:55) >  IMPRESSION:    CT PERFUSION:    Perfusion evidence of 65 cc core infarct, most prominent in the region of   the right parieto-occipital lobe as well as left medial cerebellum.    Mismatch volume: 65 cc  Mismatch ratio: 65    CTA HEAD/NECK:    There is a short segment chronic occlusion in the proximal right P1   (602/107), unchanged from previous CT scan of 6/27/2021 (3/389; 602/113)   and MRI of 6/30/2021 (6/11). There is reconstitution of the distal right   PCA from a small right posterior communicating artery.  _____________  NASCET criteria for internal carotid artery stenosis:  Mild: 0% to 49%  Moderate: 50% to 69%  Severe: 70% to 99%  Complete Occlusion    < end of copied text >  < from: Transesophageal Echocardiogram (07.01.21 @ 14:55) >  Summary:   1. Left ventricular ejection fraction, by visual estimation, is 60 to 65%.   2. Normal global left ventricular systolic function.   3. Trace mitral valve regurgitation.   4. Color flow doppler and intravenous injection of agitated saline demonstrates the presence of an intact intra atrial septum.   5. No left atrial appendage thrombus and normal left atrial appendage velocities.    < from: MR Head No Cont (04.02.22 @ 16:19) >    IMPRESSION:  There is no evidence of acute intracranial pathology. No evidence of   acute infarct, intracranial hemorrhage or mass effect.    Moderate chronic microvascular type changes as well as a chronic right   PCA territory and left thalamic lacunar infarct..    < end of copied text >      MEDICATIONS  (STANDING):  atorvastatin 80 milliGRAM(s) Oral at bedtime  clopidogrel Tablet 75 milliGRAM(s) Oral daily  dextrose 5%. 1000 milliLiter(s) (50 mL/Hr) IV Continuous <Continuous>  dextrose 5%. 1000 milliLiter(s) (100 mL/Hr) IV Continuous <Continuous>  dextrose 50% Injectable 12.5 Gram(s) IV Push once  dextrose 50% Injectable 25 Gram(s) IV Push once  dextrose 50% Injectable 25 Gram(s) IV Push once  donepezil 20 milliGRAM(s) Oral <User Schedule>  enoxaparin Injectable 40 milliGRAM(s) SubCutaneous every 24 hours  glucagon  Injectable 1 milliGRAM(s) IntraMuscular once  insulin glargine Injectable (LANTUS) 24 Unit(s) SubCutaneous at bedtime  insulin lispro (ADMELOG) corrective regimen sliding scale   SubCutaneous three times a day before meals  insulin lispro Injectable (ADMELOG) 8 Unit(s) SubCutaneous three times a day before meals  lisinopril 40 milliGRAM(s) Oral daily  memantine 10 milliGRAM(s) Oral two times a day  metoprolol succinate ER 50 milliGRAM(s) Oral daily    MEDICATIONS  (PRN):  dextrose Oral Gel 15 Gram(s) Oral once PRN Blood Glucose LESS THAN 70 milliGRAM(s)/deciliter

## 2022-04-04 NOTE — PHYSICAL THERAPY INITIAL EVALUATION ADULT - ADDITIONAL COMMENTS
As per pt report he has a walk-in shower, + grab bars. As per chart pt is a poor historian. As per pt report he has a walk-in shower, + grab bars. As per chart pt is a poor historian. As per chart pt is from [facility].

## 2022-04-04 NOTE — PHYSICAL THERAPY INITIAL EVALUATION ADULT - LEVEL OF INDEPENDENCE: STAIR NEGOTIATION, REHAB EVAL
mod indep with ascend using step over pattern using R hand on HR . Descending step to pattern using L HR/supervision

## 2022-04-04 NOTE — PROGRESS NOTE ADULT - ASSESSMENT
56 year old male with PMH  of DM, HTN, CVA, COVID in Jan, sent in from [facility] residence for unresponsiveness.    # AMS/ ??vascular dementia/   #h/o CVA  - s/p Code Stroke 03/30   - NIHSS at presentation 16 >>> NIHSS shortly after was 1  - Neuro onboard  - CTH, CTP, CTA Head & Neck unchanged  - REEG was negative for epileptiform activity   - supportive care, aspiration and fall precautions   - c/w Plavix and atorvastatin   - on Donepezil and Namenda (unsure benefit)  - MRI - chronic microvascular changes. Chronic R PCA territory and L Thalamic Lacunar Infarct  - Thyroid Stimulating Hormone, Serum: 0.72 uIU/mL (03.31.22 @ 04:30)  - Treponema Pallidum Antibody Interpretation: Negative:   - vEEG pending  - f/u T4, B12, B9, HIV    # Hypovolemic hypernatremia - resolved - monitor Na level     #HTN  - DASH diet   - Lisinopril 40mg Qd and Metoprolol Succ 50mg QD    #DM type II  - A1C with Estimated Average Glucose Result: 7.8:   - carb consistent diet   - Lantus 24U HS , Lispro 8U AC, ss  - FS ACHS    #Dyslipidemia - Atorvastatin 80mg HS    Diet: DASH/CC  Activity: IAT  DVT Prophylaxis: lovenox  GI Prophylaxis: pantoprazole  Code Status: Full  Dispo: [place] after Neuro assessment

## 2022-04-04 NOTE — PHYSICAL THERAPY INITIAL EVALUATION ADULT - PERTINENT HX OF CURRENT PROBLEM, REHAB EVAL
56 year old male with PMHx of dementia DM, HTN, CVA, COVID in Jan, sent in from [facility] residence for increased lethargy and verbal unresponsiveness. Pt's last known well time was at 12 pm. Pt is a poor historian and not answering any questions.

## 2022-04-04 NOTE — PHYSICAL THERAPY INITIAL EVALUATION ADULT - LIVES WITH, PROFILE
As per pt reporte pt lives alone in , few steps to enter w/ HR, bedroom and bathroom on first floor.

## 2022-04-04 NOTE — CHART NOTE - NSCHARTNOTEFT_GEN_A_CORE
STROKE CODE CALLED ~23:15  Patient last seen well ambulating to bathroom w nurse 1hr prior  patient laying in bed stairing at ceiling, not responding to name, sternal rub  Pupils pinpoint and non reactive, patient not given any sedative medications   Vitals WNL ( /60s, HR 75, SpO2 94% on RA, Gluc 167)  general: patient in no apparent distress, starring at ceiling blinking, not responding to external stimulus  Cardio: rrr, no murmurs, 2+ radial and dorsalis pedis pulse  Pulm: BLCTA     Labs sent, CT ordered, patient brought down to CT by neuro    Neuro came to see patient, CT head ordered, verbally recommending vEEG    f/u CT scan  f/u CBC, cmp, Mg, lactate, troponin, coagulation studies, D-dimer    Allegra Jj (wife, [phone]) was called at 23:54, left message for her to [facility] ~23:15  Patient last seen well ambulating to bathroom w nurse 1hr prior  patient laying in bed stairing at ceiling, not responding to name, sternal rub  Pupils pinpoint and non reactive, patient not given any sedative medications   Vitals WNL ( /60s, HR 75, SpO2 94% on RA, Gluc 167)  general: patient in no apparent distress, starring at ceiling blinking, not responding to external stimulus  Cardio: rrr, no murmurs, 2+ radial and dorsalis pedis pulse  Pulm: BLCTA     Labs sent, CT ordered, patient brought down to CT by neuro    Neuro came to see patient, CT head ordered, verbally recommending vEEG    f/u CT scan  f/u CBC, cmp, Mg, lactate, troponin, coagulation studies, D-dimer    Allegra Jj (wife, [phone]) was called at 23:54, left message for her to call hospital back ASAP    Upon returning from CT patient's SBP was 186, as per neurology request, 5mg Labetalol push was given STROKE CODE CALLED ~23:15  Patient last seen well ambulating to bathroom w nurse 1hr prior  patient laying in bed stairing at ceiling, not responding to name, sternal rub  Pupils pinpoint and non reactive, patient not given any sedative medications   Vitals WNL ( /60s, HR 75, SpO2 94% on RA, Gluc 167)  general: patient in no apparent distress, starring at ceiling blinking, not responding to external stimulus  Cardio: rrr, no murmurs, 2+ radial and dorsalis pedis pulse  Pulm: BLCTA     Labs sent, CT ordered, patient brought down to CT by neuro    Neuro came to see patient, CT head ordered, verbally recommending vEEG    f/u CT scan  f/u CBC, cmp, Mg, lactate, troponin, coagulation studies, D-dimer    Allegra Jj (wife, [phone]) was called at 23:54, left message for her to call hospital back ASAP    Upon returning from CT patient's SBP was 186, as per neurology request, 5mg Labetalol push was given    CT head negative  Neurology recs: vEEG(contact neuro to discuss sedation if possible so patient doesn't remove EEG strips) , Keppra 1g loading dose then 500mg BID and psych consult STROKE CODE CALLED ~23:15  Patient last seen well ambulating to bathroom w nurse 1hr prior  patient laying in bed stairing at ceiling, not responding to name, sternal rub  Pupils pinpoint and non reactive, patient not given any sedative medications   Vitals WNL ( /60s, HR 75, SpO2 94% on RA, Gluc 167)  general: patient in no apparent distress, starring at ceiling blinking, not responding to external stimulus  Cardio: rrr, no murmurs, 2+ radial and dorsalis pedis pulse  Pulm: BLCTA     Labs sent, CT ordered, patient brought down to CT by neuro    Neuro came to see patient, CT head ordered, verbally recommending vEEG    f/u CT scan  f/u CBC, cmp, Mg, lactate, troponin, coagulation studies, D-dimer    Allegra Jj (wife, [phone]) was called at 23:54, left message for her to call hospital back ASAP    Upon returning from CT patient's SBP was 186, as per neurology request, 5mg Labetalol push was given    CT head negative  Neurology recs: vEEG(contact neuro to discuss sedation if possible so patient doesn't remove EEG strips) , Keppra 1g loading dose then 500mg BID and psych consult  Note: vEEG NOT ordered (all other orders/consults are in) STROKE CODE CALLED ~23:15  Patient last seen well ambulating to bathroom w nurse 1hr prior  patient laying in bed stairing at ceiling, not responding to name, sternal rub  Pupils pinpoint and non reactive, patient not given any sedative medications   Vitals WNL ( /60s, HR 75, SpO2 94% on RA, Gluc 167)  general: patient in no apparent distress, starring at ceiling blinking, not responding to external stimulus  Cardio: rrr, no murmurs, 2+ radial and dorsalis pedis pulse  Pulm: BLCTA     Labs sent, CT ordered, patient brought down to CT by neuro    Neuro came to see patient, CT head ordered, verbally recommending vEEG    f/u CT scan  f/u CBC, cmp, Mg, lactate, troponin, coagulation studies, D-dimer    Allegra Jj (wife, [phone]) was called at 23:54, left message for her to call hospital back ASAP    Upon returning from CT patient's SBP was 186, as per neurology request, 5mg Labetalol push was given    CT head negative  Neurology recs: vEEG(contact neuro to discuss sedation if possible so patient doesn't remove EEG strips) , Keppra 1g loading dose then 500mg BID and psych consult  Note: vEEG NOT ordered (all other orders/consults are in)        Attending attestation:  -RRT called on pt for unresponsiveness, CODE STROKE called prior to my arrival at bedside. Physical exam shows pt who is staring off, will retract to painful stimuli. PERRL. VSS, no hypoxia. .     Plan:  -CTA-head/neck   -CT perfusion and CTH  -FU Neuro consult   -Keppra  -EEG  -Send labs    Rest as per above resident note.         CCT

## 2022-04-05 DIAGNOSIS — F06.1 CATATONIC DISORDER DUE TO KNOWN PHYSIOLOGICAL CONDITION: ICD-10-CM

## 2022-04-05 DIAGNOSIS — F05 DELIRIUM DUE TO KNOWN PHYSIOLOGICAL CONDITION: ICD-10-CM

## 2022-04-05 LAB
ALBUMIN SERPL ELPH-MCNC: 3.4 G/DL — LOW (ref 3.5–5.2)
ALP SERPL-CCNC: 86 U/L — SIGNIFICANT CHANGE UP (ref 30–115)
ALT FLD-CCNC: 32 U/L — SIGNIFICANT CHANGE UP (ref 0–41)
ANION GAP SERPL CALC-SCNC: 11 MMOL/L — SIGNIFICANT CHANGE UP (ref 7–14)
ANION GAP SERPL CALC-SCNC: 13 MMOL/L — SIGNIFICANT CHANGE UP (ref 7–14)
APTT BLD: 36.9 SEC — SIGNIFICANT CHANGE UP (ref 27–39.2)
AST SERPL-CCNC: 31 U/L — SIGNIFICANT CHANGE UP (ref 0–41)
BILIRUB SERPL-MCNC: 0.3 MG/DL — SIGNIFICANT CHANGE UP (ref 0.2–1.2)
BLD GP AB SCN SERPL QL: SIGNIFICANT CHANGE UP
BUN SERPL-MCNC: 15 MG/DL — SIGNIFICANT CHANGE UP (ref 10–20)
BUN SERPL-MCNC: 17 MG/DL — SIGNIFICANT CHANGE UP (ref 10–20)
CALCIUM SERPL-MCNC: 8.7 MG/DL — SIGNIFICANT CHANGE UP (ref 8.5–10.1)
CALCIUM SERPL-MCNC: 8.8 MG/DL — SIGNIFICANT CHANGE UP (ref 8.5–10.1)
CHLORIDE SERPL-SCNC: 108 MMOL/L — SIGNIFICANT CHANGE UP (ref 98–110)
CHLORIDE SERPL-SCNC: 109 MMOL/L — SIGNIFICANT CHANGE UP (ref 98–110)
CO2 SERPL-SCNC: 22 MMOL/L — SIGNIFICANT CHANGE UP (ref 17–32)
CO2 SERPL-SCNC: 25 MMOL/L — SIGNIFICANT CHANGE UP (ref 17–32)
CREAT SERPL-MCNC: 0.6 MG/DL — LOW (ref 0.7–1.5)
CREAT SERPL-MCNC: 0.7 MG/DL — SIGNIFICANT CHANGE UP (ref 0.7–1.5)
CULTURE RESULTS: SIGNIFICANT CHANGE UP
CULTURE RESULTS: SIGNIFICANT CHANGE UP
EGFR: 107 ML/MIN/1.73M2 — SIGNIFICANT CHANGE UP
EGFR: 112 ML/MIN/1.73M2 — SIGNIFICANT CHANGE UP
FOLATE SERPL-MCNC: 3.4 NG/ML — LOW
GLUCOSE BLDC GLUCOMTR-MCNC: 100 MG/DL — HIGH (ref 70–99)
GLUCOSE BLDC GLUCOMTR-MCNC: 101 MG/DL — HIGH (ref 70–99)
GLUCOSE BLDC GLUCOMTR-MCNC: 105 MG/DL — HIGH (ref 70–99)
GLUCOSE BLDC GLUCOMTR-MCNC: 96 MG/DL — SIGNIFICANT CHANGE UP (ref 70–99)
GLUCOSE SERPL-MCNC: 118 MG/DL — HIGH (ref 70–99)
GLUCOSE SERPL-MCNC: 141 MG/DL — HIGH (ref 70–99)
HCT VFR BLD CALC: 40.4 % — LOW (ref 42–52)
HCT VFR BLD CALC: 41.8 % — LOW (ref 42–52)
HGB BLD-MCNC: 13.6 G/DL — LOW (ref 14–18)
HGB BLD-MCNC: 14 G/DL — SIGNIFICANT CHANGE UP (ref 14–18)
HIV 1+2 AB+HIV1 P24 AG SERPL QL IA: SIGNIFICANT CHANGE UP
INR BLD: 1.09 RATIO — SIGNIFICANT CHANGE UP (ref 0.65–1.3)
MAGNESIUM SERPL-MCNC: 1.9 MG/DL — SIGNIFICANT CHANGE UP (ref 1.8–2.4)
MAGNESIUM SERPL-MCNC: 1.9 MG/DL — SIGNIFICANT CHANGE UP (ref 1.8–2.4)
MCHC RBC-ENTMCNC: 29.5 PG — SIGNIFICANT CHANGE UP (ref 27–31)
MCHC RBC-ENTMCNC: 29.8 PG — SIGNIFICANT CHANGE UP (ref 27–31)
MCHC RBC-ENTMCNC: 33.5 G/DL — SIGNIFICANT CHANGE UP (ref 32–37)
MCHC RBC-ENTMCNC: 33.7 G/DL — SIGNIFICANT CHANGE UP (ref 32–37)
MCV RBC AUTO: 88.2 FL — SIGNIFICANT CHANGE UP (ref 80–94)
MCV RBC AUTO: 88.6 FL — SIGNIFICANT CHANGE UP (ref 80–94)
NRBC # BLD: 0 /100 WBCS — SIGNIFICANT CHANGE UP (ref 0–0)
NRBC # BLD: 0 /100 WBCS — SIGNIFICANT CHANGE UP (ref 0–0)
PHOSPHATE SERPL-MCNC: 3.7 MG/DL — SIGNIFICANT CHANGE UP (ref 2.1–4.9)
PLATELET # BLD AUTO: 283 K/UL — SIGNIFICANT CHANGE UP (ref 130–400)
PLATELET # BLD AUTO: 285 K/UL — SIGNIFICANT CHANGE UP (ref 130–400)
POTASSIUM SERPL-MCNC: 4 MMOL/L — SIGNIFICANT CHANGE UP (ref 3.5–5)
POTASSIUM SERPL-MCNC: 4.7 MMOL/L — SIGNIFICANT CHANGE UP (ref 3.5–5)
POTASSIUM SERPL-SCNC: 4 MMOL/L — SIGNIFICANT CHANGE UP (ref 3.5–5)
POTASSIUM SERPL-SCNC: 4.7 MMOL/L — SIGNIFICANT CHANGE UP (ref 3.5–5)
PROT SERPL-MCNC: 6.1 G/DL — SIGNIFICANT CHANGE UP (ref 6–8)
PROTHROM AB SERPL-ACNC: 12.5 SEC — SIGNIFICANT CHANGE UP (ref 9.95–12.87)
RBC # BLD: 4.56 M/UL — LOW (ref 4.7–6.1)
RBC # BLD: 4.74 M/UL — SIGNIFICANT CHANGE UP (ref 4.7–6.1)
RBC # FLD: 13.7 % — SIGNIFICANT CHANGE UP (ref 11.5–14.5)
RBC # FLD: 13.8 % — SIGNIFICANT CHANGE UP (ref 11.5–14.5)
SARS-COV-2 RNA SPEC QL NAA+PROBE: SIGNIFICANT CHANGE UP
SODIUM SERPL-SCNC: 144 MMOL/L — SIGNIFICANT CHANGE UP (ref 135–146)
SODIUM SERPL-SCNC: 144 MMOL/L — SIGNIFICANT CHANGE UP (ref 135–146)
SPECIMEN SOURCE: SIGNIFICANT CHANGE UP
SPECIMEN SOURCE: SIGNIFICANT CHANGE UP
T4 AB SER-ACNC: 5.3 UG/DL — SIGNIFICANT CHANGE UP (ref 4.6–12)
TROPONIN T SERPL-MCNC: <0.01 NG/ML — SIGNIFICANT CHANGE UP
VIT B12 SERPL-MCNC: 464 PG/ML — SIGNIFICANT CHANGE UP (ref 232–1245)
WBC # BLD: 6.98 K/UL — SIGNIFICANT CHANGE UP (ref 4.8–10.8)
WBC # BLD: 7.69 K/UL — SIGNIFICANT CHANGE UP (ref 4.8–10.8)
WBC # FLD AUTO: 6.98 K/UL — SIGNIFICANT CHANGE UP (ref 4.8–10.8)
WBC # FLD AUTO: 7.69 K/UL — SIGNIFICANT CHANGE UP (ref 4.8–10.8)

## 2022-04-05 PROCEDURE — 95813 EEG EXTND MNTR 61-119 MIN: CPT | Mod: 26

## 2022-04-05 PROCEDURE — 99221 1ST HOSP IP/OBS SF/LOW 40: CPT

## 2022-04-05 PROCEDURE — 95718 EEG PHYS/QHP 2-12 HR W/VEEG: CPT

## 2022-04-05 PROCEDURE — 99233 SBSQ HOSP IP/OBS HIGH 50: CPT

## 2022-04-05 PROCEDURE — 70498 CT ANGIOGRAPHY NECK: CPT | Mod: 26

## 2022-04-05 PROCEDURE — 0042T: CPT

## 2022-04-05 PROCEDURE — 99232 SBSQ HOSP IP/OBS MODERATE 35: CPT

## 2022-04-05 PROCEDURE — 70450 CT HEAD/BRAIN W/O DYE: CPT | Mod: 26,59

## 2022-04-05 PROCEDURE — 70496 CT ANGIOGRAPHY HEAD: CPT | Mod: 26

## 2022-04-05 RX ORDER — SERTRALINE 25 MG/1
50 TABLET, FILM COATED ORAL AT BEDTIME
Refills: 0 | Status: DISCONTINUED | OUTPATIENT
Start: 2022-04-05 | End: 2022-04-06

## 2022-04-05 RX ORDER — METOPROLOL TARTRATE 50 MG
50 TABLET ORAL ONCE
Refills: 0 | Status: COMPLETED | OUTPATIENT
Start: 2022-04-05 | End: 2022-04-05

## 2022-04-05 RX ORDER — LEVETIRACETAM 250 MG/1
1000 TABLET, FILM COATED ORAL ONCE
Refills: 0 | Status: COMPLETED | OUTPATIENT
Start: 2022-04-05 | End: 2022-04-05

## 2022-04-05 RX ORDER — LEVETIRACETAM 250 MG/1
500 TABLET, FILM COATED ORAL EVERY 12 HOURS
Refills: 0 | Status: DISCONTINUED | OUTPATIENT
Start: 2022-04-05 | End: 2022-04-07

## 2022-04-05 RX ORDER — LISINOPRIL 2.5 MG/1
40 TABLET ORAL ONCE
Refills: 0 | Status: COMPLETED | OUTPATIENT
Start: 2022-04-05 | End: 2022-04-05

## 2022-04-05 RX ORDER — LABETALOL HCL 100 MG
5 TABLET ORAL ONCE
Refills: 0 | Status: COMPLETED | OUTPATIENT
Start: 2022-04-05 | End: 2022-04-05

## 2022-04-05 RX ADMIN — INSULIN GLARGINE 24 UNIT(S): 100 INJECTION, SOLUTION SUBCUTANEOUS at 21:42

## 2022-04-05 RX ADMIN — CLOPIDOGREL BISULFATE 75 MILLIGRAM(S): 75 TABLET, FILM COATED ORAL at 11:14

## 2022-04-05 RX ADMIN — LEVETIRACETAM 400 MILLIGRAM(S): 250 TABLET, FILM COATED ORAL at 17:16

## 2022-04-05 RX ADMIN — ATORVASTATIN CALCIUM 80 MILLIGRAM(S): 80 TABLET, FILM COATED ORAL at 21:43

## 2022-04-05 RX ADMIN — LEVETIRACETAM 600 MILLIGRAM(S): 250 TABLET, FILM COATED ORAL at 01:28

## 2022-04-05 RX ADMIN — SERTRALINE 50 MILLIGRAM(S): 25 TABLET, FILM COATED ORAL at 21:42

## 2022-04-05 RX ADMIN — LISINOPRIL 40 MILLIGRAM(S): 2.5 TABLET ORAL at 11:52

## 2022-04-05 RX ADMIN — Medication 50 MILLIGRAM(S): at 11:52

## 2022-04-05 RX ADMIN — ENOXAPARIN SODIUM 40 MILLIGRAM(S): 100 INJECTION SUBCUTANEOUS at 11:14

## 2022-04-05 RX ADMIN — DONEPEZIL HYDROCHLORIDE 20 MILLIGRAM(S): 10 TABLET, FILM COATED ORAL at 17:15

## 2022-04-05 RX ADMIN — Medication 0.5 MILLIGRAM(S): at 21:44

## 2022-04-05 RX ADMIN — MEMANTINE HYDROCHLORIDE 10 MILLIGRAM(S): 10 TABLET ORAL at 17:15

## 2022-04-05 RX ADMIN — LEVETIRACETAM 400 MILLIGRAM(S): 250 TABLET, FILM COATED ORAL at 06:12

## 2022-04-05 NOTE — STROKE CODE NOTE - NIH STROKE SCALE: 5A. MOTOR ARM, LEFT, QM
(0) No drift; limb holds 90 (or 45) degrees for full 10 secs
(3) No effort against gravity; limb falls

## 2022-04-05 NOTE — STROKE CODE NOTE - DISPOSITION
Case Discussed with Dr. Garcia. Most likely seizure. Primary team updated about the recommendations. Case Discussed with Dr. Garcia. Most likely seizure.  Patient had rapidly improving neurologic deficits so no TPA administered. Primary team updated about the recommendations.

## 2022-04-05 NOTE — EEG REPORT - NS EEG TEXT BOX
Epilepsy Attending Note:     ALEA MORRIS    56y Male  MRN [id number]    Vital Signs Last 24 Hrs  T(C): 36 (2022 06:03), Max: 36.3 (2022 20:38)  T(F): 96.8 (2022 06:03), Max: 97.4 (2022 20:38)  HR: 76 (2022 06:03) (69 - 77)  BP: 172/81 (2022 06:03) (117/57 - 185/63)  BP(mean): --  RR: 18 (2022 06:03) (18 - 18)  SpO2: 98% (2022 06:03) (69% - 98%)                          13.6   6.98  )-----------( 283      ( 2022 08:00 )             40.4       04-05    144  |  108  |  15  ----------------------------<  118<H>  4.0   |  25  |  0.6<L>    Ca    8.7      2022 08:00  Phos  3.7     04-05  Mg     1.9     04-05    TPro  6.1  /  Alb  3.4<L>  /  TBili  0.3  /  DBili  x   /  AST  31  /  ALT  32  /  AlkPhos  86  04-04      MEDICATIONS  (STANDING):  atorvastatin 80 milliGRAM(s) Oral at bedtime  clopidogrel Tablet 75 milliGRAM(s) Oral daily  dextrose 5%. 1000 milliLiter(s) (100 mL/Hr) IV Continuous <Continuous>  dextrose 5%. 1000 milliLiter(s) (50 mL/Hr) IV Continuous <Continuous>  dextrose 50% Injectable 25 Gram(s) IV Push once  dextrose 50% Injectable 12.5 Gram(s) IV Push once  dextrose 50% Injectable 25 Gram(s) IV Push once  donepezil 20 milliGRAM(s) Oral <User Schedule>  enoxaparin Injectable 40 milliGRAM(s) SubCutaneous every 24 hours  glucagon  Injectable 1 milliGRAM(s) IntraMuscular once  insulin glargine Injectable (LANTUS) 24 Unit(s) SubCutaneous at bedtime  insulin lispro (ADMELOG) corrective regimen sliding scale   SubCutaneous three times a day before meals  insulin lispro Injectable (ADMELOG) 8 Unit(s) SubCutaneous three times a day before meals  levETIRAcetam  IVPB 500 milliGRAM(s) IV Intermittent every 12 hours  lisinopril 40 milliGRAM(s) Oral daily  memantine 10 milliGRAM(s) Oral two times a day  metoprolol succinate ER 50 milliGRAM(s) Oral daily    MEDICATIONS  (PRN):  dextrose Oral Gel 15 Gram(s) Oral once PRN Blood Glucose LESS THAN 70 milliGRAM(s)/deciliter        VEEG x 2.5 hours:    Background - continuous, symmetrical well organized, reaching frequencies in the range of 8-9 Hz    Focal and generalized slowin. no generalized slowing  2. borderline to mild right FT focal slowing    Interictal activity - small number of right FT sharp transients     Events - around 6:30 pm patient pulled electrodes off. There was no seizure prior to that.    Seizures - none    Impression: As above    Plan - per neurology team

## 2022-04-05 NOTE — BEHAVIORAL HEALTH ASSESSMENT NOTE - SUMMARY
43 year old male patient, , white, disabled on SSD, used to work as , ,  then remarried 2002, currently resides in [facility], [place] residence for assisted living, has a PMH of HTN, DM2, recurrent strokes (Lt thalamic 2021, R occipital 2020), dementia, brought to hospital for AMS and unresponsiveness, admitted under care of medicine team for further workup. Psychiatry team consulted for concerns of catatonia.  Patient seen and examined today, was partially cooperative. Noted to be mute initially then later was more cooperative and verbal. He does have depressive symptoms based on info gathered from wife, worsening since his first stroke, in addition he has features of catatonia including recurrent immobility, stupor, mutism, staring with echolalia. However, this appears to be in the context of underlying medical condition rather than psychiatric disorder. His previous stroke history might have contribute as well, as it would predispose him to encephalopathy secondary to thalamic disconnection with higher cortical centers. At the current time, no concern of acute manic episode or depressive symptoms. Patient doesn't impose harm to self or to others.    Recommendation:  Avoid antipsychotic drugs  Ativan 0.5 mg PO BID at 6:00 AM and 8:00 PM, monitor for symptoms of sedation  Zoloft 50 mg PO daily  Continue medical and neurological workup 56 year old male,  disabled on SSD, used to work as , ,  then remarried 2002, currently resides in [facility] residence for assisted living, has a PMH of HTN, DM2, recurrent strokes (Lt thalamic 2021, R occipital 2020), dementia, brought to hospital for AMS and unresponsiveness, admitted under care of medicine team for further workup. Psychiatry team consulted for concerns of catatonia.    Patient seen and examined today, was partially cooperative. Noted to be mute initially then later was more cooperative and verbal. He does have depressive symptoms based on info gathered from wife, worsening since his first stroke, in addition he has features of catatonia including recurrent immobility, stupor, mutism, staring with echolalia. However, the presence of catatonia appears to be in the context of underlying medical condition rather than  a primary psychiatric disorder. His previous stroke history might have contribute as well, as it would predispose him to encephalopathy secondary to thalamic disconnection with higher cortical centers. At the current time, no concern of acute manic episode or depressive symptoms. Patient doesn't impose harm to self or to others.    Recommendation:  Avoid antipsychotic drugs  Ativan 0.5 mg PO BID at 6:00 AM and 8:00 PM, monitor for symptoms of sedation  Zoloft 50 mg PO daily  Continue medical and neurological workup  -Psychiatry service will follow up on 4/7/22, but there is no psychiatric contraindication to discharge this patient

## 2022-04-05 NOTE — BEHAVIORAL HEALTH ASSESSMENT NOTE - NSBHCONSULTFOLLOWAFTERCARE_PSY_A_CORE FT
Patient can be referred to [facility] outpatient psychiatry service located at [address] [place] [address], [facility], [phone]

## 2022-04-05 NOTE — BEHAVIORAL HEALTH ASSESSMENT NOTE - NSBHCHARTREVIEWVS_PSY_A_CORE FT
T(F): 96.8 (04-05-22 @ 06:03), Max: 97.4 (04-04-22 @ 20:38)  HR: 76 (04-05-22 @ 06:03) (69 - 77)  BP: 172/81 (04-05-22 @ 06:03) (117/57 - 185/63)  RR: 18 (04-05-22 @ 06:03) (18 - 18)  SpO2: 98% (04-05-22 @ 06:03) (69% - 98%)

## 2022-04-05 NOTE — STROKE CODE NOTE - READ BY:
dr cheney
Stable pattern of up to moderate microvascular ischemic change. A component of acute on chronic ischemia is difficult to rule out on CT

## 2022-04-05 NOTE — PROGRESS NOTE ADULT - ASSESSMENT
56 year old male with PMHx of dementia DM, HTN, CVA, COVID in Jan, sent in from [facility] for increased lethargy and verbal unresponsiveness on March 30th. The stroke code is called due to patient unresponsiveness with a LKW an hour before the code activation. This is the second code during this admission for this patient.   Recent MRI 4/2/22 resulted without evidence of acute intracranial pathologies including no acute infarct but chronic right PCA territory and L thalamic lacunar infarct. Stroke code NIHS 24. CTH CTA and CTP no acute pathology reported.   Ptn gained more conscious about 30 mins to hour after the code and was able to hold b/l UEs without drift but verbaly mute. I did not see any obvious motor convulsion during the code or after, no incontinecne, gaze or mouth foaming.     Recommendations   vEEG   Load with Keppra 1gr.   C/w Keppra 500mg BID.   Ptn may benefit from psychiatric eval for mental health evaluation and/or r/o underlying functional disorders.   Seizure & Fall precautions.  Management per primary team.   Please contact us if any neurological changes or questions.    56 year old male with PMHx of dementia DM, HTN, CVA, COVID in Jan, sent in from [facility] for increased lethargy and verbal unresponsiveness on March 30th. The stroke code is called due to patient unresponsiveness with a LKW an hour before the code activation. This is the second code during this admission for this patient.   Recent MRI 4/2/22 resulted without evidence of acute intracranial pathologies including no acute infarct but chronic right PCA territory and L thalamic lacunar infarct. Stroke code NIHS 24. CTH CTA and CTP no acute pathology reported.   Ptn gained more conscious about 30 mins to hour after the code and was able to hold b/l UEs without drift but verbaly mute. I did not see any obvious motor convulsion during the code or after, no incontinecne, gaze or mouth foaming.   During the episode SBP increased up to 185, HR 77, 5mg IV push Labetalol was given, SBP improved to 160s with HR 70.  Recommendations   vEEG   Load with Keppra 1gr.   C/w Keppra 500mg BID.   Ptn may benefit from psychiatric eval for mental health evaluation and/or r/o underlying functional disorders.   Seizure & Fall precautions.  Management per primary team.   Please contact us if any neurological changes or questions.    56 year old male with PMHx of dementia DM, HTN, CVA, COVID in Jan, sent in from [facility] for increased lethargy and verbal unresponsiveness on March 30th. The stroke code is called due to patient unresponsiveness with a LKW an hour before the code activation. This is the second code during this admission for this patient.   Recent MRI 4/2/22 resulted without evidence of acute intracranial pathologies including no acute infarct but chronic right PCA territory and L thalamic lacunar infarct. Stroke code NIHS 24. CTH CTA and CTP no acute pathology reported.   Ptn gained more conscious about 30 mins to hour after the code and was able to hold b/l UEs without drift but verbaly mute. I did not see any obvious motor convulsion during the code or after, no incontinecne, gaze or mouth foaming.   During the episode SBP increased up to 185, HR 77, 5mg IV push Labetalol was given, SBP improved to 160s with HR 70.  On the previous discharge note it is mentioned the wife reported multiple similar episodes at home.    Recommendations   vEEG   Load with Keppra 1gr.   C/w Keppra 500mg BID.   Ptn may benefit from psychiatric eval for mental health evaluation and/or r/o underlying functional disorders, Catatonia, etc..   Seizure & Fall precautions.  Management per primary team.   Please contact us if any neurological changes or questions.    56 year old male with PMHx of dementia DM, HTN, CVA, COVID in Jan, sent in from [facility] for increased lethargy and verbal unresponsiveness on March 30th. The stroke code is called due to patient unresponsiveness with a LKW an hour before the code activation. This is the second code during this admission for this patient.   Recent MRI 4/2/22 resulted without evidence of acute intracranial pathologies including no acute infarct but chronic right PCA territory and L thalamic lacunar infarct. Stroke code NIHS 24. CTH CTA and CTP no acute pathology reported.   Pt gained more conscious about 30 mins to hour after the code and was able to hold b/l UEs without drift but verbaly mute. I did not see any obvious motor convulsion during the code or after, no incontinence, gaze or mouth foaming.   During the episode SBP increased up to 185, HR 77, 5mg IV push Labetalol was given, SBP improved to 160s with HR 70.  On the previous discharge note it is mentioned the wife reported multiple similar episodes at home.    Recommendations   vEEG   Load with Keppra 1gr.   C/w Keppra 500mg BID.   Ptn may benefit from psychiatric eval for mental health evaluation and/or r/o underlying functional disorders, Catatonia, etc..   Seizure & Fall precautions.  Management per primary team.   Please contact us if any neurological changes or questions.

## 2022-04-05 NOTE — PROGRESS NOTE ADULT - SUBJECTIVE AND OBJECTIVE BOX
56 year old male with PMHx of dementia DM, HTN, CVA, COVID in , sent in from [facility] for increased lethargy and verbal unresponsiveness. Pt's last known well time was at 12 pm. Pt is a poor historian and not answering any questions.At the time of presentation to the ED, NIHSS was 16. Code stroke was called. Shortly after, patient was awake and responsive but not answering any questions.   CTH, CTP, CTA Head & Neck with no significant change.Off note patient was recently admitted for similar symptoms, seen and evaluated in the ED by neurology, s VEEG completed,  brain MRI noted.   Today pt is awake, answering questions, minimally verbal, demented       Vital Signs Last 24 Hrs  T(C): 36 (2022 06:03), Max: 36.3 (2022 20:38)  T(F): 96.8 (2022 06:03), Max: 97.4 (2022 20:38)  HR: 76 (2022 06:03) (69 - 77)  BP: 172/81 (2022 06:03) (117/57 - 185/63)  BP(mean): --  RR: 18 (2022 06:03) (18 - 18)  SpO2: 98% (2022 06:03) (69% - 98%)    PHYSICAL EXAM:  GENERAL: NAD, demented, AAOx1   HEAD:  Atraumatic, Normocephalic  NECK: Supple, No JVD, Normal thyroid  NERVOUS SYSTEM:  Alert & Oriented X1, moving all extremities, forgetful   CHEST/LUNG: Clear to percussion bilaterally; No rales, rhonchi, wheezing, or rubs  HEART: Regular rate and rhythm; No murmurs, rubs, or gallops  ABDOMEN: Soft, Nontender, Nondistended; Bowel sounds present  EXTREMITIES:  2+ Peripheral Pulses, No clubbing, cyanosis, or edema  LYMPH: No lymphadenopathy noted  SKIN: No rashes or lesions      LABS:                          13.6   6.98  )-----------( 283      ( 2022 08:00 )             40.4   04-05    144  |  108  |  15  ----------------------------<  118<H>  4.0   |  25  |  0.6<L>    Ca    8.7      2022 08:00  Phos  3.7     04-05  Mg     1.9     04-05    TPro  6.1  /  Alb  3.4<L>  /  TBili  0.3  /  DBili  x   /  AST  31  /  ALT  32  /  AlkPhos  86  04-04    PTT - ( 30 Mar 2022 20:21 )  PTT:35.2 sec  Urinalysis Basic - ( 30 Mar 2022 22:32 )    Color: Light Yellow / Appearance: Clear / SG: >1.050 / pH: x  Gluc: x / Ketone: Negative  / Bili: Negative / Urobili: <2 mg/dL   Blood: x / Protein: Trace / Nitrite: Negative   Leuk Esterase: Negative / RBC: x / WBC x   Sq Epi: x / Non Sq Epi: x / Bacteria: x        Culture - Blood (collected 30 Mar 2022 20:18)  Source: .Blood Blood-Peripheral  Preliminary Report (2022 02:00):    No growth to date.    Culture - Blood (collected 30 Mar 2022 20:18)  Source: .Blood Blood-Peripheral  Preliminary Report (2022 02:00):    No growth to date.      RADIOLOGY & ADDITIONAL TESTS:    < from: CT Angio Head w/ IV Cont (22 @ 19:55) >  IMPRESSION:    CT PERFUSION:    Perfusion evidence of 65 cc core infarct, most prominent in the region of   the right parieto-occipital lobe as well as left medial cerebellum.    Mismatch volume: 65 cc  Mismatch ratio: 65    CTA HEAD/NECK:    There is a short segment chronic occlusion in the proximal right P1   (602/107), unchanged from previous CT scan of 2021 (3/389; 602/113)   and MRI of 2021 (11). There is reconstitution of the distal right   PCA from a small right posterior communicating artery.  _____________  NASCET criteria for internal carotid artery stenosis:  Mild: 0% to 49%  Moderate: 50% to 69%  Severe: 70% to 99%  Complete Occlusion    < end of copied text >  < from: Transesophageal Echocardiogram (21 @ 14:55) >  Summary:   1. Left ventricular ejection fraction, by visual estimation, is 60 to 65%.   2. Normal global left ventricular systolic function.   3. Trace mitral valve regurgitation.   4. Color flow doppler and intravenous injection of agitated saline demonstrates the presence of an intact intra atrial septum.   5. No left atrial appendage thrombus and normal left atrial appendage velocities.    < from: MR Head No Cont (22 @ 16:19) >    IMPRESSION:  There is no evidence of acute intracranial pathology. No evidence of   acute infarct, intracranial hemorrhage or mass effect.    Moderate chronic microvascular type changes as well as a chronic right   PCA territory and left thalamic lacunar infarct..      Background - continuous, symmetrical well organized, reaching frequencies in the range of 8-9 Hz    Focal and generalized slowin. no generalized slowing  2. borderline to mild right FT focal slowing    Interictal activity - small number of right FT sharp transients     Events - around 6:30 pm patient pulled electrodes off. There was no seizure prior to that.    Seizures - none    Impression: As above    Plan - per neurology team      MEDICATIONS  (STANDING):  atorvastatin 80 milliGRAM(s) Oral at bedtime  clopidogrel Tablet 75 milliGRAM(s) Oral daily  dextrose 5%. 1000 milliLiter(s) (100 mL/Hr) IV Continuous <Continuous>  dextrose 5%. 1000 milliLiter(s) (50 mL/Hr) IV Continuous <Continuous>  dextrose 50% Injectable 25 Gram(s) IV Push once  dextrose 50% Injectable 12.5 Gram(s) IV Push once  dextrose 50% Injectable 25 Gram(s) IV Push once  donepezil 20 milliGRAM(s) Oral <User Schedule>  enoxaparin Injectable 40 milliGRAM(s) SubCutaneous every 24 hours  glucagon  Injectable 1 milliGRAM(s) IntraMuscular once  insulin glargine Injectable (LANTUS) 24 Unit(s) SubCutaneous at bedtime  insulin lispro (ADMELOG) corrective regimen sliding scale   SubCutaneous three times a day before meals  insulin lispro Injectable (ADMELOG) 8 Unit(s) SubCutaneous three times a day before meals  levETIRAcetam  IVPB 500 milliGRAM(s) IV Intermittent every 12 hours  lisinopril 40 milliGRAM(s) Oral daily  memantine 10 milliGRAM(s) Oral two times a day  metoprolol succinate ER 50 milliGRAM(s) Oral daily    MEDICATIONS  (PRN):  dextrose Oral Gel 15 Gram(s) Oral once PRN Blood Glucose LESS THAN 70 milliGRAM(s)/deciliter

## 2022-04-05 NOTE — BEHAVIORAL HEALTH ASSESSMENT NOTE - CASE SUMMARY
56 year old male  with  HTN, DM2, recurrent strokes (Lt thalamic 2021, R occipital 2020), dementia, brought to hospital for AMS and unresponsiveness, admitted under care of medicine team for further workup. Psychiatry team consulted for concerns of catatonia.  Patient is seen with overt presence of symptom of catatonia. While there is report of component of depressed mood, , the presence of catatonia, appears to be an independent factor not related to depressed mood, as noted by its acute onset and progressive recovery without any treatment. Recommend further medical work up to rule out underlying clinical factor. We also recommend sertraline 50mg po every morning for depressive symptoms along with small dose of ativan 0.5mg po bid as noted above for now. Psychiatry service will follow up on 4/7/22.

## 2022-04-05 NOTE — PROGRESS NOTE ADULT - ASSESSMENT
56 year old male with PMH  of DM, HTN, CVA, COVID in Jan, sent in from [facility] residence for unresponsiveness.      #Code Stroke Overnight   - pt was found by RN looking in ceiling w/ blank stare and unresponsive to verbal and tactile stimuli.   - CTH/CTA: Chronic right PCA and left thalamic infarcts.  - Neuro eval -- possible Catatonia vs Seizure   - Keppra 1G stat followed by 500mg Q12  - Psych consult       # AMS/ ??vascular dementia/   #h/o CVA  - s/p Code Stroke 03/30   - NIHSS at presentation 16 >>> NIHSS shortly after was 1  - Neuro onboard  - CTH, CTP, CTA Head & Neck unchanged  - REEG was negative for epileptiform activity   - supportive care, aspiration and fall precautions   - c/w Plavix and atorvastatin   - on Donepezil and Namenda (unsure benefit)  - MRI - chronic microvascular changes. Chronic R PCA territory and L Thalamic Lacunar Infarct  - Thyroid Stimulating Hormone, Serum: 0.72 uIU/mL (03.31.22 @ 04:30)  - Treponema Pallidum Antibody Interpretation: Negative:   - vEEG pending  - f/u T4, B12, B9, HIV    #HTN --- elevated today as pt refused medication  - DASH diet   - Lisinopril 40mg Qd and Metoprolol Succ 50mg QD  - will give stat doses of medication when more compliant    #DM type II  - A1C with Estimated Average Glucose Result: 7.8:   - carb consistent diet   - Lantus 24U HS , Lispro 8U AC, ss  - FS ACHS    #Dyslipidemia - Atorvastatin 80mg HS  # Hypovolemic hypernatremia - resolved - monitor Na level     Diet: DASH/CC  Activity: IAT  DVT Prophylaxis: lovenox  GI Prophylaxis: pantoprazole  Code Status: Full  Dispo: [facility]

## 2022-04-05 NOTE — STROKE CODE NOTE - NIH STROKE SCALE: 11. EXTINCTION AND INATTENTION, QM
(1) Visual, tactile, auditory, spatial, or personal inattention or extinction to bilateral simultaneous stimulation in one of the sensory modalities
(1) Visual, tactile, auditory, spatial, or personal inattention or extinction to bilateral simultaneous stimulation in one of the sensory modalities

## 2022-04-05 NOTE — BEHAVIORAL HEALTH ASSESSMENT NOTE - NSBHCHARTREVIEWLAB_PSY_A_CORE FT
13.6   6.98  )-----------( 283      ( 05 Apr 2022 08:00 )             40.4       05 Apr 2022 08:00    144    |  108    |  15     ----------------------------<  118    4.0     |  25     |  0.6      Ca    8.7        05 Apr 2022 08:00  Phos  3.7       05 Apr 2022 08:00  Mg     1.9       05 Apr 2022 08:00    TPro  6.1    /  Alb  3.4    /  TBili  0.3    /  DBili  x      /  AST  31     /  ALT  32     /  AlkPhos  86     04 Apr 2022 23:44       pTT    36.9             ----< 1.09 INR  (04-04-22 @ 23:44)    12.50        PT          Troponin <0.01, CKMB --, CK -- 04-04-22 @ 23:44

## 2022-04-05 NOTE — PROGRESS NOTE ADULT - SUBJECTIVE AND OBJECTIVE BOX
Hospital Day:  6d    Subjective: Patient is a 56y old  Male who presents with a chief complaint of AMS (04 Apr 2022 19:22)      Pt seen and evaluated at bedside. pt refused to be seen this morning. Will reassess in afternoon  Complaints:  Over the night Events: Code stroke, started in [place]. refused all meds this morning     Past Medical Hx:   Diabetes    Hypertension    Pancreatitis    CVA (cerebral vascular accident)      Past Sx:  No significant past surgical history      Allergies:  No Known Allergies    Current Meds:   Standng Meds:  atorvastatin 80 milliGRAM(s) Oral at bedtime  clopidogrel Tablet 75 milliGRAM(s) Oral daily  dextrose 5%. 1000 milliLiter(s) (100 mL/Hr) IV Continuous <Continuous>  dextrose 5%. 1000 milliLiter(s) (50 mL/Hr) IV Continuous <Continuous>  dextrose 50% Injectable 25 Gram(s) IV Push once  dextrose 50% Injectable 12.5 Gram(s) IV Push once  dextrose 50% Injectable 25 Gram(s) IV Push once  donepezil 20 milliGRAM(s) Oral <User Schedule>  enoxaparin Injectable 40 milliGRAM(s) SubCutaneous every 24 hours  glucagon  Injectable 1 milliGRAM(s) IntraMuscular once  insulin glargine Injectable (LANTUS) 24 Unit(s) SubCutaneous at bedtime  insulin lispro (ADMELOG) corrective regimen sliding scale   SubCutaneous three times a day before meals  insulin lispro Injectable (ADMELOG) 8 Unit(s) SubCutaneous three times a day before meals  levETIRAcetam  IVPB 500 milliGRAM(s) IV Intermittent every 12 hours  lisinopril 40 milliGRAM(s) Oral daily  memantine 10 milliGRAM(s) Oral two times a day  metoprolol succinate ER 50 milliGRAM(s) Oral daily    PRN Meds:  dextrose Oral Gel 15 Gram(s) Oral once PRN Blood Glucose LESS THAN 70 milliGRAM(s)/deciliter      Vital Signs:   T(F): 96.8 (04-05-22 @ 06:03), Max: 97.4 (04-04-22 @ 20:38)  HR: 76 (04-05-22 @ 06:03) (69 - 77)  BP: 172/81 (04-05-22 @ 06:03) (117/57 - 185/63)  RR: 18 (04-05-22 @ 06:03) (18 - 18)  SpO2: 98% (04-05-22 @ 06:03) (69% - 98%)    Physical Exam:   GENERAL: NAD, Resting in bed  HEENT: NCAT  CHEST/LUNG:   HEART:   ABDOMEN:   EXTREMITIES:  No clubbing, cyanosis, or edema  NERVOUS SYSTEM:  Alert & Oriented X1    FLUID BALANCE      Labs:                         13.6   6.98  )-----------( 283      ( 05 Apr 2022 08:00 )             40.4       05 Apr 2022 08:00    144    |  108    |  15     ----------------------------<  118    4.0     |  25     |  0.6      Ca    8.7        05 Apr 2022 08:00  Phos  3.7       05 Apr 2022 08:00  Mg     1.9       05 Apr 2022 08:00    TPro  6.1    /  Alb  3.4    /  TBili  0.3    /  DBili  x      /  AST  31     /  ALT  32     /  AlkPhos  86     04 Apr 2022 23:44       pTT    36.9             ----< 1.09 INR  (04-04-22 @ 23:44)    12.50        PT          Troponin <0.01, CKMB --, CK -- 04-04-22 @ 23:44              Culture - Blood (collected 03-30-22 @ 20:18)  Source: .Blood Blood-Peripheral  Final Report (04-05-22 @ 02:00):    No Growth Final    Culture - Blood (collected 03-30-22 @ 20:18)  Source: .Blood Blood-Peripheral  Final Report (04-05-22 @ 02:00):    No Growth Final                  Radiology:       < from: CT Angio Neck w/ IV Cont (04.05.22 @ 00:26) >  HEAD CTA:    INTERNAL CAROTID ARTERIES:  The intracranial segments of the ICA are   patent without hemodynamically significant stenosis, occlusion, or   aneurysm. Atherosclerotic plaque in the carotid siphons results in   multifocal areas of less than 50% stenosis. ICA terminus is unremarkable.    ANTERIOR CEREBRAL ARTERIES: No proximal flow-limiting stenosis or   occlusion. Anterior communicating artery is unremarkable without aneurysm.    MIDDLE CEREBRAL ARTERIES: On the right, there is no proximal   flow-limiting stenosis or occlusion. On the left, there is a chronic   moderate stenosis focally involving the proximal inferior division M2   branch. Appearance is unchanged. MCA bifurcations are unremarkable   without aneurysm.    POSTERIOR CEREBRAL ARTERIES: Chronic occlusion at the origin of the right   P1 segment. Additional irregular, chronic near occlusion of the proximal   P2 segment of the right PCA. Left PCA is patent, with fetal origin..    VERTEBROBASILAR SYSTEM: Mild to moderate stenosis in the V4 segment of   the right vertebral artery, chronic and unchanged. Basilar artery is   patent without stenosis.. Basilar tip is unremarkable.      NECK CTA:    RIGHT CAROTID SYSTEM: Normal in course and caliber without flow-limiting   stenosis or occlusion.    LEFT CAROTID SYSTEM: Normal in course and caliber without flow-limiting   stenosis or occlusion.    VERTEBRAL SYSTEM:  Normal in course and caliber  without flow-limiting   stenosis or occlusion. Origin of the vertebral arteries areunremarkable.    AORTIC ARCH: Origin of the great vessels are unremarkable. Routine   three-vessel branching.    MISCELLANEOUS: Enlarged, nodular thyroid. Mild cervical adenopathy.    IMPRESSION:    PERFUSION:    1. The software reported perfusion abnormality is artifactual related to   chronic right PCA territory infarcts that was not excluded from the area   of calculation.  2. No acute perfusion abnormality seen in the remainder of the imaged   brain.    CTA HEAD:    1. No acute hemodynamically significant stenosis or occlusion.  2. Chronic right PCA occlusion and severe stenoses, detailed above,   unchanged from the prior.    CTA NECK:    1. No flow-limiting stenosis or occlusion.    < end of copied text >      < from: CT Brain Stroke Protocol (04.05.22 @ 00:02) >  COMPARISON: CT head dated 3/30/2022, MR head dated 4-22..    FINDINGS:    Chronic right PCA and left thalamic infarcts, unchanged.    Stable pattern of patchy periventricular and subcortical white matter   hypodensities consistent with moderate microvascular ischemic changes.    Parenchymal volume is appropriate for patient age. No hydrocephalus.    No acute territorial infarct, intracranial hemorrhage, extra-axial fluid   collection, or midline shift.    Visualized intraorbital contents, soft tissues, and osseous structures   evidence no acute evaluate.    Polypoid mucosal thickening within the right sphenoid sinus. Additional   mucosal thickening within the remainder of the visualized paranasal   sinuses.    Mastoid air cavities evidence no acute disease.      IMPRESSION:      No acute intracranial pathology.    Chronic right PCA and left thalamic infarcts.    Stable pattern of up to moderate microvascular ischemic change. A   component of acute on chronic ischemia is difficult to rule out on CT. If   there is significant concern for acute infarct, consider MR brain for   further evaluation.    < end of copied text >

## 2022-04-05 NOTE — PROGRESS NOTE ADULT - SUBJECTIVE AND OBJECTIVE BOX
Neurology Progress Note    Interval History:    Stroke code was called due to ptn becoming unresponsive. LKW was about 1 hr before the stroke code activation. Stroke code and rapid response was called. Ptn was starring at the ceiling, no respond to verbal stimuli, withdraw 4x limbs from noxious stimuli.   Vitals WNL ( /60s, HR 75, SpO2 94% on RA, Gluc 167)  NIHSS 24, Pls refer to stroke code note for more details.     On the follow up evaluation 1 hr after stroke code, he became more responsive and no drift of UE limbs noticed. moves LE limbs. Blink to threat, withdraw 4x limbs from painful stimuli.   During the episode, no obvious motor convulsion, incontinence, gaze, mouth foaming noticed. The episode lasted about 30 mins until he became more conscious   Medications:  atorvastatin 80 milliGRAM(s) Oral at bedtime  clopidogrel Tablet 75 milliGRAM(s) Oral daily  dextrose 5%. 1000 milliLiter(s) IV Continuous <Continuous>  dextrose 5%. 1000 milliLiter(s) IV Continuous <Continuous>  dextrose 50% Injectable 25 Gram(s) IV Push once  dextrose 50% Injectable 12.5 Gram(s) IV Push once  dextrose 50% Injectable 25 Gram(s) IV Push once  dextrose Oral Gel 15 Gram(s) Oral once PRN  donepezil 20 milliGRAM(s) Oral <User Schedule>  enoxaparin Injectable 40 milliGRAM(s) SubCutaneous every 24 hours  glucagon  Injectable 1 milliGRAM(s) IntraMuscular once  insulin glargine Injectable (LANTUS) 24 Unit(s) SubCutaneous at bedtime  insulin lispro (ADMELOG) corrective regimen sliding scale   SubCutaneous three times a day before meals  insulin lispro Injectable (ADMELOG) 8 Unit(s) SubCutaneous three times a day before meals  levETIRAcetam  IVPB 1000 milliGRAM(s) IV Intermittent once  levETIRAcetam  IVPB 500 milliGRAM(s) IV Intermittent every 12 hours  lisinopril 40 milliGRAM(s) Oral daily  memantine 10 milliGRAM(s) Oral two times a day  metoprolol succinate ER 50 milliGRAM(s) Oral daily      Vital Signs Last 24 Hrs  T(C): 36.3 (04 Apr 2022 20:38), Max: 36.3 (04 Apr 2022 20:38)  T(F): 97.4 (04 Apr 2022 20:38), Max: 97.4 (04 Apr 2022 20:38)  HR: 75 (04 Apr 2022 20:38) (71 - 75)  BP: 117/57 (04 Apr 2022 20:38) (117/57 - 182/82)  BP(mean): --  RR: 18 (04 Apr 2022 12:22) (18 - 20)  SpO2: 94% (04 Apr 2022 20:38) (94% - 97%)    Neurological Examination:  General:  Appearance is consistent with chronologic age.  No abnormal facies.  Gross skin survey within normal limits.    Cognitive/Language:  Awake, alert, and oriented x0   Cranial Nerves  - Eyes:  w/o nystagmus, Blink to threat  b/l.  - Face:  no facial asymmetry.    - Ears/Nose/Throat: Tongue and uvula midline.   Motor examination:  (MRC grade R/L)1/5 UE; 1/5 LE. Normal tone and bulk. No twitching, tremors or involuntary movements.  Sensory examination:  Withdraws 4 limbs to noxious stimuli.   Reflexes:   2+ b/l biceps, triceps, patella and achilles. clonus absent.  Cerebellum:  N/A  Gait Deferred.     Labs:  CBC Full  -  ( 04 Apr 2022 23:44 )  WBC Count : 7.69 K/uL  RBC Count : 4.74 M/uL  Hemoglobin : 14.0 g/dL  Hematocrit : 41.8 %  Platelet Count - Automated : 285 K/uL  Mean Cell Volume : 88.2 fL  Mean Cell Hemoglobin : 29.5 pg  Mean Cell Hemoglobin Concentration : 33.5 g/dL  Auto Neutrophil # : x  Auto Lymphocyte # : x  Auto Monocyte # : x  Auto Eosinophil # : x  Auto Basophil # : x  Auto Neutrophil % : x  Auto Lymphocyte % : x  Auto Monocyte % : x  Auto Eosinophil % : x  Auto Basophil % : x    04-04    144  |  109  |  17  ----------------------------<  141<H>  4.7   |  22  |  0.7    Ca    8.8      04 Apr 2022 23:44  Mg     1.9     04-04    TPro  6.1  /  Alb  3.4<L>  /  TBili  0.3  /  DBili  x   /  AST  31  /  ALT  32  /  AlkPhos  86  04-04    LIVER FUNCTIONS - ( 04 Apr 2022 23:44 )  Alb: 3.4 g/dL / Pro: 6.1 g/dL / ALK PHOS: 86 U/L / ALT: 32 U/L / AST: 31 U/L / GGT: x           PT/INR - ( 04 Apr 2022 23:44 )   PT: 12.50 sec;   INR: 1.09 ratio         PTT - ( 04 Apr 2022 23:44 )  PTT:36.9 sec      RADIOLOGY & ADDITIONAL TESTS:  < from: CT Angio Neck w/ IV Cont (04.05.22 @ 00:26) >    PERFUSION:    1. The software reported perfusion abnormality is artifactual related to   chronic right PCA territory infarcts that was not excluded from the area   of calculation.  2. No acute perfusion abnormality seen in the remainder of the imaged   brain.    CTA HEAD:    1. No acute hemodynamically significant stenosis or occlusion.  2. Chronic right PCA occlusion and severe stenoses, detailed above,   unchanged from the prior.    CTA NECK:    1. No flow-limiting stenosis or occlusion.      < end of copied text >  < from: CT Brain Stroke Protocol (04.05.22 @ 00:02) >    IMPRESSION:      No acute intracranial pathology.    Chronic right PCA and left thalamic infarcts.    Stable pattern of up to moderate microvascular ischemic change. A   component of acute on chronic ischemia is difficult to rule out on CT. If   there is significant concern for acute infarct, consider MR brain for   further evaluation.      < end of copied text >  < from: MR Head No Cont (04.02.22 @ 16:19) >  MPRESSION:  There is no evidence of acute intracranial pathology. No evidence of   acute infarct, intracranial hemorrhage or mass effect.    Moderate chronic microvascular type changes as well as a chronic right   PCA territory and left thalamic lacunar infarct..    --- End of Report ---    < end of copied text >   Neurology Progress Note    Interval History:    Stroke code was called due to ptn becoming unresponsive. LKW was about 1 hr before the stroke code activation. Stroke code and rapid response was called. Pt was starring at the ceiling, no respond to verbal stimuli, withdraw 4x limbs from noxious stimuli.   Vitals WNL ( /60s, HR 75, SpO2 94% on RA, Gluc 167)  NIHSS 24, Pls refer to stroke code note for more details.     On the follow up evaluation 1 hr after stroke code, he became more responsive and no drift of UE limbs noticed. moves LE limbs. Blink to threat, withdraw 4x limbs from painful stimuli.   During the episode, no obvious motor convulsion, incontinence, gaze, mouth foaming noticed. The episode lasted about 30 mins until he became more conscious   Medications:  atorvastatin 80 milliGRAM(s) Oral at bedtime  clopidogrel Tablet 75 milliGRAM(s) Oral daily  dextrose 5%. 1000 milliLiter(s) IV Continuous <Continuous>  dextrose 5%. 1000 milliLiter(s) IV Continuous <Continuous>  dextrose 50% Injectable 25 Gram(s) IV Push once  dextrose 50% Injectable 12.5 Gram(s) IV Push once  dextrose 50% Injectable 25 Gram(s) IV Push once  dextrose Oral Gel 15 Gram(s) Oral once PRN  donepezil 20 milliGRAM(s) Oral <User Schedule>  enoxaparin Injectable 40 milliGRAM(s) SubCutaneous every 24 hours  glucagon  Injectable 1 milliGRAM(s) IntraMuscular once  insulin glargine Injectable (LANTUS) 24 Unit(s) SubCutaneous at bedtime  insulin lispro (ADMELOG) corrective regimen sliding scale   SubCutaneous three times a day before meals  insulin lispro Injectable (ADMELOG) 8 Unit(s) SubCutaneous three times a day before meals  levETIRAcetam  IVPB 1000 milliGRAM(s) IV Intermittent once  levETIRAcetam  IVPB 500 milliGRAM(s) IV Intermittent every 12 hours  lisinopril 40 milliGRAM(s) Oral daily  memantine 10 milliGRAM(s) Oral two times a day  metoprolol succinate ER 50 milliGRAM(s) Oral daily      Vital Signs Last 24 Hrs  T(C): 36.3 (04 Apr 2022 20:38), Max: 36.3 (04 Apr 2022 20:38)  T(F): 97.4 (04 Apr 2022 20:38), Max: 97.4 (04 Apr 2022 20:38)  HR: 75 (04 Apr 2022 20:38) (71 - 75)  BP: 117/57 (04 Apr 2022 20:38) (117/57 - 182/82)  BP(mean): --  RR: 18 (04 Apr 2022 12:22) (18 - 20)  SpO2: 94% (04 Apr 2022 20:38) (94% - 97%)    Neurological Examination:  General:  Appearance is consistent with chronologic age.  No abnormal facies.  Gross skin survey within normal limits.    Cognitive/Language:  Awake, alert, and oriented x0   Cranial Nerves  - Eyes:  w/o nystagmus, Blink to threat  b/l.  - Face:  no facial asymmetry.    - Ears/Nose/Throat: Tongue and uvula midline.   Motor examination:  (MRC grade R/L)1/5 UE; 1/5 LE. Normal tone and bulk. No twitching, tremors or involuntary movements.  Sensory examination:  Withdraws 4 limbs to noxious stimuli.   Reflexes:   2+ b/l biceps, triceps, patella and achilles. clonus absent.  Cerebellum:  N/A  Gait Deferred.     Labs:  CBC Full  -  ( 04 Apr 2022 23:44 )  WBC Count : 7.69 K/uL  RBC Count : 4.74 M/uL  Hemoglobin : 14.0 g/dL  Hematocrit : 41.8 %  Platelet Count - Automated : 285 K/uL  Mean Cell Volume : 88.2 fL  Mean Cell Hemoglobin : 29.5 pg  Mean Cell Hemoglobin Concentration : 33.5 g/dL  Auto Neutrophil # : x  Auto Lymphocyte # : x  Auto Monocyte # : x  Auto Eosinophil # : x  Auto Basophil # : x  Auto Neutrophil % : x  Auto Lymphocyte % : x  Auto Monocyte % : x  Auto Eosinophil % : x  Auto Basophil % : x    04-04    144  |  109  |  17  ----------------------------<  141<H>  4.7   |  22  |  0.7    Ca    8.8      04 Apr 2022 23:44  Mg     1.9     04-04    TPro  6.1  /  Alb  3.4<L>  /  TBili  0.3  /  DBili  x   /  AST  31  /  ALT  32  /  AlkPhos  86  04-04    LIVER FUNCTIONS - ( 04 Apr 2022 23:44 )  Alb: 3.4 g/dL / Pro: 6.1 g/dL / ALK PHOS: 86 U/L / ALT: 32 U/L / AST: 31 U/L / GGT: x           PT/INR - ( 04 Apr 2022 23:44 )   PT: 12.50 sec;   INR: 1.09 ratio         PTT - ( 04 Apr 2022 23:44 )  PTT:36.9 sec      RADIOLOGY & ADDITIONAL TESTS:  < from: CT Angio Neck w/ IV Cont (04.05.22 @ 00:26) >    PERFUSION:    1. The software reported perfusion abnormality is artifactual related to   chronic right PCA territory infarcts that was not excluded from the area   of calculation.  2. No acute perfusion abnormality seen in the remainder of the imaged   brain.    CTA HEAD:    1. No acute hemodynamically significant stenosis or occlusion.  2. Chronic right PCA occlusion and severe stenoses, detailed above,   unchanged from the prior.    CTA NECK:    1. No flow-limiting stenosis or occlusion.      < end of copied text >  < from: CT Brain Stroke Protocol (04.05.22 @ 00:02) >    IMPRESSION:      No acute intracranial pathology.    Chronic right PCA and left thalamic infarcts.    Stable pattern of up to moderate microvascular ischemic change. A   component of acute on chronic ischemia is difficult to rule out on CT. If   there is significant concern for acute infarct, consider MR brain for   further evaluation.      < end of copied text >  < from: MR Head No Cont (04.02.22 @ 16:19) >  MPRESSION:  There is no evidence of acute intracranial pathology. No evidence of   acute infarct, intracranial hemorrhage or mass effect.    Moderate chronic microvascular type changes as well as a chronic right   PCA territory and left thalamic lacunar infarct..    --- End of Report ---    < end of copied text >

## 2022-04-05 NOTE — PROGRESS NOTE ADULT - ASSESSMENT
56 year old male with PMH  of DM, HTN, CVA, COVID in Jan, sent in from [facility] residence for unresponsiveness.    A/P   # AMS/ vascular dementia/ h/o CVA  - neurology recommended  VEEG completed, negative for epileptiform activity   - brain MRI is negative for acute pathology   - supportive care, aspiration and fall precautions   - NIHSS at presentation 16, stroke code called  - CTH, CTP, CTA Head & Neck unchanged  - NIHSS shortly after was 1  - Picture suspicious of subclinical seizures with post-ictal state  - F/U RPR, VDRL, TSH, HIV  - keep Mg above 2.0   - c/w Plavix and atorvastatin   - on Donepezil and Namenda   - pt was consulted by physiatry     # Hypovolemic hypernatremia  - on D5W at 100 ml/h   - monitor Na level     #HTN  - DASH diet   - on BB and Lisinopril     #DM type II   - carb consistent diet   - monitor finger stick   - on Insulin   - check Hb A1C     #Dyslipidemia  -statin    Diet: DASH  Activity: IAT  DVT Prophylaxis: lovenox  GI Prophylaxis: pantoprazole  CHG Order  Code Status: Full    #Progress Note Handoff  Pending (specify):  none  Family discussion: I spoke with pt, he has a very limited insight   Disposition: Home___/SNF___/Other_____x ___/Unknown at this time________       56 year old male with PMH  of DM, HTN, CVA, COVID in Jan, sent in from [facility] residence for unresponsiveness.    A/P   # AMS/ vascular dementia/ h/o CVA  - neurology recommended  VEEG completed, negative for epileptiform activity   - brain MRI is negative for acute pathology   - supportive care, aspiration and fall precautions   - NIHSS at presentation 16, stroke code called  - CTH, CTP, CTA Head & Neck unchanged  - NIHSS shortly after was 1  - Picture suspicious of subclinical seizures with post-ictal state  - F/U RPR, VDRL, TSH, HIV  - keep Mg above 2.0   - c/w Plavix and atorvastatin   - on Donepezil and Namenda   - pt was consulted by physiatry, follow attendings recommendations     # Hypovolemic hypernatremia  - on D5W at 100 ml/h   - monitor Na level     #HTN  - DASH diet   - on BB and Lisinopril     #DM type II   - carb consistent diet   - monitor finger stick   - on Insulin   - check Hb A1C     #Dyslipidemia  -statin    Diet: DASH  Activity: IAT  DVT Prophylaxis: lovenox  GI Prophylaxis: pantoprazole  CHG Order  Code Status: Full    #Progress Note Handoff  Pending (specify):  pt needs a psychiatry and neurology follow up, if cleared will discharge the pt   Family discussion: I spoke with pt, he has a very limited insight   Disposition: Home___/SNF___/Other_____x ___/Unknown at this time________

## 2022-04-05 NOTE — STROKE CODE NOTE - MRS SCORE
(2) Slight disablitiy.  Able to look after own affairs without assistance, but unable to carry out all previous activities.
(2) Slight disablitiy.  Able to look after own affairs without assistance, but unable to carry out all previous activities.

## 2022-04-05 NOTE — BEHAVIORAL HEALTH ASSESSMENT NOTE - NSBHCHARTREVIEWIMAGING_PSY_A_CORE FT
< from: MR Head No Cont (04.02.22 @ 16:19) >    There is no evidence of acute intracranial pathology. No evidence of   acute infarct, intracranial hemorrhage or mass effect.    Moderate chronic microvascular type changes as well as a chronic right   PCA territory and left thalamic lacunar infarct..    < end of copied text >

## 2022-04-05 NOTE — BEHAVIORAL HEALTH ASSESSMENT NOTE - NSBHCHARTREVIEWINVESTIGATE_PSY_A_CORE FT
< from: 12 Lead ECG (03.30.22 @ 19:59) >    Diagnosis Line Sinus rhythm  Moderate voltage criteria for LVH, may be normal variant  Possible Inferior infarct , age undetermined  Abnormal ECG  QTC Calculation(Bazett) 484 ms

## 2022-04-05 NOTE — STROKE CODE NOTE - NIH STROKE SCALE: 1A. LEVEL OF CONSCIOUSNESS, QM
(2) Not alert; requires repeated stimulation to attend, or is obtunded and requires strong or painful stimulation to make movements (not stereotyped)
(2) Not alert; requires repeated stimulation to attend, or is obtunded and requires strong or painful stimulation to make movements (not stereotyped)

## 2022-04-05 NOTE — PROGRESS NOTE ADULT - TIME BILLING
direct pt's care, communication with medical team, chart review

## 2022-04-05 NOTE — BEHAVIORAL HEALTH ASSESSMENT NOTE - HPI (INCLUDE ILLNESS QUALITY, SEVERITY, DURATION, TIMING, CONTEXT, MODIFYING FACTORS, ASSOCIATED SIGNS AND SYMPTOMS)
Collateral: community home/ assisted since  disabled living truck ,  since ,  then remarried .  always very melancholy, not really appreciated, not the positive type, after first stroke was mentioning suicide. Got really aggressive and agitated, which is part of his stroke and temper. Always struggle with emotions unable to control them. Was left when he was 6, has childhood issues.   No prior psychiatric history, or IPP, not following with psychiatrist. Mother suffers from MDD. Dad struggled with AD at age of 60.   Has always been very combative, a lot of road rage, had fights with people in the middle of road. He would broke things in house,   because he is kindhearted was able to cover it, has tendency to be very generous. I see the other side of him as if I have been  to 2 different people.   He always cry when I am there, I feel I trigger him. He asks me what are you doing here?  No manic episodes, but always disconnected. He has never been present, wanted to be , , always dreamt about something that he is not.   Prior to stroke, had depressive symptoms, worsened after stroke. I am gonna throw my self. He was an avid , fish , gained 45pounds. 43 year old male patient, , white, disabled on SSD, used to work as , ,  then remarried 2002, currently resides in [facility] residence for assisted living, has a PMH of HTN, DM2, recurrent strokes (Lt thalamic 2021, R occipital 2020), dementia, brought to hospital for AMS and unresponsiveness, admitted under care of medicine team for further workup. Psychiatry team consulted for concerns of catatonia.  Upon approached today patient was lying in bed, opens eye to verbal stimuli, however noted to be verbally mute with abnormal lip smacking and R thumb movements. Patient was minimally cooperative, responding by nodding, following simple commands. When seen later, was more verbal , answering some questions. But complete psychiatric evaluation was limited.  As per chart review, stroke code was called overnight due to patient unresponsiveness. Pt was more conscious about 30 mins to hour after the code and was able to hold b/l UEs without drift but verbally mute. Patient had similar recurrent episodes since his first stroke in 2020. Workup including repeated EEG, CTH, and lately MRI was negative for acute findings.    Contacted patient's wife Elham Jj, who reports that patient has been always very melancholy, not the positive type. He can get really aggressive and agitated. He always struggled with emotion, and has been unable to control them. This is why they had divorce and got remarried again in 2001. She reports that patient has childhood issues, and was left when he was 6. Wife also reports that he has always been very combative, a lot of road rage, had fights with people in the middle of the road, and because he is kindhearted he was able to cover it. Wife denied prior psychiatric history, or IPP, not currently following with psychiatrist. His mother suffers from MDD. Dad struggled with AD at age of 60.   As per Elham Jj, patient had no manic episodes, but always looks disconnected. He has never been present, wanted to be , , always dreamt about something that he is not.   She reports that he had some depressive symptoms prior to stroke, which worsened after stroke. His mood was always low, depressed, had no energy, gained 45pounds, had insomnia, lost interest in taking care of his fish, and would frequently reports suicidal ideation like" I don't want to live any more", or" I am going to throw my self". 56 year old  male patient, disabled on SSD, used to work as , ,  then remarried 2002, currently resides in [facility] residence for assisted living, has a PMH of HTN, DM2, recurrent strokes (Lt thalamic 2021, R occipital 2020), dementia, brought to hospital for AMS and unresponsiveness, admitted under care of medicine team for further workup. Psychiatry team consulted for concerns of catatonia.  Upon approached today patient was lying in bed, opens eye to verbal stimuli, however noted to be verbally mute with abnormal lip smacking and R thumb movements. Patient was minimally cooperative, responding by nodding, following simple commands. When seen later, was more verbal , answering some questions. But complete psychiatric evaluation was limited.  As per chart review, stroke code was called overnight due to patient unresponsiveness. Pt was more conscious about 30 mins to hour after the code and was able to hold b/l UEs without drift but verbally mute. Patient had similar recurrent episodes since his first stroke in 2020. Workup including repeated EEG, CTH, and lately MRI was negative for acute findings.    Contacted patient's wife Elham Jj, who reports that patient has been always very melancholy, not the positive type. He can get really aggressive and agitated. He always struggled with emotion, and has been unable to control them. This is why they had divorce and got remarried again in 2001. She reports that patient has childhood issues, and was left when he was 6. Wife also reports that he has always been very combative, a lot of road rage, had fights with people in the middle of the road, and because he is kindhearted he was able to cover it. Wife denied prior psychiatric history, or IPP, not currently following with psychiatrist. His mother suffers from MDD. Dad struggled with AD at age of 60.   As per Elham Jj, patient had no manic episodes, but always looks disconnected. He has never been present, wanted to be , , always dreamt about something that he is not.   She reports that he had some depressive symptoms prior to stroke, which worsened after stroke. His mood was always low, depressed, had no energy, gained 45pounds, had insomnia, lost interest in taking care of his fish, and would frequently reports suicidal ideation like" I don't want to live any more", or" I am going to throw my self".

## 2022-04-05 NOTE — BEHAVIORAL HEALTH ASSESSMENT NOTE - OTHER
Unable to assess Acute on chronic medical problems Unable to obtain None No rigidity or spasticity dyskinesia: Lip smacking and Right thumb movement

## 2022-04-06 LAB
ALBUMIN SERPL ELPH-MCNC: 3.6 G/DL — SIGNIFICANT CHANGE UP (ref 3.5–5.2)
ALP SERPL-CCNC: 88 U/L — SIGNIFICANT CHANGE UP (ref 30–115)
ALT FLD-CCNC: 48 U/L — HIGH (ref 0–41)
ANION GAP SERPL CALC-SCNC: 10 MMOL/L — SIGNIFICANT CHANGE UP (ref 7–14)
AST SERPL-CCNC: 31 U/L — SIGNIFICANT CHANGE UP (ref 0–41)
BASOPHILS # BLD AUTO: 0.06 K/UL — SIGNIFICANT CHANGE UP (ref 0–0.2)
BASOPHILS NFR BLD AUTO: 0.7 % — SIGNIFICANT CHANGE UP (ref 0–1)
BILIRUB SERPL-MCNC: 0.5 MG/DL — SIGNIFICANT CHANGE UP (ref 0.2–1.2)
BUN SERPL-MCNC: 13 MG/DL — SIGNIFICANT CHANGE UP (ref 10–20)
CALCIUM SERPL-MCNC: 9.2 MG/DL — SIGNIFICANT CHANGE UP (ref 8.5–10.1)
CHLORIDE SERPL-SCNC: 107 MMOL/L — SIGNIFICANT CHANGE UP (ref 98–110)
CO2 SERPL-SCNC: 28 MMOL/L — SIGNIFICANT CHANGE UP (ref 17–32)
CREAT SERPL-MCNC: 0.6 MG/DL — LOW (ref 0.7–1.5)
EGFR: 112 ML/MIN/1.73M2 — SIGNIFICANT CHANGE UP
EOSINOPHIL # BLD AUTO: 0.3 K/UL — SIGNIFICANT CHANGE UP (ref 0–0.7)
EOSINOPHIL NFR BLD AUTO: 3.5 % — SIGNIFICANT CHANGE UP (ref 0–8)
GLUCOSE BLDC GLUCOMTR-MCNC: 108 MG/DL — HIGH (ref 70–99)
GLUCOSE BLDC GLUCOMTR-MCNC: 127 MG/DL — HIGH (ref 70–99)
GLUCOSE BLDC GLUCOMTR-MCNC: 127 MG/DL — HIGH (ref 70–99)
GLUCOSE BLDC GLUCOMTR-MCNC: 82 MG/DL — SIGNIFICANT CHANGE UP (ref 70–99)
GLUCOSE BLDC GLUCOMTR-MCNC: 88 MG/DL — SIGNIFICANT CHANGE UP (ref 70–99)
GLUCOSE SERPL-MCNC: 142 MG/DL — HIGH (ref 70–99)
HCT VFR BLD CALC: 42.1 % — SIGNIFICANT CHANGE UP (ref 42–52)
HGB BLD-MCNC: 13.9 G/DL — LOW (ref 14–18)
IMM GRANULOCYTES NFR BLD AUTO: 0.1 % — SIGNIFICANT CHANGE UP (ref 0.1–0.3)
LYMPHOCYTES # BLD AUTO: 1.96 K/UL — SIGNIFICANT CHANGE UP (ref 1.2–3.4)
LYMPHOCYTES # BLD AUTO: 22.6 % — SIGNIFICANT CHANGE UP (ref 20.5–51.1)
MAGNESIUM SERPL-MCNC: 1.9 MG/DL — SIGNIFICANT CHANGE UP (ref 1.8–2.4)
MCHC RBC-ENTMCNC: 29 PG — SIGNIFICANT CHANGE UP (ref 27–31)
MCHC RBC-ENTMCNC: 33 G/DL — SIGNIFICANT CHANGE UP (ref 32–37)
MCV RBC AUTO: 87.9 FL — SIGNIFICANT CHANGE UP (ref 80–94)
MONOCYTES # BLD AUTO: 0.58 K/UL — SIGNIFICANT CHANGE UP (ref 0.1–0.6)
MONOCYTES NFR BLD AUTO: 6.7 % — SIGNIFICANT CHANGE UP (ref 1.7–9.3)
NEUTROPHILS # BLD AUTO: 5.76 K/UL — SIGNIFICANT CHANGE UP (ref 1.4–6.5)
NEUTROPHILS NFR BLD AUTO: 66.4 % — SIGNIFICANT CHANGE UP (ref 42.2–75.2)
NRBC # BLD: 0 /100 WBCS — SIGNIFICANT CHANGE UP (ref 0–0)
PLATELET # BLD AUTO: 302 K/UL — SIGNIFICANT CHANGE UP (ref 130–400)
POTASSIUM SERPL-MCNC: 4.1 MMOL/L — SIGNIFICANT CHANGE UP (ref 3.5–5)
POTASSIUM SERPL-SCNC: 4.1 MMOL/L — SIGNIFICANT CHANGE UP (ref 3.5–5)
PROT SERPL-MCNC: 6.1 G/DL — SIGNIFICANT CHANGE UP (ref 6–8)
RBC # BLD: 4.79 M/UL — SIGNIFICANT CHANGE UP (ref 4.7–6.1)
RBC # FLD: 13.5 % — SIGNIFICANT CHANGE UP (ref 11.5–14.5)
SODIUM SERPL-SCNC: 145 MMOL/L — SIGNIFICANT CHANGE UP (ref 135–146)
WBC # BLD: 8.67 K/UL — SIGNIFICANT CHANGE UP (ref 4.8–10.8)
WBC # FLD AUTO: 8.67 K/UL — SIGNIFICANT CHANGE UP (ref 4.8–10.8)

## 2022-04-06 PROCEDURE — 99232 SBSQ HOSP IP/OBS MODERATE 35: CPT

## 2022-04-06 RX ORDER — SERTRALINE 25 MG/1
50 TABLET, FILM COATED ORAL DAILY
Refills: 0 | Status: DISCONTINUED | OUTPATIENT
Start: 2022-04-07 | End: 2022-04-08

## 2022-04-06 RX ORDER — FOLIC ACID 0.8 MG
1 TABLET ORAL DAILY
Refills: 0 | Status: DISCONTINUED | OUTPATIENT
Start: 2022-04-06 | End: 2022-04-08

## 2022-04-06 RX ORDER — PREGABALIN 225 MG/1
1000 CAPSULE ORAL DAILY
Refills: 0 | Status: DISCONTINUED | OUTPATIENT
Start: 2022-04-06 | End: 2022-04-08

## 2022-04-06 RX ADMIN — LEVETIRACETAM 400 MILLIGRAM(S): 250 TABLET, FILM COATED ORAL at 18:35

## 2022-04-06 RX ADMIN — Medication 0.5 MILLIGRAM(S): at 22:06

## 2022-04-06 RX ADMIN — ENOXAPARIN SODIUM 40 MILLIGRAM(S): 100 INJECTION SUBCUTANEOUS at 12:23

## 2022-04-06 RX ADMIN — LEVETIRACETAM 400 MILLIGRAM(S): 250 TABLET, FILM COATED ORAL at 05:49

## 2022-04-06 RX ADMIN — ATORVASTATIN CALCIUM 80 MILLIGRAM(S): 80 TABLET, FILM COATED ORAL at 22:07

## 2022-04-06 RX ADMIN — DONEPEZIL HYDROCHLORIDE 20 MILLIGRAM(S): 10 TABLET, FILM COATED ORAL at 18:35

## 2022-04-06 RX ADMIN — LISINOPRIL 40 MILLIGRAM(S): 2.5 TABLET ORAL at 05:50

## 2022-04-06 RX ADMIN — MEMANTINE HYDROCHLORIDE 10 MILLIGRAM(S): 10 TABLET ORAL at 18:34

## 2022-04-06 RX ADMIN — Medication 50 MILLIGRAM(S): at 05:50

## 2022-04-06 RX ADMIN — CLOPIDOGREL BISULFATE 75 MILLIGRAM(S): 75 TABLET, FILM COATED ORAL at 12:26

## 2022-04-06 RX ADMIN — MEMANTINE HYDROCHLORIDE 10 MILLIGRAM(S): 10 TABLET ORAL at 05:50

## 2022-04-06 RX ADMIN — Medication 8 UNIT(S): at 12:45

## 2022-04-06 RX ADMIN — Medication 0.5 MILLIGRAM(S): at 06:01

## 2022-04-06 NOTE — PROGRESS NOTE ADULT - SUBJECTIVE AND OBJECTIVE BOX
Hospital Day:  7d    Subjective: Patient is a 56y old  Male who presents with a chief complaint of AMS (05 Apr 2022 11:23)      Pt seen and evaluated at bedside.   Complaints:  Over the night Events:    Past Medical Hx:   Diabetes    Hypertension    Pancreatitis    CVA (cerebral vascular accident)      Past Sx:  No significant past surgical history      Allergies:  No Known Allergies    Current Meds:   Standng Meds:  atorvastatin 80 milliGRAM(s) Oral at bedtime  clopidogrel Tablet 75 milliGRAM(s) Oral daily  dextrose 5%. 1000 milliLiter(s) (100 mL/Hr) IV Continuous <Continuous>  dextrose 5%. 1000 milliLiter(s) (50 mL/Hr) IV Continuous <Continuous>  dextrose 50% Injectable 25 Gram(s) IV Push once  dextrose 50% Injectable 12.5 Gram(s) IV Push once  dextrose 50% Injectable 25 Gram(s) IV Push once  donepezil 20 milliGRAM(s) Oral <User Schedule>  enoxaparin Injectable 40 milliGRAM(s) SubCutaneous every 24 hours  glucagon  Injectable 1 milliGRAM(s) IntraMuscular once  insulin glargine Injectable (LANTUS) 24 Unit(s) SubCutaneous at bedtime  insulin lispro (ADMELOG) corrective regimen sliding scale   SubCutaneous three times a day before meals  insulin lispro Injectable (ADMELOG) 8 Unit(s) SubCutaneous three times a day before meals  levETIRAcetam  IVPB 500 milliGRAM(s) IV Intermittent every 12 hours  lisinopril 40 milliGRAM(s) Oral daily  LORazepam     Tablet 0.5 milliGRAM(s) Oral <User Schedule>  memantine 10 milliGRAM(s) Oral two times a day  metoprolol succinate ER 50 milliGRAM(s) Oral daily  sertraline 50 milliGRAM(s) Oral at bedtime    PRN Meds:  dextrose Oral Gel 15 Gram(s) Oral once PRN Blood Glucose LESS THAN 70 milliGRAM(s)/deciliter      Vital Signs:   T(F): 96.8 (04-06-22 @ 05:00), Max: 97.9 (04-05-22 @ 20:52)  HR: 71 (04-06-22 @ 05:00) (71 - 87)  BP: 172/86 (04-06-22 @ 05:00) (172/86 - 192/91)  RR: 20 (04-06-22 @ 05:00) (19 - 20)  SpO2: 96% (04-06-22 @ 05:00) (96% - 98%)    Physical Exam:   GENERAL: NAD, Resting in bed  HEENT: NCAT  CHEST/LUNG: Clear to auscultation bilaterally; No wheezing or rubs.   HEART: Regular rate and rhythm; No murmurs, rubs, or gallops  ABDOMEN: Bowel sounds present; Soft, Nontender, Nondistended.   EXTREMITIES:  No clubbing, cyanosis, or edema  NERVOUS SYSTEM:  Alert & Oriented X3    FLUID BALANCE      Labs:                         13.6   6.98  )-----------( 283      ( 05 Apr 2022 08:00 )             40.4       05 Apr 2022 08:00    144    |  108    |  15     ----------------------------<  118    4.0     |  25     |  0.6      Ca    8.7        05 Apr 2022 08:00  Phos  3.7       05 Apr 2022 08:00  Mg     1.9       05 Apr 2022 08:00    TPro  6.1    /  Alb  3.4    /  TBili  0.3    /  DBili  x      /  AST  31     /  ALT  32     /  AlkPhos  86     04 Apr 2022 23:44              Troponin <0.01, CKMB --, CK -- 04-04-22 @ 23:44              Culture - Blood (collected 03-30-22 @ 20:18)  Source: .Blood Blood-Peripheral  Final Report (04-05-22 @ 02:00):    No Growth Final    Culture - Blood (collected 03-30-22 @ 20:18)  Source: .Blood Blood-Peripheral  Final Report (04-05-22 @ 02:00):    No Growth Final                  Radiology:  Hospital Day:  7d    Subjective: Patient is a 56y old  Male who presents with a chief complaint of AMS (05 Apr 2022 11:23)      Pt seen and evaluated at bedside.   Complaints:  Over the night Events:    Past Medical Hx:   Diabetes    Hypertension    Pancreatitis    CVA (cerebral vascular accident)      Past Sx:  No significant past surgical history      Allergies:  No Known Allergies    Current Meds:   Standng Meds:  atorvastatin 80 milliGRAM(s) Oral at bedtime  clopidogrel Tablet 75 milliGRAM(s) Oral daily  dextrose 5%. 1000 milliLiter(s) (100 mL/Hr) IV Continuous <Continuous>  dextrose 5%. 1000 milliLiter(s) (50 mL/Hr) IV Continuous <Continuous>  dextrose 50% Injectable 25 Gram(s) IV Push once  dextrose 50% Injectable 12.5 Gram(s) IV Push once  dextrose 50% Injectable 25 Gram(s) IV Push once  donepezil 20 milliGRAM(s) Oral <User Schedule>  enoxaparin Injectable 40 milliGRAM(s) SubCutaneous every 24 hours  glucagon  Injectable 1 milliGRAM(s) IntraMuscular once  insulin glargine Injectable (LANTUS) 24 Unit(s) SubCutaneous at bedtime  insulin lispro (ADMELOG) corrective regimen sliding scale   SubCutaneous three times a day before meals  insulin lispro Injectable (ADMELOG) 8 Unit(s) SubCutaneous three times a day before meals  levETIRAcetam  IVPB 500 milliGRAM(s) IV Intermittent every 12 hours  lisinopril 40 milliGRAM(s) Oral daily  LORazepam     Tablet 0.5 milliGRAM(s) Oral <User Schedule>  memantine 10 milliGRAM(s) Oral two times a day  metoprolol succinate ER 50 milliGRAM(s) Oral daily  sertraline 50 milliGRAM(s) Oral at bedtime    PRN Meds:  dextrose Oral Gel 15 Gram(s) Oral once PRN Blood Glucose LESS THAN 70 milliGRAM(s)/deciliter      Vital Signs:   T(F): 96.8 (04-06-22 @ 05:00), Max: 97.9 (04-05-22 @ 20:52)  HR: 71 (04-06-22 @ 05:00) (71 - 87)  BP: 172/86 (04-06-22 @ 05:00) (172/86 - 192/91)  RR: 20 (04-06-22 @ 05:00) (19 - 20)  SpO2: 96% (04-06-22 @ 05:00) (96% - 98%)    Physical Exam:   GENERAL: NAD, Resting in bed  HEENT: NCAT  CHEST/LUNG: Clear to auscultation bilaterally; No wheezing or rubs.   HEART: Regular rate and rhythm; No murmurs, rubs, or gallops  ABDOMEN: Bowel sounds present; Soft, Nontender, Nondistended.   EXTREMITIES:  No clubbing, cyanosis, or edema  NERVOUS SYSTEM:  Alert & Oriented X1    FLUID BALANCE      Labs:                         13.6   6.98  )-----------( 283      ( 05 Apr 2022 08:00 )             40.4       05 Apr 2022 08:00    144    |  108    |  15     ----------------------------<  118    4.0     |  25     |  0.6      Ca    8.7        05 Apr 2022 08:00  Phos  3.7       05 Apr 2022 08:00  Mg     1.9       05 Apr 2022 08:00    TPro  6.1    /  Alb  3.4    /  TBili  0.3    /  DBili  x      /  AST  31     /  ALT  32     /  AlkPhos  86     04 Apr 2022 23:44              Troponin <0.01, CKMB --, CK -- 04-04-22 @ 23:44              Culture - Blood (collected 03-30-22 @ 20:18)  Source: .Blood Blood-Peripheral  Final Report (04-05-22 @ 02:00):    No Growth Final    Culture - Blood (collected 03-30-22 @ 20:18)  Source: .Blood Blood-Peripheral  Final Report (04-05-22 @ 02:00):    No Growth Final                  Radiology:

## 2022-04-06 NOTE — PROGRESS NOTE ADULT - ASSESSMENT
56 year old male with PMHx of dementia DM, HTN, CVA, COVID in Jan, sent in from [facility] residence for increased lethargy and verbal unresponsiveness on March 30th. Recent MRI 4/2/22 resulted without evidence of acute intracranial pathologies including no acute infarct but chronic right PCA territory and L thalamic lacunar infarct.On the previous discharge note it is mentioned the wife reported multiple similar episodes at home. Attempt was made to obtain video EEG but patient pulled off the leads. 2.5 hours recording notable for sharp transients.     Recommendations .   C/w Keppra 500mg BID.   Psychiatry evaluation appreciated: possible catatonia with elements of depression  Seizure & Fall precautions.  Management per primary team.   Please contact us if any neurological changes or questions.

## 2022-04-06 NOTE — PROGRESS NOTE ADULT - ASSESSMENT
56 year old male with PMH  of DM, HTN, CVA, COVID in Jan, sent in from [facility] residence for unresponsiveness.          #Code Stroke Overnight 04/05  - pt was found by RN looking in ceiling w/ blank stare and unresponsive to verbal and tactile stimuli.   - CTH/CTA: Chronic right PCA and left thalamic infarcts.  - Neuro eval -- possible Catatonia vs Seizure   - Keppra 1G stat followed by 500mg Q12  - Psych consult       # AMS/ ??vascular dementia/   #h/o CVA  - s/p Code Stroke 03/30   - NIHSS at presentation 16 >>> NIHSS shortly after was 1  - Neuro onboard  - CTH, CTP, CTA Head & Neck unchanged  - REEG was negative for epileptiform activity   - supportive care, aspiration and fall precautions   - c/w Plavix and atorvastatin   - on Donepezil and Namenda (unsure benefit)  - MRI - chronic microvascular changes. Chronic R PCA territory and L Thalamic Lacunar Infarct  - Thyroid Stimulating Hormone, Serum: 0.72 uIU/mL (03.31.22 @ 04:30)  - Treponema Pallidum Antibody Interpretation: Negative:   - vEEG pending  - f/u T4, B12, B9, HIV    #HTN --- elevated today as pt refused medication  - DASH diet   - Lisinopril 40mg Qd and Metoprolol Succ 50mg QD  - will give stat doses of medication when more compliant    #DM type II  - A1C with Estimated Average Glucose Result: 7.8:   - carb consistent diet   - Lantus 24U HS , Lispro 8U AC, ss  - FS ACHS    #Dyslipidemia - Atorvastatin 80mg HS  # Hypovolemic hypernatremia - resolved - monitor Na level     Diet: DASH/CC  Activity: IAT  DVT Prophylaxis: lovenox  GI Prophylaxis: pantoprazole  Code Status: Full  Dispo: [facility]  56 year old male with PMH  of DM, HTN, CVA, COVID in Jan, sent in from [facility] residence for unresponsiveness.        # AMS/ ??vascular dementia/   #h/o CVA  - s/p Code Stroke 03/30 and 04/05  - NIHSS at presentation 16 >>> NIHSS shortly after was 1  - Neuro onboard  - CTH, CTP, CTA Head & Neck unchanged  - MRI - chronic microvascular changes. Chronic R PCA territory and L Thalamic Lacunar Infarct  - CTH/CTA 04/05: Chronic right PCA and left thalamic infarcts.  - REEG was negative for epileptiform activity   - vEEG no seizure however aborted by Pt 2 hrs after initiation  - supportive care, aspiration and fall precautions  - Thyroid Stimulating Hormone, Serum: 0.72 uIU/mL (03.31.22 @ 04:30)  - T4, Serum: 5.3 ug/dL (04.05.22 @ 08:00)  - Treponema Pallidum Antibody Interpretation: Negative:    - HIV-1/2 Combo Result: Nonreactive  - Vitamin B12, Serum: 464 pg/mL (04.05.22 @ 08:00)  - Folate, Serum: 3.4 ng/mL (04.05.22 @ 08:00)  - c/w Plavix and atorvastatin   - on Donepezil and Namenda (unsure benefit)  - start Folate and Vitamin supplement     #Catatonia w/ depression - as per Psych  - Ativan 0.5mg QD 6am and 8pm  - Sertraline 50mg Qam   - f/u w/ Outpt Psych    #HTN --- elevated today as pt refused medication  - DASH diet   - Lisinopril 40mg Qd and Metoprolol Succ 50mg QD  - will give stat doses of medication when more compliant    #DM type II  - A1C with Estimated Average Glucose Result: 7.8:   - carb consistent diet   - Lantus 24U HS , Lispro 8U AC, ss  - FS ACHS    #Dyslipidemia - Atorvastatin 80mg HS  # Hypovolemic hypernatremia - resolved - monitor Na level     Diet: DASH/CC  Activity: IAT  DVT Prophylaxis: lovenox  GI Prophylaxis: pantoprazole  Code Status: Full  Dispo: [facility]

## 2022-04-06 NOTE — PROGRESS NOTE ADULT - ATTENDING COMMENTS
Patient examined.  Did not speak to examiner but followed commands readily.  No focal motor symptoms.  Psychiatry assessment appreciated.  Continue low dose keppra.  Reconsult as needed.
Patient examined this morning and did not talk but when stimulated moved all 4 extremities vigorously and tried to punch examiner.  Patient was having rapid improvement in symptoms last night so TPA was not administered.  He has prior strokes and baseline dementia.  CTA/P showed no new findings compared to prior study.  Patient has had several similar episodes. vEEG for a couple of hours yesterday was negative however patient ripped the leads off so longer study unable to be performed.  Keppra initiated for suspected seizure.  Will need to determine if patients agitation this morning is baseline or increased due to addition of keppra.  If agitation is persistent then may consider alternative anticonvulsant such as valproic acid that would have mood stabilizing effect.

## 2022-04-06 NOTE — PROGRESS NOTE ADULT - SUBJECTIVE AND OBJECTIVE BOX
INTERVAL HPI/OVERNIGHT EVENTS:  Patient seen and examined. No acute overnight event.       MEDICATIONS  (STANDING):  atorvastatin 80 milliGRAM(s) Oral at bedtime  clopidogrel Tablet 75 milliGRAM(s) Oral daily  dextrose 5%. 1000 milliLiter(s) (100 mL/Hr) IV Continuous <Continuous>  dextrose 5%. 1000 milliLiter(s) (50 mL/Hr) IV Continuous <Continuous>  dextrose 50% Injectable 25 Gram(s) IV Push once  dextrose 50% Injectable 12.5 Gram(s) IV Push once  dextrose 50% Injectable 25 Gram(s) IV Push once  donepezil 20 milliGRAM(s) Oral <User Schedule>  enoxaparin Injectable 40 milliGRAM(s) SubCutaneous every 24 hours  glucagon  Injectable 1 milliGRAM(s) IntraMuscular once  insulin glargine Injectable (LANTUS) 24 Unit(s) SubCutaneous at bedtime  insulin lispro (ADMELOG) corrective regimen sliding scale   SubCutaneous three times a day before meals  insulin lispro Injectable (ADMELOG) 8 Unit(s) SubCutaneous three times a day before meals  levETIRAcetam  IVPB 500 milliGRAM(s) IV Intermittent every 12 hours  lisinopril 40 milliGRAM(s) Oral daily  LORazepam     Tablet 0.5 milliGRAM(s) Oral <User Schedule>  memantine 10 milliGRAM(s) Oral two times a day  metoprolol succinate ER 50 milliGRAM(s) Oral daily  sertraline 50 milliGRAM(s) Oral at bedtime    MEDICATIONS  (PRN):  dextrose Oral Gel 15 Gram(s) Oral once PRN Blood Glucose LESS THAN 70 milliGRAM(s)/deciliter      Allergies    No Known Allergies    Intolerances        Vital Signs Last 24 Hrs  T(C): 36.5 (2022 12:05), Max: 36.6 (2022 20:52)  T(F): 97.7 (2022 12:05), Max: 97.9 (2022 20:52)  HR: 70 (2022 12:05) (70 - 75)  BP: 175/77 (2022 12:05) (172/86 - 179/83)  BP(mean): --  RR: 18 (2022 12:05) (18 - 20)  SpO2: 96% (2022 05:00) (96% - 97%)    Physical exam:  General:  Appearance is consistent with chronologic age.  No abnormal facies.  Gross skin survey within normal limits.    Cognitive/Language:  Awake, alert, and oriented x0 , follows commands, answers question by nodding head..    Cranial nerves: Pupils equally round and reactive to light, visual fields: right temporal cut, no nystagmus, extraocular muscles intact, V1 through V3 intact bilaterally and symmetric, face symmetric,  Motor:  MRC grading 5/5 b/l UE/LE.   strength 5/5.  Normal tone and bulk.  No abnormal movements.    Sensation: Intact to light touch,        LABS:                        13.6   6.98  )-----------( 283      ( 2022 08:00 )             40.4     04-05    144  |  108  |  15  ----------------------------<  118<H>  4.0   |  25  |  0.6<L>    Ca    8.7      2022 08:00  Phos  3.7     04-05  Mg     1.9     04-05    TPro  6.1  /  Alb  3.4<L>  /  TBili  0.3  /  DBili  x   /  AST  31  /  ALT  32  /  AlkPhos  86  04-04    PT/INR - ( 2022 23:44 )   PT: 12.50 sec;   INR: 1.09 ratio         PTT - ( 2022 23:44 )  PTT:36.9 sec      RADIOLOGY & ADDITIONAL TESTS:    < from: CT Brain Stroke Protocol (22 @ 00:02) >  No acute intracranial pathology.    Chronic right PCA and left thalamic infarcts.    Stable pattern of up to moderate microvascular ischemic change. A   component of acute on chronic ischemia is difficult to rule out on CT. If   there is significant concern for acute infarct, consider MR brain for   further evaluation.      < from: MR Head No Cont (22 @ 16:19) >  There is no evidence of acute intracranial pathology. No evidence of   acute infarct, intracranial hemorrhage or mass effect.    Moderate chronic microvascular type changes as well as a chronic right   PCA territory and left thalamic lacunar infarct..        < from: CT Angio Head w/ IV Cont (22 @ 00:25) >    1. No acute hemodynamically significant stenosis or occlusion.  2. Chronic right PCA occlusion and severe stenoses, detailed above,   unchanged from the prior.    CTA NECK:    1. No flow-limiting stenosis or occlusion.    VEEG x 2.5 hours:    Background - continuous, symmetrical well organized, reaching frequencies in the range of 8-9 Hz    Focal and generalized slowin. no generalized slowing  2. borderline to mild right FT focal slowing    Interictal activity - small number of right FT sharp transients     Events - around 6:30 pm patient pulled electrodes off. There was no seizure prior to that.    Seizures - none    Impression: As above

## 2022-04-07 ENCOUNTER — TRANSCRIPTION ENCOUNTER (OUTPATIENT)
Age: 57
End: 2022-04-07

## 2022-04-07 LAB
ALBUMIN SERPL ELPH-MCNC: 3.7 G/DL — SIGNIFICANT CHANGE UP (ref 3.5–5.2)
ALP SERPL-CCNC: 94 U/L — SIGNIFICANT CHANGE UP (ref 30–115)
ALT FLD-CCNC: 40 U/L — SIGNIFICANT CHANGE UP (ref 0–41)
ANION GAP SERPL CALC-SCNC: 13 MMOL/L — SIGNIFICANT CHANGE UP (ref 7–14)
AST SERPL-CCNC: 26 U/L — SIGNIFICANT CHANGE UP (ref 0–41)
BASOPHILS # BLD AUTO: 0.07 K/UL — SIGNIFICANT CHANGE UP (ref 0–0.2)
BASOPHILS NFR BLD AUTO: 0.9 % — SIGNIFICANT CHANGE UP (ref 0–1)
BILIRUB SERPL-MCNC: 0.5 MG/DL — SIGNIFICANT CHANGE UP (ref 0.2–1.2)
BUN SERPL-MCNC: 11 MG/DL — SIGNIFICANT CHANGE UP (ref 10–20)
CALCIUM SERPL-MCNC: 9.2 MG/DL — SIGNIFICANT CHANGE UP (ref 8.5–10.1)
CHLORIDE SERPL-SCNC: 106 MMOL/L — SIGNIFICANT CHANGE UP (ref 98–110)
CO2 SERPL-SCNC: 23 MMOL/L — SIGNIFICANT CHANGE UP (ref 17–32)
CREAT SERPL-MCNC: 0.6 MG/DL — LOW (ref 0.7–1.5)
EGFR: 112 ML/MIN/1.73M2 — SIGNIFICANT CHANGE UP
EOSINOPHIL # BLD AUTO: 0.27 K/UL — SIGNIFICANT CHANGE UP (ref 0–0.7)
EOSINOPHIL NFR BLD AUTO: 3.4 % — SIGNIFICANT CHANGE UP (ref 0–8)
GLUCOSE BLDC GLUCOMTR-MCNC: 111 MG/DL — HIGH (ref 70–99)
GLUCOSE BLDC GLUCOMTR-MCNC: 111 MG/DL — HIGH (ref 70–99)
GLUCOSE BLDC GLUCOMTR-MCNC: 124 MG/DL — HIGH (ref 70–99)
GLUCOSE BLDC GLUCOMTR-MCNC: 146 MG/DL — HIGH (ref 70–99)
GLUCOSE BLDC GLUCOMTR-MCNC: 185 MG/DL — HIGH (ref 70–99)
GLUCOSE BLDC GLUCOMTR-MCNC: 191 MG/DL — HIGH (ref 70–99)
GLUCOSE BLDC GLUCOMTR-MCNC: 313 MG/DL — HIGH (ref 70–99)
GLUCOSE BLDC GLUCOMTR-MCNC: 444 MG/DL — HIGH (ref 70–99)
GLUCOSE SERPL-MCNC: 114 MG/DL — HIGH (ref 70–99)
HCT VFR BLD CALC: 43.9 % — SIGNIFICANT CHANGE UP (ref 42–52)
HGB BLD-MCNC: 14.8 G/DL — SIGNIFICANT CHANGE UP (ref 14–18)
IMM GRANULOCYTES NFR BLD AUTO: 0.4 % — HIGH (ref 0.1–0.3)
LYMPHOCYTES # BLD AUTO: 2.44 K/UL — SIGNIFICANT CHANGE UP (ref 1.2–3.4)
LYMPHOCYTES # BLD AUTO: 31.1 % — SIGNIFICANT CHANGE UP (ref 20.5–51.1)
MAGNESIUM SERPL-MCNC: 1.9 MG/DL — SIGNIFICANT CHANGE UP (ref 1.8–2.4)
MCHC RBC-ENTMCNC: 29.8 PG — SIGNIFICANT CHANGE UP (ref 27–31)
MCHC RBC-ENTMCNC: 33.7 G/DL — SIGNIFICANT CHANGE UP (ref 32–37)
MCV RBC AUTO: 88.3 FL — SIGNIFICANT CHANGE UP (ref 80–94)
MONOCYTES # BLD AUTO: 0.55 K/UL — SIGNIFICANT CHANGE UP (ref 0.1–0.6)
MONOCYTES NFR BLD AUTO: 7 % — SIGNIFICANT CHANGE UP (ref 1.7–9.3)
NEUTROPHILS # BLD AUTO: 4.49 K/UL — SIGNIFICANT CHANGE UP (ref 1.4–6.5)
NEUTROPHILS NFR BLD AUTO: 57.2 % — SIGNIFICANT CHANGE UP (ref 42.2–75.2)
NRBC # BLD: 0 /100 WBCS — SIGNIFICANT CHANGE UP (ref 0–0)
PLATELET # BLD AUTO: 300 K/UL — SIGNIFICANT CHANGE UP (ref 130–400)
POTASSIUM SERPL-MCNC: 3.8 MMOL/L — SIGNIFICANT CHANGE UP (ref 3.5–5)
POTASSIUM SERPL-SCNC: 3.8 MMOL/L — SIGNIFICANT CHANGE UP (ref 3.5–5)
PROT SERPL-MCNC: 6.8 G/DL — SIGNIFICANT CHANGE UP (ref 6–8)
RBC # BLD: 4.97 M/UL — SIGNIFICANT CHANGE UP (ref 4.7–6.1)
RBC # FLD: 13.3 % — SIGNIFICANT CHANGE UP (ref 11.5–14.5)
SODIUM SERPL-SCNC: 142 MMOL/L — SIGNIFICANT CHANGE UP (ref 135–146)
WBC # BLD: 7.85 K/UL — SIGNIFICANT CHANGE UP (ref 4.8–10.8)
WBC # FLD AUTO: 7.85 K/UL — SIGNIFICANT CHANGE UP (ref 4.8–10.8)

## 2022-04-07 PROCEDURE — 99232 SBSQ HOSP IP/OBS MODERATE 35: CPT

## 2022-04-07 PROCEDURE — 99233 SBSQ HOSP IP/OBS HIGH 50: CPT | Mod: GC

## 2022-04-07 RX ORDER — SERTRALINE 25 MG/1
1 TABLET, FILM COATED ORAL
Qty: 30 | Refills: 0
Start: 2022-04-07 | End: 2022-05-06

## 2022-04-07 RX ORDER — AMLODIPINE BESYLATE 2.5 MG/1
5 TABLET ORAL AT BEDTIME
Refills: 0 | Status: DISCONTINUED | OUTPATIENT
Start: 2022-04-07 | End: 2022-04-08

## 2022-04-07 RX ORDER — PREGABALIN 225 MG/1
1 CAPSULE ORAL
Qty: 30 | Refills: 0
Start: 2022-04-07 | End: 2022-05-06

## 2022-04-07 RX ORDER — LEVETIRACETAM 250 MG/1
1 TABLET, FILM COATED ORAL
Qty: 60 | Refills: 0
Start: 2022-04-07 | End: 2022-05-06

## 2022-04-07 RX ORDER — FOLIC ACID 0.8 MG
1 TABLET ORAL
Qty: 30 | Refills: 0
Start: 2022-04-07 | End: 2022-05-06

## 2022-04-07 RX ORDER — LEVETIRACETAM 250 MG/1
500 TABLET, FILM COATED ORAL EVERY 12 HOURS
Refills: 0 | Status: DISCONTINUED | OUTPATIENT
Start: 2022-04-07 | End: 2022-04-08

## 2022-04-07 RX ADMIN — CLOPIDOGREL BISULFATE 75 MILLIGRAM(S): 75 TABLET, FILM COATED ORAL at 11:47

## 2022-04-07 RX ADMIN — Medication 1 MILLIGRAM(S): at 11:46

## 2022-04-07 RX ADMIN — MEMANTINE HYDROCHLORIDE 10 MILLIGRAM(S): 10 TABLET ORAL at 05:45

## 2022-04-07 RX ADMIN — ATORVASTATIN CALCIUM 80 MILLIGRAM(S): 80 TABLET, FILM COATED ORAL at 21:48

## 2022-04-07 RX ADMIN — SERTRALINE 50 MILLIGRAM(S): 25 TABLET, FILM COATED ORAL at 11:46

## 2022-04-07 RX ADMIN — PREGABALIN 1000 MICROGRAM(S): 225 CAPSULE ORAL at 11:47

## 2022-04-07 RX ADMIN — MEMANTINE HYDROCHLORIDE 10 MILLIGRAM(S): 10 TABLET ORAL at 17:22

## 2022-04-07 RX ADMIN — INSULIN GLARGINE 24 UNIT(S): 100 INJECTION, SOLUTION SUBCUTANEOUS at 21:48

## 2022-04-07 RX ADMIN — Medication 0.5 MILLIGRAM(S): at 05:47

## 2022-04-07 RX ADMIN — Medication 0.5 MILLIGRAM(S): at 20:27

## 2022-04-07 RX ADMIN — Medication 8 UNIT(S): at 17:33

## 2022-04-07 RX ADMIN — Medication 50 MILLIGRAM(S): at 05:45

## 2022-04-07 RX ADMIN — LISINOPRIL 40 MILLIGRAM(S): 2.5 TABLET ORAL at 05:45

## 2022-04-07 RX ADMIN — AMLODIPINE BESYLATE 5 MILLIGRAM(S): 2.5 TABLET ORAL at 21:48

## 2022-04-07 RX ADMIN — ENOXAPARIN SODIUM 40 MILLIGRAM(S): 100 INJECTION SUBCUTANEOUS at 11:47

## 2022-04-07 RX ADMIN — LEVETIRACETAM 500 MILLIGRAM(S): 250 TABLET, FILM COATED ORAL at 18:40

## 2022-04-07 RX ADMIN — DONEPEZIL HYDROCHLORIDE 20 MILLIGRAM(S): 10 TABLET, FILM COATED ORAL at 17:23

## 2022-04-07 RX ADMIN — LEVETIRACETAM 400 MILLIGRAM(S): 250 TABLET, FILM COATED ORAL at 05:44

## 2022-04-07 NOTE — DISCHARGE NOTE PROVIDER - NSDCMRMEDTOKEN_GEN_ALL_CORE_FT
Admelog 100 units/mL injectable solution: 8 unit(s) injectable 3 times a day (before meals)  atorvastatin 80 mg oral tablet: 1 tab(s) orally once a day (at bedtime)  clopidogrel 75 mg oral tablet: 1 tab(s) orally once a day  donepezil 10 mg oral tablet: 2 tab(s) orally once a day at 5PM  insulin glargine: 30 unit(s) subcutaneous once a day (at bedtime)  lisinopril 40 mg oral tablet: 1 tab(s) orally once a day  metoprolol succinate 50 mg oral tablet, extended release: 1 tab(s) orally once a day  Namenda 10 mg oral tablet: 1 tab(s) orally 2 times a day  Senna 8.6 mg oral tablet: orally once a day  Zetia 10 mg oral tablet: 1 tab(s) orally once a day   Admelog 100 units/mL injectable solution: 8 unit(s) injectable 3 times a day (before meals)  amLODIPine 5 mg oral tablet: 1 tab(s) orally once a day (at bedtime)  atorvastatin 80 mg oral tablet: 1 tab(s) orally once a day (at bedtime)  clopidogrel 75 mg oral tablet: 1 tab(s) orally once a day  cyanocobalamin 1000 mcg oral tablet: 1 tab(s) orally once a day  donepezil 10 mg oral tablet: 2 tab(s) orally once a day at 5PM  folic acid 1 mg oral tablet: 1 tab(s) orally once a day  insulin glargine: 30 unit(s) subcutaneous once a day (at bedtime)  Keppra 500 mg oral tablet: 1 tab(s) orally 2 times a day  lisinopril 40 mg oral tablet: 1 tab(s) orally once a day  metoprolol succinate 50 mg oral tablet, extended release: 1 tab(s) orally once a day  Namenda 10 mg oral tablet: 1 tab(s) orally 2 times a day  Senna 8.6 mg oral tablet: orally once a day  sertraline 50 mg oral tablet: 1 tab(s) orally once a day  Zetia 10 mg oral tablet: 1 tab(s) orally once a day

## 2022-04-07 NOTE — PROGRESS NOTE BEHAVIORAL HEALTH - CASE SUMMARY
Follow up done today revealed marked improvement in stiffness. He is more reactive, smile during interview. There is notable underlying memory deficit, as noted by delayed recall of 0/3. Improvement in movement is also seen. The presentation of this patient’s acute change in mental status to include Catatonia, does not appear to be related to a primary psychiatric disorder, but more likely a component of an underlying medical condition. Recommendation is for patient to continue sertraline 50mg po every morning, no indication for continued Ativan. No concern for withdrawal, since patient only received four doses, therefore it may be discontinued. Upon discharge from the hospital, patient can be referred to [facility] outpatient psychiatry service located at [address] [place] [address], [id number], [phone],

## 2022-04-07 NOTE — PROGRESS NOTE BEHAVIORAL HEALTH - NSBHCHARTREVIEWINVESTIGATE_PSY_A_CORE FT
QTC Calculation(Lucianozejonah) 484 ms    R Axis 33 degrees    T Axis -25 degrees    Diagnosis Line Sinus rhythm  Moderate voltage criteria for LVH, may be normal variant  Possible Inferior infarct , age undetermined  Abnormal ECG

## 2022-04-07 NOTE — DISCHARGE NOTE PROVIDER - HOSPITAL COURSE
HPI:  56 year old male with PMHx of dementia DM, HTN, CVA, COVID in Jan, sent in from [facility] for increased lethargy and verbal unresponsiveness. Pt's last known well time was at 12 pm. Pt is a poor historian and not answering any questions.  At the time of presentation to the ED, NIHSS was 16. Code stroke was called. Shortly after, patient was awake and responsive but not answering any questions.. very poor historian.    CTH, CTP, CTA Head & Neck with no significant change.  Off note patient was recently admitted for similar symptoms.  Seen and evaluated in the ED by neurology.  Admit for further workup and management. (31 Mar 2022 00:01)    56 year old male with PMH  of DM, HTN, CVA, COVID in Jan, sent in from [facility] for unresponsiveness.        # AMS/ ??vascular dementia/   #h/o CVA  - s/p Code Stroke 03/30 and 04/05  - NIHSS at presentation 16 >>> NIHSS shortly after was 1  - Neuro onboard  - CTH, CTP, CTA Head & Neck unchanged  - MRI - chronic microvascular changes. Chronic R PCA territory and L Thalamic Lacunar Infarct  - CTH/CTA 04/05: Chronic right PCA and left thalamic infarcts.  - REEG was negative for epileptiform activity   - vEEG no seizure however aborted by Pt 2 hrs after initiation  - supportive care, aspiration and fall precautions  - Thyroid Stimulating Hormone, Serum: 0.72 uIU/mL (03.31.22 @ 04:30)  - T4, Serum: 5.3 ug/dL (04.05.22 @ 08:00)  - Treponema Pallidum Antibody Interpretation: Negative:    - HIV-1/2 Combo Result: Nonreactive  - Vitamin B12, Serum: 464 pg/mL (04.05.22 @ 08:00)  - Folate, Serum: 3.4 ng/mL (04.05.22 @ 08:00)  - c/w Plavix and atorvastatin   - on Donepezil and Namenda (unsure benefit)  - start Folate and Vitamin supplement     #Catatonia w/ depression - as per Psych  - Ativan 0.5mg QD 6am and 8pm  - Sertraline 50mg Qam   - f/u w/ Outpt Psych    #HTN --- elevated today as pt refused medication  - DASH diet   - Lisinopril 40mg Qd and Metoprolol Succ 50mg QD  - will give stat doses of medication when more compliant    #DM type II  - A1C with Estimated Average Glucose Result: 7.8:   - carb consistent diet   - Lantus 24U HS , Lispro 8U AC, ss  - FS ACHS    #Dyslipidemia - Atorvastatin 80mg HS  # Hypovolemic hypernatremia - resolved - monitor Na level          HPI:  56 year old male with PMHx of dementia DM, HTN, CVA, COVID in Jan, sent in from [facility] residence for increased lethargy and verbal unresponsiveness. Pt's last known well time was at 12 pm. Pt is a poor historian and not answering any questions.  At the time of presentation to the ED, NIHSS was 16. Code stroke was called. Shortly after, patient was awake and responsive but not answering any questions.. very poor historian.    CTH, CTP, CTA Head & Neck with no significant change.  Off note patient was recently admitted for similar symptoms.  Seen and evaluated in the ED by neurology.  Admit for further workup and management. (31 Mar 2022 00:01)    56 year old male with PMH  of DM, HTN, CVA, COVID in Jan, sent in from [facility] residence for unresponsiveness. He managed in the hospital for 9 days for AMS/ ??vascular dementia. He has h/o stroke in the past confirmed by images. CTH/CTA 04/05: Chronic right PCA and left thalamic infarcts. MRI - chronic microvascular changes. Chronic R PCA territory and L Thalamic Lacunar Infarct. Code STroke was initiated x 2 during this admission. NIHSS at presentation 16 >>> NIHSS shortly after was 1. REEG was negative for epileptiform activity. vEEG no seizure however aborted by Pt 2 hrs after initiation. Pt received supportive care, aspiration and fall precautions. He was started on Keppra for short course of interictal spikes. He was also observed by psychiatry for Catatonia w/ depression and received- a short course of  Ativan and started on Sertraline. Folate and B12 was insufficient and he was placed on oral daily replacement. Hospital course was complicated by persistent HTN which was adjusted w/ added medication. Pt began to have poor appetite and required encouragement and dietician support.     Mr. Jj is hemodynamically stable however, at baseline of AOx1 to name only. He is cleared to return to his long-term care facility by the Primary care team, Neurologist and Psychiatrist.

## 2022-04-07 NOTE — DISCHARGE NOTE PROVIDER - NSDCFUADDAPPT_GEN_ALL_CORE_FT
Your spouse will receive a phone call from the [facility] psych for follow up at [address] [place] [address], [place], [place], [address] [phone]   Your spouse will receive a phone call from the [facility] psych for follow up at [address] [place] [address], [address] [phone].    Please continue to see the medical provider at your long-term care facility for continuity of care

## 2022-04-07 NOTE — DISCHARGE NOTE PROVIDER - PROVIDER TOKENS
FREE:[LAST:[Psychiatry],PHONE:[phone]],FAX:[(   )    -],ADDRESS:[[address] [place] [address]],FOLLOWUP:[1 month]] FREE:[LAST:[Psychiatry],PHONE:[phone]],FAX:[(   )    -],ADDRESS:[[address] [place] [address]],FOLLOWUP:[1 month]],PROVIDER:[TOKEN:[[id number]],FOLLOWUP:[1 month]]

## 2022-04-07 NOTE — PROGRESS NOTE BEHAVIORAL HEALTH - NSBHFUPREASONCONS_PSY_A_CORE
"2018    RE: Vladimir Morse, : 1957      HPI:  Vladimir presents today for a lump on his lower back for \" decades\". He denies trauma at to the site.  He has had  no drainage from the site in the past.  He has no history of  infection.  It is intermittently and increasingly painful.  It's size is slowly increasing.     PE:  There were no vitals taken for this visit.  General appearance: well-nourished, no apparent distress  Lungs: respirations unlabored  Neurologic: nonfocal, grossly intact times four extremities, alert and oriented times three  Psychiatric: Mood and affect are appropriate  Skin: There is a 9 cm subcutaneous mass on the lower back.  The overlying skin is  normal with the exception of a raised pigmented skin lesion that seems most likely to be a pigmented seborrheic keratosis.  It is mildly tender.       Impression: enlarging and increasingly symptomatic subcutaneous mass, pigmented skin lesion      Plan:  We will schedule excision of both the mass and the skin lesion at his convenience. Discussed incision, scar, recurrence, bleeding, seroma and recovery.      Prosper Loving MD  " other...

## 2022-04-07 NOTE — PROGRESS NOTE BEHAVIORAL HEALTH - NSBHFUPINTERVALHXFT_PSY_A_CORE
Patient seen and examined today. Appears to be more awake, and cooperative. He reports feeling good and having good spirit. He reports also having breakfast.  Again noted abnormal R side UE movements and lip smacking. Patient's recent memory seems to be impaired with 0/3 recall in 5 mins. Chart review showed that he has been compliant with ativan and sertraline. Patient was able to identify clear reasons for living "Family", denies SI. Patient seen and examined today. Appears to be more awake, and cooperative. He reports feeling good and having good spirit. He reports also having breakfast.  Again noted abnormal R side UE movements and lip smacking. Patient's recent memory seems to be impaired with 0/3 recall in 5 mins. Chart review showed that he has been compliant with ativan and sertraline. Patient was able to identify clear reasons for living "Family", denies suicidal ideation.

## 2022-04-07 NOTE — DISCHARGE NOTE PROVIDER - NSDCCPCAREPLAN_GEN_ALL_CORE_FT
PRINCIPAL DISCHARGE DIAGNOSIS  Diagnosis: Altered mental status  Assessment and Plan of Treatment: You current metal satus is to name only. This may have been a residual effect of yur stroke history in combination with your history of depression. You were evaluated by both the neurologis and the psychiatrist. The neurologist suspected seizures and you were started on an Antiepileptic madication.      SECONDARY DISCHARGE DIAGNOSES  Diagnosis: MDD (major depressive disorder), recurrent, with catatonic features  Assessment and Plan of Treatment: You were seen by the Physchiatrist who recommended a short course of Ativan as well as started you on Antidepressive medication. You will follow up outpatient with the psychiatry team.    Diagnosis: HTN (hypertension)  Assessment and Plan of Treatment: We added a new medicaion to your current therapy. Please adhere to your medications as prescribed.

## 2022-04-07 NOTE — PROGRESS NOTE ADULT - ASSESSMENT
56 year old male with PMH  of DM, HTN, CVA, COVID in Jan, sent in from [facility] residence for unresponsiveness.    # AMS/ ??vascular dementia/   #h/o CVA  - s/p Code Stroke 03/30 and 04/05  - NIHSS at presentation 16 >>> NIHSS shortly after was 1  - Neuro onboard  - CTH, CTP, CTA Head & Neck unchanged  - MRI - chronic microvascular changes. Chronic R PCA territory and L Thalamic Lacunar Infarct  - CTH/CTA 04/05: Chronic right PCA and left thalamic infarcts.  - REEG was negative for epileptiform activity   - vEEG no seizure however aborted by Pt 2 hrs after initiation  - supportive care, aspiration and fall precautions  - Thyroid Stimulating Hormone, Serum: 0.72 uIU/mL (03.31.22 @ 04:30)  - T4, Serum: 5.3 ug/dL (04.05.22 @ 08:00)  - Treponema Pallidum Antibody Interpretation: Negative:    - HIV-1/2 Combo Result: Nonreactive  - Vitamin B12, Serum: 464 pg/mL (04.05.22 @ 08:00)  - Folate, Serum: 3.4 ng/mL (04.05.22 @ 08:00)  - c/w Plavix and atorvastatin   - on Donepezil and Namenda (unsure benefit)  - cont Folate and Vitamin supplement   - f/u w/ Neuro Outpt     #Catatonia w/ depression - as per Psych  - Ativan 0.5mg QD 6am and 8pm  - Sertraline 50mg Qam   - f/u w/ Outpt Psych  - d/c Ativan upon discharge       #HTN   - DASH diet   - Lisinopril 40mg Qd and Metoprolol Succ 50mg QD  - will stArt trial of Amlodipine QHS    #DM type II  - A1C with Estimated Average Glucose Result: 7.8:   - carb consistent diet   - Lantus 24U HS , Lispro 8U AC, ss  - FS ACHS  - poor diet likely behavioral --- will encourage PO intake     #Dyslipidemia - Atorvastatin 80mg HS  # Hypovolemic hypernatremia - resolved - monitor Na level     Diet: DASH/CC  Activity: IAT  DVT Prophylaxis: lovenox  GI Prophylaxis: pantoprazole  Code Status: Full  Dispo: [facility]  56 year old male with PMH  of DM, HTN, CVA, COVID in Jan, sent in from [facility] residence for unresponsiveness.    # AMS/ ??vascular dementia/   #h/o CVA  - s/p Code Stroke 03/30 and 04/05  - NIHSS at presentation 16 >>> NIHSS shortly after was 1  - Neuro onboard  - CTH, CTP, CTA Head & Neck unchanged  - MRI - chronic microvascular changes. Chronic R PCA territory and L Thalamic Lacunar Infarct  - CTH/CTA 04/05: Chronic right PCA and left thalamic infarcts.  - REEG was negative for epileptiform activity   - vEEG no seizure however aborted by Pt 2 hrs after initiation  - supportive care, aspiration and fall precautions  - Thyroid Stimulating Hormone, Serum: 0.72 uIU/mL (03.31.22 @ 04:30)  - T4, Serum: 5.3 ug/dL (04.05.22 @ 08:00)  - Treponema Pallidum Antibody Interpretation: Negative:    - HIV-1/2 Combo Result: Nonreactive  - Vitamin B12, Serum: 464 pg/mL (04.05.22 @ 08:00)  - Folate, Serum: 3.4 ng/mL (04.05.22 @ 08:00)  - c/w Plavix and atorvastatin   - on Donepezil and Namenda (unsure benefit)  - cont Folate and Vitamin supplement   - f/u w/ Neuro Outpt   - f/u BMP, Prolactin, CPK, Lactate    #Catatonia w/ depression - as per Psych  - Ativan 0.5mg QD 6am and 8pm  - Sertraline 50mg Qam   - f/u w/ Outpt Psych  - d/c Ativan upon discharge       #HTN   - DASH diet   - Lisinopril 40mg Qd and Metoprolol Succ 50mg QD  - will stArt trial of Amlodipine QHS    #DM type II  - A1C with Estimated Average Glucose Result: 7.8:   - carb consistent diet   - Lantus 24U HS , Lispro 8U AC, ss  - FS ACHS  - poor diet likely behavioral --- will encourage PO intake     #Dyslipidemia - Atorvastatin 80mg HS  # Hypovolemic hypernatremia - resolved - monitor Na level     Diet: DASH/CC  Activity: IAT  DVT Prophylaxis: lovenox  GI Prophylaxis: pantoprazole  Code Status: Full  Dispo: [facility]

## 2022-04-07 NOTE — PROGRESS NOTE ADULT - SUBJECTIVE AND OBJECTIVE BOX
ALEA MORRIS  56y  Male      Patient is a 56y old  Male who presents with a chief complaint of AMS (07 Apr 2022 14:13)      INTERVAL HPI/OVERNIGHT EVENTS: nods yes/ no to questioning with me today. nods no to SI/HI. denies being depressed at the moment. but not verbalizing like yesterday with me today      REVIEW OF SYSTEMS:  limited as above  All other review of systems negative    T(C): 36.9 (04-07-22 @ 13:30), Max: 36.9 (04-07-22 @ 13:30)  HR: 76 (04-07-22 @ 13:30) (59 - 76)  BP: 151/93 (04-07-22 @ 13:30) (131/60 - 164/77)  RR: 18 (04-07-22 @ 13:30) (18 - 18)  SpO2: 99% (04-07-22 @ 07:30) (97% - 99%)  Wt(kg): --Vital Signs Last 24 Hrs  T(C): 36.9 (07 Apr 2022 13:30), Max: 36.9 (07 Apr 2022 13:30)  T(F): 98.4 (07 Apr 2022 13:30), Max: 98.4 (07 Apr 2022 13:30)  HR: 76 (07 Apr 2022 13:30) (59 - 76)  BP: 151/93 (07 Apr 2022 13:30) (131/60 - 164/77)  BP(mean): --  RR: 18 (07 Apr 2022 13:30) (18 - 18)  SpO2: 99% (07 Apr 2022 07:30) (97% - 99%)      04-06-22 @ 07:01  -  04-07-22 @ 07:00  --------------------------------------------------------  IN: 0 mL / OUT: 1 mL / NET: -1 mL        PHYSICAL EXAM:  GENERAL: NAD  PSYCH: avoids eye contact, not verbalizing today, less stiff  NERVOUS SYSTEM:  awake, forgetful, no new focal deficits  PULMONARY: Clear to percussion bilaterally; No rales, rhonchi, wheezing, or rubs  CARDIOVASCULAR: Regular rate and rhythm; No murmurs, rubs, or gallops  GI: Soft, Nontender, Nondistended; Bowel sounds present thin  EXTREMITIES:  2+ Peripheral Pulses, No clubbing, cyanosis, or edema    Consultant(s) Notes Reviewed:  [x ] YES  [ ] NO    Discussed with Consultants/Other Providers [ x] YES     LABS                          14.8   7.85  )-----------( 300      ( 07 Apr 2022 11:00 )             43.9     04-07    142  |  106  |  11  ----------------------------<  114<H>  3.8   |  23  |  0.6<L>    Ca    9.2      07 Apr 2022 11:00  Mg     1.9     04-07    TPro  6.8  /  Alb  3.7  /  TBili  0.5  /  DBili  x   /  AST  26  /  ALT  40  /  AlkPhos  94  04-07          Lactate Trend        CAPILLARY BLOOD GLUCOSE      POCT Blood Glucose.: 146 mg/dL (07 Apr 2022 16:50)        RADIOLOGY & ADDITIONAL TESTS:    Imaging Personally Reviewed:  [ ] YES  [ ] NO    HEALTH ISSUES - PROBLEM Dx:  Delirium due to another medical condition    Catatonia

## 2022-04-07 NOTE — PROGRESS NOTE BEHAVIORAL HEALTH - NSBHCHARTREVIEWLAB_PSY_A_CORE FT
13.6   6.98  )-----------( 283      ( 05 Apr 2022 08:00 )             40.4     04-05    144  |  108  |  15  ----------------------------<  118<H>  4.0   |  25  |  0.6<L>    Ca    8.7      05 Apr 2022 08:00  Phos  3.7     04-05  Mg     1.9     04-05    TPro  6.1  /  Alb  3.4<L>  /  TBili  0.3  /  DBili  x   /  AST  31  /  ALT  32  /  AlkPhos  86  04-04    PT/INR - ( 04 Apr 2022 23:44 )   PT: 12.50 sec;   INR: 1.09 ratio         PTT - ( 04 Apr 2022 23:44 )  PTT:36.9 sec

## 2022-04-07 NOTE — DISCHARGE NOTE PROVIDER - CARE PROVIDER_API CALL
Psychiatry,   [address] [facility] [address]  Phone: [phone]  Fax: (   )    -  Follow Up Time: 1 month   Psychiatry,   [address] [address]  Phone: [phone]  Fax: (   )    -  Follow Up Time: 1 month    Ector Garcia)  Neurology  [address] [place] [address], [facility] [address]  [place] [address]  Phone: [phone]  Fax: [phone]  Follow Up Time: 1 month

## 2022-04-07 NOTE — PROGRESS NOTE ADULT - SUBJECTIVE AND OBJECTIVE BOX
Hospital Day:  8d    Subjective: Patient is a 56y old  Male who presents with a chief complaint of ams (07 Apr 2022 17:35)      Pt seen and evaluated at bedside. Pt is AOx1  Complaints: none  Over the night Events: none    Past Medical Hx:   Diabetes    Hypertension    Pancreatitis    CVA (cerebral vascular accident)      Past Sx:  No significant past surgical history      Allergies:  No Known Allergies    Current Meds:   Standng Meds:  atorvastatin 80 milliGRAM(s) Oral at bedtime  clopidogrel Tablet 75 milliGRAM(s) Oral daily  cyanocobalamin 1000 MICROGram(s) Oral daily  dextrose 5%. 1000 milliLiter(s) (100 mL/Hr) IV Continuous <Continuous>  dextrose 5%. 1000 milliLiter(s) (50 mL/Hr) IV Continuous <Continuous>  dextrose 50% Injectable 25 Gram(s) IV Push once  dextrose 50% Injectable 12.5 Gram(s) IV Push once  dextrose 50% Injectable 25 Gram(s) IV Push once  donepezil 20 milliGRAM(s) Oral <User Schedule>  enoxaparin Injectable 40 milliGRAM(s) SubCutaneous every 24 hours  folic acid 1 milliGRAM(s) Oral daily  glucagon  Injectable 1 milliGRAM(s) IntraMuscular once  insulin glargine Injectable (LANTUS) 24 Unit(s) SubCutaneous at bedtime  insulin lispro (ADMELOG) corrective regimen sliding scale   SubCutaneous three times a day before meals  insulin lispro Injectable (ADMELOG) 8 Unit(s) SubCutaneous three times a day before meals  levETIRAcetam 500 milliGRAM(s) Oral every 12 hours  lisinopril 40 milliGRAM(s) Oral daily  LORazepam     Tablet 0.5 milliGRAM(s) Oral <User Schedule>  memantine 10 milliGRAM(s) Oral two times a day  metoprolol succinate ER 50 milliGRAM(s) Oral daily  sertraline 50 milliGRAM(s) Oral daily    PRN Meds:  dextrose Oral Gel 15 Gram(s) Oral once PRN Blood Glucose LESS THAN 70 milliGRAM(s)/deciliter      Vital Signs:   T(F): 98.4 (04-07-22 @ 13:30), Max: 98.4 (04-07-22 @ 13:30)  HR: 76 (04-07-22 @ 13:30) (59 - 76)  BP: 151/93 (04-07-22 @ 13:30) (131/60 - 164/77)  RR: 18 (04-07-22 @ 13:30) (18 - 18)  SpO2: 99% (04-07-22 @ 07:30) (97% - 99%)    Physical Exam:   GENERAL: NAD, Resting in bed  HEENT: forehead lump, clear conjunctiva and sclera  CHEST/LUNG: Clear to auscultation bilaterally; No wheezing or rubs.   HEART: Regular rate and rhythm; No murmurs, rubs, or gallops  ABDOMEN: Bowel sounds present; Soft, Nontender, Nondistended.   EXTREMITIES:  No clubbing, cyanosis, or edema  NERVOUS SYSTEM:  Alert & Oriented x1    FLUID BALANCE    04-06-22 @ 07:01  -  04-07-22 @ 07:00  --------------------------------------------------------  IN: 0 mL / OUT: 1 mL / NET: -1 mL        Labs:                         14.8   7.85  )-----------( 300      ( 07 Apr 2022 11:00 )             43.9     Neutophil% 57.2, Lymphocyte% 31.1, Monocyte% 7.0, Bands% 0.4 04-07-22 @ 11:00    07 Apr 2022 11:00    142    |  106    |  11     ----------------------------<  114    3.8     |  23     |  0.6      Ca    9.2        07 Apr 2022 11:00  Mg     1.9       07 Apr 2022 11:00    TPro  6.8    /  Alb  3.7    /  TBili  0.5    /  DBili  x      /  AST  26     /  ALT  40     /  AlkPhos  94     07 Apr 2022 11:00              Troponin <0.01, CKMB --, CK -- 04-04-22 @ 23:44                          Radiology:

## 2022-04-07 NOTE — PROGRESS NOTE BEHAVIORAL HEALTH - SUMMARY
56 year old male,  disabled on SSD, used to work as , ,  then remarried 2002, currently resides in [facility] residence for assisted living, has a PMH of HTN, DM2, recurrent strokes (Lt thalamic 2021, R occipital 2020), dementia, brought to hospital for AMS and unresponsiveness, admitted under care of medicine team for further workup. Psychiatry team consulted for concerns of catatonia.    Patient seen and examined today, more cooperative, but appears to be apathic. He does have depressive worsening since his first stroke, but currently reports feeling good, and clearly identifies reasons to go on. He denies active SI. He he has features of catatonia including recurrent immobility, stupor, mutism, staring with echolalia. However, the presence of catatonia appears to be in the context of underlying medical condition rather than  a primary psychiatric disorder. His previous stroke history might have contribute as well, as it would predispose him to encephalopathy secondary to thalamic disconnection with higher cortical centers. At the current time, no concern of acute manic episode or depressive symptoms. Patient doesn't impose harm to self or to others.    Recommendation:  Avoid antipsychotic drugs  Discontinue ativan on discharge  Continue Zoloft 50 mg PO daily  OPD psych follow up at [address] [place] [address], [id number] [phone] 56 year old male,  disabled on SSD, used to work as , ,  then remarried 2002, currently resides in [facility] residence for assisted living, has a PMH of HTN, DM2, recurrent strokes (Lt thalamic 2021, R occipital 2020), dementia, brought to hospital for AMS and unresponsiveness, admitted under care of medicine team for further workup. Psychiatry team consulted for concerns of catatonia.    Patient seen and examined today, more cooperative, but appears to be apathic. He does have depressive worsening since his first stroke, but currently reports feeling good, and clearly identifies reasons to go on. He denies active suicidal ideation. Hehas features of catatonia including recurrent immobility, stupor, mutism, staring with echolalia. However, the presence of catatonia appears to be in the context of underlying medical condition rather than  a primary psychiatric disorder. His previous stroke history might have contribute as well, as it would predispose him to encephalopathy secondary to thalamic disconnection with higher cortical centers. At the current time, no concern of acute manic episode or depressive symptoms. Patient doesn't impose harm to self or to others.    Recommendation:  Avoid antipsychotic drugs  Discontinue ativan on discharge  Continue Zoloft 50 mg PO daily  OPD psych follow up at [address] [place] [address], [phone]

## 2022-04-07 NOTE — PROGRESS NOTE ADULT - PROVIDER SPECIALTY LIST ADULT
Internal Medicine
Internal Medicine
Hospitalist
Hospitalist
Internal Medicine
Neurology
Neurology
Internal Medicine
Hospitalist
Hospitalist
Neurology
Hospitalist
Hospitalist

## 2022-04-07 NOTE — PROGRESS NOTE ADULT - ASSESSMENT
Patient seen and examined independently of resident. My addendum supersedes resident notation. Case discussed with housestaff, nursing and patient    56y m hx dementia, dm2, htn, cva, covid, from [facility] p/w unresponsive episodes- ? seizures c post ictal confusion, hypovolemic hypernatremia from poor oral intake, depression c catatonic features    eeg c r ft sharps, c/w keppra per neuro recs, seizure precautions  checking lactate/ cpk/ prolactin  c/w setraline 50mg po qd and ativan 0.5 mg po bid (stop after 6 doses) c behavioral guidance  vte ppx  f/u na and po intake  f/u mental status  appreciated neuro/ psych recs    Provider Hand off  Pending- f/u MS overnight as less verbally interactive than prior, if eating, verbal, and less catatonic likely for d/c tomorrow, screening serology for serologic evidence of possible overnight subclinical seizure; anticipating for discharge for tomorrow  Disposition- danny parr? probably tomorrow

## 2022-04-08 ENCOUNTER — TRANSCRIPTION ENCOUNTER (OUTPATIENT)
Age: 57
End: 2022-04-08

## 2022-04-08 VITALS
SYSTOLIC BLOOD PRESSURE: 156 MMHG | HEART RATE: 64 BPM | RESPIRATION RATE: 18 BRPM | OXYGEN SATURATION: 95 % | TEMPERATURE: 98 F | DIASTOLIC BLOOD PRESSURE: 76 MMHG

## 2022-04-08 LAB
ALBUMIN SERPL ELPH-MCNC: 3.5 G/DL — SIGNIFICANT CHANGE UP (ref 3.5–5.2)
ALP SERPL-CCNC: 85 U/L — SIGNIFICANT CHANGE UP (ref 30–115)
ALT FLD-CCNC: 39 U/L — SIGNIFICANT CHANGE UP (ref 0–41)
ANION GAP SERPL CALC-SCNC: 10 MMOL/L — SIGNIFICANT CHANGE UP (ref 7–14)
AST SERPL-CCNC: 24 U/L — SIGNIFICANT CHANGE UP (ref 0–41)
BASOPHILS # BLD AUTO: 0.08 K/UL — SIGNIFICANT CHANGE UP (ref 0–0.2)
BASOPHILS NFR BLD AUTO: 1.1 % — HIGH (ref 0–1)
BILIRUB SERPL-MCNC: 0.6 MG/DL — SIGNIFICANT CHANGE UP (ref 0.2–1.2)
BUN SERPL-MCNC: 11 MG/DL — SIGNIFICANT CHANGE UP (ref 10–20)
CALCIUM SERPL-MCNC: 8.9 MG/DL — SIGNIFICANT CHANGE UP (ref 8.5–10.1)
CHLORIDE SERPL-SCNC: 107 MMOL/L — SIGNIFICANT CHANGE UP (ref 98–110)
CK SERPL-CCNC: 75 U/L — SIGNIFICANT CHANGE UP (ref 0–225)
CO2 SERPL-SCNC: 28 MMOL/L — SIGNIFICANT CHANGE UP (ref 17–32)
CREAT SERPL-MCNC: 0.6 MG/DL — LOW (ref 0.7–1.5)
EGFR: 112 ML/MIN/1.73M2 — SIGNIFICANT CHANGE UP
EOSINOPHIL # BLD AUTO: 0.34 K/UL — SIGNIFICANT CHANGE UP (ref 0–0.7)
EOSINOPHIL NFR BLD AUTO: 4.5 % — SIGNIFICANT CHANGE UP (ref 0–8)
GLUCOSE BLDC GLUCOMTR-MCNC: 103 MG/DL — HIGH (ref 70–99)
GLUCOSE BLDC GLUCOMTR-MCNC: 140 MG/DL — HIGH (ref 70–99)
GLUCOSE BLDC GLUCOMTR-MCNC: 94 MG/DL — SIGNIFICANT CHANGE UP (ref 70–99)
GLUCOSE SERPL-MCNC: 90 MG/DL — SIGNIFICANT CHANGE UP (ref 70–99)
HCT VFR BLD CALC: 40 % — LOW (ref 42–52)
HGB BLD-MCNC: 14.2 G/DL — SIGNIFICANT CHANGE UP (ref 14–18)
IMM GRANULOCYTES NFR BLD AUTO: 0.3 % — SIGNIFICANT CHANGE UP (ref 0.1–0.3)
LACTATE SERPL-SCNC: 0.8 MMOL/L — SIGNIFICANT CHANGE UP (ref 0.7–2)
LYMPHOCYTES # BLD AUTO: 1.97 K/UL — SIGNIFICANT CHANGE UP (ref 1.2–3.4)
LYMPHOCYTES # BLD AUTO: 25.9 % — SIGNIFICANT CHANGE UP (ref 20.5–51.1)
MAGNESIUM SERPL-MCNC: 1.9 MG/DL — SIGNIFICANT CHANGE UP (ref 1.8–2.4)
MCHC RBC-ENTMCNC: 30.7 PG — SIGNIFICANT CHANGE UP (ref 27–31)
MCHC RBC-ENTMCNC: 35.5 G/DL — SIGNIFICANT CHANGE UP (ref 32–37)
MCV RBC AUTO: 86.4 FL — SIGNIFICANT CHANGE UP (ref 80–94)
MONOCYTES # BLD AUTO: 0.6 K/UL — SIGNIFICANT CHANGE UP (ref 0.1–0.6)
MONOCYTES NFR BLD AUTO: 7.9 % — SIGNIFICANT CHANGE UP (ref 1.7–9.3)
NEUTROPHILS # BLD AUTO: 4.6 K/UL — SIGNIFICANT CHANGE UP (ref 1.4–6.5)
NEUTROPHILS NFR BLD AUTO: 60.3 % — SIGNIFICANT CHANGE UP (ref 42.2–75.2)
NRBC # BLD: 0 /100 WBCS — SIGNIFICANT CHANGE UP (ref 0–0)
PLATELET # BLD AUTO: 286 K/UL — SIGNIFICANT CHANGE UP (ref 130–400)
POTASSIUM SERPL-MCNC: 3.6 MMOL/L — SIGNIFICANT CHANGE UP (ref 3.5–5)
POTASSIUM SERPL-SCNC: 3.6 MMOL/L — SIGNIFICANT CHANGE UP (ref 3.5–5)
PROLACTIN SERPL-MCNC: 14.7 NG/ML — SIGNIFICANT CHANGE UP (ref 4.1–18.4)
PROT SERPL-MCNC: 6.2 G/DL — SIGNIFICANT CHANGE UP (ref 6–8)
RBC # BLD: 4.63 M/UL — LOW (ref 4.7–6.1)
RBC # FLD: 13.4 % — SIGNIFICANT CHANGE UP (ref 11.5–14.5)
SODIUM SERPL-SCNC: 145 MMOL/L — SIGNIFICANT CHANGE UP (ref 135–146)
WBC # BLD: 7.61 K/UL — SIGNIFICANT CHANGE UP (ref 4.8–10.8)
WBC # FLD AUTO: 7.61 K/UL — SIGNIFICANT CHANGE UP (ref 4.8–10.8)

## 2022-04-08 PROCEDURE — 99233 SBSQ HOSP IP/OBS HIGH 50: CPT

## 2022-04-08 RX ORDER — LEVETIRACETAM 250 MG/1
1 TABLET, FILM COATED ORAL
Qty: 60 | Refills: 0
Start: 2022-04-08 | End: 2022-05-07

## 2022-04-08 RX ORDER — INSULIN LISPRO 100/ML
8 VIAL (ML) SUBCUTANEOUS
Qty: 0 | Refills: 0 | DISCHARGE

## 2022-04-08 RX ORDER — AMLODIPINE BESYLATE 2.5 MG/1
1 TABLET ORAL
Qty: 30 | Refills: 0
Start: 2022-04-08 | End: 2022-05-07

## 2022-04-08 RX ORDER — INSULIN LISPRO 100/ML
10 VIAL (ML) SUBCUTANEOUS
Qty: 0 | Refills: 0 | DISCHARGE
Start: 2022-04-08

## 2022-04-08 RX ORDER — PREGABALIN 225 MG/1
1 CAPSULE ORAL
Qty: 30 | Refills: 0
Start: 2022-04-08 | End: 2022-05-07

## 2022-04-08 RX ORDER — LISINOPRIL 2.5 MG/1
1 TABLET ORAL
Qty: 30 | Refills: 0
Start: 2022-04-08 | End: 2022-05-07

## 2022-04-08 RX ORDER — INSULIN LISPRO 100/ML
7 VIAL (ML) SUBCUTANEOUS
Qty: 0 | Refills: 0 | DISCHARGE
Start: 2022-04-08

## 2022-04-08 RX ORDER — INSULIN LISPRO 100/ML
8 VIAL (ML) SUBCUTANEOUS
Qty: 2 | Refills: 0
Start: 2022-04-08

## 2022-04-08 RX ORDER — FOLIC ACID 0.8 MG
1 TABLET ORAL
Qty: 30 | Refills: 0
Start: 2022-04-08 | End: 2022-05-07

## 2022-04-08 RX ORDER — SERTRALINE 25 MG/1
1 TABLET, FILM COATED ORAL
Qty: 30 | Refills: 0
Start: 2022-04-08 | End: 2022-05-07

## 2022-04-08 RX ADMIN — CLOPIDOGREL BISULFATE 75 MILLIGRAM(S): 75 TABLET, FILM COATED ORAL at 11:29

## 2022-04-08 RX ADMIN — PREGABALIN 1000 MICROGRAM(S): 225 CAPSULE ORAL at 11:30

## 2022-04-08 RX ADMIN — Medication 0.5 MILLIGRAM(S): at 05:20

## 2022-04-08 RX ADMIN — Medication 1 MILLIGRAM(S): at 11:28

## 2022-04-08 RX ADMIN — MEMANTINE HYDROCHLORIDE 10 MILLIGRAM(S): 10 TABLET ORAL at 18:06

## 2022-04-08 RX ADMIN — Medication 50 MILLIGRAM(S): at 05:20

## 2022-04-08 RX ADMIN — LISINOPRIL 40 MILLIGRAM(S): 2.5 TABLET ORAL at 05:20

## 2022-04-08 RX ADMIN — ENOXAPARIN SODIUM 40 MILLIGRAM(S): 100 INJECTION SUBCUTANEOUS at 11:30

## 2022-04-08 RX ADMIN — LEVETIRACETAM 500 MILLIGRAM(S): 250 TABLET, FILM COATED ORAL at 18:06

## 2022-04-08 RX ADMIN — LEVETIRACETAM 500 MILLIGRAM(S): 250 TABLET, FILM COATED ORAL at 05:20

## 2022-04-08 RX ADMIN — SERTRALINE 50 MILLIGRAM(S): 25 TABLET, FILM COATED ORAL at 11:28

## 2022-04-08 RX ADMIN — Medication 8 UNIT(S): at 18:06

## 2022-04-08 RX ADMIN — DONEPEZIL HYDROCHLORIDE 20 MILLIGRAM(S): 10 TABLET, FILM COATED ORAL at 18:43

## 2022-04-08 RX ADMIN — MEMANTINE HYDROCHLORIDE 10 MILLIGRAM(S): 10 TABLET ORAL at 05:20

## 2022-04-08 NOTE — DISCHARGE NOTE NURSING/CASE MANAGEMENT/SOCIAL WORK - NSDCPEFALRISK_GEN_ALL_CORE
For information on Fall & Injury Prevention, visit: [email or web address] OR  [email or web address] OR  https://www.cdc.gov/steadi/patient.html

## 2022-04-08 NOTE — DISCHARGE NOTE NURSING/CASE MANAGEMENT/SOCIAL WORK - NSDCVIVACCINE_GEN_ALL_CORE_FT
Tdap; 03-Apr-2019 18:34; Amico, Francine M (RN); [employer]; [id number] (Exp. Date: 20-Nov-2020); IntraMuscular; Deltoid Right.; 0.5 milliLiter(s); VIS (VIS Published: 09-May-2013, VIS Presented: 03-Apr-2019);   Tdap; 08-Aug-2021 16:15; Janey Vora (LINDSEY); [employer]; [id number] (Exp. Date: 28-Jan-2023); IntraMuscular; Deltoid Right.; 0.5 milliLiter(s); VIS (VIS Published: 09-May-2013, VIS Presented: 08-Aug-2021);

## 2022-04-08 NOTE — DISCHARGE NOTE NURSING/CASE MANAGEMENT/SOCIAL WORK - NSDCFUADDAPPT_GEN_ALL_CORE_FT
Your spouse will receive a phone call from the [facility] psych for follow up at [address] [place] [address], [phone].    Please continue to see the medical provider at your long-term care facility for continuity of care

## 2022-04-08 NOTE — DISCHARGE NOTE NURSING/CASE MANAGEMENT/SOCIAL WORK - NSDPLANG ASIS_GEN_ALL_CORE
No
22 y/o male with PMH significant for congenital heart disease s/p Fontan's procedure, and RLL DVT transferred from [facility] for hemoptysis and possible cardiac catheterization by  pediatric cardiology. Pt has had about 400mls hemoptysis last 48 hrs . About 150mls last 12 hrs.   Pt on morphine and Hycodine.   After discussion with pt and mother bronchoscopy performed for localization of bleeding, Pt found to have posterior segment RUL bleed. During bronchoscopy pt had significant hemoptysis requiring intubation.  Pt remains intubated  and sedated. Awaiting IT for possible embolization to stop hemoptysis.    Possible  elective cardiac  cath next week.    I have examined pt at bedside with resident and fellow. I have spent 40 minutes cc time not including procedures with this pt with massive hemoptysis.

## 2022-04-08 NOTE — CHART NOTE - NSCHARTNOTEFT_GEN_A_CORE
Patient seen and examined independently of resident. My addendum supersedes resident notation. Case discussed with housestaff, nursing and patient    nods no to all ros. has no acute complaints  vss  deconditioned  euvolemic appearing  soft spoken, moving all extremities, no si/hi    56y m hx dementia, dm2, htn, cva, covid, from [facility] unresponsive episodes- ? seizures c post ictal confusion, hypovolemic hypernatremia from poor oral intake, depression c catatonic features    eeg c r ft sharps, c/w keppra per neuro recs, seizure precautions  checking lactate/ cpk/ prolactin-> no serologic evidence of breakthrough seizure event  c/w setraline 50mg po qd and ativan 0.5 mg po bid (stopped s/p < 7 doses) c behavioral guidance  vte ppx-> ambulate when out of hospital  encourage po intake  f/u mental status- reorient and encourage  appreciated neuro/ psych recs-> f/u as outpatient    medically stable for discharge    see discharge summary for further recommendation

## 2022-04-08 NOTE — DISCHARGE NOTE NURSING/CASE MANAGEMENT/SOCIAL WORK - PATIENT PORTAL LINK FT
You can access the FollowMyHealth Patient Portal offered by [facility] by registering at the following website: [email or web address]. By joining [employer]’s FollowMyHealth portal, you will also be able to view your health information using other applications (apps) compatible with our system.

## 2022-04-11 LAB — GLUCOSE BLDC GLUCOMTR-MCNC: 101 MG/DL — HIGH (ref 70–99)

## 2022-05-18 ENCOUNTER — EMERGENCY (EMERGENCY)
Facility: HOSPITAL | Age: 57
LOS: 0 days | Discharge: NURSING, REHAB OR LONG-TERM CARE | End: 2022-05-19
Attending: EMERGENCY MEDICINE | Admitting: EMERGENCY MEDICINE
Payer: MEDICAID

## 2022-05-18 VITALS
HEART RATE: 65 BPM | RESPIRATION RATE: 18 BRPM | HEIGHT: 70 IN | SYSTOLIC BLOOD PRESSURE: 165 MMHG | WEIGHT: 210.1 LBS | OXYGEN SATURATION: 99 % | DIASTOLIC BLOOD PRESSURE: 77 MMHG | TEMPERATURE: 97 F

## 2022-05-18 DIAGNOSIS — I10 ESSENTIAL (PRIMARY) HYPERTENSION: ICD-10-CM

## 2022-05-18 DIAGNOSIS — E78.5 HYPERLIPIDEMIA, UNSPECIFIED: ICD-10-CM

## 2022-05-18 DIAGNOSIS — E11.9 TYPE 2 DIABETES MELLITUS WITHOUT COMPLICATIONS: ICD-10-CM

## 2022-05-18 DIAGNOSIS — I44.0 ATRIOVENTRICULAR BLOCK, FIRST DEGREE: ICD-10-CM

## 2022-05-18 DIAGNOSIS — Z86.73 PERSONAL HISTORY OF TRANSIENT ISCHEMIC ATTACK (TIA), AND CEREBRAL INFARCTION WITHOUT RESIDUAL DEFICITS: ICD-10-CM

## 2022-05-18 DIAGNOSIS — R10.9 UNSPECIFIED ABDOMINAL PAIN: ICD-10-CM

## 2022-05-18 DIAGNOSIS — K21.9 GASTRO-ESOPHAGEAL REFLUX DISEASE WITHOUT ESOPHAGITIS: ICD-10-CM

## 2022-05-18 DIAGNOSIS — R53.1 WEAKNESS: ICD-10-CM

## 2022-05-18 DIAGNOSIS — Z79.4 LONG TERM (CURRENT) USE OF INSULIN: ICD-10-CM

## 2022-05-18 DIAGNOSIS — Z79.02 LONG TERM (CURRENT) USE OF ANTITHROMBOTICS/ANTIPLATELETS: ICD-10-CM

## 2022-05-18 LAB
ALBUMIN SERPL ELPH-MCNC: 3.8 G/DL — SIGNIFICANT CHANGE UP (ref 3.5–5.2)
ALP SERPL-CCNC: 87 U/L — SIGNIFICANT CHANGE UP (ref 30–115)
ALT FLD-CCNC: 29 U/L — SIGNIFICANT CHANGE UP (ref 0–41)
ANION GAP SERPL CALC-SCNC: 10 MMOL/L — SIGNIFICANT CHANGE UP (ref 7–14)
AST SERPL-CCNC: 19 U/L — SIGNIFICANT CHANGE UP (ref 0–41)
BASOPHILS # BLD AUTO: 0.06 K/UL — SIGNIFICANT CHANGE UP (ref 0–0.2)
BASOPHILS NFR BLD AUTO: 0.6 % — SIGNIFICANT CHANGE UP (ref 0–1)
BILIRUB SERPL-MCNC: 0.4 MG/DL — SIGNIFICANT CHANGE UP (ref 0.2–1.2)
BUN SERPL-MCNC: 16 MG/DL — SIGNIFICANT CHANGE UP (ref 10–20)
CALCIUM SERPL-MCNC: 9 MG/DL — SIGNIFICANT CHANGE UP (ref 8.5–10.1)
CHLORIDE SERPL-SCNC: 106 MMOL/L — SIGNIFICANT CHANGE UP (ref 98–110)
CO2 SERPL-SCNC: 26 MMOL/L — SIGNIFICANT CHANGE UP (ref 17–32)
CREAT SERPL-MCNC: 0.6 MG/DL — LOW (ref 0.7–1.5)
EGFR: 112 ML/MIN/1.73M2 — SIGNIFICANT CHANGE UP
EOSINOPHIL # BLD AUTO: 0.27 K/UL — SIGNIFICANT CHANGE UP (ref 0–0.7)
EOSINOPHIL NFR BLD AUTO: 2.9 % — SIGNIFICANT CHANGE UP (ref 0–8)
GLUCOSE SERPL-MCNC: 222 MG/DL — HIGH (ref 70–99)
HCT VFR BLD CALC: 42.2 % — SIGNIFICANT CHANGE UP (ref 42–52)
HGB BLD-MCNC: 14.5 G/DL — SIGNIFICANT CHANGE UP (ref 14–18)
IMM GRANULOCYTES NFR BLD AUTO: 0.3 % — SIGNIFICANT CHANGE UP (ref 0.1–0.3)
LIDOCAIN IGE QN: 18 U/L — SIGNIFICANT CHANGE UP (ref 7–60)
LYMPHOCYTES # BLD AUTO: 1.62 K/UL — SIGNIFICANT CHANGE UP (ref 1.2–3.4)
LYMPHOCYTES # BLD AUTO: 17.1 % — LOW (ref 20.5–51.1)
MCHC RBC-ENTMCNC: 30.7 PG — SIGNIFICANT CHANGE UP (ref 27–31)
MCHC RBC-ENTMCNC: 34.4 G/DL — SIGNIFICANT CHANGE UP (ref 32–37)
MCV RBC AUTO: 89.4 FL — SIGNIFICANT CHANGE UP (ref 80–94)
MONOCYTES # BLD AUTO: 0.59 K/UL — SIGNIFICANT CHANGE UP (ref 0.1–0.6)
MONOCYTES NFR BLD AUTO: 6.2 % — SIGNIFICANT CHANGE UP (ref 1.7–9.3)
NEUTROPHILS # BLD AUTO: 6.88 K/UL — HIGH (ref 1.4–6.5)
NEUTROPHILS NFR BLD AUTO: 72.9 % — SIGNIFICANT CHANGE UP (ref 42.2–75.2)
NRBC # BLD: 0 /100 WBCS — SIGNIFICANT CHANGE UP (ref 0–0)
PLATELET # BLD AUTO: 254 K/UL — SIGNIFICANT CHANGE UP (ref 130–400)
POTASSIUM SERPL-MCNC: 3.9 MMOL/L — SIGNIFICANT CHANGE UP (ref 3.5–5)
POTASSIUM SERPL-SCNC: 3.9 MMOL/L — SIGNIFICANT CHANGE UP (ref 3.5–5)
PROT SERPL-MCNC: 6.5 G/DL — SIGNIFICANT CHANGE UP (ref 6–8)
RBC # BLD: 4.72 M/UL — SIGNIFICANT CHANGE UP (ref 4.7–6.1)
RBC # FLD: 13.3 % — SIGNIFICANT CHANGE UP (ref 11.5–14.5)
SODIUM SERPL-SCNC: 142 MMOL/L — SIGNIFICANT CHANGE UP (ref 135–146)
TROPONIN T SERPL-MCNC: <0.01 NG/ML — SIGNIFICANT CHANGE UP
WBC # BLD: 9.45 K/UL — SIGNIFICANT CHANGE UP (ref 4.8–10.8)
WBC # FLD AUTO: 9.45 K/UL — SIGNIFICANT CHANGE UP (ref 4.8–10.8)

## 2022-05-18 PROCEDURE — 71045 X-RAY EXAM CHEST 1 VIEW: CPT | Mod: 26

## 2022-05-18 PROCEDURE — 93010 ELECTROCARDIOGRAM REPORT: CPT

## 2022-05-18 PROCEDURE — 99285 EMERGENCY DEPT VISIT HI MDM: CPT

## 2022-05-18 NOTE — ED ADULT TRIAGE NOTE - CHIEF COMPLAINT QUOTE
BIBA with complaints right sided abdominal pain, sent from assisted living. Pt not currently not talking in triage. Pt Hx of mental limitation

## 2022-05-19 LAB
APPEARANCE UR: CLEAR — SIGNIFICANT CHANGE UP
BACTERIA # UR AUTO: NEGATIVE — SIGNIFICANT CHANGE UP
BILIRUB UR-MCNC: NEGATIVE — SIGNIFICANT CHANGE UP
COLOR SPEC: YELLOW — SIGNIFICANT CHANGE UP
COMMENT - URINE: SIGNIFICANT CHANGE UP
DIFF PNL FLD: NEGATIVE — SIGNIFICANT CHANGE UP
EPI CELLS # UR: 0 /HPF — SIGNIFICANT CHANGE UP (ref 0–5)
GLUCOSE UR QL: ABNORMAL
HYALINE CASTS # UR AUTO: 2 /LPF — SIGNIFICANT CHANGE UP (ref 0–7)
KETONES UR-MCNC: SIGNIFICANT CHANGE UP
LEUKOCYTE ESTERASE UR-ACNC: ABNORMAL
NITRITE UR-MCNC: NEGATIVE — SIGNIFICANT CHANGE UP
PH UR: 6 — SIGNIFICANT CHANGE UP (ref 5–8)
PROT UR-MCNC: ABNORMAL
RBC CASTS # UR COMP ASSIST: 4 /HPF — SIGNIFICANT CHANGE UP (ref 0–4)
SP GR SPEC: 1.03 — SIGNIFICANT CHANGE UP (ref 1.01–1.03)
UROBILINOGEN FLD QL: SIGNIFICANT CHANGE UP
WBC UR QL: 2 /HPF — SIGNIFICANT CHANGE UP (ref 0–5)

## 2022-05-19 NOTE — ED PROVIDER NOTE - PATIENT PORTAL LINK FT
You can access the FollowMyHealth Patient Portal offered by [facility] by registering at the following website: [email or web address]. By joining [employer]’s FollowMyHealth portal, you will also be able to view your health information using other applications (apps) compatible with our system.

## 2022-05-19 NOTE — ED ADULT NURSE NOTE - OBJECTIVE STATEMENT
male patient presents to ED, informs this writer he is unaware of why he is here.  currently calm and cooperative A&Ox1

## 2022-05-19 NOTE — ED PROVIDER NOTE - OBJECTIVE STATEMENT
56 year old male with pmhx of dm, htn, hld, gerd, and cva presents for "not feeling well" earlier today. pt feeling better now. no abd pain, nausea, vomiting, diarrhea, fever, chest pain, sob, or urinary symptoms.

## 2022-05-19 NOTE — ED ADULT NURSE REASSESSMENT NOTE - NS ED NURSE REASSESS COMMENT FT1
Patient is A&Ox4, fully functional and denies pain at this time. Right AC 18g in place +flush +BR. Patient is awaiting ambulance transportation back to [facility]. Ambulance contacted at [phone]. IV will be removed when transportation arrives.

## 2022-05-19 NOTE — ED PROVIDER NOTE - ATTENDING APP SHARED VISIT CONTRIBUTION OF CARE
56-year-old female with PMH HTN, DM, HLD presented [facility] for evaluation of feeling weakness today.  Patient denies any abdominal pain, nausea vomiting or diarrhea.  No urinary complaints, cough, CP, SOB, headache or dizziness.  Patient states he did not feel well so he called to come in to get evaluated.  Patient is limited historian.    VITAL SIGNS: noted  CONSTITUTIONAL: Well-developed; well-nourished; in no acute distress  HEAD: Normocephalic; atraumatic  EYES: PERRL, EOM intact; conjunctiva and sclera clear  ENT: No nasal discharge; airway clear. MMM  NECK: Supple; non tender. No anterior cervical lymphadenopathy noted  CARD: S1, S2 normal; no murmurs, gallops, or rubs. Regular rate and rhythm  RESP: CTAB/L, no wheezes, rales or rhonchi  ABD: Normal bowel sounds; soft; non-distended; non-tender; no hepatosplenomegaly. No CVA tenderness  EXT: Normal ROM. No calf tenderness or edema. Distal pulses intact  NEURO: Alert, oriented. Grossly unremarkable. No focal deficits  SKIN: Skin exam is warm and dry, no acute rash  MS: No midline spinal tenderness

## 2022-05-19 NOTE — ED PROVIDER NOTE - WET READ LAUNCH FT
Called patient regarding mammogram results and to schedule Diagnostic mammogram and Breast Ultrasound. Patient states she needs to speak to son about when she can schedule. Asked if we could call back Thursday or Friday this week.   There are no Wet Read(s) to document.

## 2022-05-19 NOTE — ED PROVIDER NOTE - NS ED ATTENDING STATEMENT MOD
This was a shared visit with the ANA CRISTINA. I reviewed and verified the documentation and independently performed the documented:

## 2022-05-19 NOTE — ED PROVIDER NOTE - PHYSICAL EXAMINATION
CONST: Well appearing in NAD  EYES: PERRL, EOMI, Sclera and conjunctiva clear.   ENT: No nasal discharge. Oropharynx normal appearing  NECK: Non-tender, no meningeal signs. normal ROM. supple   CARD: Normal S1 S2; Normal rate and rhythm  RESP: Equal BS B/L, No wheezes, rhonchi or rales. No distress  GI: Soft, non-tender, non-distended. no cva tenderness. normal BS  MS: Normal ROM in all extremities. No midline spinal tenderness. pulses 2 +. no calf tenderness or swelling  SKIN: Warm, dry, no acute rashes. Good turgor  NEURO: Alert, oriented, No focal deficits.

## 2022-05-26 ENCOUNTER — APPOINTMENT (OUTPATIENT)
Dept: UROLOGY | Facility: CLINIC | Age: 57
End: 2022-05-26

## 2022-06-24 NOTE — DISCHARGE NOTE PROVIDER - NSDCQMAMI_CARD_ALL_CORE
How Severe Is Your Skin Lesion?: moderate Has Your Skin Lesion Been Treated?: not been treated Is This A New Presentation, Or A Follow-Up?: Skin Lesion Is This A New Presentation, Or A Follow-Up?: Skin Lesions No

## 2022-07-07 ENCOUNTER — APPOINTMENT (OUTPATIENT)
Dept: NEUROLOGY | Facility: CLINIC | Age: 57
End: 2022-07-07

## 2022-07-07 VITALS
HEART RATE: 81 BPM | WEIGHT: 220 LBS | OXYGEN SATURATION: 98 % | TEMPERATURE: 98.3 F | HEIGHT: 69 IN | BODY MASS INDEX: 32.58 KG/M2 | DIASTOLIC BLOOD PRESSURE: 80 MMHG | SYSTOLIC BLOOD PRESSURE: 129 MMHG

## 2022-07-07 DIAGNOSIS — I63.9 CEREBRAL INFARCTION, UNSPECIFIED: ICD-10-CM

## 2022-07-07 PROCEDURE — 99214 OFFICE O/P EST MOD 30 MIN: CPT

## 2022-07-07 NOTE — PHYSICAL EXAM
[FreeTextEntry1] : Cognitive impairment - unable to tell me the date.  \par Answers questions unreliably, medical attendant makes corrections.\par \par Facial mm symmetric.  EOM's full.\par Left homonymous hemianopsia.\par \par Mild left sided weakness.\par Unsteady gait\par \par MOCA 11/30

## 2022-07-07 NOTE — HISTORY OF PRESENT ILLNESS
[FreeTextEntry1] : Pt presents today in f/u for prior stroke.  Had strokes in 2020 affecting midbrain, corpus callosum and occipital lobe.  Then despite maximal medical therapy and smoking cessation he had a left thalamic stroke in 2021.\par He has residual left homonymous hemianopsia.  He has not worked since his 2020 strokes.  He is living at a facility now - [facility] and the health aid accompanying him to his appointment notes he is off balance when he walks so uses a rollator.  \par \par During hospitalization he underwent ERICKSON which was negative for cause of stroke.  He underwent holter monitor evaluation from 7/13/21 to 08/11/21 and this was negative for atrial fibrillation.\par \par Attendant says he sleeps like a baby and does not snore.\par \par

## 2022-07-07 NOTE — ASSESSMENT
[FreeTextEntry1] : 1.  Multiinfarct dementia, progressed.\par 2.  Unsteady gait and field cut from prior strokes.\par \par Plan:\par 1.  Continue dual antiplatelet therapy.\par 2.  Hematology referral for hypercoagulable w/u.  Reported family hx of Protein S deficiency.\par 3.  F/u with PCP for management of hyperlipidemia.\par 4.  Continue use of rollator for unsteady gait.\par 5.  Continue memantine and donepezil for dementia.\par 6.  Insurance/disability paperwork completed.

## 2022-07-27 ENCOUNTER — OUTPATIENT (OUTPATIENT)
Dept: OUTPATIENT SERVICES | Facility: HOSPITAL | Age: 57
LOS: 1 days | Discharge: HOME | End: 2022-07-27

## 2022-12-07 ENCOUNTER — INPATIENT (INPATIENT)
Facility: HOSPITAL | Age: 57
LOS: 4 days | Discharge: NURSING, REHAB OR LONG-TERM CARE | End: 2022-12-12
Attending: HOSPITALIST | Admitting: HOSPITALIST

## 2022-12-07 VITALS
SYSTOLIC BLOOD PRESSURE: 141 MMHG | RESPIRATION RATE: 18 BRPM | OXYGEN SATURATION: 100 % | TEMPERATURE: 98 F | HEART RATE: 90 BPM | DIASTOLIC BLOOD PRESSURE: 68 MMHG

## 2022-12-07 LAB
ALBUMIN SERPL ELPH-MCNC: 3.7 G/DL — SIGNIFICANT CHANGE UP (ref 3.5–5.2)
ALP SERPL-CCNC: 77 U/L — SIGNIFICANT CHANGE UP (ref 30–115)
ALT FLD-CCNC: 38 U/L — SIGNIFICANT CHANGE UP (ref 0–41)
ANION GAP SERPL CALC-SCNC: 10 MMOL/L — SIGNIFICANT CHANGE UP (ref 7–14)
AST SERPL-CCNC: 30 U/L — SIGNIFICANT CHANGE UP (ref 0–41)
BASOPHILS # BLD AUTO: 0.08 K/UL — SIGNIFICANT CHANGE UP (ref 0–0.2)
BASOPHILS NFR BLD AUTO: 0.8 % — SIGNIFICANT CHANGE UP (ref 0–1)
BILIRUB SERPL-MCNC: 0.4 MG/DL — SIGNIFICANT CHANGE UP (ref 0.2–1.2)
BUN SERPL-MCNC: 17 MG/DL — SIGNIFICANT CHANGE UP (ref 10–20)
CALCIUM SERPL-MCNC: 8.8 MG/DL — SIGNIFICANT CHANGE UP (ref 8.4–10.5)
CHLORIDE SERPL-SCNC: 105 MMOL/L — SIGNIFICANT CHANGE UP (ref 98–110)
CO2 SERPL-SCNC: 24 MMOL/L — SIGNIFICANT CHANGE UP (ref 17–32)
CREAT SERPL-MCNC: 0.7 MG/DL — SIGNIFICANT CHANGE UP (ref 0.7–1.5)
EGFR: 106 ML/MIN/1.73M2 — SIGNIFICANT CHANGE UP
EOSINOPHIL # BLD AUTO: 0.17 K/UL — SIGNIFICANT CHANGE UP (ref 0–0.7)
EOSINOPHIL NFR BLD AUTO: 1.6 % — SIGNIFICANT CHANGE UP (ref 0–8)
GLUCOSE SERPL-MCNC: 115 MG/DL — HIGH (ref 70–99)
HCT VFR BLD CALC: 43.6 % — SIGNIFICANT CHANGE UP (ref 42–52)
HGB BLD-MCNC: 14.3 G/DL — SIGNIFICANT CHANGE UP (ref 14–18)
IMM GRANULOCYTES NFR BLD AUTO: 0.4 % — HIGH (ref 0.1–0.3)
LACTATE SERPL-SCNC: 1.4 MMOL/L — SIGNIFICANT CHANGE UP (ref 0.7–2)
LYMPHOCYTES # BLD AUTO: 1.65 K/UL — SIGNIFICANT CHANGE UP (ref 1.2–3.4)
LYMPHOCYTES # BLD AUTO: 15.9 % — LOW (ref 20.5–51.1)
MCHC RBC-ENTMCNC: 29.9 PG — SIGNIFICANT CHANGE UP (ref 27–31)
MCHC RBC-ENTMCNC: 32.8 G/DL — SIGNIFICANT CHANGE UP (ref 32–37)
MCV RBC AUTO: 91 FL — SIGNIFICANT CHANGE UP (ref 80–94)
MONOCYTES # BLD AUTO: 0.46 K/UL — SIGNIFICANT CHANGE UP (ref 0.1–0.6)
MONOCYTES NFR BLD AUTO: 4.4 % — SIGNIFICANT CHANGE UP (ref 1.7–9.3)
NEUTROPHILS # BLD AUTO: 7.95 K/UL — HIGH (ref 1.4–6.5)
NEUTROPHILS NFR BLD AUTO: 76.9 % — HIGH (ref 42.2–75.2)
NRBC # BLD: 0 /100 WBCS — SIGNIFICANT CHANGE UP (ref 0–0)
PLATELET # BLD AUTO: 289 K/UL — SIGNIFICANT CHANGE UP (ref 130–400)
POTASSIUM SERPL-MCNC: 4.3 MMOL/L — SIGNIFICANT CHANGE UP (ref 3.5–5)
POTASSIUM SERPL-SCNC: 4.3 MMOL/L — SIGNIFICANT CHANGE UP (ref 3.5–5)
PROT SERPL-MCNC: 6.8 G/DL — SIGNIFICANT CHANGE UP (ref 6–8)
RBC # BLD: 4.79 M/UL — SIGNIFICANT CHANGE UP (ref 4.7–6.1)
RBC # FLD: 13.1 % — SIGNIFICANT CHANGE UP (ref 11.5–14.5)
SARS-COV-2 RNA SPEC QL NAA+PROBE: SIGNIFICANT CHANGE UP
SODIUM SERPL-SCNC: 139 MMOL/L — SIGNIFICANT CHANGE UP (ref 135–146)
WBC # BLD: 10.35 K/UL — SIGNIFICANT CHANGE UP (ref 4.8–10.8)
WBC # FLD AUTO: 10.35 K/UL — SIGNIFICANT CHANGE UP (ref 4.8–10.8)

## 2022-12-07 PROCEDURE — 72125 CT NECK SPINE W/O DYE: CPT | Mod: 26,MA

## 2022-12-07 PROCEDURE — 93010 ELECTROCARDIOGRAM REPORT: CPT

## 2022-12-07 PROCEDURE — 70450 CT HEAD/BRAIN W/O DYE: CPT | Mod: 26,MA

## 2022-12-07 PROCEDURE — 99285 EMERGENCY DEPT VISIT HI MDM: CPT

## 2022-12-07 NOTE — ED ADULT NURSE NOTE - CHIEF COMPLAINT QUOTE
BIBA from [facility] pt. was sitting at the dinner table about to eat dinner and face planted on the table. -loc no facial trauma noted. FS in triage 65 pt. drank 2 orange juices

## 2022-12-07 NOTE — ED PROVIDER NOTE - PHYSICAL EXAMINATION
VITAL SIGNS: I have reviewed nursing notes and confirm.  CONSTITUTIONAL: Well-developed; well-nourished; in no acute distress.  SKIN: Skin exam is warm and dry, no acute rash.  HEAD: Pt with small 1.5-2cm mid frontal forehead hematoma without overlying skin changes  EYES: conjunctiva and sclera clear.  ENT: MMM, OP clear. poor dentition  CARD: S1, S2 normal; no murmurs, gallops, or rubs. Regular rate and rhythm.  RESP: Normal respiratory effort, no tachypnea or distress. Lungs CTAB, no wheezes, rales or rhonchi.  ABD: soft, NT/ND.  EXT: Normal ROM. No clubbing, cyanosis or edema.  Neuro: Alert, oriented to self and place but not time. normal speech, CN II-XII intact, FROM & strength 5/5 x4 extremities. Sensation intact and equal. Normal gait, (-)romberg, no pronator drift, no dysmetria on finger to nose b/l.   PSYCH: Cooperative, appropriate.

## 2022-12-07 NOTE — ED ADULT TRIAGE NOTE - CHIEF COMPLAINT QUOTE
BIBA from [facility] pt. was sitting at the dinner table about to eat dinner and face planted on the table. -loc no facial trauma noted. FS in triage 65 pt. drank 2 orange juices

## 2022-12-07 NOTE — ED PROVIDER NOTE - NS ED ROS FT
Constitutional: See HPI. No fever/chills.  Eyes: No visual changes, eye pain or discharge.  Neck: No neck pain or stiffness.  Cardiac: No chest pain, SOB or edema.  Respiratory: No cough or respiratory distress.   GI: No nausea, vomiting, diarrhea or abdominal pain.  MS: No myalgia, muscle weakness, joint pain or back pain.  Neuro: (+)staring event. No headache or weakness. No LOC.  Skin: No rash.  Endocrine: (+)HX of DM with episode of hypoglycemia reported PTA but exact FS number at that time unknown  Except as documented in the HPI, all other systems are negative.

## 2022-12-07 NOTE — ED PROVIDER NOTE - PROGRESS NOTE DETAILS
EK - as pt is awake and responsive, will give sandwich to help maintain FS within normal range as juice will not last very long.  Unclear why pt is mildly hypoglycemic and unclear if unresponsive episode was 2/2 hypoglycemia or possibly seizure given his hx ?seizure on keppra.

## 2022-12-07 NOTE — ED PROVIDER NOTE - ATTENDING APP SHARED VISIT CONTRIBUTION OF CARE
57yM [facility] resident sent in for unresponsive episode - pt sat down at table for dinner and then went unresponsive - staring but not responding to environment - then ?face planted onto table.  Pt has no recollection of the event and has no current complaints.  Unclear what FS was on scene with EMS, but FS 65 in ED triage and pt given juice.

## 2022-12-07 NOTE — ED PROVIDER NOTE - OBJECTIVE STATEMENT
58 y/o M with PMH of prior CVA with vascular dementia, HTN, HLD, GERD, DM, BIBA from [facility] group home after staring episode just PTA. Pt does not recall events and is not sure why EMS was called. I called [facility] who reported that at dinner time pt sat down to eat and then had "staring episode" where he was unresponsive to staff and others, "just staring". EMS was called immediately and they state this episode lasted about 20 minutes and continued when EMS arrived. Triage note reports pt was hypoglycemic at that time but no number given, [facility] unaware of this when questioned and pt does not remember. FS 65 in triage, given juice and sandwich in ED. Unclear if there was head trauma as EMS reported pt slumped onto table hitting his head however [facility] also unaware of this. Pt currently has no complaints and says he feels fine. Denies HA, dizziness, CP, SOB, abdominal pain, n/v/d, urinary or bowel complaints, LE pain or swelling.

## 2022-12-07 NOTE — ED PROVIDER NOTE - CARE PLAN
1 Principal Discharge DX:	Episode of unresponsiveness   Principal Discharge DX:	Episode of unresponsiveness  Secondary Diagnosis:	Hypoglycemia

## 2022-12-07 NOTE — ED PROVIDER NOTE - CLINICAL SUMMARY MEDICAL DECISION MAKING FREE TEXT BOX
57yM ?seizures on keppra sent from [facility] for episode of unresponsivesness complicated by hypoglycemia on ED arrival FS 65.  Pt w/o focal neuro deficits or hemodynamic instability on ED assessment.  Labs reassuring.  EKG w/o ischemia or arrhythmia.  CTH/c-spine w/o acute pathology.  No further episodes in the ED.  Will adm for further monitoring and care, including monitoring for hypoglycemia and likely neuro consult/EEG.

## 2022-12-07 NOTE — ED ADULT NURSE NOTE - OBJECTIVE STATEMENT
Pt brought in for unresponsive episode at NH, Pt does not recall event. Pt has no c/o pain at this time

## 2022-12-08 LAB
A1C WITH ESTIMATED AVERAGE GLUCOSE RESULT: 6.5 % — HIGH (ref 4–5.6)
ALBUMIN SERPL ELPH-MCNC: 3.9 G/DL — SIGNIFICANT CHANGE UP (ref 3.5–5.2)
ALP SERPL-CCNC: 80 U/L — SIGNIFICANT CHANGE UP (ref 30–115)
ALT FLD-CCNC: 37 U/L — SIGNIFICANT CHANGE UP (ref 0–41)
ANION GAP SERPL CALC-SCNC: 10 MMOL/L — SIGNIFICANT CHANGE UP (ref 7–14)
AST SERPL-CCNC: 31 U/L — SIGNIFICANT CHANGE UP (ref 0–41)
BASOPHILS # BLD AUTO: 0.07 K/UL — SIGNIFICANT CHANGE UP (ref 0–0.2)
BASOPHILS NFR BLD AUTO: 0.9 % — SIGNIFICANT CHANGE UP (ref 0–1)
BILIRUB SERPL-MCNC: 0.6 MG/DL — SIGNIFICANT CHANGE UP (ref 0.2–1.2)
BUN SERPL-MCNC: 12 MG/DL — SIGNIFICANT CHANGE UP (ref 10–20)
CALCIUM SERPL-MCNC: 9.2 MG/DL — SIGNIFICANT CHANGE UP (ref 8.4–10.5)
CHLORIDE SERPL-SCNC: 106 MMOL/L — SIGNIFICANT CHANGE UP (ref 98–110)
CHOLEST SERPL-MCNC: 99 MG/DL — SIGNIFICANT CHANGE UP
CO2 SERPL-SCNC: 27 MMOL/L — SIGNIFICANT CHANGE UP (ref 17–32)
CREAT SERPL-MCNC: 0.5 MG/DL — LOW (ref 0.7–1.5)
EGFR: 118 ML/MIN/1.73M2 — SIGNIFICANT CHANGE UP
EOSINOPHIL # BLD AUTO: 0.31 K/UL — SIGNIFICANT CHANGE UP (ref 0–0.7)
EOSINOPHIL NFR BLD AUTO: 4 % — SIGNIFICANT CHANGE UP (ref 0–8)
ESTIMATED AVERAGE GLUCOSE: 140 MG/DL — HIGH (ref 68–114)
GLUCOSE BLDC GLUCOMTR-MCNC: 110 MG/DL — HIGH (ref 70–99)
GLUCOSE BLDC GLUCOMTR-MCNC: 190 MG/DL — HIGH (ref 70–99)
GLUCOSE BLDC GLUCOMTR-MCNC: 196 MG/DL — HIGH (ref 70–99)
GLUCOSE BLDC GLUCOMTR-MCNC: 85 MG/DL — SIGNIFICANT CHANGE UP (ref 70–99)
GLUCOSE SERPL-MCNC: 100 MG/DL — HIGH (ref 70–99)
HCT VFR BLD CALC: 47.3 % — SIGNIFICANT CHANGE UP (ref 42–52)
HDLC SERPL-MCNC: 42 MG/DL — SIGNIFICANT CHANGE UP
HGB BLD-MCNC: 15.8 G/DL — SIGNIFICANT CHANGE UP (ref 14–18)
IMM GRANULOCYTES NFR BLD AUTO: 0.3 % — SIGNIFICANT CHANGE UP (ref 0.1–0.3)
LIPID PNL WITH DIRECT LDL SERPL: 48 MG/DL — SIGNIFICANT CHANGE UP
LYMPHOCYTES # BLD AUTO: 2.76 K/UL — SIGNIFICANT CHANGE UP (ref 1.2–3.4)
LYMPHOCYTES # BLD AUTO: 35.7 % — SIGNIFICANT CHANGE UP (ref 20.5–51.1)
MAGNESIUM SERPL-MCNC: 2 MG/DL — SIGNIFICANT CHANGE UP (ref 1.8–2.4)
MCHC RBC-ENTMCNC: 29.8 PG — SIGNIFICANT CHANGE UP (ref 27–31)
MCHC RBC-ENTMCNC: 33.4 G/DL — SIGNIFICANT CHANGE UP (ref 32–37)
MCV RBC AUTO: 89.2 FL — SIGNIFICANT CHANGE UP (ref 80–94)
MONOCYTES # BLD AUTO: 0.46 K/UL — SIGNIFICANT CHANGE UP (ref 0.1–0.6)
MONOCYTES NFR BLD AUTO: 5.9 % — SIGNIFICANT CHANGE UP (ref 1.7–9.3)
NEUTROPHILS # BLD AUTO: 4.12 K/UL — SIGNIFICANT CHANGE UP (ref 1.4–6.5)
NEUTROPHILS NFR BLD AUTO: 53.2 % — SIGNIFICANT CHANGE UP (ref 42.2–75.2)
NON HDL CHOLESTEROL: 57 MG/DL — SIGNIFICANT CHANGE UP
NRBC # BLD: 0 /100 WBCS — SIGNIFICANT CHANGE UP (ref 0–0)
PLATELET # BLD AUTO: 301 K/UL — SIGNIFICANT CHANGE UP (ref 130–400)
POTASSIUM SERPL-MCNC: 4.2 MMOL/L — SIGNIFICANT CHANGE UP (ref 3.5–5)
POTASSIUM SERPL-SCNC: 4.2 MMOL/L — SIGNIFICANT CHANGE UP (ref 3.5–5)
PROT SERPL-MCNC: 6.8 G/DL — SIGNIFICANT CHANGE UP (ref 6–8)
RBC # BLD: 5.3 M/UL — SIGNIFICANT CHANGE UP (ref 4.7–6.1)
RBC # FLD: 13.1 % — SIGNIFICANT CHANGE UP (ref 11.5–14.5)
SODIUM SERPL-SCNC: 143 MMOL/L — SIGNIFICANT CHANGE UP (ref 135–146)
TRIGL SERPL-MCNC: 49 MG/DL — SIGNIFICANT CHANGE UP
WBC # BLD: 7.74 K/UL — SIGNIFICANT CHANGE UP (ref 4.8–10.8)
WBC # FLD AUTO: 7.74 K/UL — SIGNIFICANT CHANGE UP (ref 4.8–10.8)

## 2022-12-08 PROCEDURE — 99222 1ST HOSP IP/OBS MODERATE 55: CPT

## 2022-12-08 PROCEDURE — 99223 1ST HOSP IP/OBS HIGH 75: CPT

## 2022-12-08 RX ORDER — MEMANTINE HYDROCHLORIDE 10 MG/1
10 TABLET ORAL
Refills: 0 | Status: DISCONTINUED | OUTPATIENT
Start: 2022-12-08 | End: 2022-12-12

## 2022-12-08 RX ORDER — PANTOPRAZOLE SODIUM 20 MG/1
40 TABLET, DELAYED RELEASE ORAL
Refills: 0 | Status: DISCONTINUED | OUTPATIENT
Start: 2022-12-08 | End: 2022-12-12

## 2022-12-08 RX ORDER — DEXTROSE 50 % IN WATER 50 %
15 SYRINGE (ML) INTRAVENOUS ONCE
Refills: 0 | Status: DISCONTINUED | OUTPATIENT
Start: 2022-12-08 | End: 2022-12-12

## 2022-12-08 RX ORDER — GLUCAGON INJECTION, SOLUTION 0.5 MG/.1ML
1 INJECTION, SOLUTION SUBCUTANEOUS ONCE
Refills: 0 | Status: DISCONTINUED | OUTPATIENT
Start: 2022-12-08 | End: 2022-12-12

## 2022-12-08 RX ORDER — CLOPIDOGREL BISULFATE 75 MG/1
75 TABLET, FILM COATED ORAL DAILY
Refills: 0 | Status: DISCONTINUED | OUTPATIENT
Start: 2022-12-08 | End: 2022-12-12

## 2022-12-08 RX ORDER — INSULIN GLARGINE 100 [IU]/ML
30 INJECTION, SOLUTION SUBCUTANEOUS AT BEDTIME
Refills: 0 | Status: DISCONTINUED | OUTPATIENT
Start: 2022-12-08 | End: 2022-12-08

## 2022-12-08 RX ORDER — SODIUM CHLORIDE 9 MG/ML
1000 INJECTION, SOLUTION INTRAVENOUS
Refills: 0 | Status: DISCONTINUED | OUTPATIENT
Start: 2022-12-08 | End: 2022-12-12

## 2022-12-08 RX ORDER — DEXTROSE 50 % IN WATER 50 %
12.5 SYRINGE (ML) INTRAVENOUS ONCE
Refills: 0 | Status: DISCONTINUED | OUTPATIENT
Start: 2022-12-08 | End: 2022-12-12

## 2022-12-08 RX ORDER — ASPIRIN/CALCIUM CARB/MAGNESIUM 324 MG
325 TABLET ORAL DAILY
Refills: 0 | Status: DISCONTINUED | OUTPATIENT
Start: 2022-12-08 | End: 2022-12-08

## 2022-12-08 RX ORDER — ATORVASTATIN CALCIUM 80 MG/1
80 TABLET, FILM COATED ORAL AT BEDTIME
Refills: 0 | Status: DISCONTINUED | OUTPATIENT
Start: 2022-12-08 | End: 2022-12-12

## 2022-12-08 RX ORDER — INSULIN GLARGINE 100 [IU]/ML
20 INJECTION, SOLUTION SUBCUTANEOUS AT BEDTIME
Refills: 0 | Status: DISCONTINUED | OUTPATIENT
Start: 2022-12-08 | End: 2022-12-12

## 2022-12-08 RX ORDER — ENOXAPARIN SODIUM 100 MG/ML
40 INJECTION SUBCUTANEOUS EVERY 24 HOURS
Refills: 0 | Status: DISCONTINUED | OUTPATIENT
Start: 2022-12-08 | End: 2022-12-12

## 2022-12-08 RX ORDER — SENNA PLUS 8.6 MG/1
0 TABLET ORAL
Qty: 0 | Refills: 0 | DISCHARGE

## 2022-12-08 RX ORDER — DEXTROSE 50 % IN WATER 50 %
25 SYRINGE (ML) INTRAVENOUS ONCE
Refills: 0 | Status: DISCONTINUED | OUTPATIENT
Start: 2022-12-08 | End: 2022-12-12

## 2022-12-08 RX ORDER — LEVETIRACETAM 250 MG/1
500 TABLET, FILM COATED ORAL
Refills: 0 | Status: DISCONTINUED | OUTPATIENT
Start: 2022-12-08 | End: 2022-12-12

## 2022-12-08 RX ORDER — DONEPEZIL HYDROCHLORIDE 10 MG/1
10 TABLET, FILM COATED ORAL AT BEDTIME
Refills: 0 | Status: DISCONTINUED | OUTPATIENT
Start: 2022-12-08 | End: 2022-12-12

## 2022-12-08 RX ORDER — SENNA PLUS 8.6 MG/1
2 TABLET ORAL AT BEDTIME
Refills: 0 | Status: DISCONTINUED | OUTPATIENT
Start: 2022-12-08 | End: 2022-12-12

## 2022-12-08 RX ORDER — ASPIRIN/CALCIUM CARB/MAGNESIUM 324 MG
81 TABLET ORAL DAILY
Refills: 0 | Status: DISCONTINUED | OUTPATIENT
Start: 2022-12-08 | End: 2022-12-12

## 2022-12-08 RX ORDER — METOPROLOL TARTRATE 50 MG
50 TABLET ORAL DAILY
Refills: 0 | Status: DISCONTINUED | OUTPATIENT
Start: 2022-12-08 | End: 2022-12-12

## 2022-12-08 RX ORDER — INSULIN LISPRO 100/ML
10 VIAL (ML) SUBCUTANEOUS
Refills: 0 | Status: DISCONTINUED | OUTPATIENT
Start: 2022-12-08 | End: 2022-12-12

## 2022-12-08 RX ADMIN — INSULIN GLARGINE 20 UNIT(S): 100 INJECTION, SOLUTION SUBCUTANEOUS at 22:55

## 2022-12-08 RX ADMIN — ATORVASTATIN CALCIUM 80 MILLIGRAM(S): 80 TABLET, FILM COATED ORAL at 21:58

## 2022-12-08 RX ADMIN — PANTOPRAZOLE SODIUM 40 MILLIGRAM(S): 20 TABLET, DELAYED RELEASE ORAL at 07:20

## 2022-12-08 RX ADMIN — MEMANTINE HYDROCHLORIDE 10 MILLIGRAM(S): 10 TABLET ORAL at 17:19

## 2022-12-08 RX ADMIN — SENNA PLUS 2 TABLET(S): 8.6 TABLET ORAL at 21:58

## 2022-12-08 RX ADMIN — Medication 10 UNIT(S): at 17:22

## 2022-12-08 RX ADMIN — CLOPIDOGREL BISULFATE 75 MILLIGRAM(S): 75 TABLET, FILM COATED ORAL at 11:35

## 2022-12-08 RX ADMIN — ENOXAPARIN SODIUM 40 MILLIGRAM(S): 100 INJECTION SUBCUTANEOUS at 11:35

## 2022-12-08 RX ADMIN — LEVETIRACETAM 500 MILLIGRAM(S): 250 TABLET, FILM COATED ORAL at 17:19

## 2022-12-08 RX ADMIN — DONEPEZIL HYDROCHLORIDE 10 MILLIGRAM(S): 10 TABLET, FILM COATED ORAL at 21:58

## 2022-12-08 RX ADMIN — Medication 10 UNIT(S): at 07:35

## 2022-12-08 RX ADMIN — Medication 81 MILLIGRAM(S): at 17:19

## 2022-12-08 RX ADMIN — Medication 10 UNIT(S): at 11:34

## 2022-12-08 NOTE — H&P ADULT - ATTENDING COMMENTS
Patient seen and examined independently on 3E.     PAST MEDICAL & SURGICAL HISTORY:  Diabetes  Hypertension  CVA (cerebral vascular accident)  Vascular dementia  Hyperlipidemia  GERD (gastroesophageal reflux disease)  Reported h/o Seizures      Vitals:  T(F): 97.4 (12-08-22 @ 12:43)  HR: 71 (12-08-22 @ 12:43)  BP: 159/80 (12-08-22 @ 12:43)      TESTS & MEASUREMENTS:                        15.8   7.74  )-----------( 301      ( 08 Dec 2022 09:15 )             47.3       12-08    143  |  106  |  12  ----------------------------<  100<H>  4.2   |  27  |  0.5<L>    Ca    9.2      08 Dec 2022 09:15  Mg     2.0     12-08    TPro  6.8  /  Alb  3.9  /  TBili  0.6  /  DBili  x   /  AST  31  /  ALT  37  /  AlkPhos  80  12-08    LIVER FUNCTIONS - ( 08 Dec 2022 09:15 )  Alb: 3.9 g/dL / Pro: 6.8 g/dL / ALK PHOS: 80 U/L / ALT: 37 U/L / AST: 31 U/L / GGT: x                 In summary:  HEALTH ISSUES - PROBLEM Dx:    .. Possible transient seizure, manifested by lack of responsiveness  .. Hypoglycemia on admission, unclear if primary or reactive Patient seen and examined independently on 3E.     PAST MEDICAL & SURGICAL HISTORY:  Diabetes  Hypertension  CVA (cerebral vascular accident)  Vascular dementia  Hyperlipidemia  GERD (gastroesophageal reflux disease)  Reported h/o Seizures      Vitals:  T(F): 97.4 (12-08-22 @ 12:43)  HR: 71 (12-08-22 @ 12:43)  BP: 159/80 (12-08-22 @ 12:43)      TESTS & MEASUREMENTS:                        15.8   7.74  )-----------( 301      ( 08 Dec 2022 09:15 )             47.3       12-08    143  |  106  |  12  ----------------------------<  100<H>  4.2   |  27  |  0.5<L>    Ca    9.2      08 Dec 2022 09:15  Mg     2.0     12-08    TPro  6.8  /  Alb  3.9  /  TBili  0.6  /  DBili  x   /  AST  31  /  ALT  37  /  AlkPhos  80  12-08    LIVER FUNCTIONS - ( 08 Dec 2022 09:15 )  Alb: 3.9 g/dL / Pro: 6.8 g/dL / ALK PHOS: 80 U/L / ALT: 37 U/L / AST: 31 U/L / GGT: x                 In summary:  HEALTH ISSUES - PROBLEM Dx:    .. Possible transient seizure, manifested by lack of responsiveness  .. Hypoglycemia on admission, unclear if primary or reactive.   .. Insulin dependent diabetes   .. Cognitive dysfunction/ vascular dementia   .. H/O CVA/ left thalamic     PLAN  .. EEG as requested by neuro team   .. Keppra for now, Q12 Hrs  .. Monitor for recurrent hypoglycemic episodes. Patient's A1c< 7 ---> consider decreasing baseline maintenance insulin. Patient seen and examined independently on 3E.     PAST MEDICAL & SURGICAL HISTORY:  Diabetes  Hypertension  CVA (cerebral vascular accident)  Vascular dementia  Hyperlipidemia  GERD (gastroesophageal reflux disease)  Reported h/o Seizures      Vitals:  T(F): 97.4 (12-08-22 @ 12:43)  HR: 71 (12-08-22 @ 12:43)  BP: 159/80 (12-08-22 @ 12:43)      TESTS & MEASUREMENTS:                        15.8   7.74  )-----------( 301      ( 08 Dec 2022 09:15 )             47.3       12-08    143  |  106  |  12  ----------------------------<  100<H>  4.2   |  27  |  0.5<L>    Ca    9.2      08 Dec 2022 09:15  Mg     2.0     12-08    TPro  6.8  /  Alb  3.9  /  TBili  0.6  /  DBili  x   /  AST  31  /  ALT  37  /  AlkPhos  80  12-08    LIVER FUNCTIONS - ( 08 Dec 2022 09:15 )  Alb: 3.9 g/dL / Pro: 6.8 g/dL / ALK PHOS: 80 U/L / ALT: 37 U/L / AST: 31 U/L / GGT: x                 In summary:  HEALTH ISSUES - PROBLEM Dx:    .. Possible transient seizure, manifested by lack of responsiveness  .. Hypoglycemia on admission, unclear if primary or reactive.   .. Insulin dependent diabetes   .. Cognitive dysfunction/ vascular dementia   .. H/O CVA/ left thalamic     PLAN  .. EEG as requested by neuro team   .. Keppra for now, Q12 Hrs  .. Monitor for recurrent hypoglycemic episodes. Patient's A1c< 7 ---> consider decreasing baseline maintenance insulin (Lantus) by 5-10 Units.    Rest as per H&P

## 2022-12-08 NOTE — H&P ADULT - NSHPPHYSICALEXAM_GEN_ALL_CORE
GENERAL: NAD  HEAD:  Atraumatic, Normocephalic  ENMT: poor dentition  NECK: Supple  NERVOUS SYSTEM:  Alert & Oriented X2 to self and place, not to time. answers questions appropriately. moves all 4 extremities  CHEST/LUNG: Clear to auscultation bilaterally; No rales, rhonchi, wheezing, or rubs  HEART: Regular rate and rhythm. Normal S1/S1. No murmurs, rubs, or gallops  ABDOMEN: Soft, Nontender, Nondistended; Bowel sounds present  EXTREMITIES: no LE edema

## 2022-12-08 NOTE — H&P ADULT - NSICDXPASTMEDICALHX_GEN_ALL_CORE_FT
PAST MEDICAL HISTORY:  CVA (cerebral vascular accident)     Diabetes     GERD (gastroesophageal reflux disease)     Hyperlipidemia     Hypertension     Seizures     Vascular dementia

## 2022-12-08 NOTE — PHYSICAL THERAPY INITIAL EVALUATION ADULT - GAIT DISTANCE, PT EVAL
50 ft x 1 without a.d. with contact guard - pt with loss of balance to the R side. With RW contact guard 50 ft x 1 still with loss of balance to the R side.

## 2022-12-08 NOTE — CONSULT NOTE ADULT - SUBJECTIVE AND OBJECTIVE BOX
Neurology Consult    HPI:     56 y/o M with PMH of right PCA territory and left thalamic infarcts, vascular dementia, HTN, HLD, GERD, DM, mood disorder, BIBA from [facility] after staring episode prior to presentation. As per [facility]  pt sat down to eat around dinner time, when he had and episode of unresponsive described as to staff and others, "just staring". EMS was called immediately and they state this episode lasted about 20 minutes and continued when EMS arrived. Triage note reports pt was hypoglycemic at that time but no number given, [facility] unaware of this when questioned and pt does not remember. FS 65 in triage, given juice and sandwich in ED. Unclear if there was head trauma as EMS reported pt slumped onto table hitting his head however [facility] also unaware of this. Pt currently has no complaints and says he feels fine. Denies HA, dizziness, CP, SOB, abdominal pain, n/v/d, urinary or bowel complaints, LE pain or swelling.  PAST MEDICAL & SURGICAL HISTORY:  Diabetes      Hypertension      Pancreatitis      CVA (cerebral vascular accident)      No significant past surgical history          FAMILY HISTORY:      Social History: (-) x 3    Allergies    No Known Allergies    Intolerances        MEDICATIONS  (STANDING):    MEDICATIONS  (PRN):      Review of systems:    Constitutional: as per HPI  Eyes: No eye pain or discharge  ENMT:  No difficulty hearing; No sinus or throat pain  Neck: No pain or stiffness  Respiratory: No cough, wheezing, chills or hemoptysis  Cardiovascular: No chest pain, palpitations, shortness of breath, dyspnea on exertion  Gastrointestinal: No abdominal pain, nausea, vomiting or hematemesis; No diarrhea or constipation.   Genitourinary: No dysuria, frequency, hematuria or incontinence  Neurological: As per HPI  Skin: No rashes or lesions   Endocrine: No heat or cold intolerance; No hair loss  Musculoskeletal: No joint pain or swelling  Psychiatric: No depression, anxiety, mood swings  Heme/Lymph: No easy bruising or bleeding gums    Vital Signs Last 24 Hrs  T(C): 36.6 (07 Dec 2022 18:40), Max: 36.6 (07 Dec 2022 18:40)  T(F): 97.9 (07 Dec 2022 18:40), Max: 97.9 (07 Dec 2022 18:40)  HR: 90 (07 Dec 2022 18:40) (90 - 90)  BP: 141/68 (07 Dec 2022 18:40) (141/68 - 141/68)  BP(mean): --  RR: 18 (07 Dec 2022 18:40) (18 - 18)  SpO2: 100% (07 Dec 2022 18:40) (100% - 100%)    Parameters below as of 07 Dec 2022 18:40  Patient On (Oxygen Delivery Method): room air          Neurological Examination:  General:  Appearance is consistent with chronologic age.  No abnormal facies.  Gross skin survey within normal limits.    Cognitive/Language:  Awake, alert, and oriented to person, place, time and date.  Recent and remote memory intact.  Fund of knowledge is appropriate.  Naming, repetition and comprehension intact.   Nondysarthric.    Cranial Nerves  - Eyes: Visual acuity intact, Visual fields full.  EOMI w/o nystagmus, skew or reported double vision.  PERRL.  No ptosis/weakness of eyelid closure.    - Face:  Facial sensation normal V1 - 3, no facial asymmetry.    - Ears/Nose/Throat:  Hearing grossly intact b/l to finger rub.  Palate elevates midline.  Tongue and uvula midline.   Motor examination:  Upper Extremities: L 5/5, R 5/5; Lower extremities: L 5/5, R 5/5.  No observable drift. Normal tone and bulk. No tenderness, twitching, tremors or involuntary movements.  Sensory examination:   Intact to light touch and pinprick, pain, temperature and proprioception and vibration in all extremities.  Reflexes:   2+ b/l biceps, triceps, brachioradialis, patella and achilles.  Plantar response downgoing b/l.  Jaw jerk, Noam, clonus absent.  Cerebellum:   FTN/HKS intact.  No dysmetria or dysdiadokinesia.  Gait narrow based and normal.      Labs:   CBC Full  -  ( 07 Dec 2022 20:28 )  WBC Count : 10.35 K/uL  RBC Count : 4.79 M/uL  Hemoglobin : 14.3 g/dL  Hematocrit : 43.6 %  Platelet Count - Automated : 289 K/uL  Mean Cell Volume : 91.0 fL  Mean Cell Hemoglobin : 29.9 pg  Mean Cell Hemoglobin Concentration : 32.8 g/dL  Auto Neutrophil # : 7.95 K/uL  Auto Lymphocyte # : 1.65 K/uL  Auto Monocyte # : 0.46 K/uL  Auto Eosinophil # : 0.17 K/uL  Auto Basophil # : 0.08 K/uL  Auto Neutrophil % : 76.9 %  Auto Lymphocyte % : 15.9 %  Auto Monocyte % : 4.4 %  Auto Eosinophil % : 1.6 %  Auto Basophil % : 0.8 %    12-07    139  |  105  |  17  ----------------------------<  115<H>  4.3   |  24  |  0.7    Ca    8.8      07 Dec 2022 20:28    TPro  6.8  /  Alb  3.7  /  TBili  0.4  /  DBili  x   /  AST  30  /  ALT  38  /  AlkPhos  77  12-07    LIVER FUNCTIONS - ( 07 Dec 2022 20:28 )  Alb: 3.7 g/dL / Pro: 6.8 g/dL / ALK PHOS: 77 U/L / ALT: 38 U/L / AST: 30 U/L / GGT: x                   Neuroimaging:  NCHCT: CT Head No Cont:   ACC: [id number] EXAM:  CT BRAIN                          PROCEDURE DATE:  12/07/2022          INTERPRETATION:  Clinical History / Reason for exam: Altered mental   status.    Technique: Contiguous axial CT images were obtained from the base of the   skull to the vertex, without the administration of intravenous contrast.    Comparison CT: April 5, 2022.    Findings:    Ventricular system, basal cisterns and cortical sulcal pattern are within   normal limits for the patient's stated age.    No significant change in chronic right PCA territory and left thalamic   infarcts. Unchanged patchy periventricular and subcortical white matter   hypodensities are consistent with moderate microvascular ischemic disease.    There is no acute intracranial hemorrhage, mass-effect or midline shift.    There is no extra-axial collection.    The visualized paranasal sinuses and mastoid complexes are unremarkable.      Impression:    1. No evidence of acute intracranial hemorrhage or mass effect.    2. No significant change in chronic right PCA territory and left thalamic   infarcts.    3. Moderate chronic white matter microvascular ischemic disease.    --- End of Report ---            DENIZ VITALE MD; Attending Radiologist  This document has been electronically signed. Dec  7 2022  8:49PM (12-07-22 @ 20:37)      12-08-22 @ 00:08       Neurology Consult    HPI:     58 y/o M with PMH of right PCA territory and left thalamic infarcts, vascular dementia, HTN, HLD, GERD, DM, mood disorder, BIBA from [facility] after staring episode prior to presentation. As per [facility]  pt sat down to eat around dinner time, when he had and episode of unresponsive described by staff as "just staring". EMS was called immediately and they state this episode lasted about 20 minutes and continued when EMS arrived. Triage note reports pt was hypoglycemic at that time but no number given, [facility] unaware of this when questioned and pt does not remember. FS 65 in triage, given juice and sandwich in ED. Unclear if there was head trauma as EMS reported pt slumped onto table hitting his head however [facility] also unaware of this. Pt currently has no complaints and says he feels fine. Denies HA, dizziness, CP, SOB, abdominal pain, n/v/d, urinary or bowel complaints, LE pain or swelling.    PAST MEDICAL & SURGICAL HISTORY:  Diabetes      Hypertension      Pancreatitis      CVA (cerebral vascular accident)      No significant past surgical history          FAMILY HISTORY:      Social History: (-) x 3    Allergies    No Known Allergies    Intolerances        MEDICATIONS  (STANDING):    MEDICATIONS  (PRN):      Review of systems:    Constitutional: as per HPI  Eyes: No eye pain or discharge  ENMT:  No difficulty hearing; No sinus or throat pain  Neck: No pain or stiffness  Respiratory: No cough, wheezing, chills or hemoptysis  Cardiovascular: No chest pain, palpitations, shortness of breath, dyspnea on exertion  Gastrointestinal: No abdominal pain, nausea, vomiting or hematemesis; No diarrhea or constipation.   Genitourinary: No dysuria, frequency, hematuria or incontinence  Neurological: As per HPI  Skin: No rashes or lesions   Endocrine: No heat or cold intolerance; No hair loss  Musculoskeletal: No joint pain or swelling  Psychiatric: No depression, anxiety, mood swings  Heme/Lymph: No easy bruising or bleeding gums    Vital Signs Last 24 Hrs  T(C): 36.6 (07 Dec 2022 18:40), Max: 36.6 (07 Dec 2022 18:40)  T(F): 97.9 (07 Dec 2022 18:40), Max: 97.9 (07 Dec 2022 18:40)  HR: 90 (07 Dec 2022 18:40) (90 - 90)  BP: 141/68 (07 Dec 2022 18:40) (141/68 - 141/68)  BP(mean): --  RR: 18 (07 Dec 2022 18:40) (18 - 18)  SpO2: 100% (07 Dec 2022 18:40) (100% - 100%)    Parameters below as of 07 Dec 2022 18:40  Patient On (Oxygen Delivery Method): room air          Neurological Examination:  General:  Appearance is consistent with chronologic age.  No abnormal facies.  Gross skin survey within normal limits.    Cognitive/Language:  Awake, alert, and oriented to person, place, time and date.  Recent and remote memory intact.  Fund of knowledge is appropriate.  Naming, repetition and comprehension intact.   Nondysarthric.    Cranial Nerves  - Eyes: Visual acuity intact, Visual fields full.  EOMI w/o nystagmus, skew or reported double vision.  PERRL.  No ptosis/weakness of eyelid closure.    - Face:  Facial sensation normal V1 - 3, no facial asymmetry.    - Ears/Nose/Throat:  Hearing grossly intact b/l to finger rub.  Palate elevates midline.  Tongue and uvula midline.   Motor examination:  Upper Extremities: L 5/5, R 5/5; Lower extremities: L 5/5, R 5/5.  No observable drift. Normal tone and bulk. No tenderness, twitching, tremors or involuntary movements.  Sensory examination:   Intact to light touch and pinprick, pain, temperature and proprioception and vibration in all extremities.  Reflexes:   2+ b/l biceps, triceps, brachioradialis, patella and achilles.  Plantar response downgoing b/l.  Jaw jerk, Noam, clonus absent.  Cerebellum:   FTN/HKS intact.  No dysmetria or dysdiadokinesia.  Gait narrow based and normal.      Labs:   CBC Full  -  ( 07 Dec 2022 20:28 )  WBC Count : 10.35 K/uL  RBC Count : 4.79 M/uL  Hemoglobin : 14.3 g/dL  Hematocrit : 43.6 %  Platelet Count - Automated : 289 K/uL  Mean Cell Volume : 91.0 fL  Mean Cell Hemoglobin : 29.9 pg  Mean Cell Hemoglobin Concentration : 32.8 g/dL  Auto Neutrophil # : 7.95 K/uL  Auto Lymphocyte # : 1.65 K/uL  Auto Monocyte # : 0.46 K/uL  Auto Eosinophil # : 0.17 K/uL  Auto Basophil # : 0.08 K/uL  Auto Neutrophil % : 76.9 %  Auto Lymphocyte % : 15.9 %  Auto Monocyte % : 4.4 %  Auto Eosinophil % : 1.6 %  Auto Basophil % : 0.8 %    12-07    139  |  105  |  17  ----------------------------<  115<H>  4.3   |  24  |  0.7    Ca    8.8      07 Dec 2022 20:28    TPro  6.8  /  Alb  3.7  /  TBili  0.4  /  DBili  x   /  AST  30  /  ALT  38  /  AlkPhos  77  12-07    LIVER FUNCTIONS - ( 07 Dec 2022 20:28 )  Alb: 3.7 g/dL / Pro: 6.8 g/dL / ALK PHOS: 77 U/L / ALT: 38 U/L / AST: 30 U/L / GGT: x                   Neuroimaging:  NCHCT: CT Head No Cont:   ACC: [id number] EXAM:  CT BRAIN                          PROCEDURE DATE:  12/07/2022          INTERPRETATION:  Clinical History / Reason for exam: Altered mental   status.    Technique: Contiguous axial CT images were obtained from the base of the   skull to the vertex, without the administration of intravenous contrast.    Comparison CT: April 5, 2022.    Findings:    Ventricular system, basal cisterns and cortical sulcal pattern are within   normal limits for the patient's stated age.    No significant change in chronic right PCA territory and left thalamic   infarcts. Unchanged patchy periventricular and subcortical white matter   hypodensities are consistent with moderate microvascular ischemic disease.    There is no acute intracranial hemorrhage, mass-effect or midline shift.    There is no extra-axial collection.    The visualized paranasal sinuses and mastoid complexes are unremarkable.      Impression:    1. No evidence of acute intracranial hemorrhage or mass effect.    2. No significant change in chronic right PCA territory and left thalamic   infarcts.    3. Moderate chronic white matter microvascular ischemic disease.    --- End of Report ---            DENIZ VITALE MD; Attending Radiologist  This document has been electronically signed. Dec  7 2022  8:49PM (12-07-22 @ 20:37)      12-08-22 @ 00:08       Neurology Consult    HPI:     56 y/o M with PMH of right PCA territory and left thalamic infarcts, vascular dementia, HTN, HLD, GERD, DM, mood disorder, BIBA from [facility] after staring episode prior to presentation. As per [facility]  pt sat down to eat around dinner time, when he had and episode of unresponsive described by staff as "just staring" before collapsing into the table. EMS was called immediately and they state this episode lasted about 20 minutes and continued when EMS arrived. FS 65 in triage, given juice and sandwich in ED.   PAST MEDICAL & SURGICAL HISTORY:  Diabetes      Hypertension      Pancreatitis      CVA (cerebral vascular accident)      No significant past surgical history          FAMILY HISTORY:      Social History: (-) x 3    Allergies    No Known Allergies    Intolerances        MEDICATIONS  (STANDING):    MEDICATIONS  (PRN):      Review of systems:    Constitutional: as per HPI  Eyes: No eye pain or discharge  ENMT:  No difficulty hearing; No sinus or throat pain  Neck: No pain or stiffness  Respiratory: No cough, wheezing, chills or hemoptysis  Cardiovascular: No chest pain, palpitations, shortness of breath, dyspnea on exertion  Gastrointestinal: No abdominal pain, nausea, vomiting or hematemesis; No diarrhea or constipation.   Genitourinary: No dysuria, frequency, hematuria or incontinence  Neurological: As per HPI  Skin: No rashes or lesions   Endocrine: No heat or cold intolerance; No hair loss  Musculoskeletal: No joint pain or swelling  Psychiatric: No depression, anxiety, mood swings  Heme/Lymph: No easy bruising or bleeding gums    Vital Signs Last 24 Hrs  T(C): 36.6 (07 Dec 2022 18:40), Max: 36.6 (07 Dec 2022 18:40)  T(F): 97.9 (07 Dec 2022 18:40), Max: 97.9 (07 Dec 2022 18:40)  HR: 90 (07 Dec 2022 18:40) (90 - 90)  BP: 141/68 (07 Dec 2022 18:40) (141/68 - 141/68)  BP(mean): --  RR: 18 (07 Dec 2022 18:40) (18 - 18)  SpO2: 100% (07 Dec 2022 18:40) (100% - 100%)    Parameters below as of 07 Dec 2022 18:40  Patient On (Oxygen Delivery Method): room air          Neurological Examination:  General:  Appearance is consistent with chronologic age.  No abnormal facies.  Gross skin survey within normal limits.    Cognitive/Language:  Awake, alert, and oriented to person, place, time and date.  Recent and remote memory intact.  Fund of knowledge is appropriate.  Naming, repetition and comprehension intact.   Nondysarthric.    Cranial Nerves  - Eyes: Visual acuity intact, Visual fields full.  EOMI w/o nystagmus, skew or reported double vision.  PERRL.  No ptosis/weakness of eyelid closure.    - Face:  Facial sensation normal V1 - 3, no facial asymmetry.    - Ears/Nose/Throat:  Hearing grossly intact b/l to finger rub.  Palate elevates midline.  Tongue and uvula midline.   Motor examination:  Upper Extremities: L 5/5, R 5/5; Lower extremities: L 5/5, R 5/5.  No observable drift. Normal tone and bulk. No tenderness, twitching, tremors or involuntary movements.  Sensory examination:   Intact to light touch and pinprick, pain, temperature and proprioception and vibration in all extremities.  Reflexes:   2+ b/l biceps, triceps, brachioradialis, patella and achilles.  Plantar response downgoing b/l.  Jaw jerk, Noam, clonus absent.  Cerebellum:   FTN/HKS intact.  No dysmetria or dysdiadokinesia.  Gait narrow based and normal.      Labs:   CBC Full  -  ( 07 Dec 2022 20:28 )  WBC Count : 10.35 K/uL  RBC Count : 4.79 M/uL  Hemoglobin : 14.3 g/dL  Hematocrit : 43.6 %  Platelet Count - Automated : 289 K/uL  Mean Cell Volume : 91.0 fL  Mean Cell Hemoglobin : 29.9 pg  Mean Cell Hemoglobin Concentration : 32.8 g/dL  Auto Neutrophil # : 7.95 K/uL  Auto Lymphocyte # : 1.65 K/uL  Auto Monocyte # : 0.46 K/uL  Auto Eosinophil # : 0.17 K/uL  Auto Basophil # : 0.08 K/uL  Auto Neutrophil % : 76.9 %  Auto Lymphocyte % : 15.9 %  Auto Monocyte % : 4.4 %  Auto Eosinophil % : 1.6 %  Auto Basophil % : 0.8 %    12-07    139  |  105  |  17  ----------------------------<  115<H>  4.3   |  24  |  0.7    Ca    8.8      07 Dec 2022 20:28    TPro  6.8  /  Alb  3.7  /  TBili  0.4  /  DBili  x   /  AST  30  /  ALT  38  /  AlkPhos  77  12-07    LIVER FUNCTIONS - ( 07 Dec 2022 20:28 )  Alb: 3.7 g/dL / Pro: 6.8 g/dL / ALK PHOS: 77 U/L / ALT: 38 U/L / AST: 30 U/L / GGT: x                   Neuroimaging:  NCHCT: CT Head No Cont:   ACC: [id number] EXAM:  CT BRAIN                          PROCEDURE DATE:  12/07/2022          INTERPRETATION:  Clinical History / Reason for exam: Altered mental   status.    Technique: Contiguous axial CT images were obtained from the base of the   skull to the vertex, without the administration of intravenous contrast.    Comparison CT: April 5, 2022.    Findings:    Ventricular system, basal cisterns and cortical sulcal pattern are within   normal limits for the patient's stated age.    No significant change in chronic right PCA territory and left thalamic   infarcts. Unchanged patchy periventricular and subcortical white matter   hypodensities are consistent with moderate microvascular ischemic disease.    There is no acute intracranial hemorrhage, mass-effect or midline shift.    There is no extra-axial collection.    The visualized paranasal sinuses and mastoid complexes are unremarkable.      Impression:    1. No evidence of acute intracranial hemorrhage or mass effect.    2. No significant change in chronic right PCA territory and left thalamic   infarcts.    3. Moderate chronic white matter microvascular ischemic disease.    --- End of Report ---            DENIZ VITALE MD; Attending Radiologist  This document has been electronically signed. Dec  7 2022  8:49PM (12-07-22 @ 20:37)      12-08-22 @ 00:08       Neurology Consult    HPI:     56 y/o M with PMH of right PCA territory and left thalamic infarcts, vascular dementia, HTN, HLD, GERD, DM, mood disorder, BIBA from [facility] group [facility] after staring episode prior to presentation. As per [facility]  pt sat down to eat around dinner time, when he had and episode of unresponsive described by staff as "just staring" before collapsing into the table. EMS was called immediately and they state this episode lasted about 20 minutes and continued when EMS arrived. FS 65 in triage, given juice and sandwich in ED. CTH with only chronic R PCA/ L thalamic infarcts. CT C spine with no acute fracture or dislocation. Patient was admitted under care of medicine for hypoglycemia.    PAST MEDICAL & SURGICAL HISTORY:  Diabetes      Hypertension      Pancreatitis      CVA (cerebral vascular accident)      No significant past surgical history          FAMILY HISTORY:      Social History: Lives at [facility]  Not active smoker    Allergies    No Known Allergies    Intolerances        MEDICATIONS  (STANDING):    MEDICATIONS  (PRN):      Review of systems:    Unable to obtain  Patient is not cooperative. Covering his face with the blanket, refusing to answer my questions    Vital Signs Last 24 Hrs  T(C): 36.6 (07 Dec 2022 18:40), Max: 36.6 (07 Dec 2022 18:40)  T(F): 97.9 (07 Dec 2022 18:40), Max: 97.9 (07 Dec 2022 18:40)  HR: 90 (07 Dec 2022 18:40) (90 - 90)  BP: 141/68 (07 Dec 2022 18:40) (141/68 - 141/68)  BP(mean): --  RR: 18 (07 Dec 2022 18:40) (18 - 18)  SpO2: 100% (07 Dec 2022 18:40) (100% - 100%)    Parameters below as of 07 Dec 2022 18:40  Patient On (Oxygen Delivery Method): room air          Neurological Examination:  General: Not in distress, not cooperative  Cognitive/Language:  Aox1 (baseline), no dysarthria, speech is clear, follows commands   Cranial Nerves  - Eyes: Blinks eyes to threat, reactive pupils, moving eyes in all direction  - Face:  Facial sensation normal V1 - 3, no facial asymmetry.    - Ears/Nose/Throat:   Tongue and uvula midline.   Motor examination:  Upper Extremities: L 5/5, R 5/5; Lower extremities: L 5/5, R 5/5.  No observable drift. Normal tone and bulk. No tenderness, twitching, tremors or involuntary movements.  Sensory examination:   Intact to light touch  Reflexes:   2+ UE/LE, no clonus  Cerebellum:  Gait not assessed      Labs:   CBC Full  -  ( 07 Dec 2022 20:28 )  WBC Count : 10.35 K/uL  RBC Count : 4.79 M/uL  Hemoglobin : 14.3 g/dL  Hematocrit : 43.6 %  Platelet Count - Automated : 289 K/uL  Mean Cell Volume : 91.0 fL  Mean Cell Hemoglobin : 29.9 pg  Mean Cell Hemoglobin Concentration : 32.8 g/dL  Auto Neutrophil # : 7.95 K/uL  Auto Lymphocyte # : 1.65 K/uL  Auto Monocyte # : 0.46 K/uL  Auto Eosinophil # : 0.17 K/uL  Auto Basophil # : 0.08 K/uL  Auto Neutrophil % : 76.9 %  Auto Lymphocyte % : 15.9 %  Auto Monocyte % : 4.4 %  Auto Eosinophil % : 1.6 %  Auto Basophil % : 0.8 %    12-07    139  |  105  |  17  ----------------------------<  115<H>  4.3   |  24  |  0.7    Ca    8.8      07 Dec 2022 20:28    TPro  6.8  /  Alb  3.7  /  TBili  0.4  /  DBili  x   /  AST  30  /  ALT  38  /  AlkPhos  77  12-07    LIVER FUNCTIONS - ( 07 Dec 2022 20:28 )  Alb: 3.7 g/dL / Pro: 6.8 g/dL / ALK PHOS: 77 U/L / ALT: 38 U/L / AST: 30 U/L / GGT: x                   Neuroimaging:  NCHCT: CT Head No Cont:   ACC: [id number] EXAM:  CT BRAIN                          PROCEDURE DATE:  12/07/2022          INTERPRETATION:  Clinical History / Reason for exam: Altered mental   status.    Technique: Contiguous axial CT images were obtained from the base of the   skull to the vertex, without the administration of intravenous contrast.    Comparison CT: April 5, 2022.    Findings:    Ventricular system, basal cisterns and cortical sulcal pattern are within   normal limits for the patient's stated age.    No significant change in chronic right PCA territory and left thalamic   infarcts. Unchanged patchy periventricular and subcortical white matter   hypodensities are consistent with moderate microvascular ischemic disease.    There is no acute intracranial hemorrhage, mass-effect or midline shift.    There is no extra-axial collection.    The visualized paranasal sinuses and mastoid complexes are unremarkable.      Impression:    1. No evidence of acute intracranial hemorrhage or mass effect.    2. No significant change in chronic right PCA territory and left thalamic   infarcts.    3. Moderate chronic white matter microvascular ischemic disease.

## 2022-12-08 NOTE — H&P ADULT - HISTORY OF PRESENT ILLNESS
58yo M w/ PMH of CVA w/ vascular dementia, HTN, HLD, GERD, seizures, DM was brought in by ambulance from [facility] after having an episode of staring. The patient is not a great historian, nor does he recall the events. Per the ED note, the pt sat down to eat dinner when he had an episode of staring and was unresponsive towards staff. The episode lasted about 20 minutes. The pt was apparently hypoglycemic when checked by EMS but value unknown to us. In the ED, FS was 65 and the pt was given juice and a sandwich. The patient currently denies any headaches, dizziness, lightheadedness, cough, chills, fever, chest pain, palpitations, sob, abd pain, constipation, diarrhea, urinary symptoms.     In the ED:  Vitals: T 97.9, /68, HR 90, RR 18, SpO2 100% on RA  Labs: FS 65 on arrival but improved to 169  CTH: no acute intracranial hemorrhage or mass effect. Chronic R PCA and left thalamic infarcts, no change. Moderate chronic white matter microvascular ischemic disease  CT c-spine: no acute displaced fx or facet subluxation    Neurology saw the pt in the ED and recommended seizure vs syncope work up

## 2022-12-08 NOTE — H&P ADULT - NSHPLABSRESULTS_GEN_ALL_CORE
LABS:  cret                        14.3   10.35 )-----------( 289      ( 07 Dec 2022 20:28 )             43.6     12-07    139  |  105  |  17  ----------------------------<  115<H>  4.3   |  24  |  0.7    Ca    8.8      07 Dec 2022 20:28    TPro  6.8  /  Alb  3.7  /  TBili  0.4  /  DBili  x   /  AST  30  /  ALT  38  /  AlkPhos  77  12-07

## 2022-12-08 NOTE — H&P ADULT - ASSESSMENT
58yo M w/ PMH of CVA w/ vascular dementia, HTN, HLD, GERD, seizures, DM was brought in by ambulance from [facility] after having an episode of staring. Could be a result of seizure vs syncope vs hypoglycemia.    #Staring/unresponsiveness: seizure vs syncope vs hypoglycemia  #H/o seizure  - Staring episode and unresponsiveness lasting 20 min  - FS in ED was 65, was reportedly also low prior to arrival as reported by EMS  - Obtain rEEG  - c/w keppra 500 BID  - f/u keppra level  - Seizure precautions  - Keep Mg > 2  - Obtain EKG  - Obtain echo  - Obtain orthostatics  - Monitor FS    #Diabetes  - c/w home lantus 30u and premeal lispro 10u, adjust as needed  - Monitor FS  - f/u A1c    #Prior CVA   #Vascular dementia  - c/w memantine, donepezil  - c/w asa, plavix    #HLD  - takes ezetimibe at home, not available at pharmacy  - c/w lipitor  - f/u lipid panel    #HTN  - c/w metoprolol    #GERD  - Not on GI ppx at home  - Start pantoprazole    Diet: DASH  DVT ppx: lovenox  GI ppx: pantoprazole  Activity: IAT   56yo M w/ PMH of CVA w/ vascular dementia, HTN, HLD, GERD, seizures, DM was brought in by ambulance from [facility] after having an episode of staring. Could be a result of seizure vs syncope vs hypoglycemia.    #Staring/unresponsiveness: seizure vs syncope vs hypoglycemia  #H/o seizure  - Staring episode and unresponsiveness lasting 20 min  - CTH and c-spine negative for acute pathology  - FS in ED was 65, was reportedly also low prior to arrival as reported by EMS  - Had suspected seizures with no electrographic seizure documented on prior EEG  - Obtain rEEG  - c/w keppra 500 BID  - f/u keppra level  - Seizure precautions  - Keep Mg > 2  - Obtain EKG  - Obtain echo  - Obtain orthostatics  - Monitor FS    #Diabetes  - c/w home lantus 30u and premeal lispro 10u, adjust as needed  - Monitor FS  - f/u A1c    #Prior CVA   #Vascular dementia  - c/w memantine, donepezil  - c/w asa, plavix    #HLD  - takes ezetimibe at home, not available at pharmacy  - c/w lipitor  - f/u lipid panel    #HTN  - c/w metoprolol    #GERD  - Not on medications at home  - Start pantoprazole    Diet: DASH  DVT ppx: lovenox  GI ppx: pantoprazole  Activity: IAT

## 2022-12-08 NOTE — CONSULT NOTE ADULT - ASSESSMENT
56 y/o M with PMH of right PCA territory and left thalamic infarcts, vascular dementia, HTN, HLD, GERD, DM, mood disorder, On Keppra for suspected seizures with no electrographic seizure documented on prior EEG, (Rt frontal slowing an transient sharps) BIBEMS from group home after a syncopal episode, described as starring and unresponsiveness, found to be hypoglycemic on admission, as per prior documentation, patient seems to be currently at his baseline.    Recommendation:  REEG  Continue Keppra 500 mg BID  Check Keppra level  Syncope workup  Hypoglycemia management  Seizure precautions  Keep Mg>2    This not is incomplete, pending attending attestation 58 y/o M with PMH of right PCA territory and left thalamic infarcts, vascular dementia, HTN, HLD, GERD, DM, mood disorder, On Keppra for suspected seizures with no electrographic seizure documented on prior EEG, (Rt frontal slowing an transient sharps) BIBEMS from group home after a syncopal episode, described as starring and unresponsiveness, found to be hypoglycemic on admission, as per prior documentation, patient seems to be currently at his baseline.    Recommendation:  REEG  Continue Keppra 500 mg BID  Check Keppra level  Syncope workup  Hypoglycemia management  Seizure precautions  Keep Mg>2  if EEG (-), no further inpt neurologic w/u and f/u as outpt in 4 wks

## 2022-12-08 NOTE — PATIENT PROFILE ADULT - FALL HARM RISK - HARM RISK INTERVENTIONS
Assistance with ambulation/Assistance OOB with selected safe patient handling equipment/Communicate Risk of Fall with Harm to all staff/Discuss with provider need for PT consult/Monitor gait and stability/Reinforce activity limits and safety measures with patient and family/Tailored Fall Risk Interventions/Use of alarms - bed, chair and/or voice tab/Visual Cue: Yellow wristband and red socks/Bed in lowest position, wheels locked, appropriate side rails in place/Call bell, personal items and telephone in reach/Instruct patient to call for assistance before getting out of bed or chair/Non-slip footwear when patient is out of bed/[place] to call system/Physically safe environment - no spills, clutter or unnecessary equipment/Purposeful Proactive Rounding/Room/bathroom lighting operational, light cord in reach

## 2022-12-08 NOTE — PHYSICAL THERAPY INITIAL EVALUATION ADULT - PERTINENT HX OF CURRENT PROBLEM, REHAB EVAL
pt is a 56yo M w/ PMH of CVA w/ vascular dementia, HTN, HLD, GERD, seizures, DM was brought in by ambulance from [facility] after having an episode of staring. The patient is not a great historian, nor does he recall the events. Per the ED note, the pt sat down to eat dinner when he had an episode of staring and was unresponsive towards staff. The episode lasted about 20 minutes. The pt was apparently hypoglycemic when checked by EMS but value unknown to us. In the ED, FS was 65 and the pt was given juice and a sandwich. The patient currently denies any headaches, dizziness, lightheadedness, cough, chills, fever, chest pain, palpitations, sob, abd pain, constipation, diarrhea, urinary symptoms.

## 2022-12-09 ENCOUNTER — TRANSCRIPTION ENCOUNTER (OUTPATIENT)
Age: 57
End: 2022-12-09

## 2022-12-09 LAB
A1C WITH ESTIMATED AVERAGE GLUCOSE RESULT: 6.4 % — HIGH (ref 4–5.6)
ANION GAP SERPL CALC-SCNC: 6 MMOL/L — LOW (ref 7–14)
BUN SERPL-MCNC: 13 MG/DL — SIGNIFICANT CHANGE UP (ref 10–20)
CALCIUM SERPL-MCNC: 8.7 MG/DL — SIGNIFICANT CHANGE UP (ref 8.4–10.5)
CHLORIDE SERPL-SCNC: 105 MMOL/L — SIGNIFICANT CHANGE UP (ref 98–110)
CO2 SERPL-SCNC: 30 MMOL/L — SIGNIFICANT CHANGE UP (ref 17–32)
CREAT SERPL-MCNC: 0.7 MG/DL — SIGNIFICANT CHANGE UP (ref 0.7–1.5)
EGFR: 106 ML/MIN/1.73M2 — SIGNIFICANT CHANGE UP
ESTIMATED AVERAGE GLUCOSE: 137 MG/DL — HIGH (ref 68–114)
GLUCOSE BLDC GLUCOMTR-MCNC: 130 MG/DL — HIGH (ref 70–99)
GLUCOSE BLDC GLUCOMTR-MCNC: 164 MG/DL — HIGH (ref 70–99)
GLUCOSE BLDC GLUCOMTR-MCNC: 174 MG/DL — HIGH (ref 70–99)
GLUCOSE BLDC GLUCOMTR-MCNC: 185 MG/DL — HIGH (ref 70–99)
GLUCOSE SERPL-MCNC: 147 MG/DL — HIGH (ref 70–99)
HCT VFR BLD CALC: 43.4 % — SIGNIFICANT CHANGE UP (ref 42–52)
HGB BLD-MCNC: 14.9 G/DL — SIGNIFICANT CHANGE UP (ref 14–18)
MAGNESIUM SERPL-MCNC: 1.9 MG/DL — SIGNIFICANT CHANGE UP (ref 1.8–2.4)
MCHC RBC-ENTMCNC: 30.4 PG — SIGNIFICANT CHANGE UP (ref 27–31)
MCHC RBC-ENTMCNC: 34.3 G/DL — SIGNIFICANT CHANGE UP (ref 32–37)
MCV RBC AUTO: 88.6 FL — SIGNIFICANT CHANGE UP (ref 80–94)
NRBC # BLD: 0 /100 WBCS — SIGNIFICANT CHANGE UP (ref 0–0)
PLATELET # BLD AUTO: 262 K/UL — SIGNIFICANT CHANGE UP (ref 130–400)
POTASSIUM SERPL-MCNC: 4.8 MMOL/L — SIGNIFICANT CHANGE UP (ref 3.5–5)
POTASSIUM SERPL-SCNC: 4.8 MMOL/L — SIGNIFICANT CHANGE UP (ref 3.5–5)
RBC # BLD: 4.9 M/UL — SIGNIFICANT CHANGE UP (ref 4.7–6.1)
RBC # FLD: 13 % — SIGNIFICANT CHANGE UP (ref 11.5–14.5)
SODIUM SERPL-SCNC: 141 MMOL/L — SIGNIFICANT CHANGE UP (ref 135–146)
WBC # BLD: 6.58 K/UL — SIGNIFICANT CHANGE UP (ref 4.8–10.8)
WBC # FLD AUTO: 6.58 K/UL — SIGNIFICANT CHANGE UP (ref 4.8–10.8)

## 2022-12-09 PROCEDURE — 99232 SBSQ HOSP IP/OBS MODERATE 35: CPT

## 2022-12-09 PROCEDURE — 95819 EEG AWAKE AND ASLEEP: CPT | Mod: 26

## 2022-12-09 RX ORDER — MIDODRINE HYDROCHLORIDE 2.5 MG/1
1 TABLET ORAL
Qty: 0 | Refills: 0 | DISCHARGE
Start: 2022-12-09

## 2022-12-09 RX ORDER — INSULIN GLARGINE 100 [IU]/ML
20 INJECTION, SOLUTION SUBCUTANEOUS
Qty: 0 | Refills: 0 | DISCHARGE
Start: 2022-12-09

## 2022-12-09 RX ORDER — MIDODRINE HYDROCHLORIDE 2.5 MG/1
10 TABLET ORAL
Refills: 0 | Status: DISCONTINUED | OUTPATIENT
Start: 2022-12-09 | End: 2022-12-12

## 2022-12-09 RX ADMIN — Medication 81 MILLIGRAM(S): at 12:03

## 2022-12-09 RX ADMIN — LEVETIRACETAM 500 MILLIGRAM(S): 250 TABLET, FILM COATED ORAL at 05:43

## 2022-12-09 RX ADMIN — Medication 10 UNIT(S): at 17:17

## 2022-12-09 RX ADMIN — Medication 50 MILLIGRAM(S): at 05:43

## 2022-12-09 RX ADMIN — Medication 10 UNIT(S): at 12:07

## 2022-12-09 RX ADMIN — MIDODRINE HYDROCHLORIDE 10 MILLIGRAM(S): 2.5 TABLET ORAL at 17:21

## 2022-12-09 RX ADMIN — PANTOPRAZOLE SODIUM 40 MILLIGRAM(S): 20 TABLET, DELAYED RELEASE ORAL at 05:44

## 2022-12-09 RX ADMIN — CLOPIDOGREL BISULFATE 75 MILLIGRAM(S): 75 TABLET, FILM COATED ORAL at 12:03

## 2022-12-09 RX ADMIN — MEMANTINE HYDROCHLORIDE 10 MILLIGRAM(S): 10 TABLET ORAL at 05:43

## 2022-12-09 RX ADMIN — MEMANTINE HYDROCHLORIDE 10 MILLIGRAM(S): 10 TABLET ORAL at 17:21

## 2022-12-09 RX ADMIN — SENNA PLUS 2 TABLET(S): 8.6 TABLET ORAL at 21:11

## 2022-12-09 RX ADMIN — MIDODRINE HYDROCHLORIDE 10 MILLIGRAM(S): 2.5 TABLET ORAL at 12:03

## 2022-12-09 RX ADMIN — ENOXAPARIN SODIUM 40 MILLIGRAM(S): 100 INJECTION SUBCUTANEOUS at 12:07

## 2022-12-09 RX ADMIN — ATORVASTATIN CALCIUM 80 MILLIGRAM(S): 80 TABLET, FILM COATED ORAL at 21:11

## 2022-12-09 RX ADMIN — DONEPEZIL HYDROCHLORIDE 10 MILLIGRAM(S): 10 TABLET, FILM COATED ORAL at 21:11

## 2022-12-09 RX ADMIN — LEVETIRACETAM 500 MILLIGRAM(S): 250 TABLET, FILM COATED ORAL at 17:21

## 2022-12-09 RX ADMIN — INSULIN GLARGINE 20 UNIT(S): 100 INJECTION, SOLUTION SUBCUTANEOUS at 21:10

## 2022-12-09 NOTE — DISCHARGE NOTE PROVIDER - CARE PROVIDERS DIRECT ADDRESSES
[email or web address]

## 2022-12-09 NOTE — DISCHARGE NOTE PROVIDER - NSDCCPCAREPLAN_GEN_ALL_CORE_FT
PRINCIPAL DISCHARGE DIAGNOSIS  Diagnosis: Episode of unresponsiveness  Assessment and Plan of Treatment: You were unresponsive at your nursing home. You were admitted to the hospital to workup the causes of your mental status change. Brain imaging was negative for acute pathology, an EEG did not show any seizure like activity.   Your change in mental status was likely scondary to your low blood sugar levels and your labile blood pressure when you stand up.      SECONDARY DISCHARGE DIAGNOSES  Diagnosis: Hypoglycemia  Assessment and Plan of Treatment: You are an episode of unresponsivess at your nursing home. Your sugars where found to be very low on admission. We have adjusted your insulin protocol to prevent further episodes like that.  Hypoglycemia occurs when the glucose (sugar) level in your blood is too low. Symptoms include confusion, weakness, or fainting. You may even appear to be having a stroke. Take medications exactly as prescribed by your health care professional. Maintain a healthy lifestyle and follow up with your primary care physician.  SEEK IMMEDIATE MEDICAL CARE IF YOU HAVE ANY OF THE FOLLOWING SYMPTOMS: weakness, fainting, change in mental status, nausea or vomiting, fruity smell to your breath, or any signs of dehydration.      Diagnosis: Orthostasis  Assessment and Plan of Treatment: Orthostatic hypotension is a condition in which your blood pressure drops when you stand up from a seated position. You tested positive for orthostatics while in the hospital.   Please try to stand up slowly and in a controlled manner to prevent more episodes like that. Compression stockings also help preventing these orthostatic events. Make sure you stay hydrated to minimize the chances experiencing light headedness performing those manouvers.     PRINCIPAL DISCHARGE DIAGNOSIS  Diagnosis: Episode of unresponsiveness  Assessment and Plan of Treatment: You were unresponsive at your nursing home. You were admitted to the hospital to workup the causes of your mental status change. Brain imaging was negative for acute pathology, an EEG did not show any seizure like activity.   Your change in mental status was likely scondary to your low blood sugar levels and your labile blood pressure when you stand up.      SECONDARY DISCHARGE DIAGNOSES  Diagnosis: Hypoglycemia  Assessment and Plan of Treatment: You are an episode of unresponsivess at your nursing home. Your sugars where found to be very low on admission. We have adjusted your insulin protocol to prevent further episodes like that.  Hypoglycemia occurs when the glucose (sugar) level in your blood is too low. Symptoms include confusion, weakness, or fainting. You may even appear to be having a stroke. Take medications exactly as prescribed by your health care professional. Maintain a healthy lifestyle and follow up with your primary care physician.  SEEK IMMEDIATE MEDICAL CARE IF YOU HAVE ANY OF THE FOLLOWING SYMPTOMS: weakness, fainting, change in mental status, nausea or vomiting, fruity smell to your breath, or any signs of dehydration.      Diagnosis: Orthostasis  Assessment and Plan of Treatment: Orthostatic hypotension is a condition in which your blood pressure drops when you stand up from a seated position. You tested positive for orthostatics while in the hospital.   Please try to stand up slowly and in a controlled manner to prevent more episodes like that. Compression stockings also help preventing these orthostatic events. Make sure you stay hydrated to minimize the chances experiencing light headedness performing those manouvers. We have started you on a medication called midodrine to help raise your blood pressure.     PRINCIPAL DISCHARGE DIAGNOSIS  Diagnosis: Episode of unresponsiveness  Assessment and Plan of Treatment: You had an episode of poor responsiveness. You were admitted to the hospital to workup the causes. Brain imaging was negative for acute pathology, an EEG did not show any seizure like activity.   We suspect your symptoms were seondary to your low blood sugar levels and your labile blood pressure when you stand up.      SECONDARY DISCHARGE DIAGNOSES  Diagnosis: Hypoglycemia  Assessment and Plan of Treatment: You are an episode of unresponsivess at your nursing home. Your sugars where found to be very low on admission. We have adjusted your insulin protocol to prevent further episodes like that.  Hypoglycemia occurs when the glucose (sugar) level in your blood is too low. Symptoms include confusion, weakness, or fainting. You may even appear to be having a stroke. Take medications exactly as prescribed by your health care professional. Maintain a healthy lifestyle and follow up with your primary care physician.  SEEK IMMEDIATE MEDICAL CARE IF YOU HAVE ANY OF THE FOLLOWING SYMPTOMS: weakness, fainting, change in mental status, nausea or vomiting, fruity smell to your breath, or any signs of dehydration.      Diagnosis: Orthostasis  Assessment and Plan of Treatment: Orthostatic hypotension is a condition in which your blood pressure drops when you stand up from a seated position. You tested positive for orthostatics while in the hospital. We recommend COMPA stockings when OOB and Midodrin three times per day.  Please try to stand up slowly and in a controlled manner to prevent more episodes like that. Compression stockings also help preventing these orthostatic events. Make sure you stay hydrated to minimize the chances experiencing light headedness performing those manouvers. We have started you on a medication called midodrine to help raise your blood pressure.     PRINCIPAL DISCHARGE DIAGNOSIS  Diagnosis: Episode of unresponsiveness  Assessment and Plan of Treatment: You had an episode of poor responsiveness. You were admitted to the hospital to workup the causes. Brain imaging was negative for acute pathology, an EEG did not show any seizure like activity.   We suspect your symptoms were seondary to your low blood sugar levels and your labile blood pressure when you stand up.      SECONDARY DISCHARGE DIAGNOSES  Diagnosis: Hypoglycemia  Assessment and Plan of Treatment: You are an episode of unresponsivess at your nursing home. Your sugars where found to be very low on admission. We have lowered your insulin protocol to prevent further episodes like that. Please monitor sugars closely and don't skip meals.  Hypoglycemia occurs when the glucose (sugar) level in your blood is too low. Symptoms include confusion, weakness, or fainting. You may even appear to be having a stroke. Take medications exactly as prescribed by your health care professional. Maintain a healthy lifestyle and follow up with your primary care physician.  SEEK IMMEDIATE MEDICAL CARE IF YOU HAVE ANY OF THE FOLLOWING SYMPTOMS: weakness, fainting, change in mental status, nausea or vomiting, fruity smell to your breath, or any signs of dehydration.      Diagnosis: Orthostasis  Assessment and Plan of Treatment: Orthostatic hypotension is a condition in which your blood pressure drops when you stand up from a seated position. You tested positive for orthostatics while in the hospital. We recommend COMPA stockings when OOB and Midodrin three times per day.  Please try to stand up slowly and in a controlled manner to prevent more episodes like that. Compression stockings also help preventing these orthostatic events. Make sure you stay hydrated to minimize the chances experiencing light headedness performing those manouvers. We have started you on a medication called midodrine to help raise your blood pressure.     PRINCIPAL DISCHARGE DIAGNOSIS  Diagnosis: Episode of unresponsiveness  Assessment and Plan of Treatment: You had an episode of poor responsiveness. You were admitted to the hospital to workup the causes. Brain imaging was negative for acute pathology, an EEG did not show any seizure like activity.   We suspect your symptoms were sceondary to your low blood sugar levels and your labile blood pressure when you stand up. But seizure is still a possibility. Please take your Keppra and follow up with neurology as outpt.      SECONDARY DISCHARGE DIAGNOSES  Diagnosis: Hypoglycemia  Assessment and Plan of Treatment: You are an episode of unresponsivess at your nursing home. Your sugars where found to be very low on admission. We have lowered your insulin protocol to prevent further episodes like that. Please monitor sugars closely and don't skip meals.  Hypoglycemia occurs when the glucose (sugar) level in your blood is too low. Symptoms include confusion, weakness, or fainting. You may even appear to be having a stroke. Take medications exactly as prescribed by your health care professional. Maintain a healthy lifestyle and follow up with your primary care physician.  SEEK IMMEDIATE MEDICAL CARE IF YOU HAVE ANY OF THE FOLLOWING SYMPTOMS: weakness, fainting, change in mental status, nausea or vomiting, fruity smell to your breath, or any signs of dehydration.      Diagnosis: Orthostasis  Assessment and Plan of Treatment: Orthostatic hypotension is a condition in which your blood pressure drops when you stand up from a seated position. You tested positive for orthostatics while in the hospital. We recommend COMPA stockings when OOB and Midodrin three times per day.  Please try to stand up slowly and in a controlled manner to prevent more episodes like that. Compression stockings also help preventing these orthostatic events. Make sure you stay hydrated to minimize the chances experiencing light headedness performing those manouvers. We have started you on a medication called midodrine to help raise your blood pressure.

## 2022-12-09 NOTE — DISCHARGE NOTE NURSING/CASE MANAGEMENT/SOCIAL WORK - NSDCVIVACCINE_GEN_ALL_CORE_FT
Tdap; 03-Apr-2019 18:34; Amico, Francine M (RN); [employer]; [id number] (Exp. Date: 20-Nov-2020); IntraMuscular; Deltoid Right.; 0.5 milliLiter(s); VIS (VIS Published: 09-May-2013, VIS Presented: 03-Apr-2019);   Tdap; 08-Aug-2021 16:15; Janey Vora (LINDSEY); [employer]; [id number] (Exp. Date: 28-Jan-2023); IntraMuscular; Deltoid Right.; 0.5 milliLiter(s); VIS (VIS Published: 09-May-2013, VIS Presented: 08-Aug-2021);

## 2022-12-09 NOTE — DISCHARGE NOTE NURSING/CASE MANAGEMENT/SOCIAL WORK - NSDCPEFALRISK_GEN_ALL_CORE
For information on Fall & Injury Prevention, visit: [email or web address] OR  [email or web address] OR  https://www.cdc.gov/steadi/patient.html

## 2022-12-09 NOTE — DISCHARGE NOTE NURSING/CASE MANAGEMENT/SOCIAL WORK - PATIENT PORTAL LINK FT
You can access the FollowMyHealth Patient Portal offered by [facility] by registering at the following website: [email or web address]. By joining [employer]’s FollowMyHealth portal, you will also be able to view your health information using other applications (apps) compatible with our system.

## 2022-12-09 NOTE — DISCHARGE NOTE PROVIDER - CARE PROVIDER_API CALL
Sang Montoya)  Internal Medicine  [address] [place] [address]  Phone: [phone]  Fax: [phone]  Follow Up Time: 2 weeks   Sang Montoya)  Internal Medicine  [address] [place] [address]  Phone: [phone]  Fax: [phone]  Follow Up Time: 1 week   Sang Montoya)  Internal Medicine  [address] [place] [address]  Phone: [phone]  Fax: [phone]  Follow Up Time: 1 week    Jesus Chaidez)  Neuromuscular Medicine  [address] [place] [address], [address]  [place], [place] [id number]  Phone: [phone]  Fax: [phone]  Follow Up Time: 2 weeks

## 2022-12-09 NOTE — DISCHARGE NOTE PROVIDER - HOSPITAL COURSE
58yo M w/ PMH of CVA w/ vascular dementia, HTN, HLD, GERD, seizures, DM was brought in by ambulance from [facility] after having an episode of staring. The patient is not a great historian, nor does he recall the events. Per the ED note, the pt sat down to eat dinner when he had an episode of staring and was unresponsive towards staff. The episode lasted about 20 minutes. The pt was apparently hypoglycemic when checked by EMS but value unknown to us. In the ED, FS was 65 and the pt was given juice and a sandwich. The patient currently denies any headaches, dizziness, lightheadedness, cough, chills, fever, chest pain, palpitations, sob, abd pain, constipation, diarrhea, urinary symptoms. Neurology saw the pt in the ED and recommended seizure vs syncope work up    Hospital Course:    In the ED:  Vitals: T 97.9, /68, HR 90, RR 18, SpO2 100% on RA  Labs: FS 65 on arrival but improved to 169  CTH: no acute intracranial hemorrhage or mass effect. Chronic R PCA and left thalamic infarcts, no change. Moderate chronic white matter microvascular ischemic disease  CT c-spine: no acute displaced fx or facet subluxation  EEG (-) for epileptic activity  Lantus reduced from 30 units to 20 units to prevent further morning hypoglycemic events  Orthostatics (+). Thigh high stockings and Midodrine 10mg BID started. Orthostatics re-tested and (-). Not Hypertense.          56yo M w/ PMH of CVA w/ vascular dementia, HTN, HLD, GERD, seizures, DM was brought in by ambulance from [facility] after having an episode of staring. The patient is not a great historian, nor does he recall the events. Per the ED note, the pt sat down to eat dinner when he had an episode of staring and was unresponsive towards staff. The episode lasted about 20 minutes. The pt was apparently hypoglycemic when checked by EMS but value unknown to us. In the ED, FS was 65 and the pt was given juice and a sandwich. The patient currently denies any headaches, dizziness, lightheadedness, cough, chills, fever, chest pain, palpitations, sob, abd pain, constipation, diarrhea, urinary symptoms. Neurology saw the pt in the ED and recommended seizure vs syncope work up    Hospital Course:    In the ED:  Vitals: T 97.9, /68, HR 90, RR 18, SpO2 100% on RA  Labs: FS 65 on arrival but improved to 169  CTH: no acute intracranial hemorrhage or mass effect. Chronic R PCA and left thalamic infarcts, no change. Moderate chronic white matter microvascular ischemic disease  CT c-spine: no acute displaced fx or facet subluxation  EEG (-) for epileptic activity  Lantus reduced from 30 units to 20 units, and lispro reduced to 7 units TID to prevent further morning hypoglycemic events  Orthostatics (+). Thigh high stockings and Midodrine 2.5mg BID started. Orthostatics re-tested and (-). Not Hypertense.          58yo M w/ PMH of CVA w/ vascular dementia, HTN, HLD, GERD, seizures, DM was brought in by ambulance from [facility] after having an episode of staring. The patient is not a great historian, nor does he recall the events. Per the ED note, the pt sat down to eat dinner when he had an episode of staring and was unresponsive towards staff. The episode lasted about 20 minutes. The pt was apparently hypoglycemic when checked by EMS but value unknown to us. In the ED, FS was 65 and the pt was given juice and a sandwich. The patient currently denies any headaches, dizziness, lightheadedness, cough, chills, fever, chest pain, palpitations, sob, abd pain, constipation, diarrhea, urinary symptoms. Neurology saw the pt in the ED.    Hospital Course:    In the ED:  Vitals: T 97.9, /68, HR 90, RR 18, SpO2 100% on RA  Labs: FS 65 on arrival but improved to 169  CTH: no acute intracranial hemorrhage or mass effect. Chronic R PCA and left thalamic infarcts, no change. Moderate chronic white matter microvascular ischemic disease  CT c-spine: no acute displaced fx or facet subluxation  EEG (-) for epileptic activity  Lantus reduced from 30 units to 20 units, and lispro reduced to 7 units TID to prevent further morning hypoglycemic events  Orthostatics (+). Thigh high stockings and Midodrine 2.5mg TID started. Orthostatics re-tested and (-).          58yo M w/ PMH of CVA w/ vascular dementia, HTN, HLD, GERD, seizures, DM was brought in by ambulance from [facility] after having an episode of staring. The patient is not a great historian, nor does he recall the events. Per the ED note, the pt sat down to eat dinner when he had an episode of staring and was unresponsive towards staff. The episode lasted about 20 minutes. The pt was apparently hypoglycemic when checked by EMS but value unknown to us. In the ED, FS was 65 and the pt was given juice and a sandwich. The patient currently denies any headaches, dizziness, lightheadedness, cough, chills, fever, chest pain, palpitations, sob, abd pain, constipation, diarrhea, urinary symptoms. Neurology saw the pt in the ED.    Hospital Course:    In the ED:  Vitals: T 97.9, /68, HR 90, RR 18, SpO2 100% on RA  Labs: FS 65 on arrival but improved to 169  CTH: no acute intracranial hemorrhage or mass effect. Chronic R PCA and left thalamic infarcts, no change. Moderate chronic white matter microvascular ischemic disease  CT c-spine: no acute displaced fx or facet subluxation  EEG (-) for epileptic activity  Lantus reduced from 30 units to 20 units, and lispro reduced to 7 units TID to prevent further morning hypoglycemic events  Orthostatics (+). Thigh high stockings and Midodrine 2.5mg TID started. Orthostatics re-tested and (-).     Final Dx. Transient toxic-metabolic encephalopthy due to hypoglycemia, seizure vs orthostatic hypotension.

## 2022-12-09 NOTE — DISCHARGE NOTE PROVIDER - NSDCFUSCHEDAPPT_GEN_ALL_CORE_FT
Iam Faith  [facility]  [address]  Scheduled Appointment: 01/13/2023

## 2022-12-09 NOTE — PROGRESS NOTE ADULT - ASSESSMENT
·	No evidence of seizure on EEG or clinically at this time  ·	Episode could be attributed to syncope/near syncope associated with significant orthostatism   ·	Orthostatic blood pressure, in a patient with signs of euvolemia. differential diagnoses include dysautonomia related to insulin-dependent diabetes or central (stroke sequelae)   ·	Vascular dementia/ cognitive disorder   ·	Insulin dependent diabetes with HbA1C <6.5 with hypoglycemia on hospital presentation.     PLAN  ·	Will need a new basal glargine insulin (adjusted) since risk of recurrent hypoglycemia is high with current presentation and strict insulin regimen.   ·	Unlikely to need long-standing AEDs since EEG is unremarkable   ·	Started today on Midodrine 10 mg twice daily  ·	Ambulate and re-evaluate orthostatism today     Progress Note Handoff  Pending:  improving symptomatic orthostatism/ started on midodrine today/ prepare for DC order placed/ Can be DC'd today if asymptomatic on ambulation and orthostatism improved.    Disposition: assisted living     Case discussed with resident assigned.

## 2022-12-09 NOTE — DISCHARGE NOTE PROVIDER - PROVIDER TOKENS
PROVIDER:[TOKEN:[[id number]],FOLLOWUP:[2 weeks]] PROVIDER:[TOKEN:[[id number]],FOLLOWUP:[1 week]] PROVIDER:[TOKEN:[[id number]],FOLLOWUP:[1 week]],PROVIDER:[TOKEN:[[id number]],FOLLOWUP:[2 weeks]]

## 2022-12-09 NOTE — DISCHARGE NOTE PROVIDER - NSDCMRMEDTOKEN_GEN_ALL_CORE_FT
Admelog 100 units/mL injectable solution: 10 unit(s) injectable 3 times a day (before meals)  aspirin 325 mg oral tablet: 1 tab(s) orally once a day  atorvastatin 80 mg oral tablet: 1 tab(s) orally once a day (at bedtime)  clopidogrel 75 mg oral tablet: 1 tab(s) orally once a day  donepezil 10 mg oral tablet: 2 tab(s) orally once a day at 5PM  insulin glargine 100 units/mL subcutaneous solution: 20 unit(s) subcutaneous once a day (at bedtime)  metoprolol succinate 50 mg oral tablet, extended release: 1 tab(s) orally once a day  midodrine 10 mg oral tablet: 1 tab(s) orally 2 times a day  Namenda 10 mg oral tablet: 1 tab(s) orally 2 times a day  Senna 8.6 mg oral tablet: 2 tab(s) orally once a day (at bedtime)  Zetia 10 mg oral tablet: 1 tab(s) orally once a day   Admelog 100 units/mL injectable solution: 7 unit(s) injectable 3 times a day (before meals)  aspirin 81 mg oral tablet, chewable: 1 tab(s) orally once a day  atorvastatin 80 mg oral tablet: 1 tab(s) orally once a day (at bedtime)  clopidogrel 75 mg oral tablet: 1 tab(s) orally once a day  donepezil 10 mg oral tablet: 2 tab(s) orally once a day at 5PM  insulin glargine 100 units/mL subcutaneous solution: 20 unit(s) subcutaneous once a day (at bedtime)  metoprolol succinate 50 mg oral tablet, extended release: 1 tab(s) orally once a day  midodrine 2.5 mg oral tablet: 1 tab(s) orally 3 times a day at 7a, 12p, 5p  Namenda 10 mg oral tablet: 1 tab(s) orally 2 times a day  Senna 8.6 mg oral tablet: 2 tab(s) orally once a day (at bedtime)  Zetia 10 mg oral tablet: 1 tab(s) orally once a day

## 2022-12-10 LAB
ANION GAP SERPL CALC-SCNC: 6 MMOL/L — LOW (ref 7–14)
BUN SERPL-MCNC: 17 MG/DL — SIGNIFICANT CHANGE UP (ref 10–20)
CALCIUM SERPL-MCNC: 8.9 MG/DL — SIGNIFICANT CHANGE UP (ref 8.4–10.4)
CHLORIDE SERPL-SCNC: 108 MMOL/L — SIGNIFICANT CHANGE UP (ref 98–110)
CO2 SERPL-SCNC: 31 MMOL/L — SIGNIFICANT CHANGE UP (ref 17–32)
CREAT SERPL-MCNC: 0.7 MG/DL — SIGNIFICANT CHANGE UP (ref 0.7–1.5)
EGFR: 106 ML/MIN/1.73M2 — SIGNIFICANT CHANGE UP
GLUCOSE BLDC GLUCOMTR-MCNC: 106 MG/DL — HIGH (ref 70–99)
GLUCOSE BLDC GLUCOMTR-MCNC: 139 MG/DL — HIGH (ref 70–99)
GLUCOSE BLDC GLUCOMTR-MCNC: 160 MG/DL — HIGH (ref 70–99)
GLUCOSE BLDC GLUCOMTR-MCNC: 211 MG/DL — HIGH (ref 70–99)
GLUCOSE SERPL-MCNC: 103 MG/DL — HIGH (ref 70–99)
HCT VFR BLD CALC: 44.3 % — SIGNIFICANT CHANGE UP (ref 42–52)
HGB BLD-MCNC: 14.5 G/DL — SIGNIFICANT CHANGE UP (ref 14–18)
MAGNESIUM SERPL-MCNC: 1.8 MG/DL — SIGNIFICANT CHANGE UP (ref 1.8–2.4)
MCHC RBC-ENTMCNC: 29.7 PG — SIGNIFICANT CHANGE UP (ref 27–31)
MCHC RBC-ENTMCNC: 32.7 G/DL — SIGNIFICANT CHANGE UP (ref 32–37)
MCV RBC AUTO: 90.6 FL — SIGNIFICANT CHANGE UP (ref 80–94)
NRBC # BLD: 0 /100 WBCS — SIGNIFICANT CHANGE UP (ref 0–0)
PLATELET # BLD AUTO: 287 K/UL — SIGNIFICANT CHANGE UP (ref 130–400)
POTASSIUM SERPL-MCNC: 4.3 MMOL/L — SIGNIFICANT CHANGE UP (ref 3.5–5)
POTASSIUM SERPL-SCNC: 4.3 MMOL/L — SIGNIFICANT CHANGE UP (ref 3.5–5)
RBC # BLD: 4.89 M/UL — SIGNIFICANT CHANGE UP (ref 4.7–6.1)
RBC # FLD: 13.1 % — SIGNIFICANT CHANGE UP (ref 11.5–14.5)
SODIUM SERPL-SCNC: 145 MMOL/L — SIGNIFICANT CHANGE UP (ref 135–146)
WBC # BLD: 6.81 K/UL — SIGNIFICANT CHANGE UP (ref 4.8–10.8)
WBC # FLD AUTO: 6.81 K/UL — SIGNIFICANT CHANGE UP (ref 4.8–10.8)

## 2022-12-10 PROCEDURE — 99232 SBSQ HOSP IP/OBS MODERATE 35: CPT

## 2022-12-10 PROCEDURE — 93306 TTE W/DOPPLER COMPLETE: CPT | Mod: 26

## 2022-12-10 RX ADMIN — LEVETIRACETAM 500 MILLIGRAM(S): 250 TABLET, FILM COATED ORAL at 17:19

## 2022-12-10 RX ADMIN — Medication 10 UNIT(S): at 11:31

## 2022-12-10 RX ADMIN — Medication 10 UNIT(S): at 07:24

## 2022-12-10 RX ADMIN — INSULIN GLARGINE 20 UNIT(S): 100 INJECTION, SOLUTION SUBCUTANEOUS at 21:25

## 2022-12-10 RX ADMIN — ATORVASTATIN CALCIUM 80 MILLIGRAM(S): 80 TABLET, FILM COATED ORAL at 21:25

## 2022-12-10 RX ADMIN — Medication 50 MILLIGRAM(S): at 05:03

## 2022-12-10 RX ADMIN — MEMANTINE HYDROCHLORIDE 10 MILLIGRAM(S): 10 TABLET ORAL at 17:19

## 2022-12-10 RX ADMIN — ENOXAPARIN SODIUM 40 MILLIGRAM(S): 100 INJECTION SUBCUTANEOUS at 11:31

## 2022-12-10 RX ADMIN — LEVETIRACETAM 500 MILLIGRAM(S): 250 TABLET, FILM COATED ORAL at 05:03

## 2022-12-10 RX ADMIN — Medication 81 MILLIGRAM(S): at 11:31

## 2022-12-10 RX ADMIN — PANTOPRAZOLE SODIUM 40 MILLIGRAM(S): 20 TABLET, DELAYED RELEASE ORAL at 06:15

## 2022-12-10 RX ADMIN — SENNA PLUS 2 TABLET(S): 8.6 TABLET ORAL at 21:25

## 2022-12-10 RX ADMIN — CLOPIDOGREL BISULFATE 75 MILLIGRAM(S): 75 TABLET, FILM COATED ORAL at 11:31

## 2022-12-10 RX ADMIN — MEMANTINE HYDROCHLORIDE 10 MILLIGRAM(S): 10 TABLET ORAL at 05:03

## 2022-12-10 RX ADMIN — DONEPEZIL HYDROCHLORIDE 10 MILLIGRAM(S): 10 TABLET, FILM COATED ORAL at 21:25

## 2022-12-10 NOTE — PROGRESS NOTE ADULT - ATTENDING COMMENTS
No evidence of seizure on EEG or clinically at this time. Episode could be attributed to syncope/near syncope associated with significant orthostatism  Orthostatic blood pressure, in a patient with signs of euvolemia. differential diagnoses include dysautonomia related to insulin-dependent diabetes or central (stroke sequelae), Vascular dementia/ cognitive disorder. Insulin dependent diabetes with HbA1C <6.5 with hypoglycemia on hospital presentation.     #AMS  - Home dose of Lantus reduced from 30 units to 20 units. No further AM hypoglycemic events.   - Unlikely to need long-standing AEDs since EEG is unremarkable   - Started today on Midodrine 10 mg twice daily. Orthostatics negative since after Midrodrine started. Compression stocks in place  - Pt discharged yesterday but discharge halted due to difficulties with placement. PT medically stable and ready for DC when cleared by case management    Pavel Ames MD  Attending hospitalist

## 2022-12-11 LAB
ANION GAP SERPL CALC-SCNC: 12 MMOL/L — SIGNIFICANT CHANGE UP (ref 7–14)
BUN SERPL-MCNC: 15 MG/DL — SIGNIFICANT CHANGE UP (ref 10–20)
CALCIUM SERPL-MCNC: 8.4 MG/DL — SIGNIFICANT CHANGE UP (ref 8.4–10.5)
CHLORIDE SERPL-SCNC: 106 MMOL/L — SIGNIFICANT CHANGE UP (ref 98–110)
CO2 SERPL-SCNC: 25 MMOL/L — SIGNIFICANT CHANGE UP (ref 17–32)
CREAT SERPL-MCNC: 0.7 MG/DL — SIGNIFICANT CHANGE UP (ref 0.7–1.5)
EGFR: 106 ML/MIN/1.73M2 — SIGNIFICANT CHANGE UP
GLUCOSE BLDC GLUCOMTR-MCNC: 118 MG/DL — HIGH (ref 70–99)
GLUCOSE BLDC GLUCOMTR-MCNC: 128 MG/DL — HIGH (ref 70–99)
GLUCOSE BLDC GLUCOMTR-MCNC: 148 MG/DL — HIGH (ref 70–99)
GLUCOSE BLDC GLUCOMTR-MCNC: 176 MG/DL — HIGH (ref 70–99)
GLUCOSE SERPL-MCNC: 122 MG/DL — HIGH (ref 70–99)
HCT VFR BLD CALC: 39.4 % — LOW (ref 42–52)
HGB BLD-MCNC: 13.7 G/DL — LOW (ref 14–18)
MAGNESIUM SERPL-MCNC: 1.8 MG/DL — SIGNIFICANT CHANGE UP (ref 1.8–2.4)
MCHC RBC-ENTMCNC: 30.5 PG — SIGNIFICANT CHANGE UP (ref 27–31)
MCHC RBC-ENTMCNC: 34.8 G/DL — SIGNIFICANT CHANGE UP (ref 32–37)
MCV RBC AUTO: 87.8 FL — SIGNIFICANT CHANGE UP (ref 80–94)
NRBC # BLD: 0 /100 WBCS — SIGNIFICANT CHANGE UP (ref 0–0)
PLATELET # BLD AUTO: 265 K/UL — SIGNIFICANT CHANGE UP (ref 130–400)
POTASSIUM SERPL-MCNC: 3.9 MMOL/L — SIGNIFICANT CHANGE UP (ref 3.5–5)
POTASSIUM SERPL-SCNC: 3.9 MMOL/L — SIGNIFICANT CHANGE UP (ref 3.5–5)
RBC # BLD: 4.49 M/UL — LOW (ref 4.7–6.1)
RBC # FLD: 13.2 % — SIGNIFICANT CHANGE UP (ref 11.5–14.5)
SODIUM SERPL-SCNC: 143 MMOL/L — SIGNIFICANT CHANGE UP (ref 135–146)
WBC # BLD: 8.3 K/UL — SIGNIFICANT CHANGE UP (ref 4.8–10.8)
WBC # FLD AUTO: 8.3 K/UL — SIGNIFICANT CHANGE UP (ref 4.8–10.8)

## 2022-12-11 PROCEDURE — 99232 SBSQ HOSP IP/OBS MODERATE 35: CPT

## 2022-12-11 RX ADMIN — ATORVASTATIN CALCIUM 80 MILLIGRAM(S): 80 TABLET, FILM COATED ORAL at 21:32

## 2022-12-11 RX ADMIN — Medication 50 MILLIGRAM(S): at 05:06

## 2022-12-11 RX ADMIN — DONEPEZIL HYDROCHLORIDE 10 MILLIGRAM(S): 10 TABLET, FILM COATED ORAL at 21:32

## 2022-12-11 RX ADMIN — CLOPIDOGREL BISULFATE 75 MILLIGRAM(S): 75 TABLET, FILM COATED ORAL at 11:05

## 2022-12-11 RX ADMIN — Medication 10 UNIT(S): at 11:05

## 2022-12-11 RX ADMIN — SENNA PLUS 2 TABLET(S): 8.6 TABLET ORAL at 21:32

## 2022-12-11 RX ADMIN — Medication 81 MILLIGRAM(S): at 11:06

## 2022-12-11 RX ADMIN — LEVETIRACETAM 500 MILLIGRAM(S): 250 TABLET, FILM COATED ORAL at 05:06

## 2022-12-11 RX ADMIN — LEVETIRACETAM 500 MILLIGRAM(S): 250 TABLET, FILM COATED ORAL at 17:14

## 2022-12-11 RX ADMIN — Medication 10 UNIT(S): at 17:15

## 2022-12-11 RX ADMIN — PANTOPRAZOLE SODIUM 40 MILLIGRAM(S): 20 TABLET, DELAYED RELEASE ORAL at 06:04

## 2022-12-11 RX ADMIN — MEMANTINE HYDROCHLORIDE 10 MILLIGRAM(S): 10 TABLET ORAL at 05:06

## 2022-12-11 RX ADMIN — MEMANTINE HYDROCHLORIDE 10 MILLIGRAM(S): 10 TABLET ORAL at 17:13

## 2022-12-11 RX ADMIN — ENOXAPARIN SODIUM 40 MILLIGRAM(S): 100 INJECTION SUBCUTANEOUS at 11:05

## 2022-12-11 NOTE — PROGRESS NOTE ADULT - ASSESSMENT
No evidence of seizure on EEG or clinically at this time. Episode could be attributed to syncope/near syncope associated with significant orthostatism  Orthostatic blood pressure, in a patient with signs of euvolemia. differential diagnoses include dysautonomia related to insulin-dependent diabetes or central (stroke sequelae), Vascular dementia/ cognitive disorder. Insulin dependent diabetes with HbA1C <6.5 with hypoglycemia on hospital presentation.     #AMS  - Home dose of Lantus reduced from 30 units to 20 units. No further AM hypoglycemic events.   - Unlikely to need long-standing AEDs since EEG is unremarkable   - Started today on Midodrine 10 mg twice daily. Orthostatics negative since after Midrodrine started. Compression stocks in place  - Pt discharged yesterday but discharge halted due to difficulties with placement. PT medically stable and ready for DC when cleared by case management    Pavel Ames MD  Attending hospitalist .

## 2022-12-12 VITALS
HEART RATE: 65 BPM | RESPIRATION RATE: 18 BRPM | SYSTOLIC BLOOD PRESSURE: 166 MMHG | DIASTOLIC BLOOD PRESSURE: 78 MMHG | TEMPERATURE: 99 F

## 2022-12-12 LAB
ANION GAP SERPL CALC-SCNC: 8 MMOL/L — SIGNIFICANT CHANGE UP (ref 7–14)
BUN SERPL-MCNC: 11 MG/DL — SIGNIFICANT CHANGE UP (ref 10–20)
CALCIUM SERPL-MCNC: 9 MG/DL — SIGNIFICANT CHANGE UP (ref 8.4–10.5)
CHLORIDE SERPL-SCNC: 105 MMOL/L — SIGNIFICANT CHANGE UP (ref 98–110)
CO2 SERPL-SCNC: 30 MMOL/L — SIGNIFICANT CHANGE UP (ref 17–32)
CREAT SERPL-MCNC: 0.6 MG/DL — LOW (ref 0.7–1.5)
EGFR: 111 ML/MIN/1.73M2 — SIGNIFICANT CHANGE UP
GLUCOSE BLDC GLUCOMTR-MCNC: 113 MG/DL — HIGH (ref 70–99)
GLUCOSE BLDC GLUCOMTR-MCNC: 165 MG/DL — HIGH (ref 70–99)
GLUCOSE SERPL-MCNC: 124 MG/DL — HIGH (ref 70–99)
HCT VFR BLD CALC: 43 % — SIGNIFICANT CHANGE UP (ref 42–52)
HGB BLD-MCNC: 14.9 G/DL — SIGNIFICANT CHANGE UP (ref 14–18)
LEVETIRACETAM SERPL-MCNC: 2.1 UG/ML — LOW (ref 10–40)
LEVETIRACETAM SERPL-MCNC: 7 UG/ML — LOW (ref 10–40)
MAGNESIUM SERPL-MCNC: 1.8 MG/DL — SIGNIFICANT CHANGE UP (ref 1.8–2.4)
MCHC RBC-ENTMCNC: 30.7 PG — SIGNIFICANT CHANGE UP (ref 27–31)
MCHC RBC-ENTMCNC: 34.7 G/DL — SIGNIFICANT CHANGE UP (ref 32–37)
MCV RBC AUTO: 88.7 FL — SIGNIFICANT CHANGE UP (ref 80–94)
NRBC # BLD: 0 /100 WBCS — SIGNIFICANT CHANGE UP (ref 0–0)
PLATELET # BLD AUTO: 293 K/UL — SIGNIFICANT CHANGE UP (ref 130–400)
POTASSIUM SERPL-MCNC: 4.1 MMOL/L — SIGNIFICANT CHANGE UP (ref 3.5–5)
POTASSIUM SERPL-SCNC: 4.1 MMOL/L — SIGNIFICANT CHANGE UP (ref 3.5–5)
RBC # BLD: 4.85 M/UL — SIGNIFICANT CHANGE UP (ref 4.7–6.1)
RBC # FLD: 13.2 % — SIGNIFICANT CHANGE UP (ref 11.5–14.5)
SARS-COV-2 RNA SPEC QL NAA+PROBE: SIGNIFICANT CHANGE UP
SODIUM SERPL-SCNC: 143 MMOL/L — SIGNIFICANT CHANGE UP (ref 135–146)
WBC # BLD: 7.96 K/UL — SIGNIFICANT CHANGE UP (ref 4.8–10.8)
WBC # FLD AUTO: 7.96 K/UL — SIGNIFICANT CHANGE UP (ref 4.8–10.8)

## 2022-12-12 PROCEDURE — 99239 HOSP IP/OBS DSCHRG MGMT >30: CPT

## 2022-12-12 RX ORDER — ASPIRIN/CALCIUM CARB/MAGNESIUM 324 MG
1 TABLET ORAL
Qty: 0 | Refills: 0 | DISCHARGE

## 2022-12-12 RX ORDER — ASPIRIN/CALCIUM CARB/MAGNESIUM 324 MG
1 TABLET ORAL
Qty: 0 | Refills: 0 | DISCHARGE
Start: 2022-12-12

## 2022-12-12 RX ORDER — MIDODRINE HYDROCHLORIDE 2.5 MG/1
1 TABLET ORAL
Qty: 90 | Refills: 0
Start: 2022-12-12 | End: 2023-01-10

## 2022-12-12 RX ORDER — MIDODRINE HYDROCHLORIDE 2.5 MG/1
1 TABLET ORAL
Qty: 60 | Refills: 0
Start: 2022-12-12 | End: 2023-01-10

## 2022-12-12 RX ADMIN — Medication 10 UNIT(S): at 11:43

## 2022-12-12 RX ADMIN — ENOXAPARIN SODIUM 40 MILLIGRAM(S): 100 INJECTION SUBCUTANEOUS at 11:43

## 2022-12-12 RX ADMIN — Medication 10 UNIT(S): at 08:11

## 2022-12-12 RX ADMIN — PANTOPRAZOLE SODIUM 40 MILLIGRAM(S): 20 TABLET, DELAYED RELEASE ORAL at 06:02

## 2022-12-12 RX ADMIN — LEVETIRACETAM 500 MILLIGRAM(S): 250 TABLET, FILM COATED ORAL at 17:14

## 2022-12-12 RX ADMIN — MIDODRINE HYDROCHLORIDE 10 MILLIGRAM(S): 2.5 TABLET ORAL at 17:14

## 2022-12-12 RX ADMIN — LEVETIRACETAM 500 MILLIGRAM(S): 250 TABLET, FILM COATED ORAL at 05:41

## 2022-12-12 RX ADMIN — MEMANTINE HYDROCHLORIDE 10 MILLIGRAM(S): 10 TABLET ORAL at 05:41

## 2022-12-12 RX ADMIN — CLOPIDOGREL BISULFATE 75 MILLIGRAM(S): 75 TABLET, FILM COATED ORAL at 11:44

## 2022-12-12 RX ADMIN — Medication 50 MILLIGRAM(S): at 05:41

## 2022-12-12 RX ADMIN — MEMANTINE HYDROCHLORIDE 10 MILLIGRAM(S): 10 TABLET ORAL at 17:14

## 2022-12-12 RX ADMIN — Medication 81 MILLIGRAM(S): at 11:44

## 2022-12-12 NOTE — PROGRESS NOTE ADULT - SUBJECTIVE AND OBJECTIVE BOX
ALEA MORRIS 57y Male  MRN#: [id number]   Hospital Day: 3d    SUBJECTIVE  Patient is a 57y old Male who presents with a chief complaint of unresponsiveness (09 Dec 2022 14:27)  Currently admitted to medicine with the primary diagnosis of Episode of unresponsiveness      INTERVAL HPI AND OVERNIGHT EVENTS:  Patient was examined and seen at bedside. This morning he is resting comfortably in bed and reports no issues or overnight events.    OBJECTIVE  PAST MEDICAL & SURGICAL HISTORY  Diabetes    Hypertension    CVA (cerebral vascular accident)    Vascular dementia    Hyperlipidemia    GERD (gastroesophageal reflux disease)    Seizures    No significant past surgical history      ALLERGIES:  No Known Allergies    MEDICATIONS:  STANDING MEDICATIONS  aspirin  chewable 81 milliGRAM(s) Oral daily  atorvastatin 80 milliGRAM(s) Oral at bedtime  clopidogrel Tablet 75 milliGRAM(s) Oral daily  dextrose 5%. 1000 milliLiter(s) IV Continuous <Continuous>  dextrose 5%. 1000 milliLiter(s) IV Continuous <Continuous>  dextrose 50% Injectable 25 Gram(s) IV Push once  dextrose 50% Injectable 12.5 Gram(s) IV Push once  dextrose 50% Injectable 25 Gram(s) IV Push once  donepezil 10 milliGRAM(s) Oral at bedtime  enoxaparin Injectable 40 milliGRAM(s) SubCutaneous every 24 hours  glucagon  Injectable 1 milliGRAM(s) IntraMuscular once  insulin glargine Injectable (LANTUS) 20 Unit(s) SubCutaneous at bedtime  insulin lispro Injectable (ADMELOG) 10 Unit(s) SubCutaneous three times a day before meals  levETIRAcetam 500 milliGRAM(s) Oral two times a day  memantine 10 milliGRAM(s) Oral two times a day  metoprolol succinate ER 50 milliGRAM(s) Oral daily  midodrine. 10 milliGRAM(s) Oral <User Schedule>  pantoprazole    Tablet 40 milliGRAM(s) Oral before breakfast  senna 2 Tablet(s) Oral at bedtime    PRN MEDICATIONS  dextrose Oral Gel 15 Gram(s) Oral once PRN      VITAL SIGNS: Last 24 Hours  T(C): 37 (10 Dec 2022 04:32), Max: 37 (10 Dec 2022 04:32)  T(F): 98.6 (10 Dec 2022 04:32), Max: 98.6 (10 Dec 2022 04:32)  HR: 68 (10 Dec 2022 04:32) (62 - 68)  BP: 180/84 (10 Dec 2022 04:32) (146/70 - 180/84)  BP(mean): --  RR: 18 (10 Dec 2022 04:32) (18 - 18)  SpO2: 98% (09 Dec 2022 11:52) (98% - 98%)    LABS:                        14.5   6.81  )-----------( 287      ( 10 Dec 2022 07:00 )             44.3     12-10    145  |  108  |  17  ----------------------------<  103<H>  4.3   |  31  |  0.7    Ca    8.9      10 Dec 2022 07:00  Mg     1.8     12-10    TPro  6.8  /  Alb  3.9  /  TBili  0.6  /  DBili  x   /  AST  31  /  ALT  37  /  AlkPhos  80  12-08      RADIOLOGY:  < from: CT Cervical Spine No Cont (12.07.22 @ 20:38) >  IMPRESSION:    No acute displaced fracture or facet subluxation.      < end of copied text >  < from: CT Head No Cont (12.07.22 @ 20:37) >    Impression:    1. No evidence of acute intracranial hemorrhage or mass effect.    2. No significant change in chronic right PCA territory and left thalamic   infarcts.    3. Moderate chronic white matter microvascular ischemic disease.      < end of copied text >      PHYSICAL EXAM:  GENERAL: Pleasant and in no acute distress or pain / poor dentition and dis. A&O x2  CHEST/LUNG: Clear to auscultation bilaterally   HEART: S1S2  ABDOMEN: BS++   EXTREMITIES:  chronic skin changes       ASSESSMENT and PLAN     No evidence of seizure on EEG or clinically at this time. Episode could be attributed to syncope/near syncope associated with significant orthostatism  Orthostatic blood pressure, in a patient with signs of euvolemia. differential diagnoses include dysautonomia related to insulin-dependent diabetes or central (stroke sequelae), Vascular dementia/ cognitive disorder. Insulin dependent diabetes with HbA1C <6.5 with hypoglycemia on hospital presentation.     #AMS  - Home dose of Lantus reduced from 30 units to 20 units. No further AM hypoglycemic events.   - Unlikely to need long-standing AEDs since EEG is unremarkable   - Started today on Midodrine 10 mg twice daily. Orthostatics negative since after Midrodrine started. Compression stocks in place  - Pt discharged yesterday but discharge halted due to difficulties with placement. PT medically stable and ready for DC when cleared by case management         
ALEA MORRIS 57y Male  MRN#: [id number]     Hospital Day: 5d    CC:  EPISODE OF UNRESPONSIVENESS        HOSPITAL COURSE:   57YOM from [facility]. PMH: CVA w/ vascular dementia, HTN, HLD, GERD, seizures, DM presented 12/7 after an episode of staring. Pt is not a great historian, does not recall the events. Per the ED notestaff noted when pt sat for dinner he was starting/unresponsive [id number]. Pt noted to be hypoglycemic by EMS (unknown value). In the ED, FS was 65 and the pt was given juice and a sandwich -> improved to 169. Pt denied any headaches, dizziness, lightheadedness, cough, chills, fever, chest pain, palpitations, sob, abd pain, constipation, diarrhea, urinary symptoms. Vitals stable. Neurology saw the pt in the ED and recommended seizure vs syncope work up  CTH: (-) acute infarct. Chronic R PCA and left thalamic infarcts, no change. Moderate chronic microvascular ischemic disease  CT c-spine: (-) acute path  EEG (-) for epileptic activity  Lantus reduced from 30 units to 20 units to prevent further morning hypoglycemic events  Orthostatics (+). Thigh high stockings and Midodrine 10mg BID started. Orthostatics re-tested and (-). Not Hypertense.     SUBJECTIVE     Overnight events  None    Subjective complaints                                               ----------------------------------------------------------  OBJECTIVE  PAST MEDICAL & SURGICAL HISTORY  Diabetes    Hypertension    CVA (cerebral vascular accident)    Vascular dementia    Hyperlipidemia    GERD (gastroesophageal reflux disease)    Seizures    No significant past surgical history                                              -----------------------------------------------------------  ALLERGIES:  No Known Allergies                                            ------------------------------------------------------------    HOME MEDICATIONS  Home Medications:  Admelog 100 units/mL injectable solution: 10 unit(s) injectable 3 times a day (before meals) (08 Dec 2022 03:35)  aspirin 325 mg oral tablet: 1 tab(s) orally once a day (08 Dec 2022 03:35)  atorvastatin 80 mg oral tablet: 1 tab(s) orally once a day (at bedtime) (08 Dec 2022 03:35)  clopidogrel 75 mg oral tablet: 1 tab(s) orally once a day (08 Dec 2022 03:35)  donepezil 10 mg oral tablet: 2 tab(s) orally once a day at 5PM (08 Dec 2022 03:35)  insulin glargine 100 units/mL subcutaneous solution: 20 unit(s) subcutaneous once a day (at bedtime) (09 Dec 2022 14:44)  metoprolol succinate 50 mg oral tablet, extended release: 1 tab(s) orally once a day (08 Dec 2022 03:35)  midodrine 10 mg oral tablet: 1 tab(s) orally 2 times a day (09 Dec 2022 14:44)  Namenda 10 mg oral tablet: 1 tab(s) orally 2 times a day (08 Dec 2022 03:35)  Senna 8.6 mg oral tablet: 2 tab(s) orally once a day (at bedtime) (08 Dec 2022 03:35)  Zetia 10 mg oral tablet: 1 tab(s) orally once a day (08 Dec 2022 03:35)                           MEDICATIONS:  STANDING MEDICATIONS  aspirin  chewable 81 milliGRAM(s) Oral daily  atorvastatin 80 milliGRAM(s) Oral at bedtime  clopidogrel Tablet 75 milliGRAM(s) Oral daily  dextrose 5%. 1000 milliLiter(s) IV Continuous <Continuous>  dextrose 5%. 1000 milliLiter(s) IV Continuous <Continuous>  dextrose 50% Injectable 25 Gram(s) IV Push once  dextrose 50% Injectable 12.5 Gram(s) IV Push once  dextrose 50% Injectable 25 Gram(s) IV Push once  donepezil 10 milliGRAM(s) Oral at bedtime  enoxaparin Injectable 40 milliGRAM(s) SubCutaneous every 24 hours  glucagon  Injectable 1 milliGRAM(s) IntraMuscular once  insulin glargine Injectable (LANTUS) 20 Unit(s) SubCutaneous at bedtime  insulin lispro Injectable (ADMELOG) 10 Unit(s) SubCutaneous three times a day before meals  levETIRAcetam 500 milliGRAM(s) Oral two times a day  memantine 10 milliGRAM(s) Oral two times a day  metoprolol succinate ER 50 milliGRAM(s) Oral daily  midodrine. 10 milliGRAM(s) Oral <User Schedule>  pantoprazole    Tablet 40 milliGRAM(s) Oral before breakfast  senna 2 Tablet(s) Oral at bedtime    PRN MEDICATIONS  dextrose Oral Gel 15 Gram(s) Oral once PRN                                            ------------------------------------------------------------  VITAL SIGNS: Last 24 Hours  T(C): 36 (12 Dec 2022 05:08), Max: 36.9 (11 Dec 2022 20:53)  T(F): 96.8 (12 Dec 2022 05:08), Max: 98.5 (11 Dec 2022 20:53)  HR: 72 (12 Dec 2022 05:08) (65 - 73)  BP: 168/72 (12 Dec 2022 05:08) (150/75 - 168/72)  BP(mean): 107 (11 Dec 2022 13:40) (107 - 107)  RR: 18 (12 Dec 2022 05:08) (18 - 18)  SpO2: --                                             --------------------------------------------------------------  LABS:                        13.7   8.30  )-----------( 265      ( 11 Dec 2022 06:49 )             39.4     12-11    143  |  106  |  15  ----------------------------<  122<H>  3.9   |  25  |  0.7    Ca    8.4      11 Dec 2022 06:49  Mg     1.8     12-11                                                                -------------------------------------------------------------  RADIOLOGY:                                            --------------------------------------------------------------    PHYSICAL EXAM:                                             --------------------------------------------------------------                
      ALEA MORRIS  57y, Male  Allergy: No Known Allergies    Hospital Day: 2d  Patient seen and examined earlier today on 3E.     PMH/PSH:    Diabetes  Hypertension  CVA (cerebral vascular accident)  Vascular dementia  Hyperlipidemia  GERD (gastroesophageal reflux disease)  Seizures      LAST 24-Hr EVENTS:  Noted to be significantly orthostatic today with symptoms (>40 Delta BP)     VITALS:  T(F): 97.2 (12-09-22 @ 11:52), Max: 97.2 (12-09-22 @ 11:52)  HR: 64 (12-09-22 @ 11:52)  BP: 146/70 (12-09-22 @ 11:52) (146/70 - 184/91)  RR: 18 (12-09-22 @ 11:52)  SpO2: 98% (12-09-22 @ 11:52)          TESTS & MEASUREMENTS:  Weight/Height/BMI                            14.9   6.58  )-----------( 262      ( 09 Dec 2022 07:25 )             43.4         12-09    141  |  105  |  13  ----------------------------<  147<H>  4.8   |  30  |  0.7    Ca    8.7      09 Dec 2022 07:25  Mg     1.9     12-09    TPro  6.8  /  Alb  3.9  /  TBili  0.6  /  DBili  x   /  AST  31  /  ALT  37  /  AlkPhos  80  12-08    LIVER FUNCTIONS - ( 08 Dec 2022 09:15 )  Alb: 3.9 g/dL / Pro: 6.8 g/dL / ALK PHOS: 80 U/L / ALT: 37 U/L / AST: 31 U/L / GGT: x           COVID-19 PCR: NotDetec (12-07-22 @ 20:28)    A1C with Estimated Average Glucose Result: 6.4 % (12-09-22 @ 07:25)  A1C with Estimated Average Glucose Result: 6.5 % (12-08-22 @ 09:15)          MEDICATIONS:  MEDICATIONS  (STANDING):  aspirin  chewable 81 milliGRAM(s) Oral daily  atorvastatin 80 milliGRAM(s) Oral at bedtime  clopidogrel Tablet 75 milliGRAM(s) Oral daily  dextrose 5%. 1000 milliLiter(s) (50 mL/Hr) IV Continuous <Continuous>  dextrose 5%. 1000 milliLiter(s) (100 mL/Hr) IV Continuous <Continuous>  dextrose 50% Injectable 25 Gram(s) IV Push once  dextrose 50% Injectable 12.5 Gram(s) IV Push once  dextrose 50% Injectable 25 Gram(s) IV Push once  donepezil 10 milliGRAM(s) Oral at bedtime  enoxaparin Injectable 40 milliGRAM(s) SubCutaneous every 24 hours  glucagon  Injectable 1 milliGRAM(s) IntraMuscular once  insulin glargine Injectable (LANTUS) 20 Unit(s) SubCutaneous at bedtime  insulin lispro Injectable (ADMELOG) 10 Unit(s) SubCutaneous three times a day before meals  levETIRAcetam 500 milliGRAM(s) Oral two times a day  memantine 10 milliGRAM(s) Oral two times a day  metoprolol succinate ER 50 milliGRAM(s) Oral daily  midodrine. 10 milliGRAM(s) Oral <User Schedule>  pantoprazole    Tablet 40 milliGRAM(s) Oral before breakfast  senna 2 Tablet(s) Oral at bedtime    MEDICATIONS  (PRN):  dextrose Oral Gel 15 Gram(s) Oral once PRN Blood Glucose LESS THAN 70 milliGRAM(s)/deciliter      HOME MEDICATIONS (as per chart review):  Admelog 100 units/mL injectable solution (12-08)  aspirin 325 mg oral tablet (12-08)  atorvastatin 80 mg oral tablet (12-08)  clopidogrel 75 mg oral tablet (12-08)  donepezil 10 mg oral tablet (12-08)  insulin glargine (12-08)  metoprolol succinate 50 mg oral tablet, extended release (12-08)  Namenda 10 mg oral tablet (12-08)  Senna 8.6 mg oral tablet (12-08)  Zetia 10 mg oral tablet (12-08)      PHYSICAL EXAM:  GENERAL: Pleasant and in no acute distress or pain / poor dentition and dis  CHEST/LUNG: Clear to auscultation bilaterally   HEART: S1S2  ABDOMEN: BS++   EXTREMITIES:  chronic skin changes           
Patient is a 57y old  Male who presents with a chief complaint of unresponsiveness (12 Dec 2022 05:57)    INTERVAL HPI/OVERNIGHT EVENTS: Patient was examined and seen at bedside. This morning pt is resting comfortably in bed and reports no new issues or overnight events. No complaints, feels well.  ROS: Denies CP, SOB, AP, new weakness  All other systems reviewed and are within normal limits.  InitialHPI:  56yo M w/ PMH of CVA w/ vascular dementia, HTN, HLD, GERD, seizures, DM was brought in by ambulance from [facility] after having an episode of staring. The patient is not a great historian, nor does he recall the events. Per the ED note, the pt sat down to eat dinner when he had an episode of staring and was unresponsive towards staff. The episode lasted about 20 minutes. The pt was apparently hypoglycemic when checked by EMS but value unknown to us. In the ED, FS was 65 and the pt was given juice and a sandwich. The patient currently denies any headaches, dizziness, lightheadedness, cough, chills, fever, chest pain, palpitations, sob, abd pain, constipation, diarrhea, urinary symptoms.     In the ED:  Vitals: T 97.9, /68, HR 90, RR 18, SpO2 100% on RA  Labs: FS 65 on arrival but improved to 169  CTH: no acute intracranial hemorrhage or mass effect. Chronic R PCA and left thalamic infarcts, no change. Moderate chronic white matter microvascular ischemic disease  CT c-spine: no acute displaced fx or facet subluxation    Neurology saw the pt in the ED and recommended seizure vs syncope work up   (08 Dec 2022 03:21)    PAST MEDICAL & SURGICAL HISTORY:  Diabetes      Hypertension      CVA (cerebral vascular accident)      Vascular dementia      Hyperlipidemia      GERD (gastroesophageal reflux disease)      Seizures      No significant past surgical history          General: NAD, AAOx1, chronically ill appearing  HEENT:  EOMI, no LAD  CV: S1 S2  Resp: decreased breath sounds at bases  GI: NT/ND/S +BS  MS: no clubbing/cyanosis/edema, + pulses b/l  Neuro: REYES    MEDICATIONS  (STANDING):  aspirin  chewable 81 milliGRAM(s) Oral daily  atorvastatin 80 milliGRAM(s) Oral at bedtime  clopidogrel Tablet 75 milliGRAM(s) Oral daily  dextrose 5%. 1000 milliLiter(s) (100 mL/Hr) IV Continuous <Continuous>  dextrose 5%. 1000 milliLiter(s) (50 mL/Hr) IV Continuous <Continuous>  dextrose 50% Injectable 25 Gram(s) IV Push once  dextrose 50% Injectable 12.5 Gram(s) IV Push once  dextrose 50% Injectable 25 Gram(s) IV Push once  donepezil 10 milliGRAM(s) Oral at bedtime  enoxaparin Injectable 40 milliGRAM(s) SubCutaneous every 24 hours  glucagon  Injectable 1 milliGRAM(s) IntraMuscular once  insulin glargine Injectable (LANTUS) 20 Unit(s) SubCutaneous at bedtime  insulin lispro Injectable (ADMELOG) 10 Unit(s) SubCutaneous three times a day before meals  levETIRAcetam 500 milliGRAM(s) Oral two times a day  memantine 10 milliGRAM(s) Oral two times a day  metoprolol succinate ER 50 milliGRAM(s) Oral daily  midodrine. 10 milliGRAM(s) Oral <User Schedule>  pantoprazole    Tablet 40 milliGRAM(s) Oral before breakfast  senna 2 Tablet(s) Oral at bedtime    MEDICATIONS  (PRN):  dextrose Oral Gel 15 Gram(s) Oral once PRN Blood Glucose LESS THAN 70 milliGRAM(s)/deciliter    Vital Signs Last 24 Hrs  T(C): 37 (12 Dec 2022 13:19), Max: 37 (12 Dec 2022 13:19)  T(F): 98.6 (12 Dec 2022 13:19), Max: 98.6 (12 Dec 2022 13:19)  HR: 65 (12 Dec 2022 13:19) (65 - 72)  BP: 166/78 (12 Dec 2022 13:19) (150/75 - 168/72)  BP(mean): --  RR: 18 (12 Dec 2022 13:19) (18 - 18)  SpO2: --    Parameters below as of 12 Dec 2022 13:19  Patient On (Oxygen Delivery Method): room air      CAPILLARY BLOOD GLUCOSE      POCT Blood Glucose.: 165 mg/dL (12 Dec 2022 11:23)  POCT Blood Glucose.: 113 mg/dL (12 Dec 2022 07:23)  POCT Blood Glucose.: 148 mg/dL (11 Dec 2022 21:24)                          14.9   7.96  )-----------( 293      ( 12 Dec 2022 06:30 )             43.0     12-12    143  |  105  |  11  ----------------------------<  124<H>  4.1   |  30  |  0.6<L>    Ca    9.0      12 Dec 2022 06:30  Mg     1.8     12-12                        Chart, Consultant(s) Notes Reviewed:  [x ] YES  [ ] NO  Care Discussed with Consultants/Other Providers/ Housestaff [ x] YES  [ ] NO  Radiology, labs, old available records personally reviewed.                                    
SUBJECTIVE:    Patient is a 57y old Male who presents with a chief complaint of unresponsiveness (10 Dec 2022 09:04)    Currently admitted to medicine with the primary diagnosis of Episode of unresponsiveness       Today is hospital day 4d. This morning he is resting comfortably in bed and reports no new issues or overnight events.     PAST MEDICAL & SURGICAL HISTORY  Diabetes    Hypertension    CVA (cerebral vascular accident)    Vascular dementia    Hyperlipidemia    GERD (gastroesophageal reflux disease)    Seizures    No significant past surgical history      SOCIAL HISTORY:  Negative for smoking/alcohol/drug use.     ALLERGIES:  No Known Allergies    MEDICATIONS:  STANDING MEDICATIONS  aspirin  chewable 81 milliGRAM(s) Oral daily  atorvastatin 80 milliGRAM(s) Oral at bedtime  clopidogrel Tablet 75 milliGRAM(s) Oral daily  dextrose 5%. 1000 milliLiter(s) IV Continuous <Continuous>  dextrose 5%. 1000 milliLiter(s) IV Continuous <Continuous>  dextrose 50% Injectable 25 Gram(s) IV Push once  dextrose 50% Injectable 12.5 Gram(s) IV Push once  dextrose 50% Injectable 25 Gram(s) IV Push once  donepezil 10 milliGRAM(s) Oral at bedtime  enoxaparin Injectable 40 milliGRAM(s) SubCutaneous every 24 hours  glucagon  Injectable 1 milliGRAM(s) IntraMuscular once  insulin glargine Injectable (LANTUS) 20 Unit(s) SubCutaneous at bedtime  insulin lispro Injectable (ADMELOG) 10 Unit(s) SubCutaneous three times a day before meals  levETIRAcetam 500 milliGRAM(s) Oral two times a day  memantine 10 milliGRAM(s) Oral two times a day  metoprolol succinate ER 50 milliGRAM(s) Oral daily  midodrine. 10 milliGRAM(s) Oral <User Schedule>  pantoprazole    Tablet 40 milliGRAM(s) Oral before breakfast  senna 2 Tablet(s) Oral at bedtime    PRN MEDICATIONS  dextrose Oral Gel 15 Gram(s) Oral once PRN    VITALS:   T(F): 96.5  HR: 76  BP: 149/66  RR: 18  SpO2: --    PHYSICAL EXAM:  GEN: No acute distress  LUNGS: Clear to auscultation bilaterally   HEART: S1/S2 present.   ABD: Soft, non-tender, non-distended. Bowel sounds present         LABS:                        13.7   8.30  )-----------( 265      ( 11 Dec 2022 06:49 )             39.4     12-11    143  |  106  |  15  ----------------------------<  122<H>  3.9   |  25  |  0.7    Ca    8.4      11 Dec 2022 06:49  Mg     1.8     12-11                        RADIOLOGY:

## 2022-12-12 NOTE — PROGRESS NOTE ADULT - ASSESSMENT
57YOM from [facility]. PMH: CVA w/ vascular dementia, HTN, HLD, GERD, seizures, DM presented 12/7 after an episode of staring. Pt is not a great historian, does not recall the events. Per the ED notestaff noted when pt sat for dinner he was starting/unresponsive [id number]. Found to be hypoglycemic, orthostatic positive. No evidence of seizure on EEG or clinically at this time. Episode could be attributed to syncope/near syncope associated with significant orthostatism. Orthostatic blood pressure, in a patient with signs of euvolemia. DDX include dysautonomia related to insulin-dependent diabetes or central (stroke sequelae), Vascular dementia/ cognitive disorder. Insulin dependent diabetes with HbA1C <6.5 with hypoglycemia on hospital presentation.     #Transient episode of toxic metabolic encephalopathy possibly due to ?seizure, hypoglycemia, vs orthostatic hypotension  - Home dose of Insulin adjusted. No further AM hypoglycemic events.   - Cont Keppra, outpt f/u w/ neuro  - cont TEDs, midodrine    #Vascular dementia/HLD/DM  cont current meds    D/c planning

## 2022-12-12 NOTE — CDI QUERY NOTE - NSCDIOTHERTXTBX_GEN_ALL_CORE_HH
57 M, with Diagnosis:    Labs:  2022:  Glucose =65   Documentation :   2022: ED Notes: Attendiny Male complaining of hypoglycemia.pt sat down to eat and then had "staring episode" where he was unresponsive to staff and others, "just staring". EMS was called immediately and they state this episode lasted about 20 minutes and continued when EMS arrived. Triage note reports pt was hypoglycemic   PE: Neuro: Alert, oriented to self and place but not time. normal speech,  Impression:  Episode of unresponsiveness. ,  Hypoglycemia.  2022: Currently admitted to medicine with the primary diagnosis of Episode of unresponsiveness   Today is hospital day 4d. This morning he is resting comfortably in bed and reports no new issues or overnight events.   Assessment:  #AMS  - Home dose of Lantus reduced from 30 units to 20 units. No further AM hypoglycemic events.     Meds: Dextrose oral gel 15gm once , Dextrose  50% injectable 25gm IV push (2022) Dextrose 5% in 1L @50ml/hr, insulin    Based on your professional judgment and clinical indicators, can the AMS be further specified as:     ---- AMS associated with Metabolic Encephalopathy due to Hypoglycemia, resolved  ---  Other (please specify)  ---- Unable to clinically determine    Thank you. 57 M, with Diagnosis:  AMS  Labs:  2022:  Glucose =65   Documentation :   2022: ED Notes: Attendiny Male complaining of hypoglycemia.pt sat down to eat and then had "staring episode" where he was unresponsive to staff and others, "just staring". EMS was called immediately and they state this episode lasted about 20 minutes and continued when EMS arrived. Triage note reports pt was hypoglycemic   PE: Neuro: Alert, oriented to self and place but not time. normal speech,  Impression:  Episode of unresponsiveness. ,  Hypoglycemia.  2022: Currently admitted to medicine with the primary diagnosis of Episode of unresponsiveness   Today is hospital day 4d. This morning he is resting comfortably in bed and reports no new issues or overnight events.   Assessment:  #AMS  - Home dose of Lantus reduced from 30 units to 20 units. No further AM hypoglycemic events.     Meds: Dextrose oral gel 15gm once , Dextrose  50% injectable 25gm IV push (2022) Dextrose 5% in 1L @50ml/hr, insulin    Based on your professional judgment and clinical indicators, can the AMS be further specified as:     ---- AMS associated with Metabolic Encephalopathy due to Hypoglycemia, resolved  ---  Other (please specify)  ---- Unable to clinically determine    Thank you.

## 2022-12-12 NOTE — PROGRESS NOTE ADULT - ASSESSMENT
57YOM from [facility]. PMH: CVA w/ vascular dementia, HTN, HLD, GERD, seizures, DM presented 12/7 after an episode of staring. Pt is not a great historian, does not recall the events. Per the ED notestaff noted when pt sat for dinner he was starting/unresponsive [id number]. Found to be hypoglycemic, orthostatic positive. No evidence of seizure on EEG or clinically at this time. Episode could be attributed to syncope/near syncope associated with significant orthostatism. Orthostatic blood pressure, in a patient with signs of euvolemia. DDX include dysautonomia related to insulin-dependent diabetes or central (stroke sequelae), Vascular dementia/ cognitive disorder. Insulin dependent diabetes with HbA1C <6.5 with hypoglycemia on hospital presentation.     #AMS  - Home dose of Lantus reduced from 30 units to 20 units. No further AM hypoglycemic events.   - Unlikely to need long-standing AEDs since EEG is unremarkable   - Started today on Midodrine 10 mg twice daily. Orthostatics negative since after Midrodrine started. Compression stocks in place    #Misc  - Diet: DASH  - GI: PPI  - DVT: Lovenox  - Dispo: Pt discharged 12/9 but discharge halted due to difficulties with placement. PT medically stable and ready for DC when cleared by case management

## 2022-12-16 DIAGNOSIS — G93.41 METABOLIC ENCEPHALOPATHY: ICD-10-CM

## 2022-12-16 DIAGNOSIS — F01.50 VASCULAR DEMENTIA WITHOUT BEHAVIORAL DISTURBANCE: ICD-10-CM

## 2022-12-16 DIAGNOSIS — E78.5 HYPERLIPIDEMIA, UNSPECIFIED: ICD-10-CM

## 2022-12-16 DIAGNOSIS — Z79.4 LONG TERM (CURRENT) USE OF INSULIN: ICD-10-CM

## 2022-12-16 DIAGNOSIS — Z86.73 PERSONAL HISTORY OF TRANSIENT ISCHEMIC ATTACK (TIA), AND CEREBRAL INFARCTION WITHOUT RESIDUAL DEFICITS: ICD-10-CM

## 2022-12-16 DIAGNOSIS — Z20.822 CONTACT WITH AND (SUSPECTED) EXPOSURE TO COVID-19: ICD-10-CM

## 2022-12-16 DIAGNOSIS — E11.649 TYPE 2 DIABETES MELLITUS WITH HYPOGLYCEMIA WITHOUT COMA: ICD-10-CM

## 2022-12-16 DIAGNOSIS — R41.89 OTHER SYMPTOMS AND SIGNS INVOLVING COGNITIVE FUNCTIONS AND AWARENESS: ICD-10-CM

## 2022-12-16 DIAGNOSIS — K21.9 GASTRO-ESOPHAGEAL REFLUX DISEASE WITHOUT ESOPHAGITIS: ICD-10-CM

## 2022-12-16 DIAGNOSIS — I95.1 ORTHOSTATIC HYPOTENSION: ICD-10-CM

## 2022-12-16 DIAGNOSIS — I10 ESSENTIAL (PRIMARY) HYPERTENSION: ICD-10-CM

## 2023-01-13 ENCOUNTER — APPOINTMENT (OUTPATIENT)
Dept: NEUROLOGY | Facility: CLINIC | Age: 58
End: 2023-01-13

## 2023-01-13 PROBLEM — F01.50 VASCULAR DEMENTIA, UNSPECIFIED SEVERITY, WITHOUT BEHAVIORAL DISTURBANCE, PSYCHOTIC DISTURBANCE, MOOD DISTURBANCE, AND ANXIETY: Chronic | Status: ACTIVE | Noted: 2022-12-08

## 2023-01-13 PROBLEM — F01.50 VASCULAR DEMENTIA WITHOUT BEHAVIORAL DISTURBANCE: Chronic | Status: ACTIVE | Noted: 2022-12-08

## 2023-01-13 PROBLEM — E78.5 HYPERLIPIDEMIA, UNSPECIFIED: Chronic | Status: ACTIVE | Noted: 2022-12-08

## 2023-01-13 PROBLEM — K21.9 GASTRO-ESOPHAGEAL REFLUX DISEASE WITHOUT ESOPHAGITIS: Chronic | Status: ACTIVE | Noted: 2022-12-08

## 2023-01-13 PROBLEM — R56.9 UNSPECIFIED CONVULSIONS: Chronic | Status: ACTIVE | Noted: 2022-12-08

## 2023-01-25 NOTE — SWALLOW BEDSIDE ASSESSMENT ADULT - DIET PRIOR TO ADMI
Regular w/ thin liquids
<-- Click to add NO significant Past Surgical History
Regular and thin liquids
regular with thin liquids
unknown
regular and thin liquids
regular with thin liquids

## 2023-03-15 NOTE — ED ADULT NURSE NOTE - PRO INTERPRETER NEED 2
0717 Notified Dr. Isacc Campbell with PFM of critical hypoglycemic event requiring multiple treatments through the night. Dr. Isacc Campbell to consult PFM attending and return page with further orders. 0500 Verbal order from Dr. Isacc Campbell to begin d10 at 100 ml/hr. English

## 2023-06-13 NOTE — STROKE CODE NOTE - ACTIVATED STROKE TEAM
Pharmacist Renal Dose Adjustment Note    Anastasiia Badillo is a 70 y.o. female being treated with the medication Cefazolin    Patient Data:    Vital Signs (Most Recent):  Temp: 98.7 °F (37.1 °C) (06/13/23 0751)  Pulse: 86 (06/13/23 0751)  Resp: 16 (06/13/23 0751)  BP: 124/60 (06/13/23 0751)  SpO2: (Abnormal) 93 % (06/13/23 0751) Vital Signs (72h Range):  Temp:  [97 °F (36.1 °C)-99.4 °F (37.4 °C)]   Pulse:  [77-98]   Resp:  [16-18]   BP: ()/(53-82)   SpO2:  [92 %-100 %]      Recent Labs   Lab 06/11/23  0707 06/12/23  0522 06/13/23  0607   CREATININE 0.9 1.2 3.7*     Serum creatinine: 3.7 mg/dL (H) 06/13/23 0607  Estimated creatinine clearance: 13.1 mL/min (A)    Cefazolin 2g every 8 hours will be changed to Cefazolin 2g every 12 hours    Pharmacist's Name: Vikas Harry  Pharmacist's Extension: [id number]     Yes

## 2023-08-11 ENCOUNTER — INPATIENT (INPATIENT)
Facility: HOSPITAL | Age: 58
LOS: 2 days | Discharge: HOME | DRG: 57 | End: 2023-08-14
Attending: HOSPITALIST | Admitting: STUDENT IN AN ORGANIZED HEALTH CARE EDUCATION/TRAINING PROGRAM
Payer: MEDICARE

## 2023-08-11 VITALS
OXYGEN SATURATION: 99 % | RESPIRATION RATE: 20 BRPM | HEART RATE: 63 BPM | WEIGHT: 182.1 LBS | SYSTOLIC BLOOD PRESSURE: 141 MMHG | TEMPERATURE: 97 F | DIASTOLIC BLOOD PRESSURE: 69 MMHG

## 2023-08-11 DIAGNOSIS — F31.9 BIPOLAR DISORDER, UNSPECIFIED: ICD-10-CM

## 2023-08-11 DIAGNOSIS — Z29.9 ENCOUNTER FOR PROPHYLACTIC MEASURES, UNSPECIFIED: ICD-10-CM

## 2023-08-11 DIAGNOSIS — E78.5 HYPERLIPIDEMIA, UNSPECIFIED: ICD-10-CM

## 2023-08-11 DIAGNOSIS — I10 ESSENTIAL (PRIMARY) HYPERTENSION: ICD-10-CM

## 2023-08-11 DIAGNOSIS — G93.41 METABOLIC ENCEPHALOPATHY: ICD-10-CM

## 2023-08-11 DIAGNOSIS — I95.1 ORTHOSTATIC HYPOTENSION: ICD-10-CM

## 2023-08-11 DIAGNOSIS — F48.2 PSEUDOBULBAR AFFECT: ICD-10-CM

## 2023-08-11 DIAGNOSIS — E11.9 TYPE 2 DIABETES MELLITUS WITHOUT COMPLICATIONS: ICD-10-CM

## 2023-08-11 DIAGNOSIS — F01.50 VASCULAR DEMENTIA WITHOUT BEHAVIORAL DISTURBANCE: ICD-10-CM

## 2023-08-11 DIAGNOSIS — Z86.73 PERSONAL HISTORY OF TRANSIENT ISCHEMIC ATTACK (TIA), AND CEREBRAL INFARCTION WITHOUT RESIDUAL DEFICITS: ICD-10-CM

## 2023-08-11 LAB
ALBUMIN SERPL ELPH-MCNC: 3.8 G/DL — SIGNIFICANT CHANGE UP (ref 3.5–5.2)
ALP SERPL-CCNC: 81 U/L — SIGNIFICANT CHANGE UP (ref 30–115)
ALT FLD-CCNC: 23 U/L — SIGNIFICANT CHANGE UP (ref 0–41)
ANION GAP SERPL CALC-SCNC: 9 MMOL/L — SIGNIFICANT CHANGE UP (ref 7–14)
APPEARANCE UR: CLEAR — SIGNIFICANT CHANGE UP
AST SERPL-CCNC: 20 U/L — SIGNIFICANT CHANGE UP (ref 0–41)
BACTERIA # UR AUTO: ABNORMAL /HPF
BASOPHILS # BLD AUTO: 0.05 K/UL — SIGNIFICANT CHANGE UP (ref 0–0.2)
BASOPHILS NFR BLD AUTO: 0.7 % — SIGNIFICANT CHANGE UP (ref 0–1)
BILIRUB SERPL-MCNC: 0.5 MG/DL — SIGNIFICANT CHANGE UP (ref 0.2–1.2)
BILIRUB UR-MCNC: NEGATIVE — SIGNIFICANT CHANGE UP
BUN SERPL-MCNC: 15 MG/DL — SIGNIFICANT CHANGE UP (ref 10–20)
CALCIUM SERPL-MCNC: 9.1 MG/DL — SIGNIFICANT CHANGE UP (ref 8.4–10.5)
CHLORIDE SERPL-SCNC: 105 MMOL/L — SIGNIFICANT CHANGE UP (ref 98–110)
CO2 SERPL-SCNC: 29 MMOL/L — SIGNIFICANT CHANGE UP (ref 17–32)
COLOR SPEC: YELLOW — SIGNIFICANT CHANGE UP
CREAT SERPL-MCNC: 0.6 MG/DL — LOW (ref 0.7–1.5)
DIFF PNL FLD: NEGATIVE — SIGNIFICANT CHANGE UP
EGFR: 111 ML/MIN/1.73M2 — SIGNIFICANT CHANGE UP
EOSINOPHIL # BLD AUTO: 0.39 K/UL — SIGNIFICANT CHANGE UP (ref 0–0.7)
EOSINOPHIL NFR BLD AUTO: 5.6 % — SIGNIFICANT CHANGE UP (ref 0–8)
EPI CELLS # UR: ABNORMAL /HPF (ref 0–5)
GLUCOSE BLDC GLUCOMTR-MCNC: 280 MG/DL — HIGH (ref 70–99)
GLUCOSE SERPL-MCNC: 132 MG/DL — HIGH (ref 70–99)
GLUCOSE UR QL: NEGATIVE MG/DL — SIGNIFICANT CHANGE UP
HCT VFR BLD CALC: 42.4 % — SIGNIFICANT CHANGE UP (ref 42–52)
HGB BLD-MCNC: 14.2 G/DL — SIGNIFICANT CHANGE UP (ref 14–18)
HYALINE CASTS # UR AUTO: ABNORMAL /LPF
IMM GRANULOCYTES NFR BLD AUTO: 0.4 % — HIGH (ref 0.1–0.3)
KETONES UR-MCNC: NEGATIVE — SIGNIFICANT CHANGE UP
LEUKOCYTE ESTERASE UR-ACNC: NEGATIVE — SIGNIFICANT CHANGE UP
LYMPHOCYTES # BLD AUTO: 1.64 K/UL — SIGNIFICANT CHANGE UP (ref 1.2–3.4)
LYMPHOCYTES # BLD AUTO: 23.6 % — SIGNIFICANT CHANGE UP (ref 20.5–51.1)
MAGNESIUM SERPL-MCNC: 1.8 MG/DL — SIGNIFICANT CHANGE UP (ref 1.8–2.4)
MCHC RBC-ENTMCNC: 29.8 PG — SIGNIFICANT CHANGE UP (ref 27–31)
MCHC RBC-ENTMCNC: 33.5 G/DL — SIGNIFICANT CHANGE UP (ref 32–37)
MCV RBC AUTO: 88.9 FL — SIGNIFICANT CHANGE UP (ref 80–94)
MONOCYTES # BLD AUTO: 0.59 K/UL — SIGNIFICANT CHANGE UP (ref 0.1–0.6)
MONOCYTES NFR BLD AUTO: 8.5 % — SIGNIFICANT CHANGE UP (ref 1.7–9.3)
NEUTROPHILS # BLD AUTO: 4.26 K/UL — SIGNIFICANT CHANGE UP (ref 1.4–6.5)
NEUTROPHILS NFR BLD AUTO: 61.2 % — SIGNIFICANT CHANGE UP (ref 42.2–75.2)
NITRITE UR-MCNC: NEGATIVE — SIGNIFICANT CHANGE UP
NRBC # BLD: 0 /100 WBCS — SIGNIFICANT CHANGE UP (ref 0–0)
PH UR: 6 — SIGNIFICANT CHANGE UP (ref 5–8)
PLATELET # BLD AUTO: 258 K/UL — SIGNIFICANT CHANGE UP (ref 130–400)
PMV BLD: 10.4 FL — SIGNIFICANT CHANGE UP (ref 7.4–10.4)
POTASSIUM SERPL-MCNC: 4.2 MMOL/L — SIGNIFICANT CHANGE UP (ref 3.5–5)
POTASSIUM SERPL-SCNC: 4.2 MMOL/L — SIGNIFICANT CHANGE UP (ref 3.5–5)
PROT SERPL-MCNC: 6.3 G/DL — SIGNIFICANT CHANGE UP (ref 6–8)
PROT UR-MCNC: ABNORMAL MG/DL
RBC # BLD: 4.77 M/UL — SIGNIFICANT CHANGE UP (ref 4.7–6.1)
RBC # FLD: 12.5 % — SIGNIFICANT CHANGE UP (ref 11.5–14.5)
RBC CASTS # UR COMP ASSIST: SIGNIFICANT CHANGE UP /HPF (ref 0–4)
SODIUM SERPL-SCNC: 143 MMOL/L — SIGNIFICANT CHANGE UP (ref 135–146)
SP GR SPEC: 1.02 — SIGNIFICANT CHANGE UP (ref 1.01–1.03)
TROPONIN T SERPL-MCNC: <0.01 NG/ML — SIGNIFICANT CHANGE UP
UROBILINOGEN FLD QL: 0.2 MG/DL — SIGNIFICANT CHANGE UP
WBC # BLD: 6.96 K/UL — SIGNIFICANT CHANGE UP (ref 4.8–10.8)
WBC # FLD AUTO: 6.96 K/UL — SIGNIFICANT CHANGE UP (ref 4.8–10.8)
WBC UR QL: ABNORMAL /HPF (ref 0–5)

## 2023-08-11 PROCEDURE — 97116 GAIT TRAINING THERAPY: CPT | Mod: GP

## 2023-08-11 PROCEDURE — 80177 DRUG SCRN QUAN LEVETIRACETAM: CPT

## 2023-08-11 PROCEDURE — 70450 CT HEAD/BRAIN W/O DYE: CPT | Mod: 26,MA

## 2023-08-11 PROCEDURE — 83036 HEMOGLOBIN GLYCOSYLATED A1C: CPT

## 2023-08-11 PROCEDURE — 80048 BASIC METABOLIC PNL TOTAL CA: CPT

## 2023-08-11 PROCEDURE — 99285 EMERGENCY DEPT VISIT HI MDM: CPT | Mod: FS

## 2023-08-11 PROCEDURE — 71045 X-RAY EXAM CHEST 1 VIEW: CPT | Mod: 26

## 2023-08-11 PROCEDURE — 93010 ELECTROCARDIOGRAM REPORT: CPT

## 2023-08-11 PROCEDURE — 83735 ASSAY OF MAGNESIUM: CPT

## 2023-08-11 PROCEDURE — 97162 PT EVAL MOD COMPLEX 30 MIN: CPT | Mod: GP

## 2023-08-11 PROCEDURE — 82962 GLUCOSE BLOOD TEST: CPT

## 2023-08-11 PROCEDURE — 36415 COLL VENOUS BLD VENIPUNCTURE: CPT

## 2023-08-11 PROCEDURE — 97110 THERAPEUTIC EXERCISES: CPT | Mod: GP

## 2023-08-11 PROCEDURE — 99222 1ST HOSP IP/OBS MODERATE 55: CPT | Mod: AI

## 2023-08-11 RX ORDER — LEVETIRACETAM 250 MG/1
500 TABLET, FILM COATED ORAL
Refills: 0 | Status: DISCONTINUED | OUTPATIENT
Start: 2023-08-11 | End: 2023-08-14

## 2023-08-11 RX ORDER — SERTRALINE 25 MG/1
1 TABLET, FILM COATED ORAL
Refills: 0 | DISCHARGE

## 2023-08-11 RX ORDER — AMLODIPINE BESYLATE 2.5 MG/1
1 TABLET ORAL
Refills: 0 | DISCHARGE

## 2023-08-11 RX ORDER — MEMANTINE HYDROCHLORIDE 10 MG/1
10 TABLET ORAL
Refills: 0 | Status: DISCONTINUED | OUTPATIENT
Start: 2023-08-11 | End: 2023-08-14

## 2023-08-11 RX ORDER — INSULIN LISPRO 100/ML
8 VIAL (ML) SUBCUTANEOUS
Refills: 0 | DISCHARGE

## 2023-08-11 RX ORDER — PREGABALIN 225 MG/1
1000 CAPSULE ORAL DAILY
Refills: 0 | Status: DISCONTINUED | OUTPATIENT
Start: 2023-08-12 | End: 2023-08-14

## 2023-08-11 RX ORDER — SENNA PLUS 8.6 MG/1
2 TABLET ORAL AT BEDTIME
Refills: 0 | Status: DISCONTINUED | OUTPATIENT
Start: 2023-08-11 | End: 2023-08-14

## 2023-08-11 RX ORDER — FOLIC ACID 0.8 MG
1 TABLET ORAL
Refills: 0 | DISCHARGE

## 2023-08-11 RX ORDER — ONDANSETRON 8 MG/1
4 TABLET, FILM COATED ORAL EVERY 8 HOURS
Refills: 0 | Status: DISCONTINUED | OUTPATIENT
Start: 2023-08-11 | End: 2023-08-14

## 2023-08-11 RX ORDER — FOLIC ACID 0.8 MG
1 TABLET ORAL DAILY
Refills: 0 | Status: DISCONTINUED | OUTPATIENT
Start: 2023-08-12 | End: 2023-08-14

## 2023-08-11 RX ORDER — INSULIN LISPRO 100/ML
8 VIAL (ML) SUBCUTANEOUS
Refills: 0 | Status: DISCONTINUED | OUTPATIENT
Start: 2023-08-11 | End: 2023-08-14

## 2023-08-11 RX ORDER — METOPROLOL TARTRATE 50 MG
50 TABLET ORAL DAILY
Refills: 0 | Status: DISCONTINUED | OUTPATIENT
Start: 2023-08-12 | End: 2023-08-14

## 2023-08-11 RX ORDER — EZETIMIBE 10 MG/1
1 TABLET ORAL
Qty: 0 | Refills: 0 | DISCHARGE

## 2023-08-11 RX ORDER — SENNA PLUS 8.6 MG/1
2 TABLET ORAL
Qty: 0 | Refills: 0 | DISCHARGE

## 2023-08-11 RX ORDER — LEVETIRACETAM 250 MG/1
1 TABLET, FILM COATED ORAL
Refills: 0 | DISCHARGE

## 2023-08-11 RX ORDER — DONEPEZIL HYDROCHLORIDE 10 MG/1
10 TABLET, FILM COATED ORAL AT BEDTIME
Refills: 0 | Status: DISCONTINUED | OUTPATIENT
Start: 2023-08-11 | End: 2023-08-14

## 2023-08-11 RX ORDER — LISINOPRIL 2.5 MG/1
1 TABLET ORAL
Refills: 0 | DISCHARGE

## 2023-08-11 RX ORDER — MEMANTINE HYDROCHLORIDE 10 MG/1
1 TABLET ORAL
Qty: 0 | Refills: 0 | DISCHARGE

## 2023-08-11 RX ORDER — SERTRALINE 25 MG/1
50 TABLET, FILM COATED ORAL DAILY
Refills: 0 | Status: DISCONTINUED | OUTPATIENT
Start: 2023-08-12 | End: 2023-08-14

## 2023-08-11 RX ORDER — DONEPEZIL HYDROCHLORIDE 10 MG/1
2 TABLET, FILM COATED ORAL
Qty: 0 | Refills: 0 | DISCHARGE

## 2023-08-11 RX ORDER — ENOXAPARIN SODIUM 100 MG/ML
40 INJECTION SUBCUTANEOUS EVERY 24 HOURS
Refills: 0 | Status: DISCONTINUED | OUTPATIENT
Start: 2023-08-11 | End: 2023-08-14

## 2023-08-11 RX ORDER — INSULIN GLARGINE 100 [IU]/ML
20 INJECTION, SOLUTION SUBCUTANEOUS AT BEDTIME
Refills: 0 | Status: DISCONTINUED | OUTPATIENT
Start: 2023-08-11 | End: 2023-08-14

## 2023-08-11 RX ORDER — ATORVASTATIN CALCIUM 80 MG/1
80 TABLET, FILM COATED ORAL AT BEDTIME
Refills: 0 | Status: DISCONTINUED | OUTPATIENT
Start: 2023-08-11 | End: 2023-08-14

## 2023-08-11 RX ORDER — DEXTROMETHORPHAN HYDROBROMIDE AND QUINIDINE SULFATE 20; 10 MG/1; MG/1
1 CAPSULE, GELATIN COATED ORAL
Refills: 0 | DISCHARGE

## 2023-08-11 RX ORDER — LISINOPRIL 2.5 MG/1
40 TABLET ORAL DAILY
Refills: 0 | Status: DISCONTINUED | OUTPATIENT
Start: 2023-08-12 | End: 2023-08-14

## 2023-08-11 RX ORDER — CLOPIDOGREL BISULFATE 75 MG/1
75 TABLET, FILM COATED ORAL DAILY
Refills: 0 | Status: DISCONTINUED | OUTPATIENT
Start: 2023-08-12 | End: 2023-08-14

## 2023-08-11 RX ORDER — POLYETHYLENE GLYCOL 3350 17 G/17G
17 POWDER, FOR SOLUTION ORAL DAILY
Refills: 0 | Status: DISCONTINUED | OUTPATIENT
Start: 2023-08-11 | End: 2023-08-14

## 2023-08-11 RX ORDER — DONEPEZIL HYDROCHLORIDE 10 MG/1
2 TABLET, FILM COATED ORAL
Refills: 0 | DISCHARGE

## 2023-08-11 RX ORDER — PREGABALIN 225 MG/1
1 CAPSULE ORAL
Refills: 0 | DISCHARGE

## 2023-08-11 RX ORDER — AMLODIPINE BESYLATE 2.5 MG/1
5 TABLET ORAL DAILY
Refills: 0 | Status: DISCONTINUED | OUTPATIENT
Start: 2023-08-11 | End: 2023-08-14

## 2023-08-11 RX ORDER — ACETAMINOPHEN 500 MG
650 TABLET ORAL EVERY 6 HOURS
Refills: 0 | Status: DISCONTINUED | OUTPATIENT
Start: 2023-08-11 | End: 2023-08-14

## 2023-08-11 RX ORDER — INSULIN LISPRO 100/ML
VIAL (ML) SUBCUTANEOUS
Refills: 0 | Status: DISCONTINUED | OUTPATIENT
Start: 2023-08-11 | End: 2023-08-14

## 2023-08-11 RX ORDER — ASPIRIN/CALCIUM CARB/MAGNESIUM 324 MG
81 TABLET ORAL DAILY
Refills: 0 | Status: DISCONTINUED | OUTPATIENT
Start: 2023-08-12 | End: 2023-08-14

## 2023-08-11 RX ADMIN — DONEPEZIL HYDROCHLORIDE 10 MILLIGRAM(S): 10 TABLET, FILM COATED ORAL at 21:25

## 2023-08-11 RX ADMIN — MEMANTINE HYDROCHLORIDE 10 MILLIGRAM(S): 10 TABLET ORAL at 20:11

## 2023-08-11 RX ADMIN — ENOXAPARIN SODIUM 40 MILLIGRAM(S): 100 INJECTION SUBCUTANEOUS at 21:24

## 2023-08-11 RX ADMIN — LEVETIRACETAM 500 MILLIGRAM(S): 250 TABLET, FILM COATED ORAL at 20:11

## 2023-08-11 RX ADMIN — INSULIN GLARGINE 20 UNIT(S): 100 INJECTION, SOLUTION SUBCUTANEOUS at 21:53

## 2023-08-11 RX ADMIN — ATORVASTATIN CALCIUM 80 MILLIGRAM(S): 80 TABLET, FILM COATED ORAL at 21:24

## 2023-08-11 RX ADMIN — SENNA PLUS 2 TABLET(S): 8.6 TABLET ORAL at 21:23

## 2023-08-11 NOTE — ED ADULT NURSE NOTE - NSFALLRISKINTERV_ED_ALL_ED

## 2023-08-11 NOTE — PATIENT PROFILE ADULT - NSPRONUTRITIONRISK_GEN_A_NUR
Makayla QUEEN at bedside to examine. Cervix 3cm with AROM for small amount thin,bloody fluid-yao care given.  Encouraged to rest after exam. No indicators present

## 2023-08-11 NOTE — ED PROVIDER NOTE - NEUROLOGICAL, MLM
no focal weakness, responds to painful stimulus and becomes angry with stimulus then returns to refusing to respond

## 2023-08-11 NOTE — PHYSICAL THERAPY INITIAL EVALUATION ADULT - GENERAL OBSERVATIONS, REHAB EVAL
19:20-19:45. Chart reviewed; confirmed with RN to see the pt for PT. Pt ready for PT; received in bed with no complain of pain and in NAD. +hep lock. Agreeable for PT evaluation.

## 2023-08-11 NOTE — PHYSICAL THERAPY INITIAL EVALUATION ADULT - LIVES WITH, PROFILE
Pt lives at [facility]'s residence with an elevator in the facility/other relative

## 2023-08-11 NOTE — H&P ADULT - ASSESSMENT
Patient is a 57 year old male with hx of  CVA with L sided deficits, pseudobulbar affect, dementia (suspect vascular), depression, HFpEF, IDDM, HTN, HLD, constipation sent to ED from [facility] for "sleeping and not eating". He became tearful when mentioning his family. Collateral obtained from [facility] staff: patient's baseline is somnolent, minimally verbal, does not verbalize needs, ambulates by walker, appears depressed, poor appetite. Over the last 4 days, per staff patient had to be strongly encouraged to leave his room and only ate one meal. No other concerning events reported.  Patient has no complaints.    Patient appears to be at his baseline at time of exam as per collateral obtained from [facility].    # encephalopathy  # dementia, likely vascular  # pseudobulbar affect  # depression  - continue Nuedexta 20/10 PO bid, this is NF, will order from pharmacy  - continue aricept 10 mg qhs  - continue namenda 10 mg bid  - continue sertraline 50 mg daily  - continue folic acid, Vitamin B12    # ?possible seizure hx  - there is no recorded seizure hx in chart however patient is on Keppra, possible prophylaxis post CVA  - continue Keppra 500 mg bid  - routine EEG  - Keppra level in AM    # hx of CVA, residual L sided deficits and Left homonymous hemianopia  - CTH no acute findings, there are chronic infarcts  - continue aspirin 81 mg daily  - continue Plavix 75 mg daily  - continue Lipitor 80 mg daily    # HTN  - continue lisinopril 40 mg daily  - continue Toprol XL 50 mg daily  - continue amlodipine 5 mg daily  - discontinue midodrine    # HFpEF  - last TTE 2022 G1DD  - continue Toprol XL 50 mg daily    # HLD  - continue Lipitor 80 mg daily  - hold Zetia d/t NF    # IDDM  - continue Lantus 20 units qhs  - on Lispro 8 units with meals, hold if not eating  - HgbA1c  - FS glucose, SSI    # poor appetite  - can consider Marinol    # constipation  - continue senna qhs, add miralax daily    Diet: DASH, CCHO, add ensure  Activity: OOB, fall risk  DVT PPX: Lovenox  GI PPX: not indicated  Code status: FULL CODE  Dispo: acute from [facility]   57M w/ CVA c/b Vascular Dementia/Left Sided deficits, Pseudobulbar Affect/Depression, Seizure Disorder?, DM2, HTN, HLD presented from Assisted Living for change in mood (eating less, not leaving room; NO reported seizure/NO reported unresponsiveness) and admitted to medicine for further evaluation.

## 2023-08-11 NOTE — ED PROVIDER NOTE - OBJECTIVE STATEMENT
patient sent from [facility],, patient in room , no interacting with staff, refusing meds and food past few days, Only responding to painful stim,, H/o dementia,

## 2023-08-11 NOTE — H&P ADULT - NSHPADDITIONALINFOADULT_GEN_ALL_CORE
MDM Moderate (2 of 3 elements met)    A. Number & Complexity of Problems Addressed  - 1 or more Chronic illness with Exacerbation/Progression/Side Effect of Treatment: Pseudobulbar affect w/ reported worsening outpatient- further eval if needs med optimization  &  C. Risk of Complications and/or MM of Patient management  - The patient actively is requires prescription drug management Abdominal pain

## 2023-08-11 NOTE — H&P ADULT - HISTORY OF PRESENT ILLNESS
Patient is a 57 year old male with hx of  CVA with L sided deficits, pseudobulbar affect, dementia (suspect vascular), depression, HFpEF, IDDM, HTN, HLD, constipation sent to ED from [facility] for "sleeping and not eating". He became tearful when mentioning his family. Collateral obtained from [facility] staff: patient's baseline is somnolent, minimally verbal, does not verbalize needs, ambulates by walker, appears depressed, poor appetite. Over the last 4 days, per staff patient had to be strongly encouraged to leave his room and only ate one meal. No other concerning events reported.  Patient has no complaints.

## 2023-08-11 NOTE — ED ADULT NURSE NOTE - CHIEF COMPLAINT QUOTE
ANASTACIA from [facility]'s [facility] as per staff patient has been sleeping and not eating. Patient not answering staff but responsive to painful stimuli

## 2023-08-11 NOTE — PHYSICAL THERAPY INITIAL EVALUATION ADULT - PERTINENT HX OF CURRENT PROBLEM, REHAB EVAL
Patient is a 57 year old male with hx of  CVA with L sided deficits, pseudobulbar affect, dementia (suspect vascular), depression, HFpEF, IDDM, HTN, HLD, constipation sent to ED from [facility] for "sleeping and not eating". He became tearful when mentioning his family. Collateral obtained from [facility] staff: patient's baseline is somnolent, minimally verbal, does not verbalize needs, ambulates by walker, appears depressed, poor appetite. Over the last 4 days, per staff patient had to be strongly encouraged to leave his room and only ate one meal. No other concerning events reported.  Patient has no complaints.

## 2023-08-11 NOTE — ED PROVIDER NOTE - ATTENDING APP SHARED VISIT CONTRIBUTION OF CARE
Patient is a 57-year-old male with history of seizures and vascular dementia coming in for decreased appetite and poor responsiveness from [facility] residence.  Last seen by staff leaving his room 2 days ago.    Exam: Irritable and responsive to pain, refusing to answer questions and speak.  Soft nontender abdomen, cap refill less than 2 seconds, no acute distress  Plan: Labs, urinalysis, CT head, chest x-ray

## 2023-08-11 NOTE — ED ADULT TRIAGE NOTE - CHIEF COMPLAINT QUOTE
ANASTACIA from [facility]'s [facility] as per staff patient has been sleeping and not eating. Patient not answering staff but responsive to painful stimuli

## 2023-08-11 NOTE — H&P ADULT - NSHPPHYSICALEXAM_GEN_ALL_CORE
Vital Signs Last 24 Hrs  T(C): 36 (11 Aug 2023 13:51), Max: 36 (11 Aug 2023 13:51)  T(F): 96.8 (11 Aug 2023 13:51), Max: 96.8 (11 Aug 2023 13:51)  HR: 63 (11 Aug 2023 13:51) (63 - 63)  BP: 141/69 (11 Aug 2023 13:51) (141/69 - 141/69)  RR: 20 (11 Aug 2023 13:51) (20 - 20)  SpO2: 99% (11 Aug 2023 13:51) (99% - 99%)    Parameters below as of 11 Aug 2023 13:51  Patient On (Oxygen Delivery Method): room air    PHYSICAL EXAM:  GENERAL: somnolent, tearful, well developed  SKIN: No rashes or lesions  HEAD:  Atraumatic, Normocephalic  EYES: EOMI, PERRLA, conjunctiva and sclera clear  NECK: Supple, No JVD  CHEST/LUNG: Clear to auscultation bilaterally; No wheeze  HEART: Regular rate and rhythm; No murmurs, rubs, or gallops. distal pulses 2+ and equal bilateral  ABDOMEN: Soft, Nontender, Nondistended; Bowel sounds present  EXTREMITIES:  No clubbing, cyanosis, or edema  CNS: AAOx (name and place), moves all 4 extremities, neuro exam limited by patient's lack of participation

## 2023-08-11 NOTE — H&P ADULT - NS ATTEND AMEND GEN_ALL_CORE FT
See notation above  - patient interacting well with me and staff in er. A&Ox2- appears not off from charted baseline given hx of previous strokes/vascular dementia, no flaccid paralysis. History limited due to vascular dementia however patient denies acute disease or depression/SI- he was however tearful when speaking about family with the PA  - CT head reviewed personally and no acute ICH appreciated  - cbc reviewed-no leukocytosis, UA reviewed and does not appear c/w acute uti, troponin wnl  - monitor overnight to determine if truly significant change in mood 2/2 pseudobulbar affect/depression.  - noted to be on several BP pills and midodrine (standing)? will hold off from midodrine for now  - PT consult

## 2023-08-11 NOTE — PATIENT PROFILE ADULT - TOBACCO USE
Never smoker Bactrim Counseling:  I discussed with the patient the risks of sulfa antibiotics including but not limited to GI upset, allergic reaction, drug rash, diarrhea, dizziness, photosensitivity, and yeast infections.  Rarely, more serious reactions can occur including but not limited to aplastic anemia, agranulocytosis, methemoglobinemia, blood dyscrasias, liver or kidney failure, lung infiltrates or desquamative/blistering drug rashes. Spironolactone Pregnancy And Lactation Text: This medication can cause feminization of the male fetus and should be avoided during pregnancy. The active metabolite is also found in breast milk. Topical Retinoid Pregnancy And Lactation Text: This medication is Pregnancy Category C. It is unknown if this medication is excreted in breast milk. Topical Retinoid counseling:  Patient advised to apply a pea-sized amount only at bedtime and wait 30 minutes after washing their face before applying.  If too drying, patient may add a non-comedogenic moisturizer. The patient verbalized understanding of the proper use and possible adverse effects of retinoids.  All of the patient's questions and concerns were addressed. Detail Level: Zone Doxycycline Pregnancy And Lactation Text: This medication is Pregnancy Category D and not consider safe during pregnancy. It is also excreted in breast milk but is considered safe for shorter treatment courses. Azithromycin Counseling:  I discussed with the patient the risks of azithromycin including but not limited to GI upset, allergic reaction, drug rash, diarrhea, and yeast infections. Minocycline Pregnancy And Lactation Text: This medication is Pregnancy Category D and not consider safe during pregnancy. It is also excreted in breast milk. Topical Sulfur Applications Counseling: Topical Sulfur Counseling: Patient counseled that this medication may cause skin irritation or allergic reactions.  In the event of skin irritation, the patient was advised to reduce the amount of the drug applied or use it less frequently.   The patient verbalized understanding of the proper use and possible adverse effects of topical sulfur application.  All of the patient's questions and concerns were addressed. Azithromycin Pregnancy And Lactation Text: This medication is considered safe during pregnancy and is also secreted in breast milk. Tetracycline Counseling: Patient counseled regarding possible photosensitivity and increased risk for sunburn.  Patient instructed to avoid sunlight, if possible.  When exposed to sunlight, patients should wear protective clothing, sunglasses, and sunscreen.  The patient was instructed to call the office immediately if the following severe adverse effects occur:  hearing changes, easy bruising/bleeding, severe headache, or vision changes.  The patient verbalized understanding of the proper use and possible adverse effects of tetracycline.  All of the patient's questions and concerns were addressed. Patient understands to avoid pregnancy while on therapy due to potential birth defects. Topical Sulfur Applications Pregnancy And Lactation Text: This medication is Pregnancy Category C and has an unknown safety profile during pregnancy. It is unknown if this topical medication is excreted in breast milk. Tazorac Counseling:  Patient advised that medication is irritating and drying.  Patient may need to apply sparingly and wash off after an hour before eventually leaving it on overnight.  The patient verbalized understanding of the proper use and possible adverse effects of tazorac.  All of the patient's questions and concerns were addressed. Tazorac Pregnancy And Lactation Text: This medication is not safe during pregnancy. It is unknown if this medication is excreted in breast milk. Benzoyl Peroxide Counseling: Patient counseled that medicine may cause skin irritation and bleach clothing.  In the event of skin irritation, the patient was advised to reduce the amount of the drug applied or use it less frequently.   The patient verbalized understanding of the proper use and possible adverse effects of benzoyl peroxide.  All of the patient's questions and concerns were addressed. Birth Control Pills Counseling: Birth Control Pill Counseling: I discussed with the patient the potential side effects of OCPs including but not limited to increased risk of stroke, heart attack, thrombophlebitis, deep venous thrombosis, hepatic adenomas, breast changes, GI upset, headaches, and depression.  The patient verbalized understanding of the proper use and possible adverse effects of OCPs. All of the patient's questions and concerns were addressed. Birth Control Pills Pregnancy And Lactation Text: This medication should be avoided if pregnant and for the first 30 days post-partum. Isotretinoin Pregnancy And Lactation Text: This medication is Pregnancy Category X and is considered extremely dangerous during pregnancy. It is unknown if it is excreted in breast milk. High Dose Vitamin A Counseling: Side effects reviewed, pt to contact office should one occur. Bactrim Pregnancy And Lactation Text: This medication is Pregnancy Category D and is known to cause fetal risk.  It is also excreted in breast milk. Isotretinoin Counseling: Patient should get monthly blood tests, not donate blood, not drive at night if vision affected, not share medication, and not undergo elective surgery for 6 months after tx completed. Side effects reviewed, pt to contact office should one occur. Doxycycline Counseling:  Patient counseled regarding possible photosensitivity and increased risk for sunburn.  Patient instructed to avoid sunlight, if possible.  When exposed to sunlight, patients should wear protective clothing, sunglasses, and sunscreen.  The patient was instructed to call the office immediately if the following severe adverse effects occur:  hearing changes, easy bruising/bleeding, severe headache, or vision changes.  The patient verbalized understanding of the proper use and possible adverse effects of doxycycline.  All of the patient's questions and concerns were addressed. Spironolactone Counseling: Patient advised regarding risks of diarrhea, abdominal pain, hyperkalemia, birth defects (for female patients), liver toxicity and renal toxicity. The patient may need blood work to monitor liver and kidney function and potassium levels while on therapy. The patient verbalized understanding of the proper use and possible adverse effects of spironolactone.  All of the patient's questions and concerns were addressed. Minocycline Counseling: Patient advised regarding possible photosensitivity and discoloration of the teeth, skin, lips, tongue and gums.  Patient instructed to avoid sunlight, if possible.  When exposed to sunlight, patients should wear protective clothing, sunglasses, and sunscreen.  The patient was instructed to call the office immediately if the following severe adverse effects occur:  hearing changes, easy bruising/bleeding, severe headache, or vision changes.  The patient verbalized understanding of the proper use and possible adverse effects of minocycline.  All of the patient's questions and concerns were addressed. Topical Clindamycin Pregnancy And Lactation Text: This medication is Pregnancy Category B and is considered safe during pregnancy. It is unknown if it is excreted in breast milk. Sarecycline Counseling: Patient advised regarding possible photosensitivity and discoloration of the teeth, skin, lips, tongue and gums.  Patient instructed to avoid sunlight, if possible.  When exposed to sunlight, patients should wear protective clothing, sunglasses, and sunscreen.  The patient was instructed to call the office immediately if the following severe adverse effects occur:  hearing changes, easy bruising/bleeding, severe headache, or vision changes.  The patient verbalized understanding of the proper use and possible adverse effects of sarecycline.  All of the patient's questions and concerns were addressed. High Dose Vitamin A Pregnancy And Lactation Text: High dose vitamin A therapy is contraindicated during pregnancy and breast feeding. Topical Clindamycin Counseling: Patient counseled that this medication may cause skin irritation or allergic reactions.  In the event of skin irritation, the patient was advised to reduce the amount of the drug applied or use it less frequently.   The patient verbalized understanding of the proper use and possible adverse effects of clindamycin.  All of the patient's questions and concerns were addressed. Dapsone Counseling: I discussed with the patient the risks of dapsone including but not limited to hemolytic anemia, agranulocytosis, rashes, methemoglobinemia, kidney failure, peripheral neuropathy, headaches, GI upset, and liver toxicity.  Patients who start dapsone require monitoring including baseline LFTs and weekly CBCs for the first month, then every month thereafter.  The patient verbalized understanding of the proper use and possible adverse effects of dapsone.  All of the patient's questions and concerns were addressed. Use Enhanced Medication Counseling?: No Erythromycin Counseling:  I discussed with the patient the risks of erythromycin including but not limited to GI upset, allergic reaction, drug rash, diarrhea, increase in liver enzymes, and yeast infections. Benzoyl Peroxide Pregnancy And Lactation Text: This medication is Pregnancy Category C. It is unknown if benzoyl peroxide is excreted in breast milk. Erythromycin Pregnancy And Lactation Text: This medication is Pregnancy Category B and is considered safe during pregnancy. It is also excreted in breast milk. Dapsone Pregnancy And Lactation Text: This medication is Pregnancy Category C and is not considered safe during pregnancy or breast feeding.

## 2023-08-11 NOTE — H&P ADULT - PROBLEM SELECTOR PLAN 1
- on admit examination the patient participates with interview (although limited due to vascular dementia) and interacting well with staff  - plan to continue with home Nuedexta, sertraline  - plan to observe overnight- if patient eating well, ambulates with PT, and continues to have good interaction with staff than I doubt any acute disease process is present and the patient can return to assisted living. If displays change from admission then will consider further diagnostic evaluation. Per history collected from Assisted living staff- no report of seizure-like activity, no unresponsiveness

## 2023-08-11 NOTE — ED PROVIDER NOTE - CLINICAL SUMMARY MEDICAL DECISION MAKING FREE TEXT BOX
change in behavior - likely depression - pt unable to care for self in assisted living setting - likely needs placement -a dmit for KATE conley

## 2023-08-11 NOTE — ED ADULT NURSE REASSESSMENT NOTE - NS ED NURSE REASSESS COMMENT FT1
PA at the bedside for admitting orders. Patient answering questions appropriately by nodding and stated his name.  Conversing about his daughters and his medical history.

## 2023-08-11 NOTE — ED ADULT TRIAGE NOTE - NS ED NURSE AMBULANCES
Problem: Safety  Goal: Will remain free from injury  Outcome: PROGRESSING AS EXPECTED  Fall and safety precautions in place. Bed in low, locked position, call light and personal items within reach, room close to nurses station, non-slip socks on, and hourly rounding done.    Problem: Pain Management  Goal: Pain level will decrease to patient's comfort goal  Outcome: PROGRESSING AS EXPECTED  Pt medicated as needed for back pain. Upon reassessment pt resting quietly in bed. Will continue to monitor and medicate as needed.        [facility]

## 2023-08-11 NOTE — H&P ADULT - NSHPLABSRESULTS_GEN_ALL_CORE
14.2   6.96  )-----------( 258      ( 11 Aug 2023 14:18 )             42.4         143  |  105  |  15  ----------------------------<  132<H>  4.2   |  29  |  0.6<L>    Ca    9.1      11 Aug 2023 14:18  Mg     1.8         TPro  6.3  /  Alb  3.8  /  TBili  0.5  /  DBili  x   /  AST  20  /  ALT  23  /  AlkPhos  81          Magnesium: 1.8 mg/dL (23 @ 14:18)    Urinalysis Basic - ( 11 Aug 2023 15:02 )    Color: Yellow / Appearance: Clear / S.020 / pH: x  Gluc: x / Ketone: Negative  / Bili: Negative / Urobili: 0.2 mg/dL   Blood: x / Protein: Trace mg/dL / Nitrite: Negative   Leuk Esterase: Negative / RBC: 1-2 /HPF / WBC 6-10 /HPF   Sq Epi: x / Non Sq Epi: x / Bacteria: Few /HPF    CARDIAC MARKERS ( 11 Aug 2023 14:18 )  x     / <0.01 ng/mL / x     / x     / x        CT Head No Cont (23 @ 14:43)   IMPRESSION:  1.  No evidence of acute intracranial pathology. Stable exam compared to   2022.  2.  Stable chronic infarct in the right PCA territory and chronic lacunar   infarct in the left thalamus.  3.  Stable mild parenchymal atrophy and chronic microvascular change.     Xray Chest 1 View- PORTABLE-Urgent (23 @ 15:17) >  Cardiac/mediastinum/hilum: Enlarged cardiac silhouette.  Lung parenchyma/Pleura: Low lung volumes versus pulmonary vascular   congestion. No pneumothorax.  Skeleton/soft tissues: Unremarkable.

## 2023-08-12 LAB
A1C WITH ESTIMATED AVERAGE GLUCOSE RESULT: 7.1 % — HIGH (ref 4–5.6)
ANION GAP SERPL CALC-SCNC: 9 MMOL/L — SIGNIFICANT CHANGE UP (ref 7–14)
BUN SERPL-MCNC: 15 MG/DL — SIGNIFICANT CHANGE UP (ref 10–20)
CALCIUM SERPL-MCNC: 8.7 MG/DL — SIGNIFICANT CHANGE UP (ref 8.4–10.5)
CHLORIDE SERPL-SCNC: 107 MMOL/L — SIGNIFICANT CHANGE UP (ref 98–110)
CO2 SERPL-SCNC: 28 MMOL/L — SIGNIFICANT CHANGE UP (ref 17–32)
CREAT SERPL-MCNC: 0.7 MG/DL — SIGNIFICANT CHANGE UP (ref 0.7–1.5)
EGFR: 106 ML/MIN/1.73M2 — SIGNIFICANT CHANGE UP
ESTIMATED AVERAGE GLUCOSE: 157 MG/DL — HIGH (ref 68–114)
GLUCOSE BLDC GLUCOMTR-MCNC: 374 MG/DL — HIGH (ref 70–99)
GLUCOSE SERPL-MCNC: 339 MG/DL — HIGH (ref 70–99)
MAGNESIUM SERPL-MCNC: 1.8 MG/DL — SIGNIFICANT CHANGE UP (ref 1.8–2.4)
POTASSIUM SERPL-MCNC: 4.3 MMOL/L — SIGNIFICANT CHANGE UP (ref 3.5–5)
POTASSIUM SERPL-SCNC: 4.3 MMOL/L — SIGNIFICANT CHANGE UP (ref 3.5–5)
SODIUM SERPL-SCNC: 144 MMOL/L — SIGNIFICANT CHANGE UP (ref 135–146)

## 2023-08-12 PROCEDURE — 99232 SBSQ HOSP IP/OBS MODERATE 35: CPT

## 2023-08-12 RX ADMIN — Medication 50 MILLIGRAM(S): at 05:29

## 2023-08-12 RX ADMIN — Medication 81 MILLIGRAM(S): at 12:22

## 2023-08-12 RX ADMIN — Medication 8 UNIT(S): at 12:21

## 2023-08-12 RX ADMIN — AMLODIPINE BESYLATE 5 MILLIGRAM(S): 2.5 TABLET ORAL at 05:28

## 2023-08-12 RX ADMIN — SERTRALINE 50 MILLIGRAM(S): 25 TABLET, FILM COATED ORAL at 12:20

## 2023-08-12 RX ADMIN — ENOXAPARIN SODIUM 40 MILLIGRAM(S): 100 INJECTION SUBCUTANEOUS at 21:31

## 2023-08-12 RX ADMIN — Medication 5: at 07:31

## 2023-08-12 RX ADMIN — INSULIN GLARGINE 20 UNIT(S): 100 INJECTION, SOLUTION SUBCUTANEOUS at 21:31

## 2023-08-12 RX ADMIN — MEMANTINE HYDROCHLORIDE 10 MILLIGRAM(S): 10 TABLET ORAL at 17:16

## 2023-08-12 RX ADMIN — ATORVASTATIN CALCIUM 80 MILLIGRAM(S): 80 TABLET, FILM COATED ORAL at 21:26

## 2023-08-12 RX ADMIN — Medication 8 UNIT(S): at 17:17

## 2023-08-12 RX ADMIN — Medication 3: at 12:21

## 2023-08-12 RX ADMIN — LISINOPRIL 40 MILLIGRAM(S): 2.5 TABLET ORAL at 05:29

## 2023-08-12 RX ADMIN — MEMANTINE HYDROCHLORIDE 10 MILLIGRAM(S): 10 TABLET ORAL at 05:29

## 2023-08-12 RX ADMIN — SENNA PLUS 2 TABLET(S): 8.6 TABLET ORAL at 21:26

## 2023-08-12 RX ADMIN — DONEPEZIL HYDROCHLORIDE 10 MILLIGRAM(S): 10 TABLET, FILM COATED ORAL at 21:26

## 2023-08-12 RX ADMIN — CLOPIDOGREL BISULFATE 75 MILLIGRAM(S): 75 TABLET, FILM COATED ORAL at 12:22

## 2023-08-12 RX ADMIN — LEVETIRACETAM 500 MILLIGRAM(S): 250 TABLET, FILM COATED ORAL at 17:16

## 2023-08-12 RX ADMIN — LEVETIRACETAM 500 MILLIGRAM(S): 250 TABLET, FILM COATED ORAL at 05:28

## 2023-08-12 RX ADMIN — Medication 8 UNIT(S): at 07:31

## 2023-08-12 RX ADMIN — PREGABALIN 1000 MICROGRAM(S): 225 CAPSULE ORAL at 12:21

## 2023-08-12 RX ADMIN — Medication 1 MILLIGRAM(S): at 12:22

## 2023-08-13 LAB
CULTURE RESULTS: NO GROWTH — SIGNIFICANT CHANGE UP
SPECIMEN SOURCE: SIGNIFICANT CHANGE UP

## 2023-08-13 PROCEDURE — 99232 SBSQ HOSP IP/OBS MODERATE 35: CPT

## 2023-08-13 RX ADMIN — LISINOPRIL 40 MILLIGRAM(S): 2.5 TABLET ORAL at 05:18

## 2023-08-13 RX ADMIN — Medication 50 MILLIGRAM(S): at 05:19

## 2023-08-13 RX ADMIN — SERTRALINE 50 MILLIGRAM(S): 25 TABLET, FILM COATED ORAL at 11:11

## 2023-08-13 RX ADMIN — CLOPIDOGREL BISULFATE 75 MILLIGRAM(S): 75 TABLET, FILM COATED ORAL at 11:11

## 2023-08-13 RX ADMIN — MEMANTINE HYDROCHLORIDE 10 MILLIGRAM(S): 10 TABLET ORAL at 17:07

## 2023-08-13 RX ADMIN — PREGABALIN 1000 MICROGRAM(S): 225 CAPSULE ORAL at 11:11

## 2023-08-13 RX ADMIN — ATORVASTATIN CALCIUM 80 MILLIGRAM(S): 80 TABLET, FILM COATED ORAL at 22:09

## 2023-08-13 RX ADMIN — Medication 8 UNIT(S): at 16:57

## 2023-08-13 RX ADMIN — Medication 3: at 11:19

## 2023-08-13 RX ADMIN — AMLODIPINE BESYLATE 5 MILLIGRAM(S): 2.5 TABLET ORAL at 05:18

## 2023-08-13 RX ADMIN — Medication 8 UNIT(S): at 11:18

## 2023-08-13 RX ADMIN — Medication 8 UNIT(S): at 08:09

## 2023-08-13 RX ADMIN — SENNA PLUS 2 TABLET(S): 8.6 TABLET ORAL at 22:09

## 2023-08-13 RX ADMIN — LEVETIRACETAM 500 MILLIGRAM(S): 250 TABLET, FILM COATED ORAL at 17:07

## 2023-08-13 RX ADMIN — LEVETIRACETAM 500 MILLIGRAM(S): 250 TABLET, FILM COATED ORAL at 05:18

## 2023-08-13 RX ADMIN — MEMANTINE HYDROCHLORIDE 10 MILLIGRAM(S): 10 TABLET ORAL at 05:18

## 2023-08-13 RX ADMIN — Medication 1: at 16:58

## 2023-08-13 RX ADMIN — Medication 81 MILLIGRAM(S): at 11:10

## 2023-08-13 RX ADMIN — Medication 1 MILLIGRAM(S): at 11:11

## 2023-08-13 RX ADMIN — DONEPEZIL HYDROCHLORIDE 10 MILLIGRAM(S): 10 TABLET, FILM COATED ORAL at 22:09

## 2023-08-14 ENCOUNTER — TRANSCRIPTION ENCOUNTER (OUTPATIENT)
Age: 58
End: 2023-08-14

## 2023-08-14 VITALS
RESPIRATION RATE: 18 BRPM | DIASTOLIC BLOOD PRESSURE: 77 MMHG | OXYGEN SATURATION: 99 % | SYSTOLIC BLOOD PRESSURE: 164 MMHG | HEART RATE: 65 BPM | TEMPERATURE: 97 F

## 2023-08-14 PROCEDURE — 99232 SBSQ HOSP IP/OBS MODERATE 35: CPT

## 2023-08-14 RX ORDER — DONEPEZIL HYDROCHLORIDE 10 MG/1
2 TABLET, FILM COATED ORAL
Refills: 0 | DISCHARGE

## 2023-08-14 RX ADMIN — Medication 1 MILLIGRAM(S): at 12:57

## 2023-08-14 RX ADMIN — SERTRALINE 50 MILLIGRAM(S): 25 TABLET, FILM COATED ORAL at 12:57

## 2023-08-14 RX ADMIN — CLOPIDOGREL BISULFATE 75 MILLIGRAM(S): 75 TABLET, FILM COATED ORAL at 12:57

## 2023-08-14 RX ADMIN — Medication 81 MILLIGRAM(S): at 12:57

## 2023-08-14 RX ADMIN — Medication 2: at 12:00

## 2023-08-14 RX ADMIN — AMLODIPINE BESYLATE 5 MILLIGRAM(S): 2.5 TABLET ORAL at 05:08

## 2023-08-14 RX ADMIN — PREGABALIN 1000 MICROGRAM(S): 225 CAPSULE ORAL at 12:57

## 2023-08-14 NOTE — DISCHARGE NOTE PROVIDER - NSDCFUSCHEDAPPT_GEN_ALL_CORE_FT
[facility]  [address]  Scheduled Appointment: 08/15/2023     [facility]  [address]  Scheduled Appointment: 09/01/2023

## 2023-08-14 NOTE — CONSULT NOTE ADULT - SUBJECTIVE AND OBJECTIVE BOX
This is a 57M w/ CVA, pseudobulbar affect/depression, DM2, HTN, HLD who presented from assisted living facility after experiencing a change in mood, loss of appetite, lack of daily activities. Admitted to medicine for further eval. This is a 57M w/ CVA, pseudobulbar affect/depression, DM2, HTN, HLD who presented from assisted living facility after experiencing a change in mood, loss of appetite, lack of daily activities. Admitted to medicine for further eval.    No acute events overnight. Patient seen and evaluated at bedside. Patient has no complaints. Ate breakfast this AM.    ROS:  no cp    MEDICATIONS  (STANDING):  amLODIPine   Tablet 5 milliGRAM(s) Oral daily  aspirin  chewable 81 milliGRAM(s) Oral daily  atorvastatin 80 milliGRAM(s) Oral at bedtime  clopidogrel Tablet 75 milliGRAM(s) Oral daily  cyanocobalamin 1000 MICROGram(s) Oral daily  dextromethorphan 20 mG/quinidine sulfate 10 mG 1 Capsule(s) Oral every 12 hours  donepezil 10 milliGRAM(s) Oral at bedtime  enoxaparin Injectable 40 milliGRAM(s) SubCutaneous every 24 hours  folic acid 1 milliGRAM(s) Oral daily  insulin glargine Injectable (LANTUS) 20 Unit(s) SubCutaneous at bedtime  insulin lispro (ADMELOG) corrective regimen sliding scale   SubCutaneous three times a day before meals  insulin lispro Injectable (ADMELOG) 8 Unit(s) SubCutaneous three times a day before meals  levETIRAcetam 500 milliGRAM(s) Oral two times a day  lisinopril 40 milliGRAM(s) Oral daily  memantine 10 milliGRAM(s) Oral two times a day  metoprolol succinate ER 50 milliGRAM(s) Oral daily  polyethylene glycol 3350 17 Gram(s) Oral daily  senna 2 Tablet(s) Oral at bedtime  sertraline 50 milliGRAM(s) Oral daily    MEDICATIONS  (PRN):  acetaminophen     Tablet .. 650 milliGRAM(s) Oral every 6 hours PRN Temp greater or equal to 38C (100.4F), Mild Pain (1 - 3)  ondansetron Injectable 4 milliGRAM(s) IV Push every 8 hours PRN Nausea and/or Vomiting      CAPILLARY BLOOD GLUCOSE      POCT Blood Glucose.: 101 mg/dL (13 Aug 2023 07:45)  POCT Blood Glucose.: 102 mg/dL (13 Aug 2023 07:34)  POCT Blood Glucose.: 124 mg/dL (12 Aug 2023 21:27)  POCT Blood Glucose.: 134 mg/dL (12 Aug 2023 16:28)  POCT Blood Glucose.: 254 mg/dL (12 Aug 2023 11:22)    I&O's Summary      Physical Exam  Vital Signs Last 24 Hrs  T(C): 36.2 (13 Aug 2023 04:20), Max: 36.2 (13 Aug 2023 04:20)  T(F): 97.2 (13 Aug 2023 04:20), Max: 97.2 (13 Aug 2023 04:20)  HR: 66 (13 Aug 2023 04:20) (62 - 66)  BP: 140/70 (13 Aug 2023 04:20) (132/70 - 145/75)  RR: 18 (13 Aug 2023 04:20) (16 - 18)    GENERAL: NAD  HEAD:  Atraumatic, Normocephalic  EYES: EOMI, PERRL, conjunctiva and sclera clear  ENMT: MMM, no angular cheilitis appreciated  NECK: Supple, trachea midline  Lung: normal work of breathing, cta b/l  Cardiovascular: S1&S2+, rrr, no m/r/g appreciated  ABDOMEN: soft, nt  SKIN: warm and dry, no visible purulence in exposed areas    LABS:                        14.2   6.96  )-----------( 258      ( 11 Aug 2023 14:18 )             42.4     08-12    144  |  107  |  15  ----------------------------<  339<H>  4.3   |  28  |  0.7    Ca    8.7      12 Aug 2023 06:04  Mg     1.8     08-12    TPro  6.3  /  Alb  3.8  /  TBili  0.5  /  DBili  x   /  AST  20  /  ALT  23  /  AlkPhos  81  08-11      CARDIAC MARKERS ( 11 Aug 2023 14:18 )  x     / <0.01 ng/mL / x     / x     / x          Urinalysis Basic - ( 12 Aug 2023 06:04 )    Color: x / Appearance: x / SG: x / pH: x  Gluc: 339 mg/dL / Ketone: x  / Bili: x / Urobili: x   Blood: x / Protein: x / Nitrite: x   Leuk Esterase: x / RBC: x / WBC x   Sq Epi: x / Non Sq Epi: x / Bacteria: x    Culture - Urine (collected 11 Aug 2023 15:02)  Source: Clean Catch Clean Catch (Midstream)  Final Report (13 Aug 2023 06:26):    No growth    RADIOLOGY & ADDITIONAL TESTS:  Results Reviewed:   Imaging Personally Reviewed:  Electrocardiogram Personally Reviewed:    COORDINATION OF CARE:  Care Discussed with Consultants/Other Providers [Y/N]:  Prior or Outpatient Records Reviewed [Y/N]:

## 2023-08-14 NOTE — DISCHARGE NOTE PROVIDER - NSDCCPCAREPLAN_GEN_ALL_CORE_FT
PRINCIPAL DISCHARGE DIAGNOSIS  Diagnosis: Metabolic encephalopathy  Assessment and Plan of Treatment: Patient is stable and at his baseline. Continue previous home medicatoins except DISCONTINUE midodrine.     PRINCIPAL DISCHARGE DIAGNOSIS  Diagnosis: Pseudobulbar affect  Assessment and Plan of Treatment: Continue your home medications

## 2023-08-14 NOTE — CONSULT NOTE ADULT - ASSESSMENT
#Heme  -Pt of Dr. Martinez for hx of multiple CVAs  -Being evaluated for thrombophilia outpt    #Psych  -Pseudobulbar affect  -Had worsened mood disturbance 2/2 Pseudobulbar Affect (was not eating outpt)  -Since admission, pt has been eating and is more interactive with staff  -Plan to continue with home nuedexta and sertraline  -Appreciate medicine recs; will cont to follow    #Dispo  -D/c back to assisted living facility as per medicine's recs    ALIZA Sargent MD #Heme  -Pt of Dr. Martinez for hx of multiple CVAs  -Being evaluated for thrombophilia outpt  PT IS ON ASPIRIN AND PLAVIX     #Psych  -Pseudobulbar affect  -Had worsened mood disturbance 2/2 Pseudobulbar Affect (was not eating outpt)  -Since admission, pt has been eating and is more interactive with staff  -Plan to continue with home nuedexta and sertraline  -Appreciate medicine recs; will cont to follow    #Dispo  -D/c back to assisted living facility as per medicine's recs  pt will follow as out pt with ALIZA Mcdonald MD

## 2023-08-14 NOTE — PROGRESS NOTE ADULT - PROBLEM SELECTOR PLAN 2
- continue aricept, namenda  - continue folic acid, Vitamin B12

## 2023-08-14 NOTE — PROGRESS NOTE ADULT - PROBLEM SELECTOR PLAN 1
-Worsened mood disturbance 2/2 Pseudobulbar Affect (outpatient was not eating)  - thus far in the hospital has been eating and interactive with staff  - plan to continue with home Nuedexta, sertraline
-Worsened mood disturbance 2/2 Pseudobulbar Affect (outpatient was not eating, monitor here to determine if continuing- if so then will need psych eval)  - participated in interview today, and indicates that he will eat and walk later today  - plan to continue with home Nuedexta, sertraline
-Worsened mood disturbance 2/2 Pseudobulbar Affect (outpatient was not eating)  - thus far in the hospital has been eating and interactive with staff  - plan to continue with home Nuedexta, sertraline

## 2023-08-14 NOTE — DISCHARGE NOTE PROVIDER - CARE PROVIDER_API CALL
Lainey Pérez  Hematology  [address] [place] [address]  Phone: [phone]  Fax: [phone]  Follow Up Time: 1 week    Spenser Murphy  Internal Medicine  [address] [place] [address]  Phone: [phone]  Fax: [phone]  Follow Up Time: 1 week

## 2023-08-14 NOTE — PROGRESS NOTE ADULT - NSPROGADDITIONALINFOA_GEN_ALL_CORE
MDM Moderate (2 of 3 elements met)    A. Number & Complexity of Problems Addressed  - 1 or more Chronic illness with Exacerbation/Progression/Side Effect of Treatment: Worsened mood disturbance 2/2 Pseudobulbar Affect (outpatient was not eating, monitor here to determine if continuing- if so then will need psych eval)    C. Risk of Complications and/or MM of Patient management  - The patient actively is requires prescription drug management
MDM Moderate (2 of 3 elements met)    A. Number & Complexity of Problems Addressed  - 2 chronic conditions- HTN, pseudobulbar affect    C. Risk of Complications and/or MM of Patient management  - The patient actively is requires prescription drug management
MDM Moderate (2 of 3 elements met)    A. Number & Complexity of Problems Addressed  - 2 chronic conditions- HTN, pseudobulbar affect    C. Risk of Complications and/or MM of Patient management  - The patient actively is requires prescription drug management

## 2023-08-14 NOTE — PROGRESS NOTE ADULT - PROBLEM SELECTOR PLAN 4
c/w basal-bolus regimen w/ corrective insulin

## 2023-08-14 NOTE — DISCHARGE NOTE PROVIDER - HOSPITAL COURSE
57M w/ CVA c/b Vascular Dementia/Left Sided deficits, Pseudobulbar Affect/Depression, Seizure Disorder?, DM2, HTN, HLD presented from Assisted Living for change in mood (eating less, not leaving room; NO reported seizure/NO reported unresponsiveness) and admitted to medicine for further evaluation appears at baseline and improved (eating, interactive with staff, ambulating well). Plan for discharge.    # PBA (pseudobulbar affect).   Worsened mood disturbance 2/2 Pseudobulbar Affect (outpatient was not eating)  - thus far in the hospital has been eating and interactive with staff  - plan to continue with home Nuedexta, sertraline.    # Vascular dementia.   - continue aricept, namenda  - continue folic acid, Vitamin B12.    # History of CVA in adulthood.   - c/w statin    #Type 2 diabetes mellitus.   - c/w basal-bolus regimen w/ corrective insulin.    # HTN (hypertension).   - c/w home antihypertensives.    # Hyperlipidemia.   - c/w statin.    Patient is medically stable for dc to [facility].   Continue all home medications except DISCONTINUE midodrine. Follow up with Dr. Caro for continued thrombophilia workup.  Follow up with Dr. Murphy PMTENISHA. 57M w/ CVA c/b Vascular Dementia/Left Sided deficits, Pseudobulbar Affect/Depression, Seizure Disorder?, DM2, HTN, HLD presented from Assisted Living for change in mood (eating less, not leaving room; NO reported seizure/NO reported unresponsiveness) and admitted to medicine for further evaluation appears at baseline and improved (eating, interactive with staff, ambulating well). Plan for discharge.    # PBA (pseudobulbar affect).   Worsened mood disturbance 2/2 Pseudobulbar Affect (outpatient was not eating)  - thus far in the hospital has been eating and interactive with staff  - plan to continue with home Nuedexta, sertraline.    # Vascular dementia.   - continue namenda  - continue folic acid, Vitamin B12.    # History of CVA in adulthood.   - c/w statin    #Type 2 diabetes mellitus.   - c/w basal-bolus regimen w/ corrective insulin.    # HTN (hypertension).   - c/w home antihypertensives.    # Hyperlipidemia.   - c/w statin.    Patient is medically stable for dc to [facility].   Continue all home medications except DISCONTINUE midodrine. Follow up with Dr. Caro for continued thrombophilia workup.  Follow up with Dr. Murphy PMTENISHA.

## 2023-08-14 NOTE — PROGRESS NOTE ADULT - ASSESSMENT
57M w/ CVA c/b Vascular Dementia/Left Sided deficits, Pseudobulbar Affect/Depression, Seizure Disorder?, DM2, HTN, HLD presented from Assisted Living for change in mood (eating less, not leaving room; NO reported seizure/NO reported unresponsiveness) and admitted to medicine for further evaluation.  
57M w/ CVA c/b Vascular Dementia/Left Sided deficits, Pseudobulbar Affect/Depression, Seizure Disorder?, DM2, HTN, HLD presented from Assisted Living for change in mood (eating less, not leaving room; NO reported seizure/NO reported unresponsiveness) and admitted to medicine for further evaluation.  
57M w/ CVA c/b Vascular Dementia/Left Sided deficits, Pseudobulbar Affect/Depression, Seizure Disorder?, DM2, HTN, HLD presented from Assisted Living for change in mood (eating less, not leaving room; NO reported seizure/NO reported unresponsiveness) and admitted to medicine for further evaluation appears at baseline and improved (eating, interactive with staff, ambulating well). Plan for discharge.

## 2023-08-14 NOTE — DISCHARGE NOTE PROVIDER - NSDCMRMEDTOKEN_GEN_ALL_CORE_FT
amLODIPine 5 mg oral tablet: 1 tab(s) orally once a day  Aricept 10 mg oral tablet: 2 tab(s) orally once a day  aspirin 81 mg oral tablet, chewable: 1 tab(s) orally once a day  atorvastatin 80 mg oral tablet: 1 tab(s) orally once a day (at bedtime)  clopidogrel 75 mg oral tablet: 1 tab(s) orally once a day  cyanocobalamin 100 mcg oral tablet: 1 tab(s) orally once a day  folic acid 1 mg oral tablet: 1 tab(s) orally once a day  insulin glargine 100 units/mL subcutaneous solution: 20 unit(s) subcutaneous once a day (at bedtime)  insulin lispro 100 units/mL injectable solution: 8 unit(s) injectable 3 times a day (with meals)  Keppra 500 mg oral tablet: 1 tab(s) orally 2 times a day  lisinopril 40 mg oral tablet: 1 tab(s) orally once a day  metoprolol succinate 50 mg oral tablet, extended release: 1 tab(s) orally once a day  Namenda 10 mg oral tablet: 1 tab(s) orally 2 times a day  Nuedexta 20 mg-10 mg oral capsule: 1 cap(s) orally 2 times a day  Senna 8.6 mg oral tablet: 2 tab(s) orally once a day (at bedtime)  sertraline 50 mg oral tablet: 1 tab(s) orally once a day  Zetia 10 mg oral tablet: 1 tab(s) orally once a day   amLODIPine 5 mg oral tablet: 1 tab(s) orally once a day  aspirin 81 mg oral tablet, chewable: 1 tab(s) orally once a day  atorvastatin 80 mg oral tablet: 1 tab(s) orally once a day (at bedtime)  clopidogrel 75 mg oral tablet: 1 tab(s) orally once a day  cyanocobalamin 100 mcg oral tablet: 1 tab(s) orally once a day  folic acid 1 mg oral tablet: 1 tab(s) orally once a day  insulin glargine 100 units/mL subcutaneous solution: 20 unit(s) subcutaneous once a day (at bedtime)  insulin lispro 100 units/mL injectable solution: 8 unit(s) injectable 3 times a day (with meals)  Keppra 500 mg oral tablet: 1 tab(s) orally 2 times a day  lisinopril 40 mg oral tablet: 1 tab(s) orally once a day  metoprolol succinate 50 mg oral tablet, extended release: 1 tab(s) orally once a day  Namenda 10 mg oral tablet: 1 tab(s) orally 2 times a day  Nuedexta 20 mg-10 mg oral capsule: 1 cap(s) orally 2 times a day  Senna 8.6 mg oral tablet: 2 tab(s) orally once a day (at bedtime)  sertraline 50 mg oral tablet: 1 tab(s) orally once a day  Zetia 10 mg oral tablet: 1 tab(s) orally once a day

## 2023-08-14 NOTE — PROGRESS NOTE ADULT - SUBJECTIVE AND OBJECTIVE BOX
CC: Patient is a 57y old  Male who presents with a chief complaint of mood disturbance (13 Aug 2023 10:21)      SUBJECTIVE / OVERNIGHT EVENTS:  No acute events overnight. Patient seen and evaluated at bedside. Patient conversive and feels well. He reports eating throughout the day yesterday and ambulating well    ROS:  no cp    MEDICATIONS  (STANDING):  amLODIPine   Tablet 5 milliGRAM(s) Oral daily  aspirin  chewable 81 milliGRAM(s) Oral daily  atorvastatin 80 milliGRAM(s) Oral at bedtime  clopidogrel Tablet 75 milliGRAM(s) Oral daily  cyanocobalamin 1000 MICROGram(s) Oral daily  dextromethorphan 20 mG/quinidine sulfate 10 mG 1 Capsule(s) Oral every 12 hours  donepezil 10 milliGRAM(s) Oral at bedtime  enoxaparin Injectable 40 milliGRAM(s) SubCutaneous every 24 hours  folic acid 1 milliGRAM(s) Oral daily  insulin glargine Injectable (LANTUS) 20 Unit(s) SubCutaneous at bedtime  insulin lispro (ADMELOG) corrective regimen sliding scale   SubCutaneous three times a day before meals  insulin lispro Injectable (ADMELOG) 8 Unit(s) SubCutaneous three times a day before meals  levETIRAcetam 500 milliGRAM(s) Oral two times a day  lisinopril 40 milliGRAM(s) Oral daily  memantine 10 milliGRAM(s) Oral two times a day  metoprolol succinate ER 50 milliGRAM(s) Oral daily  polyethylene glycol 3350 17 Gram(s) Oral daily  senna 2 Tablet(s) Oral at bedtime  sertraline 50 milliGRAM(s) Oral daily    MEDICATIONS  (PRN):  acetaminophen     Tablet .. 650 milliGRAM(s) Oral every 6 hours PRN Temp greater or equal to 38C (100.4F), Mild Pain (1 - 3)  ondansetron Injectable 4 milliGRAM(s) IV Push every 8 hours PRN Nausea and/or Vomiting      CAPILLARY BLOOD GLUCOSE      POCT Blood Glucose.: 109 mg/dL (14 Aug 2023 07:39)  POCT Blood Glucose.: 109 mg/dL (13 Aug 2023 21:35)  POCT Blood Glucose.: 159 mg/dL (13 Aug 2023 16:21)  POCT Blood Glucose.: 268 mg/dL (13 Aug 2023 11:16)    I&O's Summary      Physical Exam    LABS:                    Culture - Urine (collected 11 Aug 2023 15:02)  Source: Clean Catch Clean Catch (Midstream)  Final Report (13 Aug 2023 06:26):    No growth        RADIOLOGY & ADDITIONAL TESTS:  Results Reviewed:   Imaging Personally Reviewed:  Electrocardiogram Personally Reviewed:    COORDINATION OF CARE:  Care Discussed with Consultants/Other Providers [Y/N]:  Prior or Outpatient Records Reviewed [Y/N]:  
CC: 57M w/ Pseudobulbar Affect and Mood disturbance      SUBJECTIVE / OVERNIGHT EVENTS:  No acute events overnight. Patient seen and evaluated at bedside. Patient denies any pain and has no complaints. He indicates that he will try to eat today and participate with PT/try to walk later    ROS:  No CP    MEDICATIONS  (STANDING):  amLODIPine   Tablet 5 milliGRAM(s) Oral daily  aspirin  chewable 81 milliGRAM(s) Oral daily  atorvastatin 80 milliGRAM(s) Oral at bedtime  clopidogrel Tablet 75 milliGRAM(s) Oral daily  cyanocobalamin 1000 MICROGram(s) Oral daily  dextromethorphan 20 mG/quinidine sulfate 10 mG 1 Capsule(s) Oral every 12 hours  donepezil 10 milliGRAM(s) Oral at bedtime  enoxaparin Injectable 40 milliGRAM(s) SubCutaneous every 24 hours  folic acid 1 milliGRAM(s) Oral daily  insulin glargine Injectable (LANTUS) 20 Unit(s) SubCutaneous at bedtime  insulin lispro (ADMELOG) corrective regimen sliding scale   SubCutaneous three times a day before meals  insulin lispro Injectable (ADMELOG) 8 Unit(s) SubCutaneous three times a day before meals  levETIRAcetam 500 milliGRAM(s) Oral two times a day  lisinopril 40 milliGRAM(s) Oral daily  memantine 10 milliGRAM(s) Oral two times a day  metoprolol succinate ER 50 milliGRAM(s) Oral daily  polyethylene glycol 3350 17 Gram(s) Oral daily  senna 2 Tablet(s) Oral at bedtime  sertraline 50 milliGRAM(s) Oral daily    MEDICATIONS  (PRN):  acetaminophen     Tablet .. 650 milliGRAM(s) Oral every 6 hours PRN Temp greater or equal to 38C (100.4F), Mild Pain (1 - 3)  ondansetron Injectable 4 milliGRAM(s) IV Push every 8 hours PRN Nausea and/or Vomiting      CAPILLARY BLOOD GLUCOSE      POCT Blood Glucose.: 340 mg/dL (12 Aug 2023 09:10)  POCT Blood Glucose.: 374 mg/dL (12 Aug 2023 07:20)  POCT Blood Glucose.: 280 mg/dL (11 Aug 2023 21:02)    I&O's Summary    Physical Exam  Vital Signs Last 24 Hrs  T(C): 36.4 (12 Aug 2023 05:08), Max: 36.9 (11 Aug 2023 21:09)  T(F): 97.6 (12 Aug 2023 05:08), Max: 98.5 (11 Aug 2023 21:09)  HR: 68 (12 Aug 2023 05:08) (63 - 68)  BP: 164/66 (12 Aug 2023 05:08) (141/69 - 164/66)  BP(mean): --  RR: 18 (12 Aug 2023 05:08) (18 - 20)  SpO2: 96% (12 Aug 2023 05:08) (96% - 99%)    Parameters below as of 11 Aug 2023 13:51  Patient On (Oxygen Delivery Method): room air        GENERAL: NAD  HEAD:  Atraumatic, Normocephalic  EYES: EOMI, PERRL, conjunctiva and sclera clear  ENMT: MMM, no angular cheilitis appreciated  NECK: Supple, trachea midline  Lung: normal work of breathing, cta b/l  Cardiovascular: S1&S2+, rrr, no m/r/g appreciated  ABDOMEN: soft, nt  : No willson catheter, no suprapubic pain to palpation  Neuro: Alert & follows commands, no flaccid paralysis in extremities appreciated  SKIN: warm and dry, no visible purulence in exposed areas    LABS:                        14.2   6.96  )-----------( 258      ( 11 Aug 2023 14:18 )             42.4     08-12    144  |  107  |  15  ----------------------------<  339<H>  4.3   |  28  |  0.7    Ca    8.7      12 Aug 2023 06:04  Mg     1.8     08-12    TPro  6.3  /  Alb  3.8  /  TBili  0.5  /  DBili  x   /  AST  20  /  ALT  23  /  AlkPhos  81  08-11      CARDIAC MARKERS ( 11 Aug 2023 14:18 )  x     / <0.01 ng/mL / x     / x     / x          Urinalysis Basic - ( 12 Aug 2023 06:04 )    Color: x / Appearance: x / SG: x / pH: x  Gluc: 339 mg/dL / Ketone: x  / Bili: x / Urobili: x   Blood: x / Protein: x / Nitrite: x   Leuk Esterase: x / RBC: x / WBC x   Sq Epi: x / Non Sq Epi: x / Bacteria: x          RADIOLOGY & ADDITIONAL TESTS:  Results Reviewed:   Imaging Personally Reviewed:  Electrocardiogram Personally Reviewed:    COORDINATION OF CARE:  Care Discussed with Consultants/Other Providers [Y/N]:  Prior or Outpatient Records Reviewed [Y/N]:  
CC: Patient is a 57y old  Male who presents with a chief complaint of mood disturbance (12 Aug 2023 09:45)      SUBJECTIVE / OVERNIGHT EVENTS:  No acute events overnight. Patient seen and evaluated at bedside. Patient has not complaints. He was able to eat full breakfast and is interacting well with staff    ROS:  no cp    MEDICATIONS  (STANDING):  amLODIPine   Tablet 5 milliGRAM(s) Oral daily  aspirin  chewable 81 milliGRAM(s) Oral daily  atorvastatin 80 milliGRAM(s) Oral at bedtime  clopidogrel Tablet 75 milliGRAM(s) Oral daily  cyanocobalamin 1000 MICROGram(s) Oral daily  dextromethorphan 20 mG/quinidine sulfate 10 mG 1 Capsule(s) Oral every 12 hours  donepezil 10 milliGRAM(s) Oral at bedtime  enoxaparin Injectable 40 milliGRAM(s) SubCutaneous every 24 hours  folic acid 1 milliGRAM(s) Oral daily  insulin glargine Injectable (LANTUS) 20 Unit(s) SubCutaneous at bedtime  insulin lispro (ADMELOG) corrective regimen sliding scale   SubCutaneous three times a day before meals  insulin lispro Injectable (ADMELOG) 8 Unit(s) SubCutaneous three times a day before meals  levETIRAcetam 500 milliGRAM(s) Oral two times a day  lisinopril 40 milliGRAM(s) Oral daily  memantine 10 milliGRAM(s) Oral two times a day  metoprolol succinate ER 50 milliGRAM(s) Oral daily  polyethylene glycol 3350 17 Gram(s) Oral daily  senna 2 Tablet(s) Oral at bedtime  sertraline 50 milliGRAM(s) Oral daily    MEDICATIONS  (PRN):  acetaminophen     Tablet .. 650 milliGRAM(s) Oral every 6 hours PRN Temp greater or equal to 38C (100.4F), Mild Pain (1 - 3)  ondansetron Injectable 4 milliGRAM(s) IV Push every 8 hours PRN Nausea and/or Vomiting      CAPILLARY BLOOD GLUCOSE      POCT Blood Glucose.: 101 mg/dL (13 Aug 2023 07:45)  POCT Blood Glucose.: 102 mg/dL (13 Aug 2023 07:34)  POCT Blood Glucose.: 124 mg/dL (12 Aug 2023 21:27)  POCT Blood Glucose.: 134 mg/dL (12 Aug 2023 16:28)  POCT Blood Glucose.: 254 mg/dL (12 Aug 2023 11:22)    I&O's Summary      Physical Exam  Vital Signs Last 24 Hrs  T(C): 36.2 (13 Aug 2023 04:20), Max: 36.2 (13 Aug 2023 04:20)  T(F): 97.2 (13 Aug 2023 04:20), Max: 97.2 (13 Aug 2023 04:20)  HR: 66 (13 Aug 2023 04:20) (62 - 66)  BP: 140/70 (13 Aug 2023 04:20) (132/70 - 145/75)  RR: 18 (13 Aug 2023 04:20) (16 - 18)    GENERAL: NAD  HEAD:  Atraumatic, Normocephalic  EYES: EOMI, PERRL, conjunctiva and sclera clear  ENMT: MMM, no angular cheilitis appreciated  NECK: Supple, trachea midline  Lung: normal work of breathing, cta b/l  Cardiovascular: S1&S2+, rrr, no m/r/g appreciated  ABDOMEN: soft, nt  SKIN: warm and dry, no visible purulence in exposed areas    LABS:                        14.2   6.96  )-----------( 258      ( 11 Aug 2023 14:18 )             42.4     08-12    144  |  107  |  15  ----------------------------<  339<H>  4.3   |  28  |  0.7    Ca    8.7      12 Aug 2023 06:04  Mg     1.8     08-12    TPro  6.3  /  Alb  3.8  /  TBili  0.5  /  DBili  x   /  AST  20  /  ALT  23  /  AlkPhos  81  08-11      CARDIAC MARKERS ( 11 Aug 2023 14:18 )  x     / <0.01 ng/mL / x     / x     / x          Urinalysis Basic - ( 12 Aug 2023 06:04 )    Color: x / Appearance: x / SG: x / pH: x  Gluc: 339 mg/dL / Ketone: x  / Bili: x / Urobili: x   Blood: x / Protein: x / Nitrite: x   Leuk Esterase: x / RBC: x / WBC x   Sq Epi: x / Non Sq Epi: x / Bacteria: x        Culture - Urine (collected 11 Aug 2023 15:02)  Source: Clean Catch Clean Catch (Midstream)  Final Report (13 Aug 2023 06:26):    No growth        RADIOLOGY & ADDITIONAL TESTS:  Results Reviewed:   Imaging Personally Reviewed:  Electrocardiogram Personally Reviewed:    COORDINATION OF CARE:  Care Discussed with Consultants/Other Providers [Y/N]:  Prior or Outpatient Records Reviewed [Y/N]:

## 2023-08-14 NOTE — DISCHARGE NOTE NURSING/CASE MANAGEMENT/SOCIAL WORK - PATIENT PORTAL LINK FT
You can access the FollowMyHealth Patient Portal offered by [facility] by registering at the following website: [email or web address]. By joining [facility]’s FollowMyHealth portal, you will also be able to view your health information using other applications (apps) compatible with our system.

## 2023-08-14 NOTE — PROGRESS NOTE ADULT - PROBLEM SELECTOR PLAN 7
DVTppx: lovenox  GOC: full code  Dispo: to DUSTIN kong evaluated and rec back to Rhett
DVTppx: lovenox  GOC: full code  Dispo: to DUSTIN kong evaluated and rec back to Rhett
DVTppx: lovenox  GOC: full code  Dispo: to dilan once completing PT eval and is eating better

## 2023-08-14 NOTE — DISCHARGE NOTE PROVIDER - PROVIDER TOKENS
PROVIDER:[TOKEN:[[id number]],FOLLOWUP:[1 week]],PROVIDER:[TOKEN:[[id number]],FOLLOWUP:[1 week]]

## 2023-08-14 NOTE — DISCHARGE NOTE NURSING/CASE MANAGEMENT/SOCIAL WORK - NSDCVIVACCINE_GEN_ALL_CORE_FT
Tdap; 03-Apr-2019 18:34; Amico, Francine M (RN); [employer]; [id number] (Exp. Date: 20-Nov-2020); IntraMuscular; Deltoid Right.; 0.5 milliLiter(s); VIS (VIS Published: 09-May-2013, VIS Presented: 03-Apr-2019);   Tdap; 08-Aug-2021 16:15; Janey Vora (LINDSEY); [employer]; [id number] (Exp. Date: 28-Jan-2023); IntraMuscular; Deltoid Right.; 0.5 milliLiter(s); VIS (VIS Published: 09-May-2013, VIS Presented: 08-Aug-2021);

## 2023-08-15 ENCOUNTER — APPOINTMENT (OUTPATIENT)
Dept: NEUROLOGY | Facility: CLINIC | Age: 58
End: 2023-08-15

## 2023-08-15 LAB — LEVETIRACETAM SERPL-MCNC: 10.1 UG/ML — SIGNIFICANT CHANGE UP (ref 10–40)

## 2023-08-24 DIAGNOSIS — F01.50 VASCULAR DEMENTIA WITHOUT BEHAVIORAL DISTURBANCE: ICD-10-CM

## 2023-08-24 DIAGNOSIS — F48.2 PSEUDOBULBAR AFFECT: ICD-10-CM

## 2023-08-24 DIAGNOSIS — Z86.73 PERSONAL HISTORY OF TRANSIENT ISCHEMIC ATTACK (TIA), AND CEREBRAL INFARCTION WITHOUT RESIDUAL DEFICITS: ICD-10-CM

## 2023-08-24 DIAGNOSIS — I50.32 CHRONIC DIASTOLIC (CONGESTIVE) HEART FAILURE: ICD-10-CM

## 2023-08-24 DIAGNOSIS — E11.9 TYPE 2 DIABETES MELLITUS WITHOUT COMPLICATIONS: ICD-10-CM

## 2023-08-24 DIAGNOSIS — F32.A DEPRESSION, UNSPECIFIED: ICD-10-CM

## 2023-08-24 DIAGNOSIS — Z79.82 LONG TERM (CURRENT) USE OF ASPIRIN: ICD-10-CM

## 2023-08-24 DIAGNOSIS — Z79.4 LONG TERM (CURRENT) USE OF INSULIN: ICD-10-CM

## 2023-08-24 DIAGNOSIS — I11.0 HYPERTENSIVE HEART DISEASE WITH HEART FAILURE: ICD-10-CM

## 2023-08-24 DIAGNOSIS — Z87.891 PERSONAL HISTORY OF NICOTINE DEPENDENCE: ICD-10-CM

## 2023-08-24 DIAGNOSIS — E78.5 HYPERLIPIDEMIA, UNSPECIFIED: ICD-10-CM

## 2023-08-24 DIAGNOSIS — K21.9 GASTRO-ESOPHAGEAL REFLUX DISEASE WITHOUT ESOPHAGITIS: ICD-10-CM

## 2023-09-01 ENCOUNTER — APPOINTMENT (OUTPATIENT)
Dept: NEUROLOGY | Facility: CLINIC | Age: 58
End: 2023-09-01

## 2023-09-01 NOTE — ASSESSMENT
[FreeTextEntry1] : Cognitive Impairment.   - MRI Of the Brain without contrast.  -  - I Will Follow up with him in One Month.     I, Alesha Coughlin, Attest that this documentation has been prepared under the direction and in the presence of Provider Iam Faith DO  Thank You for letting me assist in the management of this patient.   Iam Faith DO Board Certified, Neurology

## 2023-09-01 NOTE — HISTORY OF PRESENT ILLNESS
[FreeTextEntry1] : Mr. Jj is a 57 year old man who comes in today for Cognitive Impairment. Patient has no recent imaging.

## 2023-09-19 ENCOUNTER — APPOINTMENT (OUTPATIENT)
Dept: NEUROLOGY | Facility: CLINIC | Age: 58
End: 2023-09-19
Payer: MEDICARE

## 2023-09-19 DIAGNOSIS — F01.50 VASCULAR DEMENTIA W/OUT BEHAVIORAL DISTURBANCE: ICD-10-CM

## 2023-09-19 PROCEDURE — 99203 OFFICE O/P NEW LOW 30 MIN: CPT

## 2023-09-21 NOTE — ED PROVIDER NOTE - EKG #1 DATE/TIME
Bilingual team member Language service provided- Gregg.   Language service provided- LANCE     Contacted patient via telephone, to assisted with referrals, patient schedule on     Physical Therapy   Appt date: 10-  Appt time: 8:00 am (need to arrive 15 mins early)  Appt location: [address] [address]    Ophthalmology   Appt date: 12/8/2023  Appt time: 8:15 am (need to arrive 15 mins early)  Appt location: [facility] [place] [address]  
27-Jun-2021 18:33

## 2023-11-14 NOTE — DISCHARGE NOTE PROVIDER - NSDCDCMDCOMP_GEN_ALL_CORE
Problem: Self Care Deficits Care Plan (Adult)  Goal: *Acute Goals and Plan of Care (Insert Text)  Description: Occupational Therapy Goals  Initiated 7/1/2022 within 7 day(s). 1.  Patient will perform grooming with supervision/set-up standing at sink for 5 minutes or more. 2.  Patient will perform lower body dressing with supervision/set-up. 3.  Patient will perform toilet transfers with supervision/set-up. 4.  Patient will perform all aspects of toileting with supervision/set-up. 5.  Patient will participate in upper extremity therapeutic exercise/activities with supervision/set-up for 10 minutes. 6.  Patient will utilize energy conservation techniques during functional activities with verbal, visual, and tactile cues. Outcome: Progressing Towards Goal  OCCUPATIONAL THERAPY TREATMENT    Patient: Russ Schwarz (46 y.o. male)  Date: 7/5/2022  Diagnosis: COVID-19 [U07.1] Pulmonary edema  Procedure(s) (LRB):  EXCISION OF INFECTED AV GRAPH LEFT UPPER ARM,TEMPORARY  DIALYSIS CATHETER PLACEMENT RIGHT LEG, REPAIR BRACIAL ARTERY WITH PATCH, HARVEST OF BASILLIC VEIN. (N/A) 11 Days Post-Op  Precautions: Fall,WBAT    Chart, occupational therapy assessment, plan of care, and goals were reviewed. ASSESSMENT:  Pt found supine in bed, reporting pain 0/10, agreeable to therapy. Pt seen in full PPE. Pt reporting fatigue during session from not sleeping. Pt supervision for bed mobility and to sit up to EOB. Pt able to don prosthetic with setup/supervision. Pt completed STS/bathroom mobility with rollator, demo good use of rollator making turns in bathroom. Pt ambulated back to EOB and returned to supine without assist, call bell/phone within reach.      Progression toward goals:  [x]          Improving appropriately and progressing toward goals  []          Improving slowly and progressing toward goals  []          Not making progress toward goals and plan of care will be adjusted     PLAN:  Patient continues to
Regional
benefit from skilled intervention to address the above impairments. Continue treatment per established plan of care. Discharge Recommendations:  Home Health  Further Equipment Recommendations for Discharge:  N/A     SUBJECTIVE:   Patient stated I haven't been sleeping here.     OBJECTIVE DATA SUMMARY:   Cognitive/Behavioral Status:  Neurologic State: Alert  Orientation Level: Oriented X4  Cognition: Appropriate decision making,Appropriate for age attention/concentration,Appropriate safety awareness,Follows commands  Safety/Judgement: Awareness of environment    Functional Mobility and Transfers for ADLs:   Bed Mobility:  Supine to Sit: Supervision  Sit to Supine: Supervision  Scooting: Supervision   Transfers:  Sit to Stand: Stand-by assistance   Bathroom Mobility: Stand-by assistance    Balance:  Sitting: Intact  Standing: Intact; With support    ADL Intervention:  Lower Body Dressing Assistance  Dressing Assistance: Set-up; Supervision    Cognitive Retraining  Safety/Judgement: Awareness of environment    Pain:  Pain level pre-treatment: 0/10   Pain level post-treatment: 0/10  Pain Intervention(s): Rest, Ice, Repositioning   Response to intervention: Nurse notified, See doc flow sheet    Activity Tolerance:    Fair. Pt able to stand ~5 minutes. Pt limited by endurance, strength, ROM    Please refer to the flowsheet for vital signs taken during this treatment. After treatment:   []  Patient left in no apparent distress sitting up in chair  [x]  Patient left in no apparent distress in bed  [x]  Call bell left within reach  [x]  Nursing notified  []  Caregiver present  []  Bed alarm activated    COMMUNICATION/EDUCATION:   [x] Role of Occupational Therapy in the acute care setting  [x] Home safety education was provided and the patient/caregiver indicated understanding. [x] Patient/family have participated as able in working towards goals and plan of care.   [x] Patient/family agree to work toward stated goals and
plan of care. [] Patient understands intent and goals of therapy, but is neutral about his/her participation. [] Patient is unable to participate in goal setting and plan of care.       Thank you for this referral.  Myrtle Landau, OTR/L  Time Calculation: 23 mins
This document is complete and the patient is ready for discharge.

## 2023-12-11 NOTE — ED ADULT NURSE NOTE - DOES PATIENT HAVE ADVANCE DIRECTIVE
Assessment & Plan     Hypertension goal BP (blood pressure) < 140/90  Stable on current regimen.  Continue same plan and routine follow-up.   - hydrochlorothiazide (HYDRODIURIL) 50 MG tablet; Take 1 tablet (50 mg) by mouth daily    Morbid obesity with BMI of 50.0-59.9, adult (H)  Patient interested in learning about options for weight loss.  I think a consultation with our weight management folks is warranted.  - Adult Comprehensive Weight Management  Referral; Future    Dysmenorrhea  Longstanding issues of dysmenorrhea.  Was previously on combination oral contraceptives but had a DVT and given her history of obesity and smoking I do not feel comfortable with that any longer.  Many different options and probably best to speak with one of our GYN providers.    - oxyCODONE IR (ROXICODONE) 10 MG tablet; Take 1 tablet (10 mg) by mouth 2 times daily as needed for pain  - Ob/Gyn  Referral; Future    Breast pain  Stable and following  - oxyCODONE IR (ROXICODONE) 10 MG tablet; Take 1 tablet (10 mg) by mouth 2 times daily as needed for pain    Chronic, continuous use of opioids  New CSA today but up-to-date on routine monitoring    Personal history of DVT (deep vein thrombosis)  I unfortunately do not have records from her time in the hospital in [place].  She was diagnosed with a right lower extremity DVT and she says a small pulmonary embolus.  Was on Eliquis for a while but I do not have the exact dates.  She wanted to know if everything had cleared up and I discussed with her that we typically workup recommendations for course of treatment based upon risk factors.  It is not so much following an ultrasound until things are gone because that may or may not happen.  So it is unknown whether this is provoked in the sense of her oral contraceptive, smoking, obesity, or whether this is unprovoked which would potentially indicate lifelong therapy.  I do not know if any workup of clotting was done on the  "outside.  Complex enough that I think I want to have a one-time consultation with our hematology folks.  - Ob/Gyn  Referral; Future  - Adult Oncology/Hematology  Referral; Future       BMI:   Estimated body mass index is 49.24 kg/m  as calculated from the following:    Height as of this encounter: 1.676 m (5' 6\").    Weight as of this encounter: 138.4 kg (305 lb 1.6 oz).   Weight management plan: As above    Depression Screening Follow Up        12/11/2023    12:34 PM   PHQ   PHQ-9 Total Score 19   Q9: Thoughts of better off dead/self-harm past 2 weeks Not at all         12/11/2023    12:34 PM   Last PHQ-9   1.  Little interest or pleasure in doing things 2   2.  Feeling down, depressed, or hopeless 2   3.  Trouble falling or staying asleep, or sleeping too much 3   4.  Feeling tired or having little energy 3   5.  Poor appetite or overeating 3   6.  Feeling bad about yourself 3   7.  Trouble concentrating 1   8.  Moving slowly or restless 2   Q9: Thoughts of better off dead/self-harm past 2 weeks 0   PHQ-9 Total Score 19       Follow Up Actions Taken       See Patient Instructions    Barbara Esquivel MD  [facility]    Barbara Benito is a 33 year old, presenting for the following health issues:  Recheck Medication (Med check on all medications)        12/11/2023    12:42 PM   Additional Questions   Roomed by Beatrice HOLLINGSWORTH   Accompanied by Self         12/11/2023    12:42 PM   Patient Reported Additional Medications   Patient reports taking the following new medications None       History of Present Illness       Reason for visit:  Check up    She eats 2-3 servings of fruits and vegetables daily.She consumes 2 sweetened beverage(s) daily.She exercises with enough effort to increase her heart rate 9 or less minutes per day.  She exercises with enough effort to increase her heart rate 3 or less days per week. She is missing 1 dose(s) of medications per week.   " "        Here today in follow-up of ongoing chronic conditions.  Details as above.      Review of Systems   Constitutional, HEENT, cardiovascular, pulmonary, gi and gu systems are negative, except as otherwise noted.      Objective    /82 (BP Location: Right arm, Patient Position: Sitting, Cuff Size: Adult Large)   Pulse 76   Temp 98.6  F (37  C) (Oral)   Resp 15   Ht 1.676 m (5' 6\")   Wt 138.4 kg (305 lb 1.6 oz)   LMP 11/29/2023 (Exact Date)   SpO2 98%   BMI 49.24 kg/m    Body mass index is 49.24 kg/m .  Physical Exam   Alert, pleasant, upbeat, and in no apparent discomfort.  S1 and S2 normal, no murmurs, clicks, gallops or rubs. Regular rate and rhythm. Chest is clear; no wheezes or rales. No edema or JVD.  Past labs reviewed with the patient.                       " No

## 2024-02-19 NOTE — ED ADULT NURSE NOTE - DO YOU HAVE ACCESS TO A FIREARM WITHIN OR OUTSIDE YOUR HOUSEHOLD?
Identified pt with two pt identifiers(name and ). Reviewed record in preparation for visit and have obtained necessary documentation. All patient medications has been reviewed.  Chief Complaint   Patient presents with    New Patient     Cortisol issues        Vitals:    24 1417   BP: 92/60   Pulse: 81   Resp: 17   SpO2: 96%                   Coordination of Care Questionnaire:   1) Have you been to an emergency room, urgent care, or hospitalized since your last visit?    no        2. Have seen or consulted any other health care provider since your last visit? no        Patient is accompanied by friend I have received verbal consent from Yasemin Manzano to discuss any/all medical information while they are present in the room.     bilaterally  Musculoskeletal: no edema  Neurological: alert and oriented  Psychiatric: normal mood and affect    Data Reviewed:     No results found for: \"HBA1C\", \"GLU\", \"GESTF\", \"GLUCPOC\", \"MCA2\", \"MCAU\", \"LDL\", \"LDLC\", \"SUYAPA\", \"CREAPOC\", \"CREA\"   Lab Results   Component Value Date/Time    BUN 13 02/20/2024 01:54 AM     02/20/2024 01:54 AM    K 4.3 02/20/2024 01:54 AM     02/20/2024 01:54 AM    CO2 24 02/20/2024 01:54 AM    MG 1.9 02/20/2024 01:54 AM    PHOS 2.3 02/20/2024 01:54 AM     November 2023: TSH, free T4 normal, she was able to breast-feed after pregnancy, having menstrual cycles    Assessment/Plan:     She was diagnosed with possible adrenal insufficiency while she was in the ER late 2023, on hydrocortisone  Continues to have symptoms, mostly postural,blood pressure has been running low, feels very weak when she is up for a long time, muscle weakness  Gold standard test is ACTH stimulation test to check adrenal reserve  Was able to review some of the labs from [facility] although not completely, there was a cortisol level 60-minute post ACTH stimulation which was 24 , suggesting good adrenal reserve making adrenal insufficiency less likely, baseline ACTH 11.  If secondary adrenal insufficiency (pituitary etiology) is acute ,adrenal gland can mount a good response to  ACTH stimulation, now it has been more than 3 months , recheck ACTH stimulation test, if the response is good we can safely exclude adrenal insufficiency  Discussed glucocorticoid use when not  really indicated can cause other side effects  Arrange for ACTH stim test and infusion center, a.m. ACTH, cortisol, aldosterone, renin, hold hydrocortisone morning of the test  For now continue hydrocortisone  Increase salt/water intake, avoid prolonged standing    She had an presyncopal episode in the office before leaving the office  Sitting blood pressure was 92/60, pulse 81, when she stood up,  felt very weak,  could not check the blood  No

## 2024-04-11 ENCOUNTER — EMERGENCY (EMERGENCY)
Facility: HOSPITAL | Age: 59
LOS: 0 days | Discharge: HOME | End: 2024-04-11
Attending: EMERGENCY MEDICINE
Payer: MEDICARE

## 2024-04-11 VITALS
WEIGHT: 190.04 LBS | OXYGEN SATURATION: 97 % | TEMPERATURE: 98 F | HEIGHT: 69 IN | RESPIRATION RATE: 17 BRPM | SYSTOLIC BLOOD PRESSURE: 178 MMHG | HEART RATE: 57 BPM | DIASTOLIC BLOOD PRESSURE: 82 MMHG

## 2024-04-11 DIAGNOSIS — G40.909 EPILEPSY, UNSPECIFIED, NOT INTRACTABLE, WITHOUT STATUS EPILEPTICUS: ICD-10-CM

## 2024-04-11 DIAGNOSIS — F01.50 VASCULAR DEMENTIA, UNSPECIFIED SEVERITY, WITHOUT BEHAVIORAL DISTURBANCE, PSYCHOTIC DISTURBANCE, MOOD DISTURBANCE, AND ANXIETY: ICD-10-CM

## 2024-04-11 DIAGNOSIS — Z04.3 ENCOUNTER FOR EXAMINATION AND OBSERVATION FOLLOWING OTHER ACCIDENT: ICD-10-CM

## 2024-04-11 DIAGNOSIS — E78.5 HYPERLIPIDEMIA, UNSPECIFIED: ICD-10-CM

## 2024-04-11 DIAGNOSIS — Z86.73 PERSONAL HISTORY OF TRANSIENT ISCHEMIC ATTACK (TIA), AND CEREBRAL INFARCTION WITHOUT RESIDUAL DEFICITS: ICD-10-CM

## 2024-04-11 DIAGNOSIS — I10 ESSENTIAL (PRIMARY) HYPERTENSION: ICD-10-CM

## 2024-04-11 DIAGNOSIS — E11.9 TYPE 2 DIABETES MELLITUS WITHOUT COMPLICATIONS: ICD-10-CM

## 2024-04-11 DIAGNOSIS — F32.A DEPRESSION, UNSPECIFIED: ICD-10-CM

## 2024-04-11 LAB
ALBUMIN SERPL ELPH-MCNC: 3.5 G/DL — SIGNIFICANT CHANGE UP (ref 3.5–5.2)
ALP SERPL-CCNC: 96 U/L — SIGNIFICANT CHANGE UP (ref 30–115)
ALT FLD-CCNC: 48 U/L — HIGH (ref 0–41)
ANION GAP SERPL CALC-SCNC: 9 MMOL/L — SIGNIFICANT CHANGE UP (ref 7–14)
AST SERPL-CCNC: 27 U/L — SIGNIFICANT CHANGE UP (ref 0–41)
BASOPHILS # BLD AUTO: 0.09 K/UL — SIGNIFICANT CHANGE UP (ref 0–0.2)
BASOPHILS NFR BLD AUTO: 0.9 % — SIGNIFICANT CHANGE UP (ref 0–1)
BILIRUB SERPL-MCNC: 0.5 MG/DL — SIGNIFICANT CHANGE UP (ref 0.2–1.2)
BUN SERPL-MCNC: 14 MG/DL — SIGNIFICANT CHANGE UP (ref 10–20)
CALCIUM SERPL-MCNC: 8.9 MG/DL — SIGNIFICANT CHANGE UP (ref 8.4–10.5)
CHLORIDE SERPL-SCNC: 108 MMOL/L — SIGNIFICANT CHANGE UP (ref 98–110)
CK SERPL-CCNC: 173 U/L — SIGNIFICANT CHANGE UP (ref 0–225)
CO2 SERPL-SCNC: 26 MMOL/L — SIGNIFICANT CHANGE UP (ref 17–32)
CREAT SERPL-MCNC: 0.6 MG/DL — LOW (ref 0.7–1.5)
EGFR: 111 ML/MIN/1.73M2 — SIGNIFICANT CHANGE UP
EOSINOPHIL # BLD AUTO: 0.45 K/UL — SIGNIFICANT CHANGE UP (ref 0–0.7)
EOSINOPHIL NFR BLD AUTO: 4.6 % — SIGNIFICANT CHANGE UP (ref 0–8)
GLUCOSE SERPL-MCNC: 137 MG/DL — HIGH (ref 70–99)
HCT VFR BLD CALC: 44.5 % — SIGNIFICANT CHANGE UP (ref 42–52)
HGB BLD-MCNC: 15.1 G/DL — SIGNIFICANT CHANGE UP (ref 14–18)
IMM GRANULOCYTES NFR BLD AUTO: 0.4 % — HIGH (ref 0.1–0.3)
LYMPHOCYTES # BLD AUTO: 2.4 K/UL — SIGNIFICANT CHANGE UP (ref 1.2–3.4)
LYMPHOCYTES # BLD AUTO: 24.7 % — SIGNIFICANT CHANGE UP (ref 20.5–51.1)
MCHC RBC-ENTMCNC: 30.1 PG — SIGNIFICANT CHANGE UP (ref 27–31)
MCHC RBC-ENTMCNC: 33.9 G/DL — SIGNIFICANT CHANGE UP (ref 32–37)
MCV RBC AUTO: 88.6 FL — SIGNIFICANT CHANGE UP (ref 80–94)
MONOCYTES # BLD AUTO: 0.64 K/UL — HIGH (ref 0.1–0.6)
MONOCYTES NFR BLD AUTO: 6.6 % — SIGNIFICANT CHANGE UP (ref 1.7–9.3)
NEUTROPHILS # BLD AUTO: 6.1 K/UL — SIGNIFICANT CHANGE UP (ref 1.4–6.5)
NEUTROPHILS NFR BLD AUTO: 62.8 % — SIGNIFICANT CHANGE UP (ref 42.2–75.2)
NRBC # BLD: 0 /100 WBCS — SIGNIFICANT CHANGE UP (ref 0–0)
PLATELET # BLD AUTO: 270 K/UL — SIGNIFICANT CHANGE UP (ref 130–400)
PMV BLD: 10.8 FL — HIGH (ref 7.4–10.4)
POTASSIUM SERPL-MCNC: 4.5 MMOL/L — SIGNIFICANT CHANGE UP (ref 3.5–5)
POTASSIUM SERPL-SCNC: 4.5 MMOL/L — SIGNIFICANT CHANGE UP (ref 3.5–5)
PROT SERPL-MCNC: 7 G/DL — SIGNIFICANT CHANGE UP (ref 6–8)
RBC # BLD: 5.02 M/UL — SIGNIFICANT CHANGE UP (ref 4.7–6.1)
RBC # FLD: 13.2 % — SIGNIFICANT CHANGE UP (ref 11.5–14.5)
SODIUM SERPL-SCNC: 143 MMOL/L — SIGNIFICANT CHANGE UP (ref 135–146)
WBC # BLD: 9.72 K/UL — SIGNIFICANT CHANGE UP (ref 4.8–10.8)
WBC # FLD AUTO: 9.72 K/UL — SIGNIFICANT CHANGE UP (ref 4.8–10.8)

## 2024-04-11 PROCEDURE — 82550 ASSAY OF CK (CPK): CPT

## 2024-04-11 PROCEDURE — 99285 EMERGENCY DEPT VISIT HI MDM: CPT

## 2024-04-11 PROCEDURE — 99285 EMERGENCY DEPT VISIT HI MDM: CPT | Mod: 25

## 2024-04-11 PROCEDURE — 82962 GLUCOSE BLOOD TEST: CPT

## 2024-04-11 PROCEDURE — 80053 COMPREHEN METABOLIC PANEL: CPT

## 2024-04-11 PROCEDURE — 85025 COMPLETE CBC W/AUTO DIFF WBC: CPT

## 2024-04-11 PROCEDURE — 93005 ELECTROCARDIOGRAM TRACING: CPT

## 2024-04-11 PROCEDURE — 36415 COLL VENOUS BLD VENIPUNCTURE: CPT

## 2024-04-11 PROCEDURE — 70450 CT HEAD/BRAIN W/O DYE: CPT | Mod: MC

## 2024-04-11 PROCEDURE — 93010 ELECTROCARDIOGRAM REPORT: CPT

## 2024-04-11 PROCEDURE — 70450 CT HEAD/BRAIN W/O DYE: CPT | Mod: 26,MC

## 2024-04-11 RX ORDER — SODIUM CHLORIDE 9 MG/ML
1000 INJECTION INTRAMUSCULAR; INTRAVENOUS; SUBCUTANEOUS ONCE
Refills: 0 | Status: COMPLETED | OUTPATIENT
Start: 2024-04-11 | End: 2024-04-11

## 2024-04-11 RX ADMIN — SODIUM CHLORIDE 1000 MILLILITER(S): 9 INJECTION INTRAMUSCULAR; INTRAVENOUS; SUBCUTANEOUS at 07:49

## 2024-04-11 NOTE — ED ADULT NURSE NOTE - NS ED NURSE IV DC DT
Last appointment: 3/8/23  Next appointment: Advised to follow-up 9/8/23  Previous refill encounter(s): 9/12/23 #90 with 1 refill    Requested Prescriptions     Pending Prescriptions Disp Refills    amLODIPine (NORVASC) 10 MG tablet [Pharmacy Med Name: AMLODIPINE BESYLATE 10 MG TAB] 90 tablet 0     Sig: Take 1 tablet by mouth daily Patient needs an appointment for further refills         For Pharmacy Admin Tracking Only    Program: Medication Refill  CPA in place:    Recommendation Provided To:   Intervention Detail: New Rx: 1, reason: Patient Preference and Scheduled Appointment  Intervention Accepted By:   Gap Closed?:    Time Spent (min): 5  
11-Apr-2024 11:37

## 2024-04-11 NOTE — ED ADULT TRIAGE NOTE - HEART RATE (BEATS/MIN)
57 Niacinamide Counseling: I recommended taking niacin or niacinamide, also know as vitamin B3, twice daily. Recent evidence suggests that taking vitamin B3 (500 mg twice daily) can reduce the risk of actinic keratoses and non-melanoma skin cancers. Side effects of vitamin B3 include flushing and headache.

## 2024-04-11 NOTE — ED ADULT NURSE NOTE - NSFALLHARMRISKINTERV_ED_ALL_ED

## 2024-04-11 NOTE — ED PROVIDER NOTE - CARE PROVIDER_API CALL
Spenser Murphy  Internal Medicine  [address] [facility] [place] [address]  Phone: [phone]  Fax: [phone]  Follow Up Time: 1-3 Days

## 2024-04-11 NOTE — ED PROVIDER NOTE - PROGRESS NOTE DETAILS
Authored by Dr. Rosenbaum: pt calm, cooperative, no longer crying, no new findings on ct scan and stable for DC

## 2024-04-11 NOTE — ED ADULT TRIAGE NOTE - CHIEF COMPLAINT QUOTE
[facility]'s from [facility]. The aide said she found him on the floor. He does not remember but he is denying any pain. He is on Plavix. He was then found back in bed and no one knows how. - EMS  Patient is on Keppra, diabetic and on Namenda, poor historian, A&Ox2

## 2024-04-11 NOTE — ED PROVIDER NOTE - ATTENDING CONTRIBUTION TO CARE
56 y/o M, PMH of CVA, Vascular Dementia, Pseudobulbar Affect/Depression, Seizure Disorder on Keppra, DM2, HTN, HLD presented from [facility] due to being found on the floor by an aide this morning.  Patient is unable to clarify how he fell or ended up on the floor.  Has no complaints of pain.  Patient is crying on exam but unable to explain why and denies any pain.  Patient is a poor historian at baseline.  Spoke to staff from facility at [facility] who reports patient was found on the floor this morning and fall was not witnessed.  Patient is otherwise acting at his baseline and does periodically cry with no explanation.  Denies recent illness, fever, cough, shortness of breath, vomiting, abdominal pain or change in mental status. On exam, pt in NAD, AAOx2, head NC/AT, CN II-XII intact, PEERL, EOMi, neck (-) midline tenderness, lungs CTA B/L, CV S1S2 regular, abdomen soft/NT/ND/(+)BS, ext (-) edema, motor 5/5x4, sensation intact. Pt observed. Labs/CT/EKG done and reviewed. Pt tolerated PO. Will d/c back to [facility].

## 2024-04-11 NOTE — ED PROVIDER NOTE - PATIENT PORTAL LINK FT
You can access the FollowMyHealth Patient Portal offered by [facility] by registering at the following website: [email or web address]. By joining [employer]’s FollowMyHealth portal, you will also be able to view your health information using other applications (apps) compatible with our system.

## 2024-04-11 NOTE — ED PROVIDER NOTE - OBJECTIVE STATEMENT
56 y/o M w pmhx of CVA, Vascular Dementia, Pseudobulbar Affect/Depression, Seizure Disorder on keppra, DM2, HTN, HLD presented from [facility] BIBA due to being found on the floor by an aide this morning.  Patient is unable to clarify how he fell or ended up on the floor.  Has no complaints of pain.  Patient is crying on exam but unable to explain why and denies any pain.  Patient is a poor historian at baseline.  Spoke to staff from facility at [facility] who reports patient was found on the floor this morning and fall was not witnessed.  Patient is otherwise acting at his baseline and does periodically cry with no explanation.  Denies recent illness fever cough shortness of breath vomiting abdominal pain or change in mental status.

## 2024-04-11 NOTE — ED ADULT NURSE NOTE - CCCP TRG CHIEF CMPLNT
Discharge Instructions/Wound [address], [address] [facility] [place] [id number]  Telephone: [phone]  WOUND CARE ORDERS:  Sacral wound - cleanse with saline or soap and water, rinse, pat dry, apply sacral foam border, change 3 x week and as needed for soiling. Patient to use gel/memory foam overlay for hospital bed. Use U shaped sacral cushion for pressure relief in chair/wheelchair. No further follow-up needed unless wound opens. TREATMENT ORDERS:  Turn/reposition every 2 hours when in bed, avoid direct pressure on wound site. When sitting, shift position or do seat lifts every 15 minutes. Limit side lying to 30 degree tilt. Limit HOB elevation to 30 degrees. Use speciality pressure relief cushion, mattress as appropriate. Follow diet as prescribed:  [x] Diet as tolerated: [] Calorie Diabetic Diet:Low carb and no Sugar [] No Added Salt:[x] Increase Protein: [] Other:Limit the amount of liquid you are drinking and avoid drinking in between meals              Return Appointment:  [x] Return Appointment: No further follow-up needed. [] Ordered tests:    Electronically signed Fortunato Cervantes RN on 12/15/2021 at . [address]: Should you experience any significant changes in your wound(s) or have questions about your wound care, please contact the [address] [place] [address] at [address] [place] [address] 8:00 am - 4:30. If you need help with your wound outside these hours and cannot wait until we are again available, contact your PCP or go to the hospital emergency room. PLEASE NOTE: IF YOU ARE UNABLE TO OBTAIN WOUND SUPPLIES, CONTINUE TO USE THE SUPPLIES YOU HAVE AVAILABLE UNTIL YOU ARE ABLE TO REACH US. IT IS MOST IMPORTANT TO KEEP THE WOUND COVERED AT ALL TIMES.      Physician Signature:_______________________    Date: ___________ Time:  ____________
evaluation/fall

## 2024-04-11 NOTE — ED PROVIDER NOTE - CLINICAL SUMMARY MEDICAL DECISION MAKING FREE TEXT BOX
56 y/o M, PMH of CVA, Vascular Dementia, Pseudobulbar Affect/Depression, Seizure Disorder on Keppra, DM2, HTN, HLD presented from [facility] due to being found on the floor by an aide this morning.  Patient is unable to clarify how he fell or ended up on the floor.  Has no complaints of pain.  Patient is crying on exam but unable to explain why and denies any pain.  Patient is a poor historian at baseline.  Spoke to staff from facility at [facility] who reports patient was found on the floor this morning and fall was not witnessed.  Patient is otherwise acting at his baseline and does periodically cry with no explanation.  Denies recent illness, fever, cough, shortness of breath, vomiting, abdominal pain or change in mental status. On exam, pt in NAD, AAOx2, head NC/AT, CN II-XII intact, PEERL, EOMi, neck (-) midline tenderness, lungs CTA B/L, CV S1S2 regular, abdomen soft/NT/ND/(+)BS, ext (-) edema, motor 5/5x4, sensation intact. Pt observed. Labs/CT/EKG done and reviewed. Pt tolerated PO. Will d/c back to [facility].

## 2024-04-11 NOTE — ED PROVIDER NOTE - PHYSICAL EXAMINATION
VITAL SIGNS: I have reviewed nursing notes and confirm.  CONSTITUTIONAL: non-toxic, well appearing  SKIN: no rash, no petechiae.  EYES: pink conjunctiva, anicteric  ENT: tongue midline, no exudates, MMM  NECK: Supple; no meningismus, no tenderness   CARD: RRR, no murmurs, equal radial pulses bilaterally 2+  RESP: CTAB, no respiratory distress  ABD: Soft, non-tender, non-distended  EXT: Normal ROM x4. No edema. No calves tenderness, no pelvic or hip tenderness   NEURO: Alert, orientedx2 at baseline to name and location, but not time, no focal deficits, moves all extremities

## 2024-04-11 NOTE — ED ADULT NURSE NOTE - OBJECTIVE STATEMENT
Patient a&ox2 He's from [facility]. The aide said she found him on the floor. He does not remember but he is denying any pain. He is on Plavix. He was then found back in bed and no one knows how. - EMS

## 2024-06-05 NOTE — DISCHARGE NOTE NURSING/CASE MANAGEMENT/SOCIAL WORK - NSDCPETBCESMAN_GEN_ALL_CORE
If you are a smoker, it is important for your health to stop smoking. Please be aware that second hand smoke is also harmful.
Detail Level: Detailed
Anesthesia Volume In Cc: 0
Render The Number Of Extractions: Yes
Consent: The patient's consent was obtained including but not limited to risks of crusting, scabbing, blistering, scarring, darker or lighter pigmentary change, recurrence, incomplete removal and infection.
Post-Care Instructions: I reviewed with the patient in detail post-care instructions. Patient is to wear sunprotection, and avoid picking at any of the treated lesions. Pt may apply Vaseline to crusted or scabbing areas.

## 2024-07-02 NOTE — ED ADULT NURSE NOTE - NS PRO AD NO ADVANCE DIRECTIVE
Assumed care of pt.    Pt resting in position of comfort on ED stretcher in lowest position with wheels locked, NAD noted, monitor in place, call light within reach, family at bedside   No

## 2025-01-11 NOTE — H&P ADULT - CLICK TO LAUNCH ORM
INFECTIOUS DISEASES FOLLOW UP-- Ruthie Shepherd  [phone]    This is a follow up note for this  55yFemale with  Cardiogenic shock    remains intubated , withdraw to pain but no meaningful interactions    ROS:  CONSTITUTIONAL:  Non interactive      Allergies    doxycycline (Angioedema)  penicillin (Unknown)  clindamycin (Angioedema)  linezolid (Angioedema)    Intolerances        ANTIBIOTICS/RELEVANT:  antimicrobials  meropenem  IVPB 1000 milliGRAM(s) IV Intermittent every 8 hours  vancomycin    Solution 125 milliGRAM(s) Oral every 12 hours  vancomycin  IVPB 500 milliGRAM(s) IV Intermittent every 12 hours    immunologic:    OTHER:  albuterol/ipratropium for Nebulization 3 milliLiter(s) Nebulizer every 6 hours  artificial tears (preservative free) Ophthalmic Solution 1 Drop(s) Both EYES four times a day  ascorbic acid 500 milliGRAM(s) Oral daily  chlorhexidine 0.12% Liquid 15 milliLiter(s) Oral Mucosa every 12 hours  chlorhexidine 2% Cloths 1 Application(s) Topical <User Schedule>  cyanocobalamin 1000 MICROGram(s) Oral daily  dexMEDEtomidine Infusion 0.2 MICROgram(s)/kG/Hr IV Continuous <Continuous>  dextrose 5%. 1000 milliLiter(s) IV Continuous <Continuous>  dextrose 5%. 1000 milliLiter(s) IV Continuous <Continuous>  dextrose 50% Injectable 25 Gram(s) IV Push once  dextrose 50% Injectable 12.5 Gram(s) IV Push once  dextrose 50% Injectable 25 Gram(s) IV Push once  dextrose Oral Gel 15 Gram(s) Oral once PRN  glucagon  Injectable 1 milliGRAM(s) IntraMuscular once  heparin   Injectable 5000 Unit(s) SubCutaneous every 8 hours  hydrALAZINE Injectable 5 milliGRAM(s) IV Push every 6 hours PRN  insulin lispro (ADMELOG) corrective regimen sliding scale   SubCutaneous every 6 hours  levETIRAcetam   Injectable 1000 milliGRAM(s) IV Push every 12 hours  multivitamin 1 Tablet(s) Oral daily  pantoprazole  Injectable 40 milliGRAM(s) IV Push daily  potassium chloride  10 mEq/50 mL IVPB 10 milliEquivalent(s) IV Intermittent every 1 hour  vasopressin Infusion 0.04 Unit(s)/Min IV Continuous <Continuous>      Objective:  Vital Signs Last 24 Hrs  T(C): 37.7 (11 Jan 2025 12:00), Max: 38.4 (10 Javed 2025 22:00)  T(F): 99.9 (11 Jan 2025 12:00), Max: 101.1 (10 Javed 2025 22:00)  HR: 99 (11 Jan 2025 14:00) (69 - 118)  BP: --  BP(mean): --  RR: 22 (11 Jan 2025 14:00) (15 - 45)  SpO2: 100% (11 Jan 2025 14:00) (96% - 100%)    Parameters below as of 11 Jan 2025 12:00  Patient On (Oxygen Delivery Method): ventilator    O2 Concentration (%): 30    PHYSICAL EXAM:  Constitutional:no change in status  Eyes:VICKY, EOMI  Ear/Nose/Throat: no oral lesions, new CVC on left, et tube	  Respiratory: coarse BL  Cardiovascular: S1S2  Gastrointestinal:soft  Extremities:no e/e/c  No Lymphadenopathy  IV sites not inflammed.    LABS:                        10.4   12.41 )-----------( 143      ( 11 Jan 2025 00:32 )             32.5     01-11    150[H]  |  109[H]  |  35[H]  ----------------------------<  109[H]  3.5   |  26  |  0.87    Ca    9.8      11 Jan 2025 12:10  Phos  3.7     01-11  Mg     2.9     01-11    TPro  7.5  /  Alb  4.3  /  TBili  2.3[H]  /  DBili  x   /  AST  57[H]  /  ALT  83[H]  /  AlkPhos  126[H]  01-11    PT/INR - ( 11 Jan 2025 00:32 )   PT: 11.1 sec;   INR: 0.97 ratio         PTT - ( 11 Jan 2025 00:32 )  PTT:23.2 sec  Urinalysis Basic - ( 11 Jan 2025 12:10 )    Color: x / Appearance: x / SG: x / pH: x  Gluc: 109 mg/dL / Ketone: x  / Bili: x / Urobili: x   Blood: x / Protein: x / Nitrite: x   Leuk Esterase: x / RBC: x / WBC x   Sq Epi: x / Non Sq Epi: x / Bacteria: x        MICROBIOLOGY:    Culture - Sputum . (01.04.25 @ 03:38)    Gram Stain:   Moderate polymorphonuclear leukocytes per low power field  Rare Squamous epithelial cells per low power field  Few Gram Positive Cocci in Clusters seen per oil power field  Rare Gram Negative Rods seen per oil power field  Rare Gram Positive Rods seen per oil power field   -  Clindamycin: R <=0.25 This isolate is presumed to be clindamycin resistant based on detection of inducible resistance. Clindamycin may still be effective in some patients.   -  Erythromycin: R >4   -  Gentamicin: S <=4 Should not be used as monotherapy   -  Oxacillin: S <=0.25 Oxacillin predicts susceptibility for dicloxacillin, methicillin, and nafcillin   -  Penicillin: R >2   -  Rifampin: S <=1 Should not be used as monotherapy   -  Tetracycline: S <=4   -  Trimethoprim/Sulfamethoxazole: S <=0.5/9.5   -  Vancomycin: S 0.5   Specimen Source: ET Tube ET Tube   Culture Results:   Moderate Staphylococcus aureus  Commensal fredi consistent with body site   Organism Identification: Staphylococcus aureus   Organism: Staphylococcus aureus   Method Type: NESHA            RECENT CULTURES:  01-08 @ 16:08  Bronchial  --  --  --    No growth  --  01-07 @ 10:17  .Blood BLOOD  --  --  --    No growth at 4 days  --  01-05 @ 14:23  ET Tube ET Tube  --  --  --    No growth  --  01-05 @ 14:00  .Blood BLOOD  --  --  --    No growth at 5 days  --  01-05 @ 12:00  .Blood BLOOD  --  --  --    No growth at 5 days  --  Vancomycin Level, Trough: 15.7: Vancomycin trough levels should be rapidly reached and maintained at  15-20 ug/mL for life threatening MRSA infections such as sepsis,          RADIOLOGY & ADDITIONAL STUDIES:    < from: Xray Chest 1 View- PORTABLE-Urgent (Xray Chest 1 View- PORTABLE-Urgent .) (01.11.25 @ 10:35) >  Frontal expiratory view of the chest shows the heart to be normal in   size. Endotracheal tube reaches the lower half of the trachea. Bilateral   jugular catheters show right catheter reaching the upper SVC and the left   catheter tip remaining in the upper right atrium.    The lungs show questionable lower right lung infiltrate and there is no   evidence of pneumothorax nor pleural effusion.    Chest one view 1/11/2025 1:28 AM  Compared to the prior study, the lower right lung is clearer.    Chest one view 1/11/2025 9:57 AM  Compared to the prior study, the lungs are clear. Left jugular central   line replaces left jugular dialysis catheter.    IMPRESSION:  Clearing of the lungs.    < end of copied text >   .

## 2025-03-07 ENCOUNTER — EMERGENCY (EMERGENCY)
Facility: HOSPITAL | Age: 60
LOS: 0 days | Discharge: HOME | End: 2025-03-07
Attending: EMERGENCY MEDICINE
Payer: MEDICARE

## 2025-03-07 VITALS
TEMPERATURE: 100 F | WEIGHT: 207.01 LBS | RESPIRATION RATE: 18 BRPM | DIASTOLIC BLOOD PRESSURE: 81 MMHG | OXYGEN SATURATION: 93 % | SYSTOLIC BLOOD PRESSURE: 155 MMHG | HEART RATE: 76 BPM

## 2025-03-07 VITALS
TEMPERATURE: 98 F | OXYGEN SATURATION: 98 % | HEART RATE: 65 BPM | SYSTOLIC BLOOD PRESSURE: 167 MMHG | DIASTOLIC BLOOD PRESSURE: 88 MMHG | RESPIRATION RATE: 18 BRPM

## 2025-03-07 DIAGNOSIS — F01.50 VASCULAR DEMENTIA, UNSPECIFIED SEVERITY, WITHOUT BEHAVIORAL DISTURBANCE, PSYCHOTIC DISTURBANCE, MOOD DISTURBANCE, AND ANXIETY: ICD-10-CM

## 2025-03-07 DIAGNOSIS — I10 ESSENTIAL (PRIMARY) HYPERTENSION: ICD-10-CM

## 2025-03-07 DIAGNOSIS — K08.409 PARTIAL LOSS OF TEETH, UNSPECIFIED CAUSE, UNSPECIFIED CLASS: ICD-10-CM

## 2025-03-07 DIAGNOSIS — F32.A DEPRESSION, UNSPECIFIED: ICD-10-CM

## 2025-03-07 DIAGNOSIS — Z86.73 PERSONAL HISTORY OF TRANSIENT ISCHEMIC ATTACK (TIA), AND CEREBRAL INFARCTION WITHOUT RESIDUAL DEFICITS: ICD-10-CM

## 2025-03-07 DIAGNOSIS — K08.89 OTHER SPECIFIED DISORDERS OF TEETH AND SUPPORTING STRUCTURES: ICD-10-CM

## 2025-03-07 DIAGNOSIS — E78.5 HYPERLIPIDEMIA, UNSPECIFIED: ICD-10-CM

## 2025-03-07 DIAGNOSIS — E11.9 TYPE 2 DIABETES MELLITUS WITHOUT COMPLICATIONS: ICD-10-CM

## 2025-03-07 DIAGNOSIS — G40.909 EPILEPSY, UNSPECIFIED, NOT INTRACTABLE, WITHOUT STATUS EPILEPTICUS: ICD-10-CM

## 2025-03-07 PROCEDURE — 99283 EMERGENCY DEPT VISIT LOW MDM: CPT

## 2025-03-07 PROCEDURE — 99283 EMERGENCY DEPT VISIT LOW MDM: CPT | Mod: GC

## 2025-03-07 RX ORDER — AMOXICILLIN AND CLAVULANATE POTASSIUM 500; 125 MG/1; MG/1
1 TABLET, FILM COATED ORAL
Qty: 28 | Refills: 0
Start: 2025-03-07 | End: 2025-03-20

## 2025-03-07 NOTE — ED PROVIDER NOTE - PHYSICAL EXAMINATION
CONSTITUTIONAL: NAD  SKIN: Warm, dry  HEAD: NCAT  EYES: Clear conjunctiva   ENMT: Oral mucosa and posterior oropharynx moist without ulcerations or lesions. Uvula midline.  Very poor dentition, multiple missing teeth, No surrounding fluctuance, induration or sign of dental abscess. No tongue laceration or wounds.   NECK: Supple  CARD: RRR, S1, S2; no M/R/G  RESP: Normal respiratory effort, CTAB  EXT: no gross deformity  NEURO: Grossly intact. Awake, alert, moving all extremities, no facial asymmetry.

## 2025-03-07 NOTE — ED PROVIDER NOTE - PROGRESS NOTE DETAILS
SAMI-- discussed plan for DC with abx with [facility] staff, who report patient can follow up with his dentist outpt. Return precautions discussed.

## 2025-03-07 NOTE — ED PROVIDER NOTE - CLINICAL SUMMARY MEDICAL DECISION MAKING FREE TEXT BOX
59-year-old coming here for bloody mouth.  Blood noted on his pillow.  Exam discerning for dentalgia/dental infection with blood from gums.  No abscess noted.  Antibiotics given.  Recommending dental follow-up.  Facility aware.    all results d/w pt & copies given, strict return precautions discussed, rec outpt pmd

## 2025-03-07 NOTE — ED ADULT NURSE NOTE - CHIEF COMPLAINT QUOTE
BIBA from [facility] after nurse saw blood coming out of mouth. pt is a&ox2 at baseline. upon assessment, noted mild blood coming from upper R tooth.  has hx of alzheimer's. denies pain. pt on plavix.

## 2025-03-07 NOTE — ED PROVIDER NOTE - PATIENT PORTAL LINK FT
You can access the FollowMyHealth Patient Portal offered by [facility] by registering at the following website: [email or web address]. By joining [email or web address]’s FollowMyHealth portal, you will also be able to view your health information using other applications (apps) compatible with our system.

## 2025-03-07 NOTE — ED PROVIDER NOTE - NSFOLLOWUPINSTRUCTIONS_ED_ALL_ED_FT
PLEASE FOLLOW UP WITH A DENTIST ASAP AND TAKE ANTIBIOTICS AS DISCUSSED      Dental Pain    Dental pain (toothache) may be caused by many things including tooth decay (cavities or caries), abscess or infection, or trauma. If you were prescribed an antibiotic medicine, finish all of it even if you start to feel better. Rinsing your mouth with salt water or applying ice to the painful area of your face may help with the pain. Follow up with a dentist is important in ensuring good oral health and preventing the worsening of dental disease.    SEEK IMMEDIATE MEDICAL CARE IF YOU HAVE ANY OF THE FOLLOWING SYMPTOMS: unable to open your mouth, trouble breathing or swallowing, fever, or swelling of the face, neck, or jaw.    You visited the Emergency Department for a dental issue. We recommend that you follow up with a dentist. If you have a private dentist, please contact them directly. If you do not have a dentist, we have a dental clinic that will do its best to accommodate you, depending on appointment availability and your insurance coverage. To make an appointment, please call the dental clinic at [phone] and leave a voicemail or email them at [email or web address] for a response.

## 2025-03-07 NOTE — ED ADULT NURSE NOTE - OBJECTIVE STATEMENT
58 y/o male BIB[facility] after blood was noted coming from mouth  hx of dementia -- pt unsure why he is here, denying any pain or discomfort

## 2025-03-07 NOTE — ED PROVIDER NOTE - OBJECTIVE STATEMENT
58yo M sent from [facility] for evaluation of blood and drool leaking from the corner of his mouth when he woke up this morning. On interview, pt is AOx2, denies any current complaints. Call placed to [facility], staff report other than the blood on his pillow this morning, patient has been otherwise well, had no recent fever, nausea, vomiting or change in behavior. Also deny any known recent trauma. 60yo M PMHx CVA, Vascular Dementia (AOx1-2 at baseline), Pseudobulbar Affect/Depression, Seizure Disorder on keppra, DM2, HTN, HLD  sent from [facility] for evaluation of blood and drool leaking from the corner of his mouth when he woke up this morning. On interview, pt is AOx2, denies any current complaints. Call placed to [facility], staff report other than the blood on his pillow this morning, patient has been otherwise well, had no recent fever, nausea, vomiting or change in behavior. Also deny any known recent trauma.

## 2025-03-07 NOTE — ED ADULT TRIAGE NOTE - CHIEF COMPLAINT QUOTE
BIBA from [facility] after nurse saw blood coming out of mouth. pt is a&ox2, has hx of alzheimers. denies pain. no active bleeding noted. [facility] nurse saw blood coming out of mouth. pt is a&ox2, upon assessment, noted mild blood coming from upper R tooth.  has hx of alzheimers. denies pain. pt on plavix. BIBA from [facility] after nurse saw blood coming out of mouth. pt is a&ox2 at baseline. upon assessment, noted mild blood coming from upper R tooth.  has hx of alzheimer's. denies pain. pt on plavix.

## 2025-05-17 ENCOUNTER — EMERGENCY (EMERGENCY)
Facility: HOSPITAL | Age: 60
LOS: 0 days | Discharge: HOME | End: 2025-05-18
Attending: EMERGENCY MEDICINE
Payer: MEDICARE

## 2025-05-17 VITALS
DIASTOLIC BLOOD PRESSURE: 74 MMHG | SYSTOLIC BLOOD PRESSURE: 123 MMHG | WEIGHT: 199.96 LBS | OXYGEN SATURATION: 95 % | TEMPERATURE: 98 F | RESPIRATION RATE: 18 BRPM | HEART RATE: 74 BPM

## 2025-05-17 VITALS
HEART RATE: 60 BPM | SYSTOLIC BLOOD PRESSURE: 164 MMHG | OXYGEN SATURATION: 98 % | RESPIRATION RATE: 16 BRPM | DIASTOLIC BLOOD PRESSURE: 79 MMHG | TEMPERATURE: 98 F

## 2025-05-17 DIAGNOSIS — F32.A DEPRESSION, UNSPECIFIED: ICD-10-CM

## 2025-05-17 DIAGNOSIS — E11.65 TYPE 2 DIABETES MELLITUS WITH HYPERGLYCEMIA: ICD-10-CM

## 2025-05-17 DIAGNOSIS — R73.9 HYPERGLYCEMIA, UNSPECIFIED: ICD-10-CM

## 2025-05-17 DIAGNOSIS — Z86.73 PERSONAL HISTORY OF TRANSIENT ISCHEMIC ATTACK (TIA), AND CEREBRAL INFARCTION WITHOUT RESIDUAL DEFICITS: ICD-10-CM

## 2025-05-17 DIAGNOSIS — I10 ESSENTIAL (PRIMARY) HYPERTENSION: ICD-10-CM

## 2025-05-17 DIAGNOSIS — E78.5 HYPERLIPIDEMIA, UNSPECIFIED: ICD-10-CM

## 2025-05-17 DIAGNOSIS — F01.50 VASCULAR DEMENTIA, UNSPECIFIED SEVERITY, WITHOUT BEHAVIORAL DISTURBANCE, PSYCHOTIC DISTURBANCE, MOOD DISTURBANCE, AND ANXIETY: ICD-10-CM

## 2025-05-17 DIAGNOSIS — G40.909 EPILEPSY, UNSPECIFIED, NOT INTRACTABLE, WITHOUT STATUS EPILEPTICUS: ICD-10-CM

## 2025-05-17 LAB
ALBUMIN SERPL ELPH-MCNC: 3.5 G/DL — SIGNIFICANT CHANGE UP (ref 3.5–5.2)
ALP SERPL-CCNC: 111 U/L — SIGNIFICANT CHANGE UP (ref 30–115)
ALT FLD-CCNC: 15 U/L — SIGNIFICANT CHANGE UP (ref 0–41)
ANION GAP SERPL CALC-SCNC: 11 MMOL/L — SIGNIFICANT CHANGE UP (ref 7–14)
AST SERPL-CCNC: 18 U/L — SIGNIFICANT CHANGE UP (ref 0–41)
B-OH-BUTYR SERPL-SCNC: <0.2 MMOL/L — SIGNIFICANT CHANGE UP
BASOPHILS # BLD AUTO: 0.06 K/UL — SIGNIFICANT CHANGE UP (ref 0–0.2)
BASOPHILS NFR BLD AUTO: 0.7 % — SIGNIFICANT CHANGE UP (ref 0–1)
BILIRUB SERPL-MCNC: 0.2 MG/DL — SIGNIFICANT CHANGE UP (ref 0.2–1.2)
BUN SERPL-MCNC: 23 MG/DL — HIGH (ref 10–20)
CALCIUM SERPL-MCNC: 9.3 MG/DL — SIGNIFICANT CHANGE UP (ref 8.4–10.5)
CHLORIDE SERPL-SCNC: 102 MMOL/L — SIGNIFICANT CHANGE UP (ref 98–110)
CO2 SERPL-SCNC: 23 MMOL/L — SIGNIFICANT CHANGE UP (ref 17–32)
CREAT SERPL-MCNC: 0.9 MG/DL — SIGNIFICANT CHANGE UP (ref 0.7–1.5)
EGFR: 97 ML/MIN/1.73M2 — SIGNIFICANT CHANGE UP
EGFR: 97 ML/MIN/1.73M2 — SIGNIFICANT CHANGE UP
EOSINOPHIL # BLD AUTO: 0.22 K/UL — SIGNIFICANT CHANGE UP (ref 0–0.7)
EOSINOPHIL NFR BLD AUTO: 2.4 % — SIGNIFICANT CHANGE UP (ref 0–8)
FLUAV AG NPH QL: SIGNIFICANT CHANGE UP
FLUBV AG NPH QL: SIGNIFICANT CHANGE UP
GAS PNL BLDV: SIGNIFICANT CHANGE UP
GLUCOSE SERPL-MCNC: 493 MG/DL — CRITICAL HIGH (ref 70–99)
HCT VFR BLD CALC: 42.8 % — SIGNIFICANT CHANGE UP (ref 42–52)
HGB BLD-MCNC: 14.5 G/DL — SIGNIFICANT CHANGE UP (ref 14–18)
IMM GRANULOCYTES NFR BLD AUTO: 0.4 % — HIGH (ref 0.1–0.3)
LYMPHOCYTES # BLD AUTO: 1.79 K/UL — SIGNIFICANT CHANGE UP (ref 1.2–3.4)
LYMPHOCYTES # BLD AUTO: 19.7 % — LOW (ref 20.5–51.1)
MAGNESIUM SERPL-MCNC: 2.1 MG/DL — SIGNIFICANT CHANGE UP (ref 1.8–2.4)
MCHC RBC-ENTMCNC: 30.3 PG — SIGNIFICANT CHANGE UP (ref 27–31)
MCHC RBC-ENTMCNC: 33.9 G/DL — SIGNIFICANT CHANGE UP (ref 32–37)
MCV RBC AUTO: 89.5 FL — SIGNIFICANT CHANGE UP (ref 80–94)
MONOCYTES # BLD AUTO: 0.73 K/UL — HIGH (ref 0.1–0.6)
MONOCYTES NFR BLD AUTO: 8 % — SIGNIFICANT CHANGE UP (ref 1.7–9.3)
NEUTROPHILS # BLD AUTO: 6.25 K/UL — SIGNIFICANT CHANGE UP (ref 1.4–6.5)
NEUTROPHILS NFR BLD AUTO: 68.8 % — SIGNIFICANT CHANGE UP (ref 42.2–75.2)
NRBC BLD AUTO-RTO: 0 /100 WBCS — SIGNIFICANT CHANGE UP (ref 0–0)
PHOSPHATE SERPL-MCNC: 2.6 MG/DL — SIGNIFICANT CHANGE UP (ref 2.1–4.9)
PLATELET # BLD AUTO: 295 K/UL — SIGNIFICANT CHANGE UP (ref 130–400)
PMV BLD: 11 FL — HIGH (ref 7.4–10.4)
POTASSIUM SERPL-MCNC: 4.7 MMOL/L — SIGNIFICANT CHANGE UP (ref 3.5–5)
POTASSIUM SERPL-SCNC: 4.7 MMOL/L — SIGNIFICANT CHANGE UP (ref 3.5–5)
PROT SERPL-MCNC: 6.8 G/DL — SIGNIFICANT CHANGE UP (ref 6–8)
RBC # BLD: 4.78 M/UL — SIGNIFICANT CHANGE UP (ref 4.7–6.1)
RBC # FLD: 13.4 % — SIGNIFICANT CHANGE UP (ref 11.5–14.5)
RSV RNA NPH QL NAA+NON-PROBE: SIGNIFICANT CHANGE UP
SARS-COV-2 RNA SPEC QL NAA+PROBE: SIGNIFICANT CHANGE UP
SODIUM SERPL-SCNC: 136 MMOL/L — SIGNIFICANT CHANGE UP (ref 135–146)
SOURCE RESPIRATORY: SIGNIFICANT CHANGE UP
WBC # BLD: 9.09 K/UL — SIGNIFICANT CHANGE UP (ref 4.8–10.8)
WBC # FLD AUTO: 9.09 K/UL — SIGNIFICANT CHANGE UP (ref 4.8–10.8)

## 2025-05-17 PROCEDURE — 85014 HEMATOCRIT: CPT

## 2025-05-17 PROCEDURE — 84295 ASSAY OF SERUM SODIUM: CPT

## 2025-05-17 PROCEDURE — 0241U: CPT

## 2025-05-17 PROCEDURE — 85025 COMPLETE CBC W/AUTO DIFF WBC: CPT

## 2025-05-17 PROCEDURE — 83605 ASSAY OF LACTIC ACID: CPT

## 2025-05-17 PROCEDURE — 93010 ELECTROCARDIOGRAM REPORT: CPT

## 2025-05-17 PROCEDURE — 36000 PLACE NEEDLE IN VEIN: CPT

## 2025-05-17 PROCEDURE — 71045 X-RAY EXAM CHEST 1 VIEW: CPT

## 2025-05-17 PROCEDURE — 85018 HEMOGLOBIN: CPT

## 2025-05-17 PROCEDURE — 82330 ASSAY OF CALCIUM: CPT

## 2025-05-17 PROCEDURE — 36415 COLL VENOUS BLD VENIPUNCTURE: CPT

## 2025-05-17 PROCEDURE — 84100 ASSAY OF PHOSPHORUS: CPT

## 2025-05-17 PROCEDURE — 82962 GLUCOSE BLOOD TEST: CPT

## 2025-05-17 PROCEDURE — 99285 EMERGENCY DEPT VISIT HI MDM: CPT | Mod: 25

## 2025-05-17 PROCEDURE — 82803 BLOOD GASES ANY COMBINATION: CPT

## 2025-05-17 PROCEDURE — 71045 X-RAY EXAM CHEST 1 VIEW: CPT | Mod: 26

## 2025-05-17 PROCEDURE — 80053 COMPREHEN METABOLIC PANEL: CPT

## 2025-05-17 PROCEDURE — 83735 ASSAY OF MAGNESIUM: CPT

## 2025-05-17 PROCEDURE — 84132 ASSAY OF SERUM POTASSIUM: CPT

## 2025-05-17 PROCEDURE — 99285 EMERGENCY DEPT VISIT HI MDM: CPT | Mod: FS

## 2025-05-17 PROCEDURE — 96372 THER/PROPH/DIAG INJ SC/IM: CPT

## 2025-05-17 PROCEDURE — 82010 KETONE BODYS QUAN: CPT

## 2025-05-17 PROCEDURE — 93005 ELECTROCARDIOGRAM TRACING: CPT

## 2025-05-17 RX ORDER — SODIUM CHLORIDE 9 G/1000ML
1000 INJECTION, SOLUTION INTRAVENOUS ONCE
Refills: 0 | Status: COMPLETED | OUTPATIENT
Start: 2025-05-17 | End: 2025-05-17

## 2025-05-17 RX ADMIN — Medication 40 MILLIEQUIVALENT(S): at 21:55

## 2025-05-17 RX ADMIN — SODIUM CHLORIDE 1000 MILLILITER(S): 9 INJECTION, SOLUTION INTRAVENOUS at 20:10

## 2025-05-17 RX ADMIN — SODIUM CHLORIDE 1000 MILLILITER(S): 9 INJECTION, SOLUTION INTRAVENOUS at 21:55

## 2025-05-17 RX ADMIN — Medication 6 UNIT(S): at 21:54

## 2025-07-17 NOTE — ED ADULT TRIAGE NOTE - GLASGOW COMA SCALE: BEST [employer]OR RESPONSE, MLM
"Subjective: Pt agreeable to therapeutic plan of care.    Objective:     Precautions -   Falls     Bed mobility -   NT secondary to pt sitting up in b/s chair   Transfers -  SBA with RW   Ambulation -  100' with RW with SBA       Vitals: WNL    Pain: 0 VAS Location: n/a  Intervention for pain: N/A    Education: Provided education on the importance of mobility in the acute care setting, Verbal/Tactile Cues, Transfer Training, Gait Training, and Energy conservation strategies    Assessment: Faby Le presents with functional mobility impairments which indicate the need for skilled intervention. Tolerating session today without incident. Pt on room air and telemetry this date.  SBA for transfers with use of RW and ambulated 100' with RW with SBA.  HR remained WNL throughout session.  Will continue to follow and progress as tolerated.     Plan/Recommendations:   If medically appropriate, Low Intensity Therapy recommended post-acute care - This is recommended as therapy feels this patient would require 2-3 visits per week. OP or HH would be the best option depending on patient's home bound status. Consider, if the patient has other  \"skilled\" needs such as wounds, IV antibiotics, etc. Combined with \"low intensity\" could also equate to a SNF. If patient is medically complex, consider LTAC. Pt requires no DME at discharge.     Pt desires to return to [facility] with HHPT at discharge. Pt cooperative; agreeable to therapeutic recommendations and plan of care.       Post-Tx Position: Up in Chair, Staff Present, and Call light and personal items within reach  PPE: gloves and surgical mask    Therapy Charges for Today       Code Description Service Date Service Provider Modifiers Qty    61670204561 HC GAIT TRAINING EA 15 MIN 7/17/2025 Huey Terry, PT GP 1    91915864496 HC PT THERAPEUTIC ACT EA 15 MIN 7/17/2025 Huey Terry, PT GP 1           PT Charges       Row Name 07/17/25 1119             Time Calculation    Start Time " 1007  -AM      Stop Time 1025  -AM      Time Calculation (min) 18 min  -AM      PT Received On 07/17/25  -AM      PT - Next Appointment 07/18/25  -AM         Time Calculation- PT    Total Timed Code Minutes- PT 18 minute(s)  -AM                User Key  (r) = Recorded By, (t) = Taken By, (c) = Cosigned By      Initials Name Provider Type    Huey Gee, PT Physical Therapist                    (M6) obeys commands